# Patient Record
Sex: FEMALE | Race: WHITE | Employment: OTHER | URBAN - METROPOLITAN AREA
[De-identification: names, ages, dates, MRNs, and addresses within clinical notes are randomized per-mention and may not be internally consistent; named-entity substitution may affect disease eponyms.]

---

## 2017-07-20 PROBLEM — E03.9 HYPOTHYROIDISM, ADULT: Status: ACTIVE | Noted: 2017-07-20

## 2017-07-20 PROBLEM — E78.2 HYPERLIPIDEMIA, MIXED: Status: ACTIVE | Noted: 2017-07-20

## 2017-07-20 PROBLEM — I48.20 CHRONIC ATRIAL FIBRILLATION (HCC): Status: ACTIVE | Noted: 2017-07-20

## 2017-07-20 PROBLEM — R73.01 IFG (IMPAIRED FASTING GLUCOSE): Status: ACTIVE | Noted: 2017-07-20

## 2017-07-20 PROBLEM — H40.9 GLAUCOMA: Status: ACTIVE | Noted: 2017-07-20

## 2017-07-20 PROBLEM — Z86.73 HISTORY OF CVA (CEREBROVASCULAR ACCIDENT): Status: ACTIVE | Noted: 2017-07-20

## 2017-07-20 PROBLEM — I10 HTN (HYPERTENSION), BENIGN: Status: ACTIVE | Noted: 2017-07-20

## 2017-07-20 PROBLEM — K21.00 GASTROESOPHAGEAL REFLUX DISEASE WITH ESOPHAGITIS: Status: ACTIVE | Noted: 2017-07-20

## 2017-07-20 PROBLEM — M51.36 DDD (DEGENERATIVE DISC DISEASE), LUMBAR: Status: ACTIVE | Noted: 2017-07-20

## 2017-07-20 PROBLEM — M81.0 AGE RELATED OSTEOPOROSIS: Status: ACTIVE | Noted: 2017-07-20

## 2017-07-21 PROBLEM — M15.9 PRIMARY OSTEOARTHRITIS INVOLVING MULTIPLE JOINTS: Status: ACTIVE | Noted: 2017-07-21

## 2017-08-10 PROBLEM — K40.90 RIGHT INGUINAL HERNIA: Status: ACTIVE | Noted: 2017-08-10

## 2018-06-18 ENCOUNTER — HOSPITAL ENCOUNTER (OUTPATIENT)
Dept: LAB | Age: 83
Discharge: HOME | End: 2018-06-18
Attending: INTERNAL MEDICINE
Payer: MEDICARE

## 2018-06-18 DIAGNOSIS — R53.83 FATIGUE, UNSPECIFIED TYPE: ICD-10-CM

## 2018-06-18 LAB
T4 FREE SERPL-MCNC: 1.5 NG/DL (ref 0.78–1.46)
TSH SERPL DL<=0.005 MIU/L-ACNC: 0.9 UIU/ML (ref 0.36–3.74)

## 2018-06-18 PROCEDURE — 84443 ASSAY THYROID STIM HORMONE: CPT | Performed by: INTERNAL MEDICINE

## 2018-06-18 PROCEDURE — 84439 ASSAY OF FREE THYROXINE: CPT | Performed by: INTERNAL MEDICINE

## 2018-10-10 RX ORDER — SODIUM CHLORIDE 0.9 % (FLUSH) 0.9 %
5-10 SYRINGE (ML) INJECTION AS NEEDED
Status: CANCELLED | OUTPATIENT
Start: 2018-10-10

## 2018-10-10 RX ORDER — SODIUM CHLORIDE 0.9 % (FLUSH) 0.9 %
5-10 SYRINGE (ML) INJECTION EVERY 8 HOURS
Status: CANCELLED | OUTPATIENT
Start: 2018-10-10

## 2018-10-11 ENCOUNTER — ANESTHESIA EVENT (OUTPATIENT)
Dept: SURGERY | Age: 83
End: 2018-10-11
Payer: MEDICARE

## 2018-10-11 RX ORDER — SODIUM CHLORIDE 0.9 % (FLUSH) 0.9 %
5-10 SYRINGE (ML) INJECTION EVERY 8 HOURS
Status: CANCELLED | OUTPATIENT
Start: 2018-10-11

## 2018-10-11 RX ORDER — SODIUM CHLORIDE 0.9 % (FLUSH) 0.9 %
5-10 SYRINGE (ML) INJECTION AS NEEDED
Status: CANCELLED | OUTPATIENT
Start: 2018-10-11

## 2018-10-11 NOTE — ANESTHESIA PREPROCEDURE EVALUATION
Anesthetic History Review of Systems / Medical History Patient summary reviewed, nursing notes reviewed and pertinent labs reviewed Pulmonary Neuro/Psych CVA Cardiovascular Hypertension Dysrhythmias : atrial fibrillation Hyperlipidemia Exercise tolerance: >4 METS Comments: TTE Sept 2017:  LVEF 50-55%. Aortic valve sclerosis w mild AR. Mild-moderate MR. Moderate TR.  
GI/Hepatic/Renal 
  
GERD: well controlled PUD (remote hx) Endo/Other Hypothyroidism: well controlled Arthritis Other Findings Physical Exam 
 
Airway Mallampati: I 
TM Distance: 4 - 6 cm Neck ROM: normal range of motion Mouth opening: Normal 
 
 Cardiovascular Rhythm: irregular Rate: normal 
 
 
 
 Dental 
No notable dental hx Pulmonary Breath sounds clear to auscultation Abdominal 
GI exam deferred Other Findings Anesthetic Plan ASA: 3 Anesthesia type: spinal 
Pt and daughter requesting avoidance of general anesthesia if possible Anesthetic plan and risks discussed with: Patient and Son / Daughter

## 2018-10-12 ENCOUNTER — HOSPITAL ENCOUNTER (OUTPATIENT)
Age: 83
Setting detail: OUTPATIENT SURGERY
Discharge: HOME | End: 2018-10-12
Attending: SURGERY | Admitting: SURGERY
Payer: MEDICARE

## 2018-10-12 ENCOUNTER — ANESTHESIA (OUTPATIENT)
Dept: SURGERY | Age: 83
End: 2018-10-12
Payer: MEDICARE

## 2018-10-12 VITALS
DIASTOLIC BLOOD PRESSURE: 75 MMHG | BODY MASS INDEX: 25.61 KG/M2 | SYSTOLIC BLOOD PRESSURE: 167 MMHG | RESPIRATION RATE: 15 BRPM | OXYGEN SATURATION: 100 % | HEART RATE: 67 BPM | TEMPERATURE: 98.1 F | WEIGHT: 126.8 LBS

## 2018-10-12 DIAGNOSIS — K40.90 RIGHT INGUINAL HERNIA: Primary | ICD-10-CM

## 2018-10-12 LAB — HGB BLD-MCNC: 13.8 G/DL (ref 11.7–15.4)

## 2018-10-12 PROCEDURE — 77030007880 HC KT SPN EPDRL BBMI -B: Performed by: ANESTHESIOLOGY

## 2018-10-12 PROCEDURE — 76060000033 HC ANESTHESIA 1 TO 1.5 HR: Performed by: SURGERY

## 2018-10-12 PROCEDURE — 77030018836 HC SOL IRR NACL ICUM -A: Performed by: SURGERY

## 2018-10-12 PROCEDURE — 74011000250 HC RX REV CODE- 250

## 2018-10-12 PROCEDURE — 77030002933 HC SUT MCRYL J&J -A: Performed by: SURGERY

## 2018-10-12 PROCEDURE — 77030032490 HC SLV COMPR SCD KNE COVD -B: Performed by: SURGERY

## 2018-10-12 PROCEDURE — 76210000020 HC REC RM PH II FIRST 0.5 HR: Performed by: SURGERY

## 2018-10-12 PROCEDURE — 74011250637 HC RX REV CODE- 250/637: Performed by: ANESTHESIOLOGY

## 2018-10-12 PROCEDURE — 77030002996 HC SUT SLK J&J -A: Performed by: SURGERY

## 2018-10-12 PROCEDURE — 77030003665 HC NDL SPN BBMI -A: Performed by: ANESTHESIOLOGY

## 2018-10-12 PROCEDURE — 74011250636 HC RX REV CODE- 250/636

## 2018-10-12 PROCEDURE — 74011250637 HC RX REV CODE- 250/637

## 2018-10-12 PROCEDURE — 85018 HEMOGLOBIN: CPT

## 2018-10-12 PROCEDURE — 77030031139 HC SUT VCRL2 J&J -A: Performed by: SURGERY

## 2018-10-12 PROCEDURE — 77030039266 HC ADH SKN EXOFIN S2SG -A: Performed by: SURGERY

## 2018-10-12 PROCEDURE — C1781 MESH (IMPLANTABLE): HCPCS | Performed by: SURGERY

## 2018-10-12 PROCEDURE — 77030020782 HC GWN BAIR PAWS FLX 3M -B: Performed by: ANESTHESIOLOGY

## 2018-10-12 PROCEDURE — 74011250636 HC RX REV CODE- 250/636: Performed by: SURGERY

## 2018-10-12 PROCEDURE — 77030002986 HC SUT PROL J&J -A: Performed by: SURGERY

## 2018-10-12 PROCEDURE — 77030020143 HC AIRWY LARYN INTUB CGAS -A: Performed by: ANESTHESIOLOGY

## 2018-10-12 PROCEDURE — 74011250636 HC RX REV CODE- 250/636: Performed by: ANESTHESIOLOGY

## 2018-10-12 PROCEDURE — 76010000149 HC OR TIME 1 TO 1.5 HR: Performed by: SURGERY

## 2018-10-12 PROCEDURE — 76210000006 HC OR PH I REC 0.5 TO 1 HR: Performed by: SURGERY

## 2018-10-12 DEVICE — MESH HERN W1.8XL4IN INGUINAL POLYPR PRESHAPED SPERMATIC CRD: Type: IMPLANTABLE DEVICE | Site: GROIN | Status: FUNCTIONAL

## 2018-10-12 RX ORDER — CEFAZOLIN SODIUM/WATER 2 G/20 ML
2 SYRINGE (ML) INTRAVENOUS ONCE
Status: COMPLETED | OUTPATIENT
Start: 2018-10-12 | End: 2018-10-12

## 2018-10-12 RX ORDER — GUAIFENESIN 100 MG/5ML
81 LIQUID (ML) ORAL ONCE
Status: DISCONTINUED | OUTPATIENT
Start: 2018-10-12 | End: 2018-10-12 | Stop reason: HOSPADM

## 2018-10-12 RX ORDER — GUAIFENESIN 100 MG/5ML
81 LIQUID (ML) ORAL DAILY
Status: DISCONTINUED | OUTPATIENT
Start: 2018-10-12 | End: 2018-10-12 | Stop reason: CLARIF

## 2018-10-12 RX ORDER — SODIUM CHLORIDE, SODIUM LACTATE, POTASSIUM CHLORIDE, CALCIUM CHLORIDE 600; 310; 30; 20 MG/100ML; MG/100ML; MG/100ML; MG/100ML
100 INJECTION, SOLUTION INTRAVENOUS CONTINUOUS
Status: DISCONTINUED | OUTPATIENT
Start: 2018-10-12 | End: 2018-10-12 | Stop reason: HOSPADM

## 2018-10-12 RX ORDER — GUAIFENESIN 100 MG/5ML
LIQUID (ML) ORAL
Status: COMPLETED
Start: 2018-10-12 | End: 2018-10-12

## 2018-10-12 RX ORDER — BUPIVACAINE HYDROCHLORIDE 7.5 MG/ML
INJECTION, SOLUTION EPIDURAL; RETROBULBAR AS NEEDED
Status: DISCONTINUED | OUTPATIENT
Start: 2018-10-12 | End: 2018-10-12 | Stop reason: HOSPADM

## 2018-10-12 RX ORDER — PROPOFOL 10 MG/ML
INJECTION, EMULSION INTRAVENOUS
Status: DISCONTINUED | OUTPATIENT
Start: 2018-10-12 | End: 2018-10-12 | Stop reason: HOSPADM

## 2018-10-12 RX ORDER — OXYCODONE AND ACETAMINOPHEN 5; 325 MG/1; MG/1
TABLET ORAL
Qty: 24 TAB | Refills: 0 | Status: SHIPPED | OUTPATIENT
Start: 2018-10-12 | End: 2019-11-11

## 2018-10-12 RX ORDER — ONDANSETRON 2 MG/ML
INJECTION INTRAMUSCULAR; INTRAVENOUS AS NEEDED
Status: DISCONTINUED | OUTPATIENT
Start: 2018-10-12 | End: 2018-10-12 | Stop reason: HOSPADM

## 2018-10-12 RX ORDER — NALOXONE HYDROCHLORIDE 0.4 MG/ML
0.1 INJECTION, SOLUTION INTRAMUSCULAR; INTRAVENOUS; SUBCUTANEOUS AS NEEDED
Status: DISCONTINUED | OUTPATIENT
Start: 2018-10-12 | End: 2018-10-12 | Stop reason: HOSPADM

## 2018-10-12 RX ORDER — HYDROMORPHONE HYDROCHLORIDE 2 MG/ML
0.5 INJECTION, SOLUTION INTRAMUSCULAR; INTRAVENOUS; SUBCUTANEOUS
Status: DISCONTINUED | OUTPATIENT
Start: 2018-10-12 | End: 2018-10-12 | Stop reason: HOSPADM

## 2018-10-12 RX ORDER — SODIUM CHLORIDE 0.9 % (FLUSH) 0.9 %
5-10 SYRINGE (ML) INJECTION AS NEEDED
Status: DISCONTINUED | OUTPATIENT
Start: 2018-10-12 | End: 2018-10-12 | Stop reason: HOSPADM

## 2018-10-12 RX ORDER — LIDOCAINE HYDROCHLORIDE 10 MG/ML
0.1 INJECTION INFILTRATION; PERINEURAL AS NEEDED
Status: DISCONTINUED | OUTPATIENT
Start: 2018-10-12 | End: 2018-10-12 | Stop reason: HOSPADM

## 2018-10-12 RX ORDER — PROPOFOL 10 MG/ML
INJECTION, EMULSION INTRAVENOUS AS NEEDED
Status: DISCONTINUED | OUTPATIENT
Start: 2018-10-12 | End: 2018-10-12 | Stop reason: HOSPADM

## 2018-10-12 RX ORDER — OXYCODONE HYDROCHLORIDE 5 MG/1
5 TABLET ORAL
Status: COMPLETED | OUTPATIENT
Start: 2018-10-12 | End: 2018-10-12

## 2018-10-12 RX ORDER — FENTANYL CITRATE 50 UG/ML
INJECTION, SOLUTION INTRAMUSCULAR; INTRAVENOUS AS NEEDED
Status: DISCONTINUED | OUTPATIENT
Start: 2018-10-12 | End: 2018-10-12 | Stop reason: HOSPADM

## 2018-10-12 RX ADMIN — FENTANYL CITRATE 25 MCG: 50 INJECTION, SOLUTION INTRAMUSCULAR; INTRAVENOUS at 07:43

## 2018-10-12 RX ADMIN — PROPOFOL 50 MCG/KG/MIN: 10 INJECTION, EMULSION INTRAVENOUS at 07:30

## 2018-10-12 RX ADMIN — PROPOFOL 120 MG: 10 INJECTION, EMULSION INTRAVENOUS at 07:50

## 2018-10-12 RX ADMIN — ASPIRIN 81 MG 81 MG: 81 TABLET ORAL at 07:12

## 2018-10-12 RX ADMIN — Medication 2 G: at 07:30

## 2018-10-12 RX ADMIN — PROPOFOL 20 MG: 10 INJECTION, EMULSION INTRAVENOUS at 07:30

## 2018-10-12 RX ADMIN — SODIUM CHLORIDE, SODIUM LACTATE, POTASSIUM CHLORIDE, AND CALCIUM CHLORIDE 100 ML/HR: 600; 310; 30; 20 INJECTION, SOLUTION INTRAVENOUS at 06:18

## 2018-10-12 RX ADMIN — BUPIVACAINE HYDROCHLORIDE 1.8 ML: 7.5 INJECTION, SOLUTION EPIDURAL; RETROBULBAR at 07:22

## 2018-10-12 RX ADMIN — OXYCODONE HYDROCHLORIDE 2.5 MG: 5 TABLET ORAL at 08:57

## 2018-10-12 RX ADMIN — ONDANSETRON 4 MG: 2 INJECTION INTRAMUSCULAR; INTRAVENOUS at 07:57

## 2018-10-12 NOTE — ANESTHESIA PROCEDURE NOTES
Spinal Block Start time: 10/12/2018 7:20 AM 
End time: 10/12/2018 7:22 AM 
Performed by: Rocio Rose Authorized by: Rocio Rose  
 
Pre-procedure: Indications: primary anesthetic  Preanesthetic Checklist: patient identified, risks and benefits discussed, anesthesia consent, site marked, patient being monitored and timeout performed Timeout Time: 07:20 Spinal Block:  
Patient Position:  Seated Prep Region:  Lumbar Prep: chlorhexidine and patient draped Location:  L3-4 Technique:  Single shot Local:  Lidocaine 1% Needle:  
Needle Type:  Quincke Needle Gauge:  22 G Attempts:  1 Events: CSF confirmed and no blood with aspiration Assessment: 
Insertion:  Uncomplicated Patient tolerance:  Patient tolerated the procedure well with no immediate complications

## 2018-10-12 NOTE — IP AVS SNAPSHOT
[address] [facility] 
 
 
 [address] [facility] [address] 
[phone] Patient: Carmen Wetzel MRN: [id number] NGD:[id number] About your hospitalization You were admitted on:  October 12, 2018 You last received care in the:  [facility] You were discharged on:  October 12, 2018 Why you were hospitalized Your primary diagnosis was:  Not on File Follow-up Information Follow up With Details Comments Contact Info Alistair Rincon Brothers, DO   [address] [facility] [place] [address] 
[phone] Candace Arredondo MD Follow up on 10/25/2018 10:15 am [address] 
[address] [address] 
[phone] Your Scheduled Appointments Thursday October 25, 2018 10:15 AM EDT Global Post Op with HELENE Gallo  
[facility] ([facility] MAIN) Jazmin [address] [facility]  
[phone] Discharge Orders None A check renay indicates which time of day the medication should be taken. My Medications START taking these medications Instructions Each Dose to Equal  
 Morning Noon Evening Bedtime  
 oxyCODONE-acetaminophen 5-325 mg per tablet Commonly known as:  PERCOCET Your last dose was: Your next dose is:    
   
   
 1 tab by mouth every four hours prn pain CONTINUE taking these medications Instructions Each Dose to Equal  
 Morning Noon Evening Bedtime  
 amLODIPine 5 mg tablet Commonly known as:  Francisco Peterson Your last dose was: Your next dose is: Take 1 Tab by mouth daily. 5 mg  
    
   
   
   
  
 benazepril 40 mg tablet Commonly known as:  LOTENSIN Your last dose was: Your next dose is: Take 1 Tab by mouth daily. 40 mg CALCIUM 600 + D 600-125 mg-unit Tab Generic drug:  calcium-cholecalciferol (d3) Your last dose was: Your next dose is: Take  by mouth. dorzolamide-timolol 22.3-6.8 mg/mL ophthalmic solution Commonly known as:  COSOPT Your last dose was: Your next dose is:    
   
   
 INT ONE GTT INTO OU BID  
     
   
   
   
  
 levothyroxine 75 mcg tablet Commonly known as:  SYNTHROID Your last dose was: Your next dose is: Take 1 Tab by mouth Daily (before breakfast). Indications: hypothyroidism 75 mcg LUMIGAN 0.01 % ophthalmic drops Generic drug:  bimatoprost  
   
Your last dose was: Your next dose is: INT 1 GTT IN OU HS  
     
   
   
   
  
 metoprolol succinate 100 mg tablet Commonly known as:  TOPROL XL Your last dose was: Your next dose is: Take 1 Tab by mouth daily. 100 mg  
    
   
   
   
  
 omeprazole 20 mg capsule Commonly known as:  PRILOSEC Your last dose was: Your next dose is: Take 1 Cap by mouth daily. 20 mg  
    
   
   
   
  
 rivaroxaban 20 mg Tab tablet Commonly known as:  Ruchi Chelsea Your last dose was: Your next dose is: Take 1 Tab by mouth daily (with dinner). 20 mg Where to Get Your Medications Information on where to get these meds will be given to you by the nurse or doctor. ! Ask your nurse or doctor about these medications  
  oxyCODONE-acetaminophen 5-325 mg per tablet Opioid Education Prescription Opioids: What You Need to Know: 
 
 
ACTIVITY · As tolerated and as directed by your doctor. · You may shower starting tomorrow but do not take a bath until released by your doctor. · Avoid lifting more than 5 pounds (pulling and straining). Avoid excessive use of stairs. · Take deep breathes and support incision with pillow when you cough. DIET · Clear liquids until no nausea or vomiting; then light diet for the first day. · Advance to regular diet on second day, unless directed by your doctor. · If nausea or vomiting continues, call your doctor. You may notice that your bowel movements are not regular right after your surgery. This is common. Try to avoid constipation and straining with bowel movements. You may want to take a fiber supplement every day (Miralax). If you have not had a bowel movement after a couple of days, ask your doctor about taking a mild laxative. CALL YOUR DOCTOR IF  
· Excessive bleeding that does not stop after holding pressure over the area. · Temperature of 101 degrees F or above. · Redness, excessive swelling or bruising, and/ or green or yellow, smelly discharge from incision. AFTER ANESTHESIA · For the first 24 hours: DO NOT drive, drink alcoholic beverages, or make important decisions. · Be aware of dizziness following anesthesia and while taking pain medication. · Limit your activities · Do not  operate hazardous machinery · If you have not urinated within 8 hours after discharge, please contact your surgeon on call. *  Please give a list of your current medications to your Primary Care Provider. *  Please update this list whenever your medications are discontinued, doses are 
    changed, or new medications (including over-the-counter products) are added. *  Please carry medication information at all times in case of emergency situations. No smoking/ No tobacco products/ Avoid exposure to second hand smoke Surgeon General's Warning:  Quitting smoking now greatly reduces serious risk to your health. Obesity, smoking, and sedentary lifestyle greatly increases your risk for illness A healthy diet, regular physical exercise & weight monitoring are important for maintaining a healthy lifestyle Recognize signs and symptoms of STROKE: 
F-face looks uneven A-arms unable to move or move unevenly S-speech slurred or non-existent T-time-call 911 as soon as signs and symptoms begin-DO NOT go Back to bed or wait to see if you get better-TIME IS BRAIN. ACO Transitions of Care Introducing [employer] [address] Kylie Martinez offers a voluntary care coordination program to provide high quality service and care to [facility] fee-for-service beneficiaries. Stephanie Rodney was designed to help you enhance your health and well-being through the following services: ? Transitions of Care  support for individuals who are transitioning from one care setting to another (example: Hospital to home). ? Chronic and Complex Care Coordination  support for individuals and caregivers of those with serious or chronic illnesses or with more than one chronic (ongoing) condition and those who take a number of different medications. If you meet specific medical criteria, a [facility] may call you directly to coordinate your care with your primary care physician and your other care providers. For questions about the [facility] programs, please, contact your physicians office. For general questions or additional information about Accountable Care Organizations: 
Please visit www.medicare.gov/acos. html or call 1-800-MEDICARE (8-148.834.6618) TTY users should call 4-912.874.2841. Introducing [facility]! Lilia Holcomb introduces [employer] patient portal. Now you can access parts of your medical record, email your doctor's office, and request medication refills online.    
 
1. In your internet browser, go to [email or web address] 2. Click on the First Time User? Click Here link in the Sign In box. You will see the New Member Sign Up page. 3. Enter your [employer] Access Code exactly as it appears below. You will not need to use this code after youve completed the sign-up process. If you do not sign up before the expiration date, you must request a new code. · [employer] Access Code: [email or web address] Expires: 12/19/2018  3:24 PM 
 
4. Enter the last four digits of your Social Security Number (xxxx) and Date of Birth (mm/dd/yyyy) as indicated and click Submit. You will be taken to the next sign-up page. 5. Create a [employer] ID. This will be your [employer] login ID and cannot be changed, so think of one that is secure and easy to remember. 6. Create a [employer] password. You can change your password at any time. 7. Enter your Password Reset Question and Answer. This can be used at a later time if you forget your password. 8. Enter your e-mail address. You will receive e-mail notification when new information is available in [address] 9. Click Sign Up. You can now view and download portions of your medical record. 10. Click the Download Summary menu link to download a portable copy of your medical information. If you have questions, please visit the Frequently Asked Questions section of the [employer] website. Remember, [employer] is NOT to be used for urgent needs. For medical emergencies, dial 911. Now available from your iPhone and Android! Introducing Mo Noyola As a [facility] patient, I wanted to make you aware of our electronic visit tool called Mo Noyola. [facility] 24/7 allows you to connect within minutes with a medical provider 24 hours a day, seven days a week via a mobile device or tablet or logging into a secure website from your computer. You can access Mo Noyola from anywhere in the [place]. A virtual visit might be right for you when you have a simple condition and feel like you just dont want to get out of bed, or cant get away from work for an appointment, when your regular [employer] provider is not available (evenings, weekends or holidays), or when youre out of town and need minor care. Electronic visits cost only $49 and if the [employer] 24/7 provider determines a prescription is needed to treat your condition, one can be electronically transmitted to a nearby pharmacy*. Please take a moment to enroll today if you have not already done so. The enrollment process is free and takes just a few minutes. To enroll, please download the [employer] 24/7 judit to your tablet or phone, or visit [email or web address] to enroll on your computer. And, as an [address] [place] [address] patient with a [employer] account, the results of your visits will be scanned into your electronic medical record and your primary care provider will be able to view the scanned results. We urge you to continue to see your regular [employer] provider for your ongoing medical care. And while your primary care provider may not be the one available when you seek a [employer] virtual visit, the peace of mind you get from getting a real diagnosis real time can be priceless. For more information on [employer], view our Frequently Asked Questions (FAQs) at [email or web address]. Sincerely, 
 
Willma Hodgkins, MD 
Chief Medical Officer Uzma Martinez *:  certain medications cannot be prescribed via [employer] Providers Seen During Your Hospitalization Provider Specialty Primary office phone Dagmar Lorenz MD General Surgery [phone] Your Primary Care Physician (PCP) Primary Care Physician Office Phone Office Fax Patricia 86, [address] [facility] [phone] You are allergic to the following Allergen Reactions Actonel (Risedronate) Other (comments) GI Problems Atorvastatin Myalgia Fosamax (Alendronate) Unknown (comments) Ketoprofen Other (comments) Peptic ulcer disease Moexipril Cough Recent Documentation Weight BMI OB Status Smoking Status 57.5 kg 25.61 kg/m2 Menopause Never Smoker Emergency Contacts Name Discharge Info Relation Home Work Mobile Nathalie Haynes  Daughter [21] [phone] Colten Winston  Other Relative [6] [phone] Patient Belongings The following personal items are in your possession at time of discharge: 
  Dental Appliances: None         Home Medications: None   Jewelry: None  Clothing: Footwear, Pants, Shirt, Undergarments, With patient    Other Valuables: None  Personal Items Sent to Safe: none Please provide this summary of care documentation to your next provider. Signatures-by signing, you are acknowledging that this After Visit Summary has been reviewed with you and you have received a copy. Patient Signature:  ____________________________________________________________ Date:  ____________________________________________________________  
  
Celester Rota Provider Signature:  ____________________________________________________________ Date:  ____________________________________________________________

## 2018-10-12 NOTE — ANESTHESIA POSTPROCEDURE EVALUATION
Post-Anesthesia Evaluation and Assessment Patient: Carmen Wetzel MRN: [id number]  SSN: [id number] YOB: 1931  Age: 80 y.o. Sex: female Cardiovascular Function/Vital Signs Visit Vitals  /70  Pulse 65  Temp 36.6 °C (97.9 °F)  Resp 16  Wt 57.5 kg (126 lb 12.8 oz)  SpO2 97%  BMI 25.61 kg/m2 Patient is status post spinal, general anesthesia for Procedure(s): OPEN RIGHT HERNIA INGUINAL REPAIR. Nausea/Vomiting: None Postoperative hydration reviewed and adequate. Pain: 
Pain Scale 1: Visual (10/12/18 2518) Pain Intensity 1: 0 (10/12/18 5038) Managed Neurological Status:  
Neuro (WDL): Exceptions to [facility] (10/12/18 8040) Neuro Neurologic State: Sleeping (10/12/18 3376) At baseline Mental Status and Level of Consciousness: Arousable Pulmonary Status:  
O2 Device: Room air (10/12/18 9464) Adequate oxygenation and airway patent Complications related to anesthesia: None Post-anesthesia assessment completed. No concerns Signed By: Emily Rucker MD   
 October 12, 2018

## 2018-10-13 NOTE — OP NOTES
[facility] REPORT    Cara Whittington  MR#: [id number]  : 10/23/1931  ACCOUNT #: [de-identified]   DATE OF SERVICE: 10/12/2018    PREOPERATIVE DIAGNOSIS:  Right inguinal hernia. POSTOPERATIVE DIAGNOSIS:  Right inguinal hernia. PROCEDURE PERFORMED:  Open right inguinal hernia repair with mesh. SURGEON:  Claudeen Siren, MD    ASSISTANT:  None. ANESTHESIA:  General anesthetic. ESTIMATED BLOOD LOSS:  10 mL. COMPLICATIONS:  None. IMPLANT:  Bard polypropylene mesh. SPECIMENS REMOVED:  none    CONDITION:  Patient was stable. INDICATION:  The patient is an 66-year-old female who presented with a palpable hernia in the right groin. After a period of conservative management, the patient began experiencing more pain and difficulty with standing and walking. She wished to have the hernia repaired. The risks, benefits and alternatives to surgical repair of the hernia were discussed in detail with the patient prior to surgery. Appropriate consent was given. PROCEDURE IN DETAIL:  The patient was taken to the operating room. She underwent a spinal anesthetic, which was then converted to a general anesthetic. The area was prepped and draped in sterile fashion. IV antibiotics were given upon induction of anesthetic. Timeout was performed identifying the patient, procedure and laterality. Curved incision was made above the inguinal crease with a 15 blade and careful dissection through the subcutaneous layers of tissue were performed with electrocautery. The external oblique fascia was identified. A 15 blade was used to open the external oblique fascia. This was extended with Metzenbaum scissors to the pubic tubercle. Fascial flaps were raised superiorly and inferiorly. A direct hernia sac was identified and was carefully dissected out from the surrounding tissues.   The hernia sac was opened at its apex and assurance of no incarceration or strangulation of bowel was performed. The bowel was reduced into the abdominal cavity with no difficulty. The hernia sac was then amputated at the level of the internal ring by suture ligation with 2-0 Vicryl suture. The distal sac was amputated with electrocautery. Mesh repair was then performed. The polypropylene pre-shaped mesh was secured at the pubic tubercle with 0 Prolene suture and then in a running fashion along the shelving edge of the inguinal ligament. Superiorly, the mesh was secured to the transversalis fascia in an interrupted fashion. The external oblique fascia was closed over the mesh with a running 3-0 Vicryl suture. Subcuticular tissue was closed with interrupted 3-0 Vicryl. 30 mL of Marcaine was injected for anesthetic purposes and the skin layer was closed with a subcuticular 4-0 Monocryl. Steri-Strips and gauze dressings were applied. The patient tolerated the procedure well with no complication, was awakened, extubated, and taken to recovery in stable condition.       MD Jaime Barrera  D: 10/12/2018 14:46     T: 10/13/2018 00:09  JOB #: [id number]

## 2018-10-25 PROBLEM — Z87.19 S/P RIGHT INGUINAL HERNIA REPAIR: Status: ACTIVE | Noted: 2018-10-25

## 2018-10-25 PROBLEM — Z98.890 S/P RIGHT INGUINAL HERNIA REPAIR: Status: ACTIVE | Noted: 2018-10-25

## 2018-11-01 ENCOUNTER — APPOINTMENT (OUTPATIENT)
Dept: CT IMAGING | Age: 83
End: 2018-11-01
Attending: EMERGENCY MEDICINE
Payer: MEDICARE

## 2018-11-01 ENCOUNTER — HOSPITAL ENCOUNTER (EMERGENCY)
Age: 83
Discharge: HOME | End: 2018-11-01
Attending: EMERGENCY MEDICINE
Payer: MEDICARE

## 2018-11-01 VITALS
RESPIRATION RATE: 18 BRPM | WEIGHT: 125 LBS | TEMPERATURE: 98.3 F | OXYGEN SATURATION: 98 % | HEART RATE: 75 BPM | SYSTOLIC BLOOD PRESSURE: 175 MMHG | DIASTOLIC BLOOD PRESSURE: 80 MMHG | BODY MASS INDEX: 25.2 KG/M2 | HEIGHT: 59 IN

## 2018-11-01 DIAGNOSIS — S30.1XXA ABDOMINAL WALL SEROMA, INITIAL ENCOUNTER: ICD-10-CM

## 2018-11-01 DIAGNOSIS — K62.5 RECTAL BLEEDING: ICD-10-CM

## 2018-11-01 DIAGNOSIS — R10.9 NONSPECIFIC ABDOMINAL PAIN: Primary | ICD-10-CM

## 2018-11-01 LAB
ABO + RH BLD: NORMAL
ANION GAP SERPL CALC-SCNC: 6 MMOL/L (ref 7–16)
BASOPHILS # BLD: 0.1 K/UL (ref 0–0.2)
BASOPHILS NFR BLD: 1 % (ref 0–2)
BLOOD GROUP ANTIBODIES SERPL: NORMAL
BUN SERPL-MCNC: 15 MG/DL (ref 8–23)
CALCIUM SERPL-MCNC: 8.2 MG/DL (ref 8.3–10.4)
CHLORIDE SERPL-SCNC: 108 MMOL/L (ref 98–107)
CO2 SERPL-SCNC: 29 MMOL/L (ref 21–32)
CREAT SERPL-MCNC: 0.76 MG/DL (ref 0.6–1)
DIFFERENTIAL METHOD BLD: NORMAL
EOSINOPHIL # BLD: 0.1 K/UL (ref 0–0.8)
EOSINOPHIL NFR BLD: 2 % (ref 0.5–7.8)
ERYTHROCYTE [DISTWIDTH] IN BLOOD BY AUTOMATED COUNT: 14.1 %
GLUCOSE SERPL-MCNC: 90 MG/DL (ref 65–100)
HCT VFR BLD AUTO: 40.8 % (ref 35.8–46.3)
HGB BLD-MCNC: 13.4 G/DL (ref 11.7–15.4)
IMM GRANULOCYTES # BLD: 0 K/UL (ref 0–0.5)
IMM GRANULOCYTES NFR BLD AUTO: 1 % (ref 0–5)
LYMPHOCYTES # BLD: 1.4 K/UL (ref 0.5–4.6)
LYMPHOCYTES NFR BLD: 24 % (ref 13–44)
MCH RBC QN AUTO: 29.4 PG (ref 26.1–32.9)
MCHC RBC AUTO-ENTMCNC: 32.8 G/DL (ref 31.4–35)
MCV RBC AUTO: 89.5 FL (ref 79.6–97.8)
MONOCYTES # BLD: 0.4 K/UL (ref 0.1–1.3)
MONOCYTES NFR BLD: 8 % (ref 4–12)
NEUTS SEG # BLD: 3.8 K/UL (ref 1.7–8.2)
NEUTS SEG NFR BLD: 65 % (ref 43–78)
NRBC # BLD: 0 K/UL (ref 0–0.2)
PLATELET # BLD AUTO: 243 K/UL (ref 150–450)
PMV BLD AUTO: 9.8 FL (ref 9.4–12.3)
POTASSIUM SERPL-SCNC: 3.4 MMOL/L (ref 3.5–5.1)
RBC # BLD AUTO: 4.56 M/UL (ref 4.05–5.2)
SODIUM SERPL-SCNC: 143 MMOL/L (ref 136–145)
SPECIMEN EXP DATE BLD: NORMAL
WBC # BLD AUTO: 5.8 K/UL (ref 4.3–11.1)

## 2018-11-01 PROCEDURE — 99284 EMERGENCY DEPT VISIT MOD MDM: CPT | Performed by: EMERGENCY MEDICINE

## 2018-11-01 PROCEDURE — 86901 BLOOD TYPING SEROLOGIC RH(D): CPT

## 2018-11-01 PROCEDURE — 74011250636 HC RX REV CODE- 250/636: Performed by: EMERGENCY MEDICINE

## 2018-11-01 PROCEDURE — 74177 CT ABD & PELVIS W/CONTRAST: CPT

## 2018-11-01 PROCEDURE — 80048 BASIC METABOLIC PNL TOTAL CA: CPT

## 2018-11-01 PROCEDURE — 74011636320 HC RX REV CODE- 636/320: Performed by: EMERGENCY MEDICINE

## 2018-11-01 PROCEDURE — 74011000258 HC RX REV CODE- 258: Performed by: EMERGENCY MEDICINE

## 2018-11-01 PROCEDURE — 96361 HYDRATE IV INFUSION ADD-ON: CPT | Performed by: EMERGENCY MEDICINE

## 2018-11-01 PROCEDURE — 85025 COMPLETE CBC W/AUTO DIFF WBC: CPT

## 2018-11-01 PROCEDURE — 96375 TX/PRO/DX INJ NEW DRUG ADDON: CPT | Performed by: EMERGENCY MEDICINE

## 2018-11-01 PROCEDURE — 96374 THER/PROPH/DIAG INJ IV PUSH: CPT | Performed by: EMERGENCY MEDICINE

## 2018-11-01 RX ORDER — SODIUM CHLORIDE 9 MG/ML
150 INJECTION, SOLUTION INTRAVENOUS CONTINUOUS
Status: DISCONTINUED | OUTPATIENT
Start: 2018-11-01 | End: 2018-11-01 | Stop reason: HOSPADM

## 2018-11-01 RX ORDER — ONDANSETRON 2 MG/ML
4 INJECTION INTRAMUSCULAR; INTRAVENOUS
Status: COMPLETED | OUTPATIENT
Start: 2018-11-01 | End: 2018-11-01

## 2018-11-01 RX ORDER — SODIUM CHLORIDE 0.9 % (FLUSH) 0.9 %
10 SYRINGE (ML) INJECTION
Status: COMPLETED | OUTPATIENT
Start: 2018-11-01 | End: 2018-11-01

## 2018-11-01 RX ORDER — MORPHINE SULFATE 2 MG/ML
2 INJECTION, SOLUTION INTRAMUSCULAR; INTRAVENOUS ONCE
Status: COMPLETED | OUTPATIENT
Start: 2018-11-01 | End: 2018-11-01

## 2018-11-01 RX ADMIN — SODIUM CHLORIDE 150 ML/HR: 900 INJECTION, SOLUTION INTRAVENOUS at 11:44

## 2018-11-01 RX ADMIN — Medication 10 ML: at 13:00

## 2018-11-01 RX ADMIN — IOPAMIDOL 100 ML: 755 INJECTION, SOLUTION INTRAVENOUS at 13:00

## 2018-11-01 RX ADMIN — SODIUM CHLORIDE 100 ML: 900 INJECTION, SOLUTION INTRAVENOUS at 13:00

## 2018-11-01 RX ADMIN — ONDANSETRON 4 MG: 2 INJECTION INTRAMUSCULAR; INTRAVENOUS at 11:45

## 2018-11-01 RX ADMIN — MORPHINE SULFATE 2 MG: 2 INJECTION, SOLUTION INTRAMUSCULAR; INTRAVENOUS at 11:45

## 2018-11-01 NOTE — ED PROVIDER NOTES
Patient presents with complaints of rectal bleeding. She reports 3 episodes so far; onset yesterday. She also reports left-sided abdominal pain radiating 8/10 in intensity. No reported history of diverticulosis or diverticulitis. No prior GI bleeding. Patient currently takes anticoagulation for atrial fibrillation. Also had recent right inguinal hernia surgery. The history is provided by the patient. Rectal Bleeding This is a new problem. The current episode started yesterday. The stool is described as bloody coated. Associated symptoms include abdominal pain. Pertinent negatives include no flatus, no dysuria, no abdominal distention, no chills, no fever, no nausea, no back pain, no vomiting, no diarrhea and no constipation. Risk factors: anticoagulation. She has tried nothing for the symptoms. The treatment provided no relief. Past Medical History:  
Diagnosis Date  Age related osteoporosis 2017  Chronic atrial fibrillation ([address] 2017  Chronic pain   
 shoulder  DDD (degenerative disc disease), lumbar 2017  Gastroesophageal reflux disease with esophagitis 2017  
 controlled with medication  Glaucoma 2017  History of CVA (cerebrovascular accident) 2017  
 HTN (hypertension), benign 2017  Hyperlipidemia, mixed 2017  Hypertension  Hypothyroidism, adult 2017  IFG (impaired fasting glucose) 2017  Primary osteoarthritis involving multiple joints 2017  PUD (peptic ulcer disease) Past Surgical History:  
Procedure Laterality Date  HX  SECTION    
 x2  
 HX COLONOSCOPY    
 HX URIEL AND BSO    
 hysterectomy Family History:  
Problem Relation Age of Onset  No Known Problems Mother  Cancer Father   
     stomach  No Known Problems Sister  No Known Problems Brother  No Known Problems Sister  No Known Problems Sister  No Known Problems Sister  No Known Problems Brother  No Known Problems Brother  No Known Problems Brother  No Known Problems Brother  Coronary Artery Disease Neg Hx Social History Socioeconomic History  Marital status:  Spouse name: Not on file  Number of children: Not on file  Years of education: Not on file  Highest education level: Not on file Social Needs  Financial resource strain: Not on file  Food insecurity - worry: Not on file  Food insecurity - inability: Not on file  Transportation needs - medical: Not on file  Transportation needs - non-medical: Not on file Occupational History  Not on file Tobacco Use  Smoking status: Never Smoker  Smokeless tobacco: Never Used Substance and Sexual Activity  Alcohol use: No  
 Drug use: Not on file  Sexual activity: Not on file Other Topics Concern  Not on file Social History Narrative  Not on file ALLERGIES: Actonel [risedronate]; Atorvastatin; Fosamax [alendronate]; Ketoprofen; and Moexipril Review of Systems Constitutional: Negative for chills and fever. Gastrointestinal: Positive for abdominal pain and anal bleeding. Negative for abdominal distention, constipation, diarrhea, flatus, nausea and vomiting. Genitourinary: Negative for dysuria. Musculoskeletal: Negative for back pain. All other systems reviewed and are negative. Vitals:  
 11/01/18 1048 BP: 145/76 Pulse: 84 Resp: 18 Temp: 98.3 °F (36.8 °C) SpO2: 97% Weight: 56.7 kg (125 lb) Height: 4' 11\" (1.499 m) Physical Exam  
Constitutional: She is oriented to person, place, and time. She appears well-developed and well-nourished. HENT:  
Head: Normocephalic and atraumatic. Eyes: Conjunctivae and EOM are normal. Pupils are equal, round, and reactive to light. Neck: Normal range of motion. Neck supple. Cardiovascular: Normal rate and regular rhythm. Pulmonary/Chest: Effort normal and breath sounds normal.  
Abdominal: Soft. Bowel sounds are normal. She exhibits no distension and no mass. There is tenderness. There is no rebound and no guarding. No hernia. Tenderness across the upper abdomen and in the left side of the abdomen is well Genitourinary: Rectal exam shows guaiac negative stool. Genitourinary Comments: Rectal exam was performed there are no masses or hemorrhoids noted there is no blood noted on the glove and Hemoccult was negative. Examination of the surgical incision site demonstrates no evidence of infection however there does to be or a possible seroma or hematomain the subcutaneous tissues. Musculoskeletal: Normal range of motion. Neurological: She is alert and oriented to person, place, and time. Skin: Skin is warm and dry. Capillary refill takes less than 2 seconds. Psychiatric: She has a normal mood and affect. Her behavior is normal.  
Nursing note and vitals reviewed. MDM Number of Diagnoses or Management Options Amount and/or Complexity of Data Reviewed Clinical lab tests: ordered and reviewed Tests in the radiology section of CPT®: ordered and reviewed Independent visualization of images, tracings, or specimens: yes Risk of Complications, Morbidity, and/or Mortality Presenting problems: moderate Diagnostic procedures: moderate Management options: moderate Patient Progress Patient progress: stable Procedures

## 2018-11-01 NOTE — ED NOTES
Laboratory data andCT results were reviewed and discussed with the patient and family members. There does not appear to be any evidence of diverticulitis CT shows what is felt to be a seroma or hematoma in the incisional area from her recent inguinal hernia repair and there is no evidence of active bleeding noted based on rectal exam or stable hemoglobin. Patient will be discharged to follow up with primary care as needed return if worse

## 2018-11-01 NOTE — DISCHARGE INSTRUCTIONS
Abdominal Pain: Care Instructions  Your Care Instructions    Abdominal pain has many possible causes. Some aren't serious and get better on their own in a few days. Others need more testing and treatment. If your pain continues or gets worse, you need to be rechecked and may need more tests to find out what is wrong. You may need surgery to correct the problem. Don't ignore new symptoms, such as fever, nausea and vomiting, urination problems, pain that gets worse, and dizziness. These may be signs of a more serious problem. Your doctor may have recommended a follow-up visit in the next 8 to 12 hours. If you are not getting better, you may need more tests or treatment. The doctor has checked you carefully, but problems can develop later. If you notice any problems or new symptoms, get medical treatment right away. Follow-up care is a key part of your treatment and safety. Be sure to make and go to all appointments, and call your doctor if you are having problems. It's also a good idea to know your test results and keep a list of the medicines you take. How can you care for yourself at home? · Rest until you feel better. · To prevent dehydration, drink plenty of fluids, enough so that your urine is light yellow or clear like water. Choose water and other caffeine-free clear liquids until you feel better. If you have kidney, heart, or liver disease and have to limit fluids, talk with your doctor before you increase the amount of fluids you drink. · If your stomach is upset, eat mild foods, such as rice, dry toast or crackers, bananas, and applesauce. Try eating several small meals instead of two or three large ones. · Wait until 48 hours after all symptoms have gone away before you have spicy foods, alcohol, and drinks that contain caffeine. · Do not eat foods that are high in fat. · Avoid anti-inflammatory medicines such as aspirin, ibuprofen (Advil, Motrin), and naproxen (Aleve).  These can cause stomach upset. Talk to your doctor if you take daily aspirin for another health problem. When should you call for help? Call 911 anytime you think you may need emergency care. For example, call if:    · You passed out (lost consciousness).     · You pass maroon or very bloody stools.     · You vomit blood or what looks like coffee grounds.     · You have new, severe belly pain.    Call your doctor now or seek immediate medical care if:    · Your pain gets worse, especially if it becomes focused in one area of your belly.     · You have a new or higher fever.     · Your stools are black and look like tar, or they have streaks of blood.     · You have unexpected vaginal bleeding.     · You have symptoms of a urinary tract infection. These may include:  ? Pain when you urinate. ? Urinating more often than usual.  ? Blood in your urine.     · You are dizzy or lightheaded, or you feel like you may faint.    Watch closely for changes in your health, and be sure to contact your doctor if:    · You are not getting better after 1 day (24 hours). Where can you learn more? Go to http://juan-shimon.info/. Enter E802 in the search box to learn more about \"Abdominal Pain: Care Instructions. \"  Current as of: November 20, 2017  Content Version: 11.8  © 5053-7305 [employer]. Care instructions adapted under license by [employer] (which disclaims liability or warranty for this information). If you have questions about a medical condition or this instruction, always ask your healthcare professional. [place] [address] any warranty or liability for your use of this information. Rectal Bleeding: Care Instructions  Your Care Instructions    Rectal bleeding in small amounts is common. You may see red spotting on toilet paper or drops of blood in the toilet.  Rectal bleeding has many possible causes, from something as minor as hemorrhoids to something as serious as colon cancer. You may need more tests to find the cause of your bleeding. Follow-up care is a key part of your treatment and safety. Be sure to make and go to all appointments, and call your doctor if you are having problems. It's also a good idea to know your test results and keep a list of the medicines you take. How can you care for yourself at home? · Avoid aspirin and other nonsteroidal anti-inflammatory drugs (NSAIDs), such as ibuprofen (Advil, Motrin) and naproxen (Aleve). They can cause you to bleed more. Ask your doctor if you can take acetaminophen (Tylenol). Read and follow all instructions on the label. · Use a stool softener that contains bran or psyllium. You can save money by buying bran or psyllium (available in bulk at most health food stores) and sprinkling it on foods or stirring it into fruit juice. You can also use a product such as Metamucil or Citrucel. · Take your medicines exactly as directed. Call your doctor if you think you are having a problem with your medicine. When should you call for help? Call 911 anytime you think you may need emergency care. For example, call if:    · You passed out (lost consciousness).    Call your doctor now or seek immediate medical care if:    · You have new or worse pain.     · You have new or worse bleeding from the rectum.     · You are dizzy or light-headed, or you feel like you may faint.    Watch closely for changes in your health, and be sure to contact your doctor if:    · You cannot pass stools or gas.     · You do not get better as expected. Where can you learn more? Go to http://juan-shimon.info/. Enter D608 in the search box to learn more about \"Rectal Bleeding: Care Instructions. \"  Current as of: March 28, 2018  Content Version: 11.8  © 0205-9982 [employer], [employer] /l?k â€¢ [employer]ik/. Care instructions adapted under license by [employer] (which disclaims liability or warranty for this information).  If you have questions about a medical condition or this instruction, always ask your healthcare professional. [place] [address] any warranty or liability for your use of this information.

## 2018-11-01 NOTE — ED NOTES
I have reviewed discharge instructions with the patient and caregiver. The patient and caregiver verbalized understanding. Patient left ED via Discharge Method: wheelchair to Home with daughter. Opportunity for questions and clarification provided. Patient given 0 scripts. To continue your aftercare when you leave the hospital, you may receive an automated call from our care team to check in on how you are doing. This is a free service and part of our promise to provide the best care and service to meet your aftercare needs.  If you have questions, or wish to unsubscribe from this service please call [phone]. Thank you for Choosing our [facility] [place] Emergency Department.

## 2018-11-01 NOTE — ED NOTES
Called lab to verify blood has been received since is not in process. Ngozi Leach states it was sent with temp labels and they do not run until we call when there are temp labels.

## 2018-11-01 NOTE — ED TRIAGE NOTES
Pt started having rectal bleeding yesterday morning. Had a hernia operation here 2 weeks ago. Pt is on xarelto. Taking pain pills and stool softener to help her sleep. Bright red blood. No known hemorrhoids.

## 2019-11-11 ENCOUNTER — HOSPITAL ENCOUNTER (OUTPATIENT)
Dept: GENERAL RADIOLOGY | Age: 84
Discharge: HOME | End: 2019-11-11

## 2019-11-11 DIAGNOSIS — M25.512 ACUTE PAIN OF LEFT SHOULDER: ICD-10-CM

## 2019-11-14 ENCOUNTER — HOSPITAL ENCOUNTER (OUTPATIENT)
Dept: MRI IMAGING | Age: 84
Discharge: HOME | End: 2019-11-14
Attending: FAMILY MEDICINE
Payer: MEDICARE

## 2019-11-14 DIAGNOSIS — R42 DIZZINESS: ICD-10-CM

## 2019-11-14 DIAGNOSIS — S06.0X1S: ICD-10-CM

## 2019-11-14 PROCEDURE — 70551 MRI BRAIN STEM W/O DYE: CPT

## 2020-06-17 ENCOUNTER — HOSPITAL ENCOUNTER (OUTPATIENT)
Dept: CT IMAGING | Age: 85
Discharge: HOME | End: 2020-06-17
Attending: PHYSICIAN ASSISTANT
Payer: MEDICARE

## 2020-06-17 DIAGNOSIS — M54.50 ACUTE BILATERAL LOW BACK PAIN WITHOUT SCIATICA: ICD-10-CM

## 2020-06-17 DIAGNOSIS — R31.0 GROSS HEMATURIA: ICD-10-CM

## 2020-06-17 PROCEDURE — 74176 CT ABD & PELVIS W/O CONTRAST: CPT

## 2020-06-17 NOTE — PROGRESS NOTES
CT urogram did not show kidney stone. I would like to wait until culture comes back to discuss further as far as potential for Xarelto to cause this bleeding. I would like to go ahead and treat with Macrobid, antibiotic, at this time while we await the culture result. We will be in touch with culture result and discuss further at that time. Tanvi Sexton was sent to [facility].

## 2020-07-24 ENCOUNTER — HOSPITAL ENCOUNTER (OUTPATIENT)
Dept: GENERAL RADIOLOGY | Age: 85
Discharge: HOME | End: 2020-07-24
Payer: MEDICARE

## 2020-07-24 DIAGNOSIS — M25.551 RIGHT HIP PAIN: ICD-10-CM

## 2020-07-24 DIAGNOSIS — M54.50 ACUTE RIGHT-SIDED LOW BACK PAIN WITHOUT SCIATICA: ICD-10-CM

## 2020-07-24 DIAGNOSIS — M25.562 ACUTE PAIN OF LEFT KNEE: ICD-10-CM

## 2020-07-24 PROCEDURE — 73562 X-RAY EXAM OF KNEE 3: CPT

## 2020-07-24 PROCEDURE — 72100 X-RAY EXAM L-S SPINE 2/3 VWS: CPT

## 2020-07-24 PROCEDURE — 73502 X-RAY EXAM HIP UNI 2-3 VIEWS: CPT

## 2020-12-17 ENCOUNTER — HOSPITAL ENCOUNTER (OUTPATIENT)
Dept: GENERAL RADIOLOGY | Age: 85
Discharge: HOME | End: 2020-12-17
Payer: MEDICARE

## 2020-12-17 DIAGNOSIS — G89.29 CHRONIC LEFT SHOULDER PAIN: ICD-10-CM

## 2020-12-17 DIAGNOSIS — M25.512 CHRONIC LEFT SHOULDER PAIN: ICD-10-CM

## 2020-12-17 PROCEDURE — 73030 X-RAY EXAM OF SHOULDER: CPT

## 2020-12-21 NOTE — PROGRESS NOTES
Called pt, left message for pt daughter to return call. Need to advise of results and recommendations.

## 2020-12-23 ENCOUNTER — HOSPITAL ENCOUNTER (OUTPATIENT)
Dept: CT IMAGING | Age: 85
Discharge: HOME | End: 2020-12-23
Attending: FAMILY MEDICINE
Payer: MEDICARE

## 2020-12-23 DIAGNOSIS — R10.32 LLQ PAIN: ICD-10-CM

## 2020-12-23 LAB — CREAT BLD-MCNC: 0.6 MG/DL (ref 0.8–1.5)

## 2020-12-23 PROCEDURE — 74011000636 HC RX REV CODE- 636: Performed by: FAMILY MEDICINE

## 2020-12-23 PROCEDURE — 82565 ASSAY OF CREATININE: CPT

## 2020-12-23 PROCEDURE — 74011000258 HC RX REV CODE- 258: Performed by: FAMILY MEDICINE

## 2020-12-23 PROCEDURE — 74177 CT ABD & PELVIS W/CONTRAST: CPT

## 2020-12-23 RX ORDER — SODIUM CHLORIDE 0.9 % (FLUSH) 0.9 %
10 SYRINGE (ML) INJECTION
Status: COMPLETED | OUTPATIENT
Start: 2020-12-23 | End: 2020-12-23

## 2020-12-23 RX ADMIN — IOPAMIDOL 100 ML: 755 INJECTION, SOLUTION INTRAVENOUS at 13:04

## 2020-12-23 RX ADMIN — SODIUM CHLORIDE 100 ML: 900 INJECTION, SOLUTION INTRAVENOUS at 13:04

## 2020-12-23 RX ADMIN — Medication 10 ML: at 13:04

## 2020-12-23 RX ADMIN — DIATRIZOATE MEGLUMINE AND DIATRIZOATE SODIUM 15 ML: 660; 100 LIQUID ORAL; RECTAL at 13:04

## 2020-12-24 ENCOUNTER — APPOINTMENT (OUTPATIENT)
Dept: GENERAL RADIOLOGY | Age: 85
End: 2020-12-24
Attending: EMERGENCY MEDICINE
Payer: MEDICARE

## 2020-12-24 ENCOUNTER — HOSPITAL ENCOUNTER (EMERGENCY)
Age: 85
Discharge: HOME | End: 2020-12-24
Attending: EMERGENCY MEDICINE | Admitting: EMERGENCY MEDICINE
Payer: MEDICARE

## 2020-12-24 VITALS
TEMPERATURE: 98.2 F | OXYGEN SATURATION: 99 % | RESPIRATION RATE: 20 BRPM | DIASTOLIC BLOOD PRESSURE: 99 MMHG | SYSTOLIC BLOOD PRESSURE: 185 MMHG | HEART RATE: 107 BPM

## 2020-12-24 DIAGNOSIS — S43.004A DISLOCATION OF RIGHT SHOULDER JOINT, INITIAL ENCOUNTER: Primary | ICD-10-CM

## 2020-12-24 LAB
ALBUMIN SERPL-MCNC: 3.8 G/DL (ref 3.2–4.6)
ALBUMIN/GLOB SERPL: 1 {RATIO} (ref 1.2–3.5)
ALP SERPL-CCNC: 131 U/L (ref 50–136)
ALT SERPL-CCNC: 26 U/L (ref 12–65)
ANION GAP SERPL CALC-SCNC: 8 MMOL/L (ref 7–16)
AST SERPL-CCNC: 29 U/L (ref 15–37)
BASOPHILS # BLD: 0.1 K/UL (ref 0–0.2)
BASOPHILS NFR BLD: 1 % (ref 0–2)
BILIRUB SERPL-MCNC: 0.9 MG/DL (ref 0.2–1.1)
BUN SERPL-MCNC: 17 MG/DL (ref 8–23)
CALCIUM SERPL-MCNC: 8.6 MG/DL (ref 8.3–10.4)
CHLORIDE SERPL-SCNC: 104 MMOL/L (ref 98–107)
CO2 SERPL-SCNC: 25 MMOL/L (ref 21–32)
CREAT SERPL-MCNC: 0.65 MG/DL (ref 0.6–1)
DIFFERENTIAL METHOD BLD: ABNORMAL
EOSINOPHIL # BLD: 0 K/UL (ref 0–0.8)
EOSINOPHIL NFR BLD: 0 % (ref 0.5–7.8)
ERYTHROCYTE [DISTWIDTH] IN BLOOD BY AUTOMATED COUNT: 13.4 % (ref 11.9–14.6)
GLOBULIN SER CALC-MCNC: 3.9 G/DL (ref 2.3–3.5)
GLUCOSE SERPL-MCNC: 177 MG/DL (ref 65–100)
HCT VFR BLD AUTO: 40.3 % (ref 35.8–46.3)
HGB BLD-MCNC: 13.3 G/DL (ref 11.7–15.4)
IMM GRANULOCYTES # BLD AUTO: 0.1 K/UL (ref 0–0.5)
IMM GRANULOCYTES NFR BLD AUTO: 1 % (ref 0–5)
LYMPHOCYTES # BLD: 1 K/UL (ref 0.5–4.6)
LYMPHOCYTES NFR BLD: 13 % (ref 13–44)
MCH RBC QN AUTO: 29.4 PG (ref 26.1–32.9)
MCHC RBC AUTO-ENTMCNC: 33 G/DL (ref 31.4–35)
MCV RBC AUTO: 89 FL (ref 79.6–97.8)
MONOCYTES # BLD: 0.4 K/UL (ref 0.1–1.3)
MONOCYTES NFR BLD: 5 % (ref 4–12)
NEUTS SEG # BLD: 6.7 K/UL (ref 1.7–8.2)
NEUTS SEG NFR BLD: 81 % (ref 43–78)
NRBC # BLD: 0 K/UL (ref 0–0.2)
PLATELET # BLD AUTO: 196 K/UL (ref 150–450)
PMV BLD AUTO: 10.8 FL (ref 9.4–12.3)
POTASSIUM SERPL-SCNC: 3.1 MMOL/L (ref 3.5–5.1)
PROT SERPL-MCNC: 7.7 G/DL (ref 6.3–8.2)
RBC # BLD AUTO: 4.53 M/UL (ref 4.05–5.2)
SODIUM SERPL-SCNC: 137 MMOL/L (ref 136–145)
WBC # BLD AUTO: 8.3 K/UL (ref 4.3–11.1)

## 2020-12-24 PROCEDURE — 99283 EMERGENCY DEPT VISIT LOW MDM: CPT

## 2020-12-24 PROCEDURE — 75810000301 HC ER LEVEL 1 CLOSED TREATMNT FRACTURE/DISLOCATION

## 2020-12-24 PROCEDURE — 85025 COMPLETE CBC W/AUTO DIFF WBC: CPT

## 2020-12-24 PROCEDURE — 80053 COMPREHEN METABOLIC PANEL: CPT

## 2020-12-24 PROCEDURE — 74011250636 HC RX REV CODE- 250/636: Performed by: EMERGENCY MEDICINE

## 2020-12-24 PROCEDURE — 73020 X-RAY EXAM OF SHOULDER: CPT

## 2020-12-24 PROCEDURE — 96375 TX/PRO/DX INJ NEW DRUG ADDON: CPT

## 2020-12-24 PROCEDURE — 73030 X-RAY EXAM OF SHOULDER: CPT

## 2020-12-24 PROCEDURE — 96374 THER/PROPH/DIAG INJ IV PUSH: CPT

## 2020-12-24 RX ORDER — ONDANSETRON 2 MG/ML
4 INJECTION INTRAMUSCULAR; INTRAVENOUS ONCE
Status: COMPLETED | OUTPATIENT
Start: 2020-12-24 | End: 2020-12-24

## 2020-12-24 RX ORDER — HYDROCODONE BITARTRATE AND ACETAMINOPHEN 5; 325 MG/1; MG/1
1 TABLET ORAL
Qty: 10 TAB | Refills: 0 | Status: SHIPPED | OUTPATIENT
Start: 2020-12-24 | End: 2020-12-27

## 2020-12-24 RX ORDER — FENTANYL CITRATE 50 UG/ML
100 INJECTION, SOLUTION INTRAMUSCULAR; INTRAVENOUS
Status: COMPLETED | OUTPATIENT
Start: 2020-12-24 | End: 2020-12-24

## 2020-12-24 RX ORDER — HYDROMORPHONE HYDROCHLORIDE 1 MG/ML
0.5 INJECTION, SOLUTION INTRAMUSCULAR; INTRAVENOUS; SUBCUTANEOUS ONCE
Status: COMPLETED | OUTPATIENT
Start: 2020-12-24 | End: 2020-12-24

## 2020-12-24 RX ORDER — FENTANYL CITRATE 50 UG/ML
100 INJECTION, SOLUTION INTRAMUSCULAR; INTRAVENOUS
Status: DISCONTINUED | OUTPATIENT
Start: 2020-12-24 | End: 2020-12-25 | Stop reason: HOSPADM

## 2020-12-24 RX ADMIN — ONDANSETRON 4 MG: 2 INJECTION INTRAMUSCULAR; INTRAVENOUS at 20:31

## 2020-12-24 RX ADMIN — HYDROMORPHONE HYDROCHLORIDE 0.5 MG: 1 INJECTION, SOLUTION INTRAMUSCULAR; INTRAVENOUS; SUBCUTANEOUS at 20:31

## 2020-12-24 RX ADMIN — FENTANYL CITRATE 50 MCG: 50 INJECTION, SOLUTION INTRAMUSCULAR; INTRAVENOUS at 21:28

## 2020-12-25 NOTE — DISCHARGE INSTRUCTIONS
Keep your right arm in the sling for comfort. Remove it from the sling 5 times per day for passive range of motion exercises. Arm above your shoulder. Do not lift more than 3 pounds with the arm. Follow-up with orthopedics for reevaluation.

## 2020-12-25 NOTE — ED NOTES
I have reviewed discharge instructions with the daughter. The daughter verbalized understanding. Patient left ED via Discharge Method: wheelchair to Home with daughter. Opportunity for questions and clarification provided. Patient given 1 scripts. To continue your aftercare when you leave the hospital, you may receive an automated call from our care team to check in on how you are doing. This is a free service and part of our promise to provide the best care and service to meet your aftercare needs.  If you have questions, or wish to unsubscribe from this service please call [phone]. Thank you for Choosing our [facility] Emergency Department.

## 2020-12-25 NOTE — ED PROVIDER NOTES
The history is provided by the patient and a relative. Fall  The accident occurred less than 1 hour ago. The fall occurred while walking. She fell from a height of ground level. She landed on hard floor. There was no blood loss. The point of impact was the right shoulder. The pain is present in the right shoulder. The pain is at a severity of 10/10. The pain is severe. She was ambulatory at the scene. There was no entrapment after the fall. There was no drug use involved in the accident. There was no alcohol use involved in the accident. Pertinent negatives include no visual change, no fever, no numbness, no abdominal pain, no bowel incontinence, no nausea, no vomiting, no hematuria, no headaches, no extremity weakness, no hearing loss, no loss of consciousness, no tingling and no laceration. The risk factors include being elderly. The symptoms are aggravated by rotation and use of injured limb. She has tried immobilization for the symptoms. The treatment provided mild relief. The patient's last tetanus shot was less than 5 years ago.        Past Medical History:   Diagnosis Date    Age related osteoporosis 2017    Chronic atrial fibrillation ([address] 2017    Chronic pain     shoulder    DDD (degenerative disc disease), lumbar 2017    Gastroesophageal reflux disease with esophagitis 2017    controlled with medication    Glaucoma 2017    History of CVA (cerebrovascular accident) 2017    HTN (hypertension), benign 2017    Hyperlipidemia, mixed 2017    Hypertension     Hypothyroidism, adult 2017    IFG (impaired fasting glucose) 2017    Primary osteoarthritis involving multiple joints 2017    PUD (peptic ulcer disease)        Past Surgical History:   Procedure Laterality Date    HX  SECTION      x2    HX COLONOSCOPY      HX URIEL AND BSO      hysterectomy         Family History:   Problem Relation Age of Onset    No Known Problems Mother     Cancer Father         stomach    No Known Problems Sister     No Known Problems Brother     No Known Problems Sister     No Known Problems Sister     No Known Problems Sister     No Known Problems Brother     No Known Problems Brother     No Known Problems Brother     No Known Problems Brother     Coronary Artery Disease Neg Hx        Social History     Socioeconomic History    Marital status:      Spouse name: Not on file    Number of children: Not on file    Years of education: Not on file    Highest education level: Not on file   Occupational History    Not on file   Social Needs    Financial resource strain: Not on file    Food insecurity     Worry: Not on file     Inability: Not on file   [employer] needs     Medical: Not on file     Non-medical: Not on file   Tobacco Use    Smoking status: Never Smoker    Smokeless tobacco: Never Used   Substance and Sexual Activity    Alcohol use: No    Drug use: Not on file    Sexual activity: Not on file   Lifestyle    Physical activity     Days per week: Not on file     Minutes per session: Not on file    Stress: Not on file   Relationships    Social connections     Talks on phone: Not on file     Gets together: Not on file     Attends Rastafarian service: Not on file     Active member of club or organization: Not on file     Attends meetings of clubs or organizations: Not on file     Relationship status: Not on file    Intimate partner violence     Fear of current or ex partner: Not on file     Emotionally abused: Not on file     Physically abused: Not on file     Forced sexual activity: Not on file   Other Topics Concern    Not on file   Social History Narrative    Not on file         ALLERGIES: Actonel [risedronate], Atorvastatin, Fosamax [alendronate], Ketoprofen, and Moexipril    Review of Systems   Constitutional: Negative for fever.    Gastrointestinal: Negative for abdominal pain, bowel incontinence, nausea and vomiting. Genitourinary: Negative for hematuria. Musculoskeletal: Positive for arthralgias. Negative for extremity weakness. Neurological: Negative for tingling, loss of consciousness, numbness and headaches. All other systems reviewed and are negative. Vitals:    12/24/20 2005 12/24/20 2101   BP: (!) 169/118 (!) 192/99   Pulse: (!) 120    Resp: 20    Temp: 98.2 °F (36.8 °C)    SpO2: 98% 94%            Physical Exam  Vitals signs and nursing note reviewed. Constitutional:       Appearance: She is well-developed. She is ill-appearing. HENT:      Head: Normocephalic and atraumatic. Eyes:      Conjunctiva/sclera: Conjunctivae normal.      Pupils: Pupils are equal, round, and reactive to light. Neck:      Musculoskeletal: Neck supple. Cardiovascular:      Rate and Rhythm: Normal rate and regular rhythm. Pulmonary:      Effort: Pulmonary effort is normal.      Breath sounds: Normal breath sounds. Abdominal:      General: Bowel sounds are normal.      Palpations: Abdomen is soft. Musculoskeletal: Normal range of motion. General: Swelling, tenderness, deformity and signs of injury present. Comments: Obvious deformity of the right shoulder, no tenderness over elbow or wrist.   is full, pulses and cap refill are less than 3 seconds ll   Skin:     General: Skin is warm and dry. Findings: No laceration. Neurological:      Mental Status: She is alert and oriented to person, place, and time. MDM  Number of Diagnoses or Management Options  Dislocation of right shoulder joint, initial encounter  Diagnosis management comments: 51-year-old female presenting for right shoulder injury. Concern for fracture versus dislocation.        Amount and/or Complexity of Data Reviewed  Clinical lab tests: ordered and reviewed (Results for orders placed or performed during the hospital encounter of 12/24/20  -CBC WITH AUTOMATED DIFF       Result                      Value Ref Range           WBC                         8.3               4.3 - 11.1 K*       RBC                         4.53              4.05 - 5.2 M*       HGB                         13.3              11.7 - 15.4 *       HCT                         40.3              35.8 - 46.3 %       MCV                         89.0              79.6 - 97.8 *       MCH                         29.4              26.1 - 32.9 *       MCHC                        33.0              31.4 - 35.0 *       RDW                         13.4              11.9 - 14.6 %       PLATELET                    196               150 - 450 K/*       MPV                         10.8              9.4 - 12.3 FL       ABSOLUTE NRBC               0.00              0.0 - 0.2 K/*       DF                          AUTOMATED                             NEUTROPHILS                 81 (H)            43 - 78 %           LYMPHOCYTES                 13                13 - 44 %           MONOCYTES                   5                 4.0 - 12.0 %        EOSINOPHILS                 0 (L)             0.5 - 7.8 %         BASOPHILS                   1                 0.0 - 2.0 %         IMMATURE GRANULOCYTES       1                 0.0 - 5.0 %         ABS. NEUTROPHILS            6.7               1.7 - 8.2 K/*       ABS. LYMPHOCYTES            1.0               0.5 - 4.6 K/*       ABS. MONOCYTES              0.4               0.1 - 1.3 K/*       ABS. EOSINOPHILS            0.0               0.0 - 0.8 K/*       ABS. BASOPHILS              0.1               0.0 - 0.2 K/*       ABS. IMM.  GRANS.            0.1               0.0 - 0.5 K/*  -METABOLIC PANEL, COMPREHENSIVE       Result                      Value             Ref Range           Sodium                      137               136 - 145 mm*       Potassium                   3.1 (L)           3.5 - 5.1 mm*       Chloride                    104               98 - 107 mmo*       CO2                         25                21 - 32 mmol*       Anion gap                   8                 7 - 16 mmol/L       Glucose                     177 (H)           65 - 100 mg/*       BUN                         17                8 - 23 MG/DL        Creatinine                  0.65              0.6 - 1.0 MG*       GFR est AA                  >60               >60 ml/min/1*       GFR est non-AA              >60               >60 ml/min/1*       Calcium                     8.6               8.3 - 10.4 M*       Bilirubin, total            0.9               0.2 - 1.1 MG*       ALT (SGPT)                  26                12 - 65 U/L         AST (SGOT)                  29                15 - 37 U/L         Alk. phosphatase            131               50 - 136 U/L        Protein, total              7.7               6.3 - 8.2 g/*       Albumin                     3.8               3.2 - 4.6 g/*       Globulin                    3.9 (H)           2.3 - 3.5 g/*       A-G Ratio                   1.0 (L)           1.2 - 3.5      )  Tests in the radiology section of CPT®: ordered and reviewed (Xr Shoulder Rt 1v    Result Date: 12/24/2020  X-ray right shoulder. HISTORY: Dislocation. COMPARISON: 12/24/2020. FINDINGS: Anatomic closed reduction of glenohumeral dislocation. No fractures are identified. Is evidence of a Hill-Sachs deformity. IMPRESSION: Anatomic closed reduction. Xr Shoulder Lt Ap/lat Min 2 V    Result Date: 12/17/2020  LEFT SHOULDER, 3 views. HISTORY: Chronic left shoulder pain. COMPARISON:  November 2019. TECHNIQUE: AP, Grashey and transcapular-Y views. FINDINGS: No acute fracture, subluxation or dislocation. Severe osteoarthritis with joint space narrowing and osteophytes at the glenohumeral joint. Also osteophytes at the acromioclavicular joint. Included portion of the ribs are unremarkable. IMPRESSION: Severe osteoarthritis without significant change. Osteopenia.      Xr Shoulder Rt Ap/lat Min 2 V    Result Date: 12/24/2020  X-ray right shoulder 3 views. HISTORY: Pain. COMPARISON: None. FINDINGS: Anterior inferior glenohumeral dislocation with evidence of a Hill-Sachs deformity. No other abnormalities are identified. IMPRESSION: Glenohumeral dislocation with a Hill-Sachs deformity. Ct Abd Pelv W Cont    Result Date: 12/23/2020  CT OF THE ABDOMEN AND PELVIS INDICATION: Increasing left lower quadrant pain for several months Multiple axial images were obtained through the abdomen and pelvis. Oral contrast was used for bowel opacification. 100mL of Isovue 370 intravenous contrast was used for better evaluation of solid organs and vascular structures. Radiation dose reduction techniques were used for this study. All CT scans performed at this facility use one or all of the following: Automated exposure control, adjustment of the mA and/or kVp according to patient's size, iterative reconstruction. COMPARISON: 06/17/2020 FINDINGS: -LUNG BASES: No infiltrates or masses. -LIVER: Normal in size and appearance. -GALLBLADDER/BILE DUCTS: No gallstones or bile duct dilatation. -PANCREAS: Normal. -SPLEEN: Normal. -ADRENALS: Normal. -KIDNEYS/URETERS: No hydronephrosis or significant mass. -BLADDER: Distended -REPRODUCTIVE ORGANS: Post hysterectomy. -BOWEL: Normal caliber. No inflammatory changes. -LYMPH NODES: No significant retroperitoneal, mesenteric, or pelvic adenopathy. -BONES: Lumbar scoliosis and degenerative change. Slight increased compression of L1 compared to the prior exam.  This is not appear acute. -VASCULATURE: Moderate atherosclerosis. -OTHER: No ascites. IMPRESSION: 1. Urinary bladder distention. 2.  Moderate vascular disease. 3.  L1 compression fracture which is not acute, but is new compared with 6/20.  If there are any questions about this report, I can be reached on [employer] or at [phone]     )  Tests in the medicine section of CPT®: ordered and reviewed  Independent visualization of images, tracings, or specimens: yes    Risk of Complications, Morbidity, and/or Mortality  Presenting problems: high  Diagnostic procedures: high  Management options: moderate  General comments: I personally reviewed the patient's vital signs, laboratory tests, and/or radiological findings. I discussed these findings with the patient and their significance. I answered all questions and gave the patient clear return precautions. The patient was discharged from the emergency department in stable condition        Patient Progress  Patient progress: improved    ED Course as of Dec 24 2116   Thu Dec 24, 2020   2108 Reviewed the patient's x-ray and she appears to have a right anterior shoulder dislocation. [JS]      ED Course User Index  [JS] Lilian Harden MD       Reduction of Joint    Date/Time: 12/24/2020 9:35 PM  Performed by: Lilian Harden MD  Authorized by: Lilian Harden MD     Consent:     Consent obtained:  Verbal    Consent given by:  Patient    Risks discussed:  Nerve damage    Alternatives discussed:  No treatment  Injury:     Injury location:  Shoulder    Shoulder injury location:  R shoulder    Hill-Sachs deformity: yes    Pre-procedure assessment:     Neurological function: normal      Distal perfusion: normal      Range of motion: reduced    Sedation:     Sedation type: Anxiolysis  Anesthesia (see MAR for exact dosages): Anesthesia method:  None  Procedure details:     Manipulation performed: yes      Reduction successful: yes      X-ray confirmed reduction: yes      Immobilization:  Sling  Post-procedure assessment:     Neurological function: normal      Distal perfusion: normal      Range of motion: normal      Patient tolerance of procedure:   Tolerated well, no immediate complications

## 2020-12-25 NOTE — ED TRIAGE NOTES
Patient arrives via [facility] from home with mask in place. Patient with ground level fall tonight onto carpet. Right sided shoulder and bicep pain. Patient denies LOC, does take xarelto. ; bp 168/90; 100 RA.

## 2020-12-27 NOTE — PROGRESS NOTES
Please let daughter know looks ok as far as colon concerned. She does have a new L1 compression fracture since last June. This could be causing some of her pain. Please make sure she is taking Ca++ 1000mg/Vit D 5000IU daily.

## 2020-12-31 ENCOUNTER — APPOINTMENT (OUTPATIENT)
Dept: MRI IMAGING | Age: 85
DRG: 064 | End: 2020-12-31
Attending: INTERNAL MEDICINE
Payer: MEDICARE

## 2020-12-31 ENCOUNTER — HOSPITAL ENCOUNTER (INPATIENT)
Age: 85
LOS: 7 days | Discharge: NURSING, REHAB OR LONG-TERM CARE | DRG: 064 | End: 2021-01-07
Attending: EMERGENCY MEDICINE | Admitting: INTERNAL MEDICINE
Payer: MEDICARE

## 2020-12-31 ENCOUNTER — APPOINTMENT (OUTPATIENT)
Dept: CT IMAGING | Age: 85
DRG: 064 | End: 2020-12-31
Attending: EMERGENCY MEDICINE
Payer: MEDICARE

## 2020-12-31 DIAGNOSIS — E78.2 HYPERLIPIDEMIA, MIXED: Chronic | ICD-10-CM

## 2020-12-31 DIAGNOSIS — I48.91 ATRIAL FIBRILLATION WITH RVR (HCC): ICD-10-CM

## 2020-12-31 DIAGNOSIS — I48.20 CHRONIC ATRIAL FIBRILLATION (HCC): Chronic | ICD-10-CM

## 2020-12-31 DIAGNOSIS — I63.9 ACUTE CVA (CEREBROVASCULAR ACCIDENT) (HCC): Primary | ICD-10-CM

## 2020-12-31 PROBLEM — E03.9 HYPOTHYROIDISM, ADULT: Chronic | Status: ACTIVE | Noted: 2017-07-20

## 2020-12-31 PROBLEM — R77.8 ELEVATED TROPONIN: Status: ACTIVE | Noted: 2020-12-31

## 2020-12-31 PROBLEM — M81.0 AGE RELATED OSTEOPOROSIS: Chronic | Status: ACTIVE | Noted: 2017-07-20

## 2020-12-31 PROBLEM — I10 HTN (HYPERTENSION), BENIGN: Chronic | Status: ACTIVE | Noted: 2017-07-20

## 2020-12-31 PROBLEM — I21.4 NSTEMI (NON-ST ELEVATED MYOCARDIAL INFARCTION) (HCC): Status: ACTIVE | Noted: 2020-12-31

## 2020-12-31 PROBLEM — Z86.73 HISTORY OF CVA (CEREBROVASCULAR ACCIDENT): Chronic | Status: ACTIVE | Noted: 2017-07-20

## 2020-12-31 PROBLEM — K21.00 GASTROESOPHAGEAL REFLUX DISEASE WITH ESOPHAGITIS: Chronic | Status: ACTIVE | Noted: 2017-07-20

## 2020-12-31 PROBLEM — M15.9 PRIMARY OSTEOARTHRITIS INVOLVING MULTIPLE JOINTS: Chronic | Status: ACTIVE | Noted: 2017-07-21

## 2020-12-31 LAB
ALBUMIN SERPL-MCNC: 3.4 G/DL (ref 3.2–4.6)
ALBUMIN/GLOB SERPL: 1.1 {RATIO} (ref 1.2–3.5)
ALP SERPL-CCNC: 107 U/L (ref 50–136)
ALT SERPL-CCNC: 37 U/L (ref 12–65)
ANION GAP SERPL CALC-SCNC: 5 MMOL/L (ref 7–16)
APTT PPP: 32.1 SEC (ref 24.1–35.1)
AST SERPL-CCNC: 136 U/L (ref 15–37)
ATRIAL RATE: 214 BPM
BASOPHILS # BLD: 0 K/UL (ref 0–0.2)
BASOPHILS NFR BLD: 1 % (ref 0–2)
BILIRUB DIRECT SERPL-MCNC: 0.3 MG/DL
BILIRUB SERPL-MCNC: 1.5 MG/DL (ref 0.2–1.1)
BUN SERPL-MCNC: 17 MG/DL (ref 8–23)
CALCIUM SERPL-MCNC: 8.8 MG/DL (ref 8.3–10.4)
CALCULATED P AXIS, ECG09: 0 DEGREES
CALCULATED R AXIS, ECG10: 92 DEGREES
CALCULATED T AXIS, ECG11: -74 DEGREES
CHLORIDE SERPL-SCNC: 106 MMOL/L (ref 98–107)
CO2 SERPL-SCNC: 27 MMOL/L (ref 21–32)
CREAT SERPL-MCNC: 0.81 MG/DL (ref 0.6–1)
DIAGNOSIS, 93000: NORMAL
DIFFERENTIAL METHOD BLD: ABNORMAL
EOSINOPHIL # BLD: 0 K/UL (ref 0–0.8)
EOSINOPHIL NFR BLD: 0 % (ref 0.5–7.8)
ERYTHROCYTE [DISTWIDTH] IN BLOOD BY AUTOMATED COUNT: 13.6 % (ref 11.9–14.6)
GLOBULIN SER CALC-MCNC: 3 G/DL (ref 2.3–3.5)
GLUCOSE BLD STRIP.AUTO-MCNC: 135 MG/DL (ref 65–100)
GLUCOSE SERPL-MCNC: 125 MG/DL (ref 65–100)
HCT VFR BLD AUTO: 38 % (ref 35.8–46.3)
HGB BLD-MCNC: 12.7 G/DL (ref 11.7–15.4)
IMM GRANULOCYTES # BLD AUTO: 0 K/UL (ref 0–0.5)
IMM GRANULOCYTES NFR BLD AUTO: 0 % (ref 0–5)
INR BLD: 1.4 (ref 0.9–1.2)
INR PPP: 1.2
LYMPHOCYTES # BLD: 0.9 K/UL (ref 0.5–4.6)
LYMPHOCYTES NFR BLD: 11 % (ref 13–44)
MCH RBC QN AUTO: 29.7 PG (ref 26.1–32.9)
MCHC RBC AUTO-ENTMCNC: 33.4 G/DL (ref 31.4–35)
MCV RBC AUTO: 88.8 FL (ref 79.6–97.8)
MONOCYTES # BLD: 0.7 K/UL (ref 0.1–1.3)
MONOCYTES NFR BLD: 9 % (ref 4–12)
NEUTS SEG # BLD: 6.3 K/UL (ref 1.7–8.2)
NEUTS SEG NFR BLD: 79 % (ref 43–78)
NRBC # BLD: 0 K/UL (ref 0–0.2)
PLATELET # BLD AUTO: 165 K/UL (ref 150–450)
PMV BLD AUTO: 11.7 FL (ref 9.4–12.3)
POTASSIUM SERPL-SCNC: 2.9 MMOL/L (ref 3.5–5.1)
PROT SERPL-MCNC: 6.4 G/DL (ref 6.3–8.2)
PROTHROMBIN TIME: 15 SEC (ref 12.5–14.7)
PT BLD: 16.8 SECS (ref 9.6–11.6)
Q-T INTERVAL, ECG07: 376 MS
QRS DURATION, ECG06: 128 MS
QTC CALCULATION (BEZET), ECG08: 513 MS
RBC # BLD AUTO: 4.28 M/UL (ref 4.05–5.2)
SODIUM SERPL-SCNC: 138 MMOL/L (ref 136–145)
TROPONIN-HIGH SENSITIVITY: ABNORMAL PG/ML (ref 0–14)
UFH PPP CHRO-ACNC: 0.19 IU/ML (ref 0.3–0.7)
VENTRICULAR RATE, ECG03: 112 BPM
WBC # BLD AUTO: 8 K/UL (ref 4.3–11.1)

## 2020-12-31 PROCEDURE — 74011250637 HC RX REV CODE- 250/637: Performed by: INTERNAL MEDICINE

## 2020-12-31 PROCEDURE — 85610 PROTHROMBIN TIME: CPT

## 2020-12-31 PROCEDURE — 96374 THER/PROPH/DIAG INJ IV PUSH: CPT

## 2020-12-31 PROCEDURE — 70496 CT ANGIOGRAPHY HEAD: CPT

## 2020-12-31 PROCEDURE — 4A03X5D MEASUREMENT OF ARTERIAL FLOW, INTRACRANIAL, EXTERNAL APPROACH: ICD-10-PCS | Performed by: RADIOLOGY

## 2020-12-31 PROCEDURE — 86580 TB INTRADERMAL TEST: CPT | Performed by: INTERNAL MEDICINE

## 2020-12-31 PROCEDURE — 74011000250 HC RX REV CODE- 250: Performed by: INTERNAL MEDICINE

## 2020-12-31 PROCEDURE — 36415 COLL VENOUS BLD VENIPUNCTURE: CPT

## 2020-12-31 PROCEDURE — 74011250636 HC RX REV CODE- 250/636: Performed by: EMERGENCY MEDICINE

## 2020-12-31 PROCEDURE — 0042T CT PERF W CBF: CPT

## 2020-12-31 PROCEDURE — 82962 GLUCOSE BLOOD TEST: CPT

## 2020-12-31 PROCEDURE — 65660000000 HC RM CCU STEPDOWN

## 2020-12-31 PROCEDURE — 74011250636 HC RX REV CODE- 250/636: Performed by: INTERNAL MEDICINE

## 2020-12-31 PROCEDURE — 93005 ELECTROCARDIOGRAM TRACING: CPT | Performed by: EMERGENCY MEDICINE

## 2020-12-31 PROCEDURE — 74011000258 HC RX REV CODE- 258: Performed by: INTERNAL MEDICINE

## 2020-12-31 PROCEDURE — 99223 1ST HOSP IP/OBS HIGH 75: CPT | Performed by: INTERNAL MEDICINE

## 2020-12-31 PROCEDURE — 80076 HEPATIC FUNCTION PANEL: CPT

## 2020-12-31 PROCEDURE — 96375 TX/PRO/DX INJ NEW DRUG ADDON: CPT

## 2020-12-31 PROCEDURE — 74011000302 HC RX REV CODE- 302: Performed by: INTERNAL MEDICINE

## 2020-12-31 PROCEDURE — 85025 COMPLETE CBC W/AUTO DIFF WBC: CPT

## 2020-12-31 PROCEDURE — 85730 THROMBOPLASTIN TIME PARTIAL: CPT

## 2020-12-31 PROCEDURE — 70450 CT HEAD/BRAIN W/O DYE: CPT

## 2020-12-31 PROCEDURE — 74011000258 HC RX REV CODE- 258: Performed by: EMERGENCY MEDICINE

## 2020-12-31 PROCEDURE — 99284 EMERGENCY DEPT VISIT MOD MDM: CPT

## 2020-12-31 PROCEDURE — 2709999900 HC NON-CHARGEABLE SUPPLY

## 2020-12-31 PROCEDURE — C8929 TTE W OR WO FOL WCON,DOPPLER: HCPCS

## 2020-12-31 PROCEDURE — 85520 HEPARIN ASSAY: CPT

## 2020-12-31 PROCEDURE — 87635 SARS-COV-2 COVID-19 AMP PRB: CPT

## 2020-12-31 PROCEDURE — 74011000636 HC RX REV CODE- 636: Performed by: EMERGENCY MEDICINE

## 2020-12-31 PROCEDURE — 84484 ASSAY OF TROPONIN QUANT: CPT

## 2020-12-31 PROCEDURE — 80048 BASIC METABOLIC PNL TOTAL CA: CPT

## 2020-12-31 RX ORDER — ENOXAPARIN SODIUM 100 MG/ML
40 INJECTION SUBCUTANEOUS EVERY 24 HOURS
Status: CANCELLED | OUTPATIENT
Start: 2020-12-31

## 2020-12-31 RX ORDER — PANTOPRAZOLE SODIUM 40 MG/1
40 TABLET, DELAYED RELEASE ORAL
Status: DISCONTINUED | OUTPATIENT
Start: 2021-01-01 | End: 2021-01-07 | Stop reason: HOSPADM

## 2020-12-31 RX ORDER — ASPIRIN 81 MG/1
81 TABLET ORAL DAILY
Status: DISCONTINUED | OUTPATIENT
Start: 2021-01-01 | End: 2020-12-31

## 2020-12-31 RX ORDER — LABETALOL HYDROCHLORIDE 5 MG/ML
5 INJECTION, SOLUTION INTRAVENOUS
Status: DISCONTINUED | OUTPATIENT
Start: 2020-12-31 | End: 2021-01-07 | Stop reason: HOSPADM

## 2020-12-31 RX ORDER — METOPROLOL SUCCINATE 100 MG/1
100 TABLET, EXTENDED RELEASE ORAL DAILY
Status: DISCONTINUED | OUTPATIENT
Start: 2021-01-01 | End: 2021-01-03

## 2020-12-31 RX ORDER — AMLODIPINE BESYLATE 5 MG/1
2.5 TABLET ORAL DAILY
Status: DISCONTINUED | OUTPATIENT
Start: 2021-01-01 | End: 2021-01-04

## 2020-12-31 RX ORDER — SODIUM CHLORIDE 0.9 % (FLUSH) 0.9 %
5-40 SYRINGE (ML) INJECTION EVERY 8 HOURS
Status: DISCONTINUED | OUTPATIENT
Start: 2020-12-31 | End: 2021-01-07 | Stop reason: HOSPADM

## 2020-12-31 RX ORDER — LISINOPRIL 20 MG/1
40 TABLET ORAL DAILY
Status: DISCONTINUED | OUTPATIENT
Start: 2021-01-01 | End: 2021-01-03

## 2020-12-31 RX ORDER — LORAZEPAM 2 MG/ML
1 INJECTION INTRAMUSCULAR
Status: DISCONTINUED | OUTPATIENT
Start: 2020-12-31 | End: 2021-01-01

## 2020-12-31 RX ORDER — HEPARIN SODIUM 5000 [USP'U]/ML
20 INJECTION, SOLUTION INTRAVENOUS; SUBCUTANEOUS ONCE
Status: COMPLETED | OUTPATIENT
Start: 2020-12-31 | End: 2020-12-31

## 2020-12-31 RX ORDER — FERROUS SULFATE, DRIED 160(50) MG
1 TABLET, EXTENDED RELEASE ORAL
Status: DISCONTINUED | OUTPATIENT
Start: 2021-01-01 | End: 2021-01-07 | Stop reason: HOSPADM

## 2020-12-31 RX ORDER — POTASSIUM CHLORIDE 14.9 MG/ML
20 INJECTION INTRAVENOUS
Status: DISPENSED | OUTPATIENT
Start: 2020-12-31 | End: 2020-12-31

## 2020-12-31 RX ORDER — DORZOLAMIDE HYDROCHLORIDE AND TIMOLOL MALEATE 20; 5 MG/ML; MG/ML
1 SOLUTION/ DROPS OPHTHALMIC EVERY 12 HOURS
Status: DISCONTINUED | OUTPATIENT
Start: 2020-12-31 | End: 2021-01-07 | Stop reason: HOSPADM

## 2020-12-31 RX ORDER — ASPIRIN 300 MG/1
300 SUPPOSITORY RECTAL DAILY
Status: DISCONTINUED | OUTPATIENT
Start: 2020-12-31 | End: 2021-01-03

## 2020-12-31 RX ORDER — ACETAMINOPHEN 325 MG/1
650 TABLET ORAL
Status: DISCONTINUED | OUTPATIENT
Start: 2020-12-31 | End: 2021-01-07 | Stop reason: HOSPADM

## 2020-12-31 RX ORDER — HEPARIN SODIUM 5000 [USP'U]/100ML
12-25 INJECTION, SOLUTION INTRAVENOUS
Status: DISCONTINUED | OUTPATIENT
Start: 2020-12-31 | End: 2021-01-03

## 2020-12-31 RX ORDER — METOPROLOL TARTRATE 5 MG/5ML
2.5 INJECTION INTRAVENOUS EVERY 6 HOURS
Status: DISCONTINUED | OUTPATIENT
Start: 2020-12-31 | End: 2020-12-31

## 2020-12-31 RX ORDER — SODIUM CHLORIDE 0.9 % (FLUSH) 0.9 %
10 SYRINGE (ML) INJECTION
Status: COMPLETED | OUTPATIENT
Start: 2020-12-31 | End: 2020-12-31

## 2020-12-31 RX ORDER — LEVOTHYROXINE SODIUM 75 UG/1
75 TABLET ORAL
Status: DISCONTINUED | OUTPATIENT
Start: 2021-01-01 | End: 2021-01-07 | Stop reason: HOSPADM

## 2020-12-31 RX ORDER — HEPARIN SODIUM 5000 [USP'U]/ML
60 INJECTION, SOLUTION INTRAVENOUS; SUBCUTANEOUS ONCE
Status: COMPLETED | OUTPATIENT
Start: 2020-12-31 | End: 2020-12-31

## 2020-12-31 RX ORDER — ONDANSETRON 2 MG/ML
4 INJECTION INTRAMUSCULAR; INTRAVENOUS
Status: DISCONTINUED | OUTPATIENT
Start: 2020-12-31 | End: 2021-01-07 | Stop reason: HOSPADM

## 2020-12-31 RX ORDER — POLYETHYLENE GLYCOL 3350 17 G/17G
17 POWDER, FOR SOLUTION ORAL
Status: DISCONTINUED | OUTPATIENT
Start: 2020-12-31 | End: 2021-01-07 | Stop reason: HOSPADM

## 2020-12-31 RX ORDER — DILTIAZEM HYDROCHLORIDE 5 MG/ML
5 INJECTION INTRAVENOUS
Status: DISCONTINUED | OUTPATIENT
Start: 2020-12-31 | End: 2020-12-31

## 2020-12-31 RX ORDER — SODIUM CHLORIDE 0.9 % (FLUSH) 0.9 %
5-40 SYRINGE (ML) INJECTION AS NEEDED
Status: DISCONTINUED | OUTPATIENT
Start: 2020-12-31 | End: 2021-01-07 | Stop reason: HOSPADM

## 2020-12-31 RX ADMIN — Medication 10 ML: at 22:00

## 2020-12-31 RX ADMIN — TUBERCULIN PURIFIED PROTEIN DERIVATIVE 5 UNITS: 5 INJECTION, SOLUTION INTRADERMAL at 17:47

## 2020-12-31 RX ADMIN — POTASSIUM CHLORIDE 20 MEQ: 14.9 INJECTION, SOLUTION INTRAVENOUS at 17:24

## 2020-12-31 RX ADMIN — Medication 10 ML: at 20:40

## 2020-12-31 RX ADMIN — METOPROLOL TARTRATE 2.5 MG: 1 INJECTION, SOLUTION INTRAVENOUS at 14:43

## 2020-12-31 RX ADMIN — LORAZEPAM 1 MG: 2 INJECTION INTRAMUSCULAR; INTRAVENOUS at 17:41

## 2020-12-31 RX ADMIN — Medication 10 ML: at 10:42

## 2020-12-31 RX ADMIN — ASPIRIN 300 MG: 300 SUPPOSITORY RECTAL at 20:24

## 2020-12-31 RX ADMIN — Medication 10 ML: at 17:36

## 2020-12-31 RX ADMIN — SODIUM CHLORIDE 2.5 MG/HR: 900 INJECTION, SOLUTION INTRAVENOUS at 17:05

## 2020-12-31 RX ADMIN — DILTIAZEM HYDROCHLORIDE 5 MG: 5 INJECTION INTRAVENOUS at 15:31

## 2020-12-31 RX ADMIN — SODIUM CHLORIDE 100 ML: 900 INJECTION, SOLUTION INTRAVENOUS at 10:42

## 2020-12-31 RX ADMIN — HEPARIN SODIUM 3100 UNITS: 5000 INJECTION INTRAVENOUS; SUBCUTANEOUS at 13:34

## 2020-12-31 RX ADMIN — DORZOLAMIDE HYDROCHLORIDE AND TIMOLOL MALEATE 1 DROP: 20; 5 SOLUTION/ DROPS OPHTHALMIC at 20:24

## 2020-12-31 RX ADMIN — LORAZEPAM 1 MG: 2 INJECTION INTRAMUSCULAR; INTRAVENOUS at 20:40

## 2020-12-31 RX ADMIN — HEPARIN SODIUM 1050 UNITS: 5000 INJECTION INTRAVENOUS; SUBCUTANEOUS at 22:01

## 2020-12-31 RX ADMIN — SODIUM CHLORIDE 1000 MG: 900 INJECTION, SOLUTION INTRAVENOUS at 22:09

## 2020-12-31 RX ADMIN — HEPARIN SODIUM 12 UNITS/KG/HR: 5000 INJECTION, SOLUTION INTRAVENOUS at 13:36

## 2020-12-31 RX ADMIN — IOPAMIDOL 100 ML: 755 INJECTION, SOLUTION INTRAVENOUS at 10:42

## 2020-12-31 RX ADMIN — PERFLUTREN 1 ML: 6.52 INJECTION, SUSPENSION INTRAVENOUS at 13:00

## 2020-12-31 RX ADMIN — SODIUM CHLORIDE 1000 MG: 900 INJECTION, SOLUTION INTRAVENOUS at 11:43

## 2020-12-31 NOTE — ED PROVIDER NOTES
51-year-old female presenting for sudden onset right-sided hemiparesis  Patient's daughter reported to EMS that she was last seen normal about 7:30 in the morning. She was talking to her about breakfast exited the room and then came back in and found the patient looking to the left not moving her right side. Patient had a fall about a week ago resulted in a right shoulder dislocation but otherwise no injuries. The patient herself is not answering questions but she does recognize her name and looks around.            Past Medical History:   Diagnosis Date    Age related osteoporosis 2017    Chronic atrial fibrillation ([address] 2017    Chronic pain     shoulder    DDD (degenerative disc disease), lumbar 2017    Gastroesophageal reflux disease with esophagitis 2017    controlled with medication    Glaucoma 2017    History of CVA (cerebrovascular accident) 2017    HTN (hypertension), benign 2017    Hyperlipidemia, mixed 2017    Hypertension     Hypothyroidism, adult 2017    IFG (impaired fasting glucose) 2017    Primary osteoarthritis involving multiple joints 2017    PUD (peptic ulcer disease)        Past Surgical History:   Procedure Laterality Date    HX  SECTION      x2    HX COLONOSCOPY      HX URIEL AND BSO      hysterectomy         Family History:   Problem Relation Age of Onset    No Known Problems Mother     Cancer Father         stomach    No Known Problems Sister     No Known Problems Brother     No Known Problems Sister     No Known Problems Sister     No Known Problems Sister     No Known Problems Brother     No Known Problems Brother     No Known Problems Brother     No Known Problems Brother     Coronary Artery Disease Neg Hx        Social History     Socioeconomic History    Marital status:      Spouse name: Not on file    Number of children: Not on file    Years of education: Not on file   [facility] Highest education level: Not on file   Occupational History    Not on file   Social Needs    Financial resource strain: Not on file    Food insecurity     Worry: Not on file     Inability: Not on file    Transportation needs     Medical: Not on file     Non-medical: Not on file   Tobacco Use    Smoking status: Never Smoker    Smokeless tobacco: Never Used   Substance and Sexual Activity    Alcohol use: No    Drug use: Not on file    Sexual activity: Not on file   Lifestyle    Physical activity     Days per week: Not on file     Minutes per session: Not on file    Stress: Not on file   Relationships    Social connections     Talks on phone: Not on file     Gets together: Not on file     Attends Denominational service: Not on file     Active member of club or organization: Not on file     Attends meetings of clubs or organizations: Not on file     Relationship status: Not on file    Intimate partner violence     Fear of current or ex partner: Not on file     Emotionally abused: Not on file     Physically abused: Not on file     Forced sexual activity: Not on file   Other Topics Concern    Not on file   Social History Narrative    Not on file         ALLERGIES: Actonel [risedronate], Atorvastatin, Fosamax [alendronate], Ketoprofen, and Moexipril    Review of Systems   Neurological: Positive for facial asymmetry, speech difficulty and weakness. All other systems reviewed and are negative. There were no vitals filed for this visit. Physical Exam  Vitals signs and nursing note reviewed. Constitutional:       Appearance: She is well-developed. HENT:      Head: Normocephalic and atraumatic. Eyes:      Conjunctiva/sclera: Conjunctivae normal.      Pupils: Pupils are equal, round, and reactive to light. Neck:      Musculoskeletal: Neck supple. Cardiovascular:      Rate and Rhythm: Normal rate and regular rhythm.    Pulmonary:      Effort: Pulmonary effort is normal.      Breath sounds: Normal breath sounds. Abdominal:      General: Bowel sounds are normal.      Palpations: Abdomen is soft. Musculoskeletal: Normal range of motion. Skin:     General: Skin is warm and dry. Neurological:      Mental Status: She is alert. Cranial Nerves: Cranial nerve deficit present. Sensory: Sensory deficit present. Motor: Weakness present.       Comments: Right-sided hemineglect, left lateral gaze deviation, right-sided hemiparesis          MDM  Number of Diagnoses or Management Options  Acute CVA (cerebrovascular accident) [facility])  Diagnosis management comments: Code S was activated upon arrival.  Patient with NIH score of 29 Race score of 9  Last known well was 730 which was 2 hours ago patient is on Xarelto       Amount and/or Complexity of Data Reviewed  Clinical lab tests: ordered and reviewed (Results for orders placed or performed during the hospital encounter of 12/31/20  -CBC WITH AUTOMATED DIFF       Result                      Value             Ref Range           WBC                         8.0               4.3 - 11.1 K*       RBC                         4.28              4.05 - 5.2 M*       HGB                         12.7              11.7 - 15.4 *       HCT                         38.0              35.8 - 46.3 %       MCV                         88.8              79.6 - 97.8 *       MCH                         29.7              26.1 - 32.9 *       MCHC                        33.4              31.4 - 35.0 *       RDW                         13.6              11.9 - 14.6 %       PLATELET                    165               150 - 450 K/*       MPV                         11.7              9.4 - 12.3 FL       ABSOLUTE NRBC               0.00              0.0 - 0.2 K/*       DF                          AUTOMATED                             NEUTROPHILS                 79 (H)            43 - 78 %           LYMPHOCYTES                 11 (L)            13 - 44 %           MONOCYTES 9                 4.0 - 12.0 %        EOSINOPHILS                 0 (L)             0.5 - 7.8 %         BASOPHILS                   1                 0.0 - 2.0 %         IMMATURE GRANULOCYTES       0                 0.0 - 5.0 %         ABS. NEUTROPHILS            6.3               1.7 - 8.2 K/*       ABS. LYMPHOCYTES            0.9               0.5 - 4.6 K/*       ABS. MONOCYTES              0.7               0.1 - 1.3 K/*       ABS. EOSINOPHILS            0.0               0.0 - 0.8 K/*       ABS. BASOPHILS              0.0               0.0 - 0.2 K/*       ABS. IMM.  GRANS.            0.0               0.0 - 0.5 K/*  -METABOLIC PANEL, BASIC       Result                      Value             Ref Range           Sodium                      138               136 - 145 mm*       Potassium                   2.9 (L)           3.5 - 5.1 mm*       Chloride                    106               98 - 107 mmo*       CO2                         27                21 - 32 mmol*       Anion gap                   5 (L)             7 - 16 mmol/L       Glucose                     125 (H)           65 - 100 mg/*       BUN                         17                8 - 23 MG/DL        Creatinine                  0.81              0.6 - 1.0 MG*       GFR est AA                  >60               >60 ml/min/1*       GFR est non-AA              >60               >60 ml/min/1*       Calcium                     8.8               8.3 - 10.4 M*  -PROTHROMBIN TIME + INR       Result                      Value             Ref Range           Prothrombin time            15.0 (H)          12.5 - 14.7 *       INR                         1.2                              -POC PT/INR       Result                      Value             Ref Range           Prothrombin time (POC)      16.8 (H)          9.6 - 11.6 S*       INR (POC)                   1.4 (H)           0.9 - 1.2      -GLUCOSE, POC       Result                      Value Ref Range           Glucose (POC)               135 (H)           65 - 100 mg/*  )  Tests in the radiology section of CPT®: ordered and reviewed (Xr Shoulder Rt 1v    Result Date: 12/24/2020  X-ray right shoulder. HISTORY: Dislocation. COMPARISON: 12/24/2020. FINDINGS: Anatomic closed reduction of glenohumeral dislocation. No fractures are identified. Is evidence of a Hill-Sachs deformity. IMPRESSION: Anatomic closed reduction. Xr Shoulder Lt Ap/lat Min 2 V    Result Date: 12/17/2020  LEFT SHOULDER, 3 views. HISTORY: Chronic left shoulder pain. COMPARISON:  November 2019. TECHNIQUE: AP, Grashey and transcapular-Y views. FINDINGS: No acute fracture, subluxation or dislocation. Severe osteoarthritis with joint space narrowing and osteophytes at the glenohumeral joint. Also osteophytes at the acromioclavicular joint. Included portion of the ribs are unremarkable. IMPRESSION: Severe osteoarthritis without significant change. Osteopenia. Xr Shoulder Rt Ap/lat Min 2 V    Result Date: 12/24/2020  X-ray right shoulder 3 views. HISTORY: Pain. COMPARISON: None. FINDINGS: Anterior inferior glenohumeral dislocation with evidence of a Hill-Sachs deformity. No other abnormalities are identified. IMPRESSION: Glenohumeral dislocation with a Hill-Sachs deformity. Ct Abd Pelv W Cont    Result Date: 12/23/2020  CT OF THE ABDOMEN AND PELVIS INDICATION: Increasing left lower quadrant pain for several months Multiple axial images were obtained through the abdomen and pelvis. Oral contrast was used for bowel opacification. 100mL of Isovue 370 intravenous contrast was used for better evaluation of solid organs and vascular structures. Radiation dose reduction techniques were used for this study. All CT scans performed at this facility use one or all of the following: Automated exposure control, adjustment of the mA and/or kVp according to patient's size, iterative reconstruction.  COMPARISON: 06/17/2020 FINDINGS: -LUNG BASES: No infiltrates or masses. -LIVER: Normal in size and appearance. -GALLBLADDER/BILE DUCTS: No gallstones or bile duct dilatation. -PANCREAS: Normal. -SPLEEN: Normal. -ADRENALS: Normal. -KIDNEYS/URETERS: No hydronephrosis or significant mass. -BLADDER: Distended -REPRODUCTIVE ORGANS: Post hysterectomy. -BOWEL: Normal caliber. No inflammatory changes. -LYMPH NODES: No significant retroperitoneal, mesenteric, or pelvic adenopathy. -BONES: Lumbar scoliosis and degenerative change. Slight increased compression of L1 compared to the prior exam.  This is not appear acute. -VASCULATURE: Moderate atherosclerosis. -OTHER: No ascites. IMPRESSION: 1. Urinary bladder distention. 2.  Moderate vascular disease. 3.  L1 compression fracture which is not acute, but is new compared with 6/20. If there are any questions about this report, I can be reached on [employer] or at [phone]     Ct Code Neuro Head Wo Contrast    Result Date: 12/31/2020  History: acute neuro changes. Right-sided deficits. Exam: CT head without contrast Technique: Thin section axial CT images were obtained from the skullbase through the vertex. Radiation dose reduction techniques were used for this study. Our CT scanners use one or all of the following: Automated exposure control, adjustment of the mA and/or kV according to patient size, use of iterative reconstruction. Findings: The ventricles are normal in size, shape, and position. Chronic appearing white matter change noted in the corona radiata and centrum semiovale. No extra-axial fluid collection is present. There is no mass or mass-effect. The basilar cisterns are patent. The paranasal sinuses and mastoid air cells are clear.      Impression: Chronic appearing white matter change noted without acute intracranial abnormality.     )  Tests in the medicine section of CPT®: ordered and reviewed  Discuss the patient with other providers: yes (Consultation with neurology    Neurosurgery    Hospitalist service)  Independent visualization of images, tracings, or specimens: yes    Risk of Complications, Morbidity, and/or Mortality  Presenting problems: high  Diagnostic procedures: high  Management options: high  General comments: I personally reviewed the patient's vital signs, laboratory tests, and/or radiological findings. I discussed these findings with the patient and their significance. I answered all questions and explained that given these findings there is significant concern for increased morbidity and/or mortality without immediate intervention. As a result, I recommended admission to the hospital, consulted the appropriate service, and transitioned care to that service in stable condition      Patient Progress  Patient progress: stable    ED Course as of Dec 31 1107   Thu Dec 31, 2020   1047 Patient appears to have a large MCA sign consistent with patient's stroke symptoms. She is on Xarelto so I have not preemptively ordered TPA. [JS]   7103 Patient was evaluated by surgery and they felt like maybe the patient's symptoms actually began overnight and this is a completed stroke. Imaging been reviewed by neuro interventional at this time.     [JS]   2679 EKG was performed shows A. fib with a rate of 112 right bundle branch block and LVH    [JS]   1059 Dr. Nelly Valle reviewed imaging says she has an M3 occlusion and thinks that with her NIH this high there may be some seizure activity so going order a gram of Keppra    [JS]      ED Course User Index  [JS] Jessica Bill MD       Critical Care  Performed by: Jessica Bill MD  Authorized by: Jessica Bill MD     Critical care provider statement:     Critical care time (minutes):  36    Critical care was necessary to treat or prevent imminent or life-threatening deterioration of the following conditions:  CNS failure or compromise    Critical care was time spent personally by me on the following activities:  Blood draw for specimens, ordering and performing treatments and interventions, development of treatment plan with patient or surrogate, ordering and review of laboratory studies, discussions with consultants, ordering and review of radiographic studies, discussions with primary provider, pulse oximetry, evaluation of patient's response to treatment, re-evaluation of patient's condition, examination of patient, review of old charts and obtaining history from patient or surrogate    I assumed direction of critical care for this patient from another provider in my specialty: no    Comments:      Acute CVA, stroke alert, rapid imagery interpretation, multiple service consultation

## 2020-12-31 NOTE — PROGRESS NOTES
Pt. 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 Given distal location and changes already noted, the patient is not an interventional candidate.

## 2020-12-31 NOTE — CONSULTS
am  12/31/2020 1:51 PM    Admit Date: 12/31/2020    Admit Diagnosis: Acute ischemic stroke [facility]) [I63.9]      Subjective:    Patient History of permanent atrial fibrillation (prior cardiologist in [facility]). History of hypertension, hyperlipidemia. Prior hx of CVA with no sig residual deficits. Echo with preserved EF and severe biatrial enlargement [9/17]. Aileen Alaniz is a 80 y.o. female who is being seen for code S. . The patient was last known normal at 07 30.      The patient presented to [facility] ED. A code S was called at 10:28 AM. Neurology arrived to the bedside at 10:30 AM initial NIHSS was18. A CT of the head was obtained and showed a left hemispheric area of encephalomalacia in an MCA distribution in addition to deep white matter ischemic change     The patient had experienced at 730 this morning the acute onset of a right hemiplegia and inability to to communicate.     Of significance is the fact that the patient had a fall 6 days ago and at that time had been seen in the emergency room with a shoulder separation.     This was mechanically reduced. The patient has chronic atrial fibrillation and is on Xarelto.   That medication was refilled on review of the chart and patient's relatives indicate that she is compliant with her medication and there was no history which suggested that Xarelto had been discontinued in the light of the shoulder problem    We are asked to see for troponin elevation    Objective:      Visit Vitals  BP (!) 164/96   Pulse (!) 129   Temp 98.7 °F (37.1 °C)   Resp 19   Ht 4' 11\" (1.499 m)   Wt 114 lb (51.7 kg)   SpO2 99%   BMI 23.03 kg/m²       ROS: not obtainable  Physical Exam:    Physical Examination:   Chest/CV - clear to auscultation, no wheezes, rales or rhonchi, symmetric air entry  Heart - irregularly irregular rhythm  Abdomen/GI - soft, nontender, nondistended, no masses or organomegaly  Musculoskeletal - no joint tenderness, deformity or swelling  Extremities - peripheral pulses normal, no pedal edema, no clubbing or cyanosis  Skin - normal coloration and turgor, no rashes, no suspicious skin lesions noted    Current Facility-Administered Medications   Medication Dose Route Frequency    levETIRAcetam (KEPPRA) 1,000 mg in 0.9% sodium chloride 100 mL IVPB  1,000 mg IntraVENous Q12H    potassium bicarb-citric acid (EFFER-K) tablet 40 mEq  40 mEq Oral BID    heparin 25,000 units in dextrose 500 mL infusion  12-25 Units/kg/hr IntraVENous TITRATE     Current Outpatient Medications   Medication Sig    Xarelto 20 mg tab tablet TAKE 1 TABLET BY MOUTH ONCE DAILY WITH SUPPER    metoprolol succinate (TOPROL-XL) 100 mg tablet Take 1 tablet by mouth once daily    benazepriL (LOTENSIN) 40 mg tablet Take 1 tablet by mouth once daily    levothyroxine (SYNTHROID) 75 mcg tablet TAKE 1 TABLET BY MOUTH ONCE DAILY BEFORE BREAKFAST    amLODIPine (NORVASC) 2.5 mg tablet Take 1 tablet by mouth once daily    omeprazole (PRILOSEC) 40 mg capsule Take 1 Cap by mouth daily.  Lumigan 0.01 % ophthalmic drops INSTILL 1 DROP IN BOTH EYES AT BEDTIME    cholecalciferol, vitamin D3, (VITAMIN D3) 2,000 unit tab Take  by mouth.  dorzolamide-timolol (COSOPT) 22.3-6.8 mg/mL ophthalmic solution PUT 1 DROP INTO BOTH EYES TWICE A DAY    calcium-cholecalciferol, d3, (CALCIUM 600 + D) 600-125 mg-unit tab Take  by mouth.        Data Review:   @LABRCNT(Na,K,BUN,CREA,WBC,HGB,HCT,PLT,INR,TRP,TCHOL*,Triglyceride*,LDL*,LDLCPOC HDL*,HDL])@    TELEMETRY: AF nstt    Assessment/Plan:     Principal Problem:    Acute ischemic stroke ([facility] (12/31/2020)    Active Problems:    Chronic atrial fibrillation ([facility] (7/20/2017)      HTN (hypertension), benign (7/20/2017)      Hyperlipidemia, mixed (7/20/2017)      Gastroesophageal reflux disease with esophagitis (7/20/2017)      Hypothyroidism, adult (7/20/2017)      History of CVA (cerebrovascular accident) (7/20/2017)      Overview: 2014      Age related osteoporosis (7/20/2017)      Primary osteoarthritis involving multiple joints (7/21/2017)      Elevated troponin (12/31/2020)    trop may well be sequele of CVA. She has no known cad but does have AF. She has some ST depression and subendocardial injury also a possibility. She currently is not a candidate for any cardiac workup or invasive treatment.  Would consider slowing AF with IV lopressor    Ok with heparin gtt          Elba Whitman MD

## 2020-12-31 NOTE — ED NOTES
Pt presents asleep at this time with even and unlabored respiraitons, daughter at bedside.  Bittrick at bedside

## 2020-12-31 NOTE — H&P
Hospitalist Note     Admit Date:  2020 10:23 AM   Name:  Alma Jansen   Age:  80 y.o.  :  10/23/1931   MRN:  [id number]   PCP:  Wilton Bentley DO  Treatment Team: Attending Provider: Iona Lopez MD; Primary Nurse: Pavel Dominick    HPI/Subjective:   Pt is poorly responsive so history is per daughter at bedside. Pt is an 81 y/o F with HTN, HLD intolerant to statins, hx CVA, afib on xarelto, who presented to ER with R sided weakness and unresponsiveness. Daughter said pt was in usual state of health this morning until around 730am.  At that point she noticed pt had stopped responding, was having trouble moving her R side. EMS was called. She had code S and CTA showed M3 occlusion but too distal to intervene mechanically. She is compliant with meds including xarelto so no TPA. Troponin came back >23k. Daughter did report pt having CP radiating up her neck often, off and on the past few days, significantly bothersome enough for her to complain multiple times a day. Daughter does not think she has had any other symptoms.     Unable to obtain ROS due to condition  Past Medical History:   Diagnosis Date    Age related osteoporosis 2017    Chronic atrial fibrillation ([address] 2017    Chronic pain     shoulder    DDD (degenerative disc disease), lumbar 2017    Gastroesophageal reflux disease with esophagitis 2017    controlled with medication    Glaucoma 2017    History of CVA (cerebrovascular accident) 2017    HTN (hypertension), benign 2017    Hyperlipidemia, mixed 2017    Hypertension     Hypothyroidism, adult 2017    IFG (impaired fasting glucose) 2017    Primary osteoarthritis involving multiple joints 2017    PUD (peptic ulcer disease)       Past Surgical History:   Procedure Laterality Date    HX  SECTION      x2    HX COLONOSCOPY      HX URIEL AND BSO      hysterectomy      Allergies Allergen Reactions    Actonel [Risedronate] Other (comments)     GI Problems    Atorvastatin Myalgia    Fosamax [Alendronate] Unknown (comments)    Ketoprofen Other (comments)     Peptic ulcer disease    Moexipril Cough      Social History     Tobacco Use    Smoking status: Never Smoker    Smokeless tobacco: Never Used   Substance Use Topics    Alcohol use: No      Family History   Problem Relation Age of Onset    No Known Problems Mother     Cancer Father         stomach    No Known Problems Sister     No Known Problems Brother     No Known Problems Sister     No Known Problems Sister     No Known Problems Sister     No Known Problems Brother     No Known Problems Brother     No Known Problems Brother     No Known Problems Brother     Coronary Artery Disease Neg Hx       Family history reviewed and noncontributory.   Immunization History   Administered Date(s) Administered    Tdap 10/13/2019     PTA Medications:  Current Outpatient Medications   Medication Instructions    amLODIPine (NORVASC) 2.5 mg tablet Take 1 tablet by mouth once daily    benazepriL (LOTENSIN) 40 mg tablet Take 1 tablet by mouth once daily    calcium-cholecalciferol, d3, (CALCIUM 600 + D) 600-125 mg-unit tab Oral    cholecalciferol, vitamin D3, (VITAMIN D3) 2,000 unit tab Oral    dorzolamide-timolol (COSOPT) 22.3-6.8 mg/mL ophthalmic solution PUT 1 DROP INTO BOTH EYES TWICE A DAY    levothyroxine (SYNTHROID) 75 mcg tablet TAKE 1 TABLET BY MOUTH ONCE DAILY BEFORE BREAKFAST    Lumigan 0.01 % ophthalmic drops INSTILL 1 DROP IN BOTH EYES AT BEDTIME    metoprolol succinate (TOPROL-XL) 100 mg tablet Take 1 tablet by mouth once daily    omeprazole (PRILOSEC) 40 mg, Oral, DAILY    Xarelto 20 mg tab tablet TAKE 1 TABLET BY MOUTH ONCE DAILY WITH SUPPER       Objective:     Patient Vitals for the past 24 hrs:   Temp Pulse Resp BP SpO2   12/31/20 1339 -- (!) 129 19 (!) 164/96 --   12/31/20 1320 -- (!) 133 18 (!) 153/91 -- 12/31/20 1300 -- (!) 137 26 (!) 174/90 --   12/31/20 1240 -- -- -- (!) 158/92 --   12/31/20 1220 -- (!) 121 21 (!) 167/81 --   12/31/20 1200 -- (!) 118 22 (!) 144/85 99 %   12/31/20 1146 -- (!) 110 17 (!) 162/87 96 %   12/31/20 1102 98.7 °F (37.1 °C) (!) 125 18 (!) 172/79 97 %     Oxygen Therapy  O2 Sat (%): 99 % (12/31/20 1200)  Pulse via Oximetry: 115 beats per minute (12/31/20 1200)  O2 Device: Room air (12/31/20 1102)    Estimated body mass index is 23.03 kg/m² as calculated from the following:    Height as of this encounter: 4' 11\" (1.499 m). Weight as of this encounter: 51.7 kg (114 lb). Intake/Output Summary (Last 24 hours) at 12/31/2020 1421  Last data filed at 12/31/2020 1158  Gross per 24 hour   Intake 100 ml   Output --   Net 100 ml       Physical Exam:  General:    Well nourished. Appears uncomfortable, constantly moving/writhing on stretcher. Eyes:   ?icteric sclerae. Appears to have leftward gaze  HENT:  Normocephalic, atraumatic. Moist mucous membranes  CV:   Very Tachy, reg. No m/r/g. No edema  Lungs:  CTAB anterior. No wheezing, rhonchi, or rales. Unlabored  Abdomen: Soft, nondistended. Extremities: Warm and dry. No cyanosis or clubbing  Neurologic: does not follow commands. Lethargic. Did see pt move RUE but appeared to be decerebrate rigidity. No significant RLE movement. Spontaneously moves other extremities. Skin:     No rashes. Slight yellowish discoloration, mild. Hard to say if jaundice    I reviewed the labs, imaging, EKGs, telemetry, and other studies done this admission.   Data Review:   Recent Results (from the past 24 hour(s))   BMP(8)/MAG    Collection Time: 12/30/20  2:29 PM   Result Value Ref Range    Glucose 134 (H) 65 - 99 mg/dL    BUN 10 8 - 27 mg/dL    Creatinine 0.69 0.57 - 1.00 mg/dL    GFR est non-AA 77 >59 mL/min/1.73    GFR est AA 89 >59 mL/min/1.73    BUN/Creatinine ratio 14 12 - 28    Sodium 140 134 - 144 mmol/L    Potassium 3.3 (L) 3.5 - 5.2 mmol/L Chloride 100 96 - 106 mmol/L    CO2 26 20 - 29 mmol/L    Calcium 9.1 8.7 - 10.3 mg/dL    Magnesium 1.9 1.6 - 2.3 mg/dL   GLUCOSE, POC    Collection Time: 12/31/20 10:29 AM   Result Value Ref Range    Glucose (POC) 135 (H) 65 - 100 mg/dL   CBC WITH AUTOMATED DIFF    Collection Time: 12/31/20 10:30 AM   Result Value Ref Range    WBC 8.0 4.3 - 11.1 K/uL    RBC 4.28 4.05 - 5.2 M/uL    HGB 12.7 11.7 - 15.4 g/dL    HCT 38.0 35.8 - 46.3 %    MCV 88.8 79.6 - 97.8 FL    MCH 29.7 26.1 - 32.9 PG    MCHC 33.4 31.4 - 35.0 g/dL    RDW 13.6 11.9 - 14.6 %    PLATELET 474 139 - 191 K/uL    MPV 11.7 9.4 - 12.3 FL    ABSOLUTE NRBC 0.00 0.0 - 0.2 K/uL    DF AUTOMATED      NEUTROPHILS 79 (H) 43 - 78 %    LYMPHOCYTES 11 (L) 13 - 44 %    MONOCYTES 9 4.0 - 12.0 %    EOSINOPHILS 0 (L) 0.5 - 7.8 %    BASOPHILS 1 0.0 - 2.0 %    IMMATURE GRANULOCYTES 0 0.0 - 5.0 %    ABS. NEUTROPHILS 6.3 1.7 - 8.2 K/UL    ABS. LYMPHOCYTES 0.9 0.5 - 4.6 K/UL    ABS. MONOCYTES 0.7 0.1 - 1.3 K/UL    ABS. EOSINOPHILS 0.0 0.0 - 0.8 K/UL    ABS. BASOPHILS 0.0 0.0 - 0.2 K/UL    ABS. IMM.  GRANS. 0.0 0.0 - 0.5 K/UL   METABOLIC PANEL, BASIC    Collection Time: 12/31/20 10:30 AM   Result Value Ref Range    Sodium 138 136 - 145 mmol/L    Potassium 2.9 (L) 3.5 - 5.1 mmol/L    Chloride 106 98 - 107 mmol/L    CO2 27 21 - 32 mmol/L    Anion gap 5 (L) 7 - 16 mmol/L    Glucose 125 (H) 65 - 100 mg/dL    BUN 17 8 - 23 MG/DL    Creatinine 0.81 0.6 - 1.0 MG/DL    GFR est AA >60 >60 ml/min/1.73m2    GFR est non-AA >60 >60 ml/min/1.73m2    Calcium 8.8 8.3 - 10.4 MG/DL   PROTHROMBIN TIME + INR    Collection Time: 12/31/20 10:30 AM   Result Value Ref Range    Prothrombin time 15.0 (H) 12.5 - 14.7 sec    INR 1.2     TROPONIN-HIGH SENSITIVITY    Collection Time: 12/31/20 10:30 AM   Result Value Ref Range    Troponin-High Sensitivity 23,154.9 (HH) 0 - 14 pg/mL   PTT    Collection Time: 12/31/20 10:30 AM   Result Value Ref Range    aPTT 32.1 24.1 - 35.1 SEC   POC PT/INR    Collection Time: 12/31/20 10:31 AM   Result Value Ref Range    Prothrombin time (POC) 16.8 (H) 9.6 - 11.6 SECS    INR (POC) 1.4 (H) 0.9 - 1.2         All Micro Results     None          Medications Administered     heparin (porcine) injection 3,100 Units     Admin Date  12/31/2020 Action  Given Dose  3,100 Units Route  IntraVENous Administered By  Omega Montalvo          iopamidoL (ISOVUE-370) 76 % injection 100 mL     Admin Date  12/31/2020 Action  Given Dose  100 mL Route  IntraVENous Administered By  Montell Jeans          levETIRAcetam (KEPPRA) 1,000 mg in 0.9% sodium chloride 100 mL IVPB     Admin Date  12/31/2020 Action  New Bag Dose  1,000 mg Route  IntraVENous Administered By  Omega Montalvo M          saline peripheral flush soln 10 mL     Admin Date  12/31/2020 Action  Given Dose  10 mL Route  InterCATHeter Administered By  Montell Jeans          sodium chloride 0.9 % bolus infusion 100 mL     Admin Date  12/31/2020 Action  New Bag Dose  100 mL Route  IntraVENous Administered By  Montell Jeans                Other Studies:  Ct Perf W Cbf    Result Date: 12/31/2020  EXAMINATION: CT PERFUSION DATE: 12/31/2020 10:49 AM ACCESSION NUMBER:  [id number] INDICATION: : acute neuro changes, possible LVAO COMPARISON: Same-day head CT and CTA head and neck TECHNIQUE: CT perfusion of the brain was obtained after the administration of intravenous contrast. Perfusion maps and perfusion analysis output were generated using the VIZ perfusion processing software algorithm. Radiation dose reduction techniques were used for this study:  Our CT scanners use one or all of the following: Automated exposure control, adjustment of the mA and/or kVp according to patient's size, iterative reconstruction. FINDINGS: There is a focus of Tmax greater than 6 seconds in the left parietal lobe measuring 59 cc.  There is a focus of cerebral blood flow less than 30 percent in the left parietal lobe in a similar distribution measuring 29 cc. There is a mismatch volume of 30 cc with a mismatch ratio of 2. Widespread areas of Tmax greater than 4 are likely artifactual or due to chronic oligemia. IMPRESSION: 1. There is an acute infarction in the left parietal lobe in the posterior left MCA distribution. Core infarction volume measures 29 cc. Mismatch volume measures 30 cc. 2. This infarction corresponds to to the left M2 occlusion and the sylvian fissure on axial image 419 of the concurrent CT angiogram. 3. Neuro interventional evaluation is recommended. Discussed with Dr. Soledad Flor by Dr. Marielena Mosqueda at 11:35 AM 12/31/2020. VOICE DICTATED BY: Dr. Rosendo Mccullough Date: 12/31/2020    Addendum: Addendum: There is occlusion of an M2 branch on the left in the sylvian fissure. This was reported to the bedside team by Dr. Marielena Mosqueda. Result Date: 12/31/2020  History: Right-sided weakness and left-sided gaze acute onset code stroke. FINDINGS: CT angiography was performed of the neck and head with contrast and three-dimensional CT angiography reconstruction and reformat was performed. NASCET criteria as needed. CT dose reduction was achieved through use of a standardized protocol tailored for this examination and automatic exposure control for dose modulation. Study analyzed by the [employer] software package per the hospital protocol. The results of the analysis are neither reviewed nor provided in this exam interpretation and report. Atherosclerosis of the aortic arch. The left subclavian artery is atherosclerotic but patent. The left vertebral artery is patent. The innominate artery is patent. The right subclavian artery is patent. There is a stenosis at the origin of the right vertebral artery. Atherosclerosis in the intracranial segment of the right vertebral artery. The basilar artery is patent.  The left posterior communicating artery provides the dominant flow to the left posterior cerebral artery with a hypoplastic P1 segment on the left. The right posterior communicating artery provides the dominant flow to the right posterior cerebral artery which is patent. Hypoplastic P1 segment on the right. The right common carotid artery has concentric atherosclerosis at the proximal segment without hemodynamically significant stenosis. The left internal carotid artery is patent with no significant stenosis. The right common carotid artery is patent. The right internal carotid artery is patent. The anterior cerebral arteries are patent bilaterally. The right middle cerebral artery is patent. The left middle cerebral artery is patent. Dural venous sinuses are patent. IMPRESSION: No evidence of acute arterial occlusive disease. No hemodynamically significant carotid artery stenosis. Ct Code Neuro Head Wo Contrast    Result Date: 12/31/2020  History: acute neuro changes. Right-sided deficits. Exam: CT head without contrast Technique: Thin section axial CT images were obtained from the skullbase through the vertex. Radiation dose reduction techniques were used for this study. Our CT scanners use one or all of the following: Automated exposure control, adjustment of the mA and/or kV according to patient size, use of iterative reconstruction. Findings: The ventricles are normal in size, shape, and position. Chronic appearing white matter change noted in the corona radiata and centrum semiovale. No extra-axial fluid collection is present. There is no mass or mass-effect. The basilar cisterns are patent. The paranasal sinuses and mastoid air cells are clear. Impression: Chronic appearing white matter change noted without acute intracranial abnormality.          Assessment and Plan:     Hospital Problems as of 12/31/2020 Date Reviewed: 12/30/2020          Codes Class Noted - Resolved POA    * (Principal) Acute ischemic stroke [facility]) ICD-10-CM: I63.9  ICD-9-CM: 434.91  12/31/2020 - Present Yes        NSTEMI (non-ST elevated myocardial infarction) ([facility] [address] ICD-10-CM: I21.4  ICD-9-CM: 410.70  12/31/2020 - Present Yes        Primary osteoarthritis involving multiple joints (Chronic) ICD-10-CM: M89.49  ICD-9-CM: 715.98  7/21/2017 - Present Yes        Chronic atrial fibrillation (HCC) (Chronic) ICD-10-CM: I48.20  ICD-9-CM: 427.31  7/20/2017 - Present Yes        HTN (hypertension), benign (Chronic) ICD-10-CM: I10  ICD-9-CM: 401.1  7/20/2017 - Present Yes        Hyperlipidemia, mixed (Chronic) ICD-10-CM: E78.2  ICD-9-CM: 272.2  7/20/2017 - Present Yes        Gastroesophageal reflux disease with esophagitis (Chronic) ICD-10-CM: K21.00  ICD-9-CM: 530.11  7/20/2017 - Present Yes        Hypothyroidism, adult (Chronic) ICD-10-CM: E03.9  ICD-9-CM: 244.9  7/20/2017 - Present Yes        History of CVA (cerebrovascular accident) (Chronic) ICD-10-CM: Z86.73  ICD-9-CM: V12.54  7/20/2017 - Present Yes    Overview Signed 7/20/2017 11:58 AM by Megan De La Torre DO     2014             Age related osteoporosis (Chronic) ICD-10-CM: M81.0  ICD-9-CM: 733.01  7/20/2017 - Present Yes              Plan:  CVA M3 occlusion  -Admit to inpatient  -Check MRI brain, 2d echo + bubble study, lipids, a1c  -I assume neurology ordered the keppra; I did not. Will defer to them  -Neuro checks q4h  -Antiplatelet ordered. Cannot tolerate statin per allergies  -cont AC; do heparin gtt due to NPO status  -PT/OT/SLP evals   -Cardiac telemetry  -Permissive HTN for 24 hours after symptoms first noted; no BP meds unless SBP >220 or DBP >120, unless bleeding or evidence of organ damage from HTN. Labetalol IV PRN or other PRN, or cardene gtt if really necessary  -Code status:  Full code, verified with daughter POA. She may change her mind. Elevated troponin  -quite high 23k. Some CP off and on per daughter last few days. I think there is some V5-V6 depression  -Heparin gtt  -notified Dr Hillary Mills who came to eval immediately.   Appreciate input/help    Afib  -heparin gtt  -metoprolol IV per cardiology   -cardizem gtt   -if this ineffective, may need cardizem gtt. Am told beds available for this are limited    Skin discoloration, ?icterus  -add on LFTs    FEN  -replace K IV    Other listed chronic conditions stable, cont home meds    DVT ppx:  heparin  Discharge planning:  CM consulted.   PPD in case of placement  Estimated LOS:  Greater than 2 midnights  Critical care time 45 minutes    Signed:  Miguel Angel Matta MD

## 2020-12-31 NOTE — PROGRESS NOTES
Sent the following message to Dr. Soren Hood via perfect serve \"This patient just came up from ER. Could I get a soft restraints order for her left leg and left arm. She's tugging at everything and flopping her leg off the bed. \"    He stated he is okay with the order.

## 2020-12-31 NOTE — CONSULTS
Consult    Patient: Mavis Palomares MRN: [id number]     YOB: 1931  Age: 80 y.o. Sex: female      Subjective:      Mavis Palomares is a 80 y.o. female who is being seen for code S. . The patient was last known normal at 07 30. The patient presented to [facility] ED. A code S was called at 10:28 AM. Neurology arrived to the bedside at 10:30 AM initial NIHSS was18. A CT of the head was obtained and showed a left hemispheric area of encephalomalacia in an MCA distribution in addition to deep white matter ischemic change    The patient had experienced at 730 this morning the acute onset of a right hemiplegia and inability to to communicate. Of significance is the fact that the patient had a fall 6 days ago and at that time had been seen in the emergency room with a shoulder separation. This was mechanically reduced. The patient has chronic atrial fibrillation and is on Xarelto.   That medication was refilled on review of the chart and patient's relatives indicate that she is compliant with her medication and there was no history which suggested that Xarelto had been discontinued in the light of the shoulder problem    Past Medical History:   Diagnosis Date    Age related osteoporosis 2017    Chronic atrial fibrillation ([address] 2017    Chronic pain     shoulder    DDD (degenerative disc disease), lumbar 2017    Gastroesophageal reflux disease with esophagitis 2017    controlled with medication    Glaucoma 2017    History of CVA (cerebrovascular accident) 2017    HTN (hypertension), benign 2017    Hyperlipidemia, mixed 2017    Hypertension     Hypothyroidism, adult 2017    IFG (impaired fasting glucose) 2017    Primary osteoarthritis involving multiple joints 2017    PUD (peptic ulcer disease)      Past Surgical History:   Procedure Laterality Date    HX  SECTION      x2    HX COLONOSCOPY      HX URIEL AND BSO hysterectomy      Family History   Problem Relation Age of Onset    No Known Problems Mother     Cancer Father         stomach    No Known Problems Sister     No Known Problems Brother     No Known Problems Sister     No Known Problems Sister     No Known Problems Sister     No Known Problems Brother     No Known Problems Brother     No Known Problems Brother     No Known Problems Brother     Coronary Artery Disease Neg Hx      Social History     Tobacco Use    Smoking status: Never Smoker    Smokeless tobacco: Never Used   Substance Use Topics    Alcohol use: No      Current Facility-Administered Medications   Medication Dose Route Frequency Provider Last Rate Last Admin    levETIRAcetam (KEPPRA) 1,000 mg in 0.9% sodium chloride 100 mL IVPB  1,000 mg IntraVENous Q12H Chris Tucker MD   1,000 mg at 12/31/20 1143    potassium bicarb-citric acid (EFFER-K) tablet 40 mEq  40 mEq Oral BID Areli Roca MD         Current Outpatient Medications   Medication Sig Dispense Refill    Xarelto 20 mg tab tablet TAKE 1 TABLET BY MOUTH ONCE DAILY WITH SUPPER 30 Tab 5    metoprolol succinate (TOPROL-XL) 100 mg tablet Take 1 tablet by mouth once daily 90 Tab 1    benazepriL (LOTENSIN) 40 mg tablet Take 1 tablet by mouth once daily 90 Tab 1    levothyroxine (SYNTHROID) 75 mcg tablet TAKE 1 TABLET BY MOUTH ONCE DAILY BEFORE BREAKFAST 90 Tab 1    amLODIPine (NORVASC) 2.5 mg tablet Take 1 tablet by mouth once daily 90 Tab 1    omeprazole (PRILOSEC) 40 mg capsule Take 1 Cap by mouth daily. 30 Cap 5    Lumigan 0.01 % ophthalmic drops INSTILL 1 DROP IN BOTH EYES AT BEDTIME 2.5 mL 5    cholecalciferol, vitamin D3, (VITAMIN D3) 2,000 unit tab Take  by mouth.  dorzolamide-timolol (COSOPT) 22.3-6.8 mg/mL ophthalmic solution PUT 1 DROP INTO BOTH EYES TWICE A DAY 10 mL 11    calcium-cholecalciferol, d3, (CALCIUM 600 + D) 600-125 mg-unit tab Take  by mouth.           Allergies   Allergen Reactions    Actonel [Risedronate] Other (comments)     GI Problems    Atorvastatin Myalgia    Fosamax [Alendronate] Unknown (comments)    Ketoprofen Other (comments)     Peptic ulcer disease    Moexipril Cough       Review of Systems:  Not obtained due to emergent situation         Objective:     Vitals:    12/31/20 1102   BP: (!) 172/79   Pulse: (!) 125   Resp: 18   Temp: 98.7 °F (37.1 °C)   SpO2: 97%   Weight: 114 lb (51.7 kg)   Height: 4' 11\" (1.499 m)        Physical Exam:  General - Well developed, well nourished, in no apparent distress. Pleasant and conversent. HEENT - Normocephalic, atraumatic. Conjunctiva, tympanic membranes, and oropharynx are clear. Neck - Supple without masses, no bruits   Cardiovascular - Regular rate and rhythm. Normal S1, S2 without murmurs, rubs, or gallops. Lungs - Clear to auscultation. Abdomen - Soft, nontender with normal bowel sounds. Extremities - Peripheral pulses intact. No edema and no rashes. Neurological examination -the patient is awake and alert but is globally aphasic. There is a left gaze paralysis. The patient is right hemiplegic. There is a right facial droop. Visual fields difficult to assess    Lab Results   Component Value Date/Time    Cholesterol, total 159 06/08/2020 12:25 PM    HDL Cholesterol 53 06/08/2020 12:25 PM    LDL, calculated 87 06/08/2020 12:25 PM    VLDL, calculated 19 06/08/2020 12:25 PM    Triglyceride 96 06/08/2020 12:25 PM        Lab Results   Component Value Date/Time    Hemoglobin A1c 5.5 05/28/2019 11:10 AM        Images personally reviewed by me area of encephalomalacia on the uninfused imaging in the left middle cerebral artery distribution  CT Results (most recent):  Results from Hospital Encounter encounter on 12/31/20   CTA CODE NEURO HEAD AND NECK W CONT    Addendum Addendum: Addendum: There is occlusion of an M2 branch on the left in the sylvian fissure. This was reported to the bedside team by Dr. Boston Person.       Yoel Robins MD [facility] 12/31/2020 11:46 AM          Narrative History: Right-sided weakness and left-sided gaze acute onset code stroke. FINDINGS:    CT angiography was performed of the neck and head with contrast and  three-dimensional CT angiography reconstruction and reformat was performed. NASCET criteria as needed. CT dose reduction was achieved through use of a  standardized protocol tailored for this examination and automatic exposure  control for dose modulation. Study analyzed by the [employer] software package per the hospital protocol. The  results of the analysis are neither reviewed nor provided in this exam  interpretation and report. Atherosclerosis of the aortic arch. The left subclavian artery is  atherosclerotic but patent. The left vertebral artery is patent. The innominate  artery is patent. The right subclavian artery is patent. There is a stenosis at  the origin of the right vertebral artery. Atherosclerosis in the intracranial  segment of the right vertebral artery. The basilar artery is patent. The left  posterior communicating artery provides the dominant flow to the left posterior  cerebral artery with a hypoplastic P1 segment on the left. The right posterior  communicating artery provides the dominant flow to the right posterior cerebral  artery which is patent. Hypoplastic P1 segment on the right. The right common  carotid artery has concentric atherosclerosis at the proximal segment without  hemodynamically significant stenosis. The left internal carotid artery is patent  with no significant stenosis. The right common carotid artery is patent. The  right internal carotid artery is patent. The anterior cerebral arteries are  patent bilaterally. The right middle cerebral artery is patent. The left middle  cerebral artery is patent. Dural venous sinuses are patent. Impression IMPRESSION:    No evidence of acute arterial occlusive disease.  No hemodynamically significant  carotid artery stenosis. Results for orders placed or performed in visit on 05/31/18   AMB POC EKG ROUTINE W/ 12 LEADS, INTER & REP    Impression    Atrial fibrillation with controlled ventricular response. Heart rate 74        MRI Results (most recent):   Results from Hospital Encounter encounter on 11/14/19   MRI BRAIN WO CONT    Narrative MRI of the brain     INDICATION: Recent fall with right-sided head injury, ataxia    Standard MRI sequences were obtained through the brain in multiple planes  without IV contrast    FINDINGS:  There is moderate chronic change in the white matter. There is a small focus of  old hemorrhage in the right thalamus. There is no evidence of acute hemorrhage  or infarction. The ventricles are normal in size. There are no extra-axial  fluid collections. There are no intracranial masses. There are no bony  lesions. The sinuses are clear. Impression IMPRESSION: Moderate chronic change in the white matter suggesting small vessel  disease. No evidence of acute intracranial abnormality          Most recent CTA:  Results from Hospital Encounter encounter on 12/31/20   CTA CODE NEURO HEAD AND NECK W CONT    Addendum Addendum: Addendum: There is occlusion of an M2 branch on the left in the sylvian fissure. This was reported to the bedside team by Dr. Mahendra Mares. Xin Ahmadi MD 12/31/2020 11:46 AM          Narrative History: Right-sided weakness and left-sided gaze acute onset code stroke. FINDINGS:    CT angiography was performed of the neck and head with contrast and  three-dimensional CT angiography reconstruction and reformat was performed. NASCET criteria as needed. CT dose reduction was achieved through use of a  standardized protocol tailored for this examination and automatic exposure  control for dose modulation. Study analyzed by the [employer] software package per the hospital protocol.   The  results of the analysis are neither reviewed nor provided in this exam  interpretation and report. Atherosclerosis of the aortic arch. The left subclavian artery is  atherosclerotic but patent. The left vertebral artery is patent. The innominate  artery is patent. The right subclavian artery is patent. There is a stenosis at  the origin of the right vertebral artery. Atherosclerosis in the intracranial  segment of the right vertebral artery. The basilar artery is patent. The left  posterior communicating artery provides the dominant flow to the left posterior  cerebral artery with a hypoplastic P1 segment on the left. The right posterior  communicating artery provides the dominant flow to the right posterior cerebral  artery which is patent. Hypoplastic P1 segment on the right. The right common  carotid artery has concentric atherosclerosis at the proximal segment without  hemodynamically significant stenosis. The left internal carotid artery is patent  with no significant stenosis. The right common carotid artery is patent. The  right internal carotid artery is patent. The anterior cerebral arteries are  patent bilaterally. The right middle cerebral artery is patent. The left middle  cerebral artery is patent. Dural venous sinuses are patent. Impression IMPRESSION:    No evidence of acute arterial occlusive disease. No hemodynamically significant  carotid artery stenosis. Most recent MRA:  Results from [place] encounter on 11/14/19   MRI BRAIN WO CONT    Narrative MRI of the brain     INDICATION: Recent fall with right-sided head injury, ataxia    Standard MRI sequences were obtained through the brain in multiple planes  without IV contrast    FINDINGS:  There is moderate chronic change in the white matter. There is a small focus of  old hemorrhage in the right thalamus. There is no evidence of acute hemorrhage  or infarction. The ventricles are normal in size. There are no extra-axial  fluid collections. There are no intracranial masses.   There are no bony  lesions. The sinuses are clear. Impression IMPRESSION: Moderate chronic change in the white matter suggesting small vessel  disease. No evidence of acute intracranial abnormality         Most recent Echo:  No results found for this visit on 12/31/20. Assessment:     seen as a code S with signs as above  Initial NIHSS was 18. CT of the head was obtained and . CTA of head neck {WAS obtained. Left M2 occlusion the patient was deemed not to be a candidate for alteplase secondary to the fact that she was on Xarelto. The patient was not a candidate for mechanical thrombectomy, due to age and area of encephalomalacia    Plan:         Signed By: Silvano Varma MD     December 31, 2020      I spent 50minutes with the patient. Over 50% of that time was spent face-to-face with the patient and family.   This represented critical care time with the patient who was extremely unstable

## 2020-12-31 NOTE — ED TRIAGE NOTES
Pt arrives from home with ems after daughter noticing changes with speech and \"sliding out of chair\" this am at 0730. Pt presents with a hx of afib and on xarleto per daughter. Pt unable to speak on arrival with right sided weakness and left sided gaze. Pt respirations even and unlabored.

## 2020-12-31 NOTE — ED NOTES
TRANSFER - OUT REPORT:    Verbal report given to clara Gamez on Paulette Moran  being transferred to [phone] for routine progression of care       Report consisted of patients Situation, Background, Assessment and   Recommendations(SBAR). Information from the following report(s) SBAR was reviewed with the receiving nurse. Lines:   Peripheral IV 12/31/20 Right Antecubital (Active)        Opportunity for questions and clarification was provided.       Patient transported with:   Monitor  Registered Nurse

## 2020-12-31 NOTE — Clinical Note
Status[de-identified] INPATIENT [101]   Type of Bed: Remote Telemetry [29]   Inpatient Hospitalization Certified Necessary for the Following Reasons: 3.  Patient receiving treatment that can only be provided in an inpatient setting (further clarification in H&P documentation)   Admitting Diagnosis: Acute ischemic stroke [facility]) [[id number]]   Admitting Physician: Leander Deleon   Attending Physician: Octavio Booker [[id number]]   Estimated Length of Stay: 2 Midnights   Discharge Plan[de-identified] Other (Specify) Comment: [facility]  Pager: [phone]. If no response or past 5 pm call [phone].     Please call ID service for questions over weekend at [phone]. Agree with assessment and plan.   No urologic intervention indicated

## 2021-01-01 LAB
ANION GAP SERPL CALC-SCNC: 9 MMOL/L (ref 7–16)
BASOPHILS # BLD: 0.1 K/UL (ref 0–0.2)
BASOPHILS NFR BLD: 1 % (ref 0–2)
BUN SERPL-MCNC: 14 MG/DL (ref 8–23)
CALCIUM SERPL-MCNC: 8.9 MG/DL (ref 8.3–10.4)
CHLORIDE SERPL-SCNC: 106 MMOL/L (ref 98–107)
CHOLEST SERPL-MCNC: 179 MG/DL
CO2 SERPL-SCNC: 26 MMOL/L (ref 21–32)
CREAT SERPL-MCNC: 0.55 MG/DL (ref 0.6–1)
DIFFERENTIAL METHOD BLD: ABNORMAL
EOSINOPHIL # BLD: 0 K/UL (ref 0–0.8)
EOSINOPHIL NFR BLD: 0 % (ref 0.5–7.8)
ERYTHROCYTE [DISTWIDTH] IN BLOOD BY AUTOMATED COUNT: 13.4 % (ref 11.9–14.6)
EST. AVERAGE GLUCOSE BLD GHB EST-MCNC: 120 MG/DL
GLUCOSE SERPL-MCNC: 105 MG/DL (ref 65–100)
HBA1C MFR BLD: 5.8 % (ref 4.8–6)
HCT VFR BLD AUTO: 39.2 % (ref 35.8–46.3)
HDLC SERPL-MCNC: 61 MG/DL (ref 40–60)
HDLC SERPL: 2.9 {RATIO}
HGB BLD-MCNC: 12.9 G/DL (ref 11.7–15.4)
IMM GRANULOCYTES # BLD AUTO: 0 K/UL (ref 0–0.5)
IMM GRANULOCYTES NFR BLD AUTO: 0 % (ref 0–5)
LDLC SERPL CALC-MCNC: 104.8 MG/DL
LIPID PROFILE,FLP: ABNORMAL
LYMPHOCYTES # BLD: 1.1 K/UL (ref 0.5–4.6)
LYMPHOCYTES NFR BLD: 16 % (ref 13–44)
MCH RBC QN AUTO: 28.9 PG (ref 26.1–32.9)
MCHC RBC AUTO-ENTMCNC: 32.9 G/DL (ref 31.4–35)
MCV RBC AUTO: 87.9 FL (ref 79.6–97.8)
MM INDURATION POC: 0 MM (ref 0–5)
MONOCYTES # BLD: 0.6 K/UL (ref 0.1–1.3)
MONOCYTES NFR BLD: 9 % (ref 4–12)
NEUTS SEG # BLD: 5.1 K/UL (ref 1.7–8.2)
NEUTS SEG NFR BLD: 74 % (ref 43–78)
NRBC # BLD: 0 K/UL (ref 0–0.2)
PLATELET # BLD AUTO: 168 K/UL (ref 150–450)
PMV BLD AUTO: 11.7 FL (ref 9.4–12.3)
POTASSIUM SERPL-SCNC: 2.5 MMOL/L (ref 3.5–5.1)
PPD POC: NEGATIVE NEGATIVE
RBC # BLD AUTO: 4.46 M/UL (ref 4.05–5.2)
SODIUM SERPL-SCNC: 141 MMOL/L (ref 136–145)
TRIGL SERPL-MCNC: 66 MG/DL (ref 35–150)
TROPONIN-HIGH SENSITIVITY: ABNORMAL PG/ML (ref 0–14)
UFH PPP CHRO-ACNC: 0.3 IU/ML (ref 0.3–0.7)
UFH PPP CHRO-ACNC: 0.32 IU/ML (ref 0.3–0.7)
VLDLC SERPL CALC-MCNC: 13.2 MG/DL (ref 6–23)
WBC # BLD AUTO: 6.9 K/UL (ref 4.3–11.1)

## 2021-01-01 PROCEDURE — 83036 HEMOGLOBIN GLYCOSYLATED A1C: CPT

## 2021-01-01 PROCEDURE — 80061 LIPID PANEL: CPT

## 2021-01-01 PROCEDURE — 99233 SBSQ HOSP IP/OBS HIGH 50: CPT | Performed by: PSYCHIATRY & NEUROLOGY

## 2021-01-01 PROCEDURE — 74011250636 HC RX REV CODE- 250/636: Performed by: INTERNAL MEDICINE

## 2021-01-01 PROCEDURE — 84484 ASSAY OF TROPONIN QUANT: CPT

## 2021-01-01 PROCEDURE — 77030040393 HC DRSG OPTIFOAM GENT MDII -B

## 2021-01-01 PROCEDURE — 2709999900 HC NON-CHARGEABLE SUPPLY

## 2021-01-01 PROCEDURE — 65660000000 HC RM CCU STEPDOWN

## 2021-01-01 PROCEDURE — 85520 HEPARIN ASSAY: CPT

## 2021-01-01 PROCEDURE — 85025 COMPLETE CBC W/AUTO DIFF WBC: CPT

## 2021-01-01 PROCEDURE — 80048 BASIC METABOLIC PNL TOTAL CA: CPT

## 2021-01-01 PROCEDURE — 36415 COLL VENOUS BLD VENIPUNCTURE: CPT

## 2021-01-01 PROCEDURE — 74011250637 HC RX REV CODE- 250/637: Performed by: INTERNAL MEDICINE

## 2021-01-01 PROCEDURE — 99221 1ST HOSP IP/OBS SF/LOW 40: CPT | Performed by: PHYSICAL MEDICINE & REHABILITATION

## 2021-01-01 PROCEDURE — 74011000258 HC RX REV CODE- 258: Performed by: INTERNAL MEDICINE

## 2021-01-01 RX ORDER — SODIUM CHLORIDE, SODIUM LACTATE, POTASSIUM CHLORIDE, CALCIUM CHLORIDE 600; 310; 30; 20 MG/100ML; MG/100ML; MG/100ML; MG/100ML
25 INJECTION, SOLUTION INTRAVENOUS CONTINUOUS
Status: DISCONTINUED | OUTPATIENT
Start: 2021-01-01 | End: 2021-01-03

## 2021-01-01 RX ORDER — ENALAPRILAT 1.25 MG/ML
1.25 INJECTION INTRAVENOUS
Status: DISCONTINUED | OUTPATIENT
Start: 2021-01-01 | End: 2021-01-01

## 2021-01-01 RX ORDER — LABETALOL HYDROCHLORIDE 5 MG/ML
20 INJECTION, SOLUTION INTRAVENOUS
Status: DISCONTINUED | OUTPATIENT
Start: 2021-01-01 | End: 2021-01-07 | Stop reason: HOSPADM

## 2021-01-01 RX ORDER — POTASSIUM CHLORIDE 14.9 MG/ML
20 INJECTION INTRAVENOUS
Status: COMPLETED | OUTPATIENT
Start: 2021-01-01 | End: 2021-01-01

## 2021-01-01 RX ORDER — LORAZEPAM 2 MG/ML
0.5 INJECTION INTRAMUSCULAR
Status: DISCONTINUED | OUTPATIENT
Start: 2021-01-01 | End: 2021-01-07 | Stop reason: HOSPADM

## 2021-01-01 RX ORDER — HYDRALAZINE HYDROCHLORIDE 20 MG/ML
20 INJECTION INTRAMUSCULAR; INTRAVENOUS
Status: DISCONTINUED | OUTPATIENT
Start: 2021-01-01 | End: 2021-01-03

## 2021-01-01 RX ORDER — HYDRALAZINE HYDROCHLORIDE 20 MG/ML
10 INJECTION INTRAMUSCULAR; INTRAVENOUS
Status: DISCONTINUED | OUTPATIENT
Start: 2021-01-01 | End: 2021-01-03

## 2021-01-01 RX ORDER — POTASSIUM CHLORIDE 20 MEQ/1
40 TABLET, EXTENDED RELEASE ORAL 2 TIMES DAILY
Status: DISCONTINUED | OUTPATIENT
Start: 2021-01-01 | End: 2021-01-01

## 2021-01-01 RX ADMIN — POTASSIUM CHLORIDE 20 MEQ: 14.9 INJECTION, SOLUTION INTRAVENOUS at 08:46

## 2021-01-01 RX ADMIN — SODIUM CHLORIDE 10 MG/HR: 900 INJECTION, SOLUTION INTRAVENOUS at 20:21

## 2021-01-01 RX ADMIN — DORZOLAMIDE HYDROCHLORIDE AND TIMOLOL MALEATE 1 DROP: 20; 5 SOLUTION/ DROPS OPHTHALMIC at 20:24

## 2021-01-01 RX ADMIN — Medication 10 ML: at 20:24

## 2021-01-01 RX ADMIN — Medication 10 ML: at 05:26

## 2021-01-01 RX ADMIN — ASPIRIN 300 MG: 300 SUPPOSITORY RECTAL at 08:31

## 2021-01-01 RX ADMIN — DORZOLAMIDE HYDROCHLORIDE AND TIMOLOL MALEATE 1 DROP: 20; 5 SOLUTION/ DROPS OPHTHALMIC at 10:25

## 2021-01-01 RX ADMIN — HYDRALAZINE HYDROCHLORIDE 10 MG: 20 INJECTION, SOLUTION INTRAMUSCULAR; INTRAVENOUS at 17:04

## 2021-01-01 RX ADMIN — SODIUM CHLORIDE 12.5 MG/HR: 900 INJECTION, SOLUTION INTRAVENOUS at 00:47

## 2021-01-01 RX ADMIN — POTASSIUM CHLORIDE 20 MEQ: 14.9 INJECTION, SOLUTION INTRAVENOUS at 12:30

## 2021-01-01 RX ADMIN — HYDRALAZINE HYDROCHLORIDE 20 MG: 20 INJECTION, SOLUTION INTRAMUSCULAR; INTRAVENOUS at 22:20

## 2021-01-01 RX ADMIN — LORAZEPAM 1 MG: 2 INJECTION INTRAMUSCULAR; INTRAVENOUS at 06:37

## 2021-01-01 RX ADMIN — SODIUM CHLORIDE, SODIUM LACTATE, POTASSIUM CHLORIDE, AND CALCIUM CHLORIDE 50 ML/HR: 600; 310; 30; 20 INJECTION, SOLUTION INTRAVENOUS at 08:40

## 2021-01-01 RX ADMIN — POTASSIUM CHLORIDE 20 MEQ: 14.9 INJECTION, SOLUTION INTRAVENOUS at 14:35

## 2021-01-01 RX ADMIN — LORAZEPAM 0.5 MG: 2 INJECTION INTRAMUSCULAR; INTRAVENOUS at 18:01

## 2021-01-01 RX ADMIN — POTASSIUM CHLORIDE 20 MEQ: 14.9 INJECTION, SOLUTION INTRAVENOUS at 10:24

## 2021-01-01 RX ADMIN — SODIUM CHLORIDE 10 MG/HR: 900 INJECTION, SOLUTION INTRAVENOUS at 10:23

## 2021-01-01 NOTE — PROGRESS NOTES
01/01/21 0738   NIH Stroke Scale   Interval Other (comment)  (change primary Rn)   LOC 3   LOC Questions 2   LOC Commands 2   Best Gaze 1   Visual 2  (unable to assess)   Facial Palsy 0   Motor Right Arm 3   Motor Left Arm 3   Motor Right Leg 3   Motor Left Leg 3   Limb Ataxia 2   Sensory 1   Best Language 3   Dysarthria UN  (unable to assess)   Extinction and Inattention 2   Total 30

## 2021-01-01 NOTE — PROGRESS NOTES
Patient was supposed to go for a MRI, MRI called to see if pt can com off telemetry monitor , but she is on Cardizem drip and on Telemetry monitor. MD Cyn Sams notified he order for the MRI to be cancelled.

## 2021-01-01 NOTE — PROGRESS NOTES
12/31/20 1925   NIH Stroke Scale   Interval Other (comment)  (shift change)   LOC 3   LOC Questions 2   LOC Commands 2   Best Gaze 1   Visual 2  (unable to assess)   Facial Palsy 0   Motor Right Arm 3   Motor Left Arm 2   Motor Right Leg 3   Motor Left Leg 2   Limb Ataxia 2  (unable to assess)   Sensory 1   Best Language 2   Dysarthria 0   Extinction and Inattention 2  (unable to verbalize)   Total 27

## 2021-01-01 NOTE — PROGRESS NOTES
Neurology Daily Progress Note     Assessment:     Acute LMCA stroke secondary to A.fib despite being on Xarelto. A CT of the head was obtained and showed a left hemispheric area of encephalomalacia in an MCA distribution in addition to deep white matter ischemic change. CTA of head/neck left M2 occlusion with 30cc of core infarct that correlates with changes noted on the CT head. She was evaluated by [facility], not candidate for ALIX. Continues to have Right hemiplegia, left gaze deviation, and aphasic. She was agitated, received IV ativan prior to examination. Left wrist and LLE restriant in place. Daughter updated on plan of care at bedside. She was started on heparin gtt for elevated troponin and abnormal EKG, this will likely will increase for hemorrhagic conversion. If no MI is suspected, recommend discontinuing heparin drip and continue with ASA therapy. Will make recommendation on resuming [address] [place] [address] Road pending MRI. Plan:   · Continue ASA  300 mg suppository daily  · Initiate high intensity statin prior to d/c  · A.fib rate controlled with diltiazem. · Neurochecks Q4H  · MRI of brain pending. · Bedside swallow test   · Labs: A1c, FLP, TSH, Cardiac enzymes, BMP, CBC  · Telemetry and echocardiogram with bubble study  · PT/OT/ST  · DVT prophylaxis   · BP management - allow permissive HTN to 220/120 x24 hours, treat with labetalol 10 mg IV or nicardipine gtt  · Smoking cessation if applicable   · Diabetes education if applicable   · Depression Screening prior to discharge      Subjective: Interval history:    Continues to have Right hemiplegia, left gaze deviation, and aphasic. She was agitated, received IV ativan prior to examination. Left wrist and LLE restriant in place. Daughter updated on plan of care at bedside. She was started on heparin gtt for elevated troponin and abnormal EKG, this will likely will increase for hemorrhagic conversion.  If no MI is suspected, recommend discontinuing heparin drip and continue with ASA therapy. Will make recommendation on resuming [address] pending MRI. History:    Alonza Simmonds is a 80 y.o. female who is being seen for LMCA stroke    Review of systems negative with exception of pertinent positives and negatives noted above.        Objective:     Vitals:    01/01/21 0346 01/01/21 0446 01/01/21 0546 01/01/21 0705   BP: (!) 184/84 (!) 176/81 (!) 189/79 (!) 185/79   Pulse: 84 79 82 86   Resp:    16   Temp:    97.4 °F (36.3 °C)   SpO2:    96%   Weight:       Height:              Current Facility-Administered Medications:     potassium chloride 20 mEq in 100 ml IVPB, 20 mEq, IntraVENous, Q2H, Tamra Moraes MD, Last Rate: 50 mL/hr at 01/01/21 0846, 20 mEq at 01/01/21 0846    labetaloL (NORMODYNE;TRANDATE) injection 20 mg, 20 mg, IntraVENous, Q4H PRN, Tamra Moraes MD    hydrALAZINE (APRESOLINE) 20 mg/mL injection 20 mg, 20 mg, IntraVENous, Q4H PRN, Tamra Moraes MD    hydrALAZINE (APRESOLINE) 20 mg/mL injection 10 mg, 10 mg, IntraVENous, Q4H PRN, Tamra Mroaes MD    lactated Ringers infusion, 50 mL/hr, IntraVENous, CONTINUOUS, Tamra Moraes MD, Last Rate: 50 mL/hr at 01/01/21 0840, 50 mL/hr at 01/01/21 0840    LORazepam (ATIVAN) injection 0.5 mg, 0.5 mg, IntraVENous, Q2H PRN, Tamra Moraes MD    [Held by provider] levETIRAcetam (KEPPRA) 1,000 mg in 0.9% sodium chloride 100 mL IVPB, 1,000 mg, IntraVENous, Q12H, Satya Morales MD, Last Rate: 0 mL/hr at 12/31/20 1158, 1,000 mg at 12/31/20 2209    [Held by provider] amLODIPine (NORVASC) tablet 2.5 mg, 2.5 mg, Oral, DAILY, Tamra Moraes MD    [Held by provider] lisinopriL (PRINIVIL, ZESTRIL) tablet 40 mg, 40 mg, Oral, DAILY, Tamra Moraes MD    [Held by provider] calcium-vitamin D (OS-GIANA +D3) 500 mg-200 unit per tablet 1 Tab, 1 Tab, Oral, DAILY WITH BREAKFAST, Tamra Moraes MD    dorzolamide-timoloL (COSOPT) 22.3-6.8 mg/mL ophthalmic solution 1 Drop, 1 Drop, Both Eyes, Q12H, Carter Chambers MD, 1 Drop at 12/31/20 2024    [Held by provider] levothyroxine (SYNTHROID) tablet 75 mcg, 75 mcg, Oral, ACB, Carter Chambers MD    [Held by provider] metoprolol succinate (TOPROL-XL) tablet 100 mg, 100 mg, Oral, DAILY, Carter Chambers MD    [Held by provider] pantoprazole (PROTONIX) tablet 40 mg, 40 mg, Oral, ACB, Carter Chambers MD    [Held by provider] rivaroxaban [facility]) tablet 15 mg, 15 mg, Oral, DAILY, Carter Chambers MD    sodium chloride (NS) flush 5-40 mL, 5-40 mL, IntraVENous, Q8H, Carter Chambers MD, 10 mL at 01/01/21 0526    sodium chloride (NS) flush 5-40 mL, 5-40 mL, IntraVENous, PRN, Carter Chambers MD, 10 mL at 12/31/20 2040    ondansetron [facility]) injection 4 mg, 4 mg, IntraVENous, Q4H PRN, Carter Chambers MD    acetaminophen (TYLENOL) tablet 650 mg, 650 mg, Oral, Q4H PRN, Carter Chambers MD    labetaloL (NORMODYNE;TRANDATE) injection 5 mg, 5 mg, IntraVENous, Q10MIN PRN, Carter Chambers MD    polyethylene glycol [facility]) packet 17 g, 17 g, Oral, DAILY PRN, Carter Chambers MD    tuberculin injection 5 Units, 5 Units, IntraDERMal, ONCE, Carter Chambers MD, 5 Units at 12/31/20 1747    heparin 25,000 units in dextrose 500 mL infusion, 12-25 Units/kg/hr, IntraVENous, TITRATE, Carter Chambers MD, Last Rate: 14.5 mL/hr at 01/01/21 0735, 14 Units/kg/hr at 01/01/21 0735    dilTIAZem (CARDIZEM) 100 mg in 0.9% sodium chloride (MBP/ADV) 100 mL infusion, 0-15 mg/hr, IntraVENous, TITRATE, Carter Chambers MD, Last Rate: 10 mL/hr at 01/01/21 0130, 10 mg/hr at 01/01/21 0130    aspirin (ASA) suppository 300 mg, 300 mg, Rectal, DAILY, Carter Chambers MD, 300 mg at 01/01/21 0831    Recent Results (from the past 12 hour(s))   LIPID PANEL    Collection Time: 01/01/21  3:59 AM   Result Value Ref Range    LIPID PROFILE          Cholesterol, total 179 <200 MG/DL Triglyceride 66 35 - 150 MG/DL    HDL Cholesterol 61 (H) 40 - 60 MG/DL    LDL, calculated 104.8 (H) <100 MG/DL    VLDL, calculated 13.2 6.0 - 23.0 MG/DL    CHOL/HDL Ratio 2.9     HEMOGLOBIN A1C WITH EAG    Collection Time: 01/01/21  3:59 AM   Result Value Ref Range    Hemoglobin A1c 5.8 4.8 - 6.0 %    Est. average glucose 460 mg/dL   METABOLIC PANEL, BASIC    Collection Time: 01/01/21  3:59 AM   Result Value Ref Range    Sodium 141 136 - 145 mmol/L    Potassium 2.5 (L) 3.5 - 5.1 mmol/L    Chloride 106 98 - 107 mmol/L    CO2 26 21 - 32 mmol/L    Anion gap 9 7 - 16 mmol/L    Glucose 105 (H) 65 - 100 mg/dL    BUN 14 8 - 23 MG/DL    Creatinine 0.55 (L) 0.6 - 1.0 MG/DL    GFR est AA >60 >60 ml/min/1.73m2    GFR est non-AA >60 >60 ml/min/1.73m2    Calcium 8.9 8.3 - 10.4 MG/DL   TROPONIN-HIGH SENSITIVITY    Collection Time: 01/01/21  3:59 AM   Result Value Ref Range    Troponin-High Sensitivity 13,649.5 (HH) 0 - 14 pg/mL   HEPARIN XA UFH    Collection Time: 01/01/21  3:59 AM   Result Value Ref Range    Heparin Xa UFH 0.32 0.3 - 0.7 IU/mL   CBC WITH AUTOMATED DIFF    Collection Time: 01/01/21  4:00 AM   Result Value Ref Range    WBC 6.9 4.3 - 11.1 K/uL    RBC 4.46 4.05 - 5.2 M/uL    HGB 12.9 11.7 - 15.4 g/dL    HCT 39.2 35.8 - 46.3 %    MCV 87.9 79.6 - 97.8 FL    MCH 28.9 26.1 - 32.9 PG    MCHC 32.9 31.4 - 35.0 g/dL    RDW 13.4 11.9 - 14.6 %    PLATELET 460 157 - 090 K/uL    MPV 11.7 9.4 - 12.3 FL    ABSOLUTE NRBC 0.00 0.0 - 0.2 K/uL    DF AUTOMATED      NEUTROPHILS 74 43 - 78 %    LYMPHOCYTES 16 13 - 44 %    MONOCYTES 9 4.0 - 12.0 %    EOSINOPHILS 0 (L) 0.5 - 7.8 %    BASOPHILS 1 0.0 - 2.0 %    IMMATURE GRANULOCYTES 0 0.0 - 5.0 %    ABS. NEUTROPHILS 5.1 1.7 - 8.2 K/UL    ABS. LYMPHOCYTES 1.1 0.5 - 4.6 K/UL    ABS. MONOCYTES 0.6 0.1 - 1.3 K/UL    ABS. EOSINOPHILS 0.0 0.0 - 0.8 K/UL    ABS. BASOPHILS 0.1 0.0 - 0.2 K/UL    ABS. IMM.  GRANS. 0.0 0.0 - 0.5 K/UL     CT Results (most recent):  Results from [facility] [place] on 12/31/20   CTA CODE NEURO HEAD AND NECK W CONT    Addendum Addendum: Addendum: There is occlusion of an M2 branch on the left in the sylvian fissure. This was reported to the bedside team by Dr. Sam Hastings. Percy Siddiqi MD 12/31/2020 11:46 AM          Narrative History: Right-sided weakness and left-sided gaze acute onset code stroke. FINDINGS:    CT angiography was performed of the neck and head with contrast and  three-dimensional CT angiography reconstruction and reformat was performed. NASCET criteria as needed. CT dose reduction was achieved through use of a  standardized protocol tailored for this examination and automatic exposure  control for dose modulation. Study analyzed by the clinovo software package per the hospital protocol. The  results of the analysis are neither reviewed nor provided in this exam  interpretation and report. Atherosclerosis of the aortic arch. The left subclavian artery is  atherosclerotic but patent. The left vertebral artery is patent. The innominate  artery is patent. The right subclavian artery is patent. There is a stenosis at  the origin of the right vertebral artery. Atherosclerosis in the intracranial  segment of the right vertebral artery. The basilar artery is patent. The left  posterior communicating artery provides the dominant flow to the left posterior  cerebral artery with a hypoplastic P1 segment on the left. The right posterior  communicating artery provides the dominant flow to the right posterior cerebral  artery which is patent. Hypoplastic P1 segment on the right. The right common  carotid artery has concentric atherosclerosis at the proximal segment without  hemodynamically significant stenosis. The left internal carotid artery is patent  with no significant stenosis. The right common carotid artery is patent. The  right internal carotid artery is patent. The anterior cerebral arteries are  patent bilaterally.  The right middle cerebral artery is patent. The left middle  cerebral artery is patent. Dural venous sinuses are patent. Impression IMPRESSION:    No evidence of acute arterial occlusive disease. No hemodynamically significant  carotid artery stenosis. Physical Exam:  General - Well developed, well nourished, in no apparent distress. Pleasant and conversent. HEENT - Normocephalic, atraumatic. Conjunctiva are clear. Neck - Supple without masses  Cardiovascular - Regular rate and rhythm. Normal S1, S2 without murmurs, rubs, or gallops. Lungs - Clear to auscultation. Abdomen - Soft, nontender with normal bowel sounds. Extremities - Peripheral pulses intact. No edema and no rashes. Neurological examination - Somnolent. Comprehension, attention, memory and reasoning are impaired. Language and speech are expressive/receptive aphasia. On cranial nerve examination, (II, III, IV, VI) pupils are equal, round, and reactive to light. Visual acuity unable to assess. Visual fields are full to finger confrontation. Left gaze deviation. (V, VII) left facial droop  (VIII) Hearing is intact. (IX, X) unable to assess (XI) unable to assess (XII)unable to assess. Motor examination - There is normal muscle tone and bulk. Moves LUE/LE spontaneously, flaccid in RUE/LE. Muscle stretch reflexes are normoactive and there are no pathological reflexes present. Plantar response is flexor. Unable to assess sensation,  Cerebellar examination, or Gait due to AMS. Signed By: Demi Macedo NP     January 1, 2021    Neuro attending    Patient seen and examined    Extensive period of education of daughter at bedside with reference to management of atrial fibrillation and anticoagulation usage.   Issues raised with reference to potential risk of hemorrhagic conversion are clearly with the size stroke quite substantial.  Have additionally attempted to educate her with reference to issues of arousal level and her aphasia and the fact that the aphasia may well be a long-term difficulty. Discussed also the fact that she will undoubtedly need a skilled amount of rehabilitation.     Daughter had multiple questions as indeed is appropriate given the patient's relatively high level of functionality prior to this event and they are clearly aware that she will require substantially more in the way of supervision in the face of this illness, for which additional morbidity and mortality incidence is going to be quite substantial despite optimal medical management

## 2021-01-01 NOTE — ROUTINE PROCESS
TRANSFER - IN REPORT:    Verbal report received from [place], RN(name) on Carly Chambers  being received from ED(unit) for routine progression of care      Report consisted of patients Situation, Background, Assessment and   Recommendations(SBAR). Information from the following report(s) SBAR, Kardex, ED Summary, Intake/Output, MAR, Recent Results, Med Rec Status and Cardiac Rhythm atrial fibrillation was reviewed with the receiving nurse. Opportunity for questions and clarification was provided. Assessment completed upon patients arrival to unit and care assumed. Dual skin assessment completed with RN. All skin inspected, no breakdown noted. Heels intact.

## 2021-01-01 NOTE — PROGRESS NOTES
PT Note:  Evaluation attempted this AM but RN requested hold for pt agitation. Will re-attempt pending schedule and pt status.   Thanks,  Marciano Blood, PT, DPT

## 2021-01-01 NOTE — PROGRESS NOTES
SPEECH PATHOLOGY NOTE    Consult appreciated and chart reviewed. Swallow Evaluation attempted, however, RN reports pt is not appropriate for po trials at this time secondary to unresponsiveness. SLP will follow-up tomorrow for evaluation as indicated.     Thank you for this referral,  Janie Tse, CCC-SLP

## 2021-01-01 NOTE — PROGRESS NOTES
Bedside and Verbal shift change report given to self (oncoming nurse) by Guille Staley (offgoing nurse). Report included the following information SBAR and Kardex.

## 2021-01-01 NOTE — PROGRESS NOTES
Notified night shift RN that I did not  Administer aspirin suppository due to patient's HR of 150s-170s. Once this becomes more stable, she will administer it.

## 2021-01-01 NOTE — PROGRESS NOTES
Hospitalist Note     Admit Date:  2020 10:23 AM   Name:  Maggi Patterson   Age:  80 y.o.  :  10/23/1931   MRN:  [id number]   PCP:  Fransisco Hahn DO  Treatment Team: Attending Provider: Jose De Jesus Rolon MD; Occupational Therapist: Clarisa Joy, OT; Physical Therapist: Abdirahman Actis, PT, DPT    HPI/Subjective:   Pt is poorly responsive so history is per daughter at bedside. Pt is an 81 y/o F with HTN, HLD intolerant to statins, hx CVA, afib on xarelto, who presented to ER with R sided weakness and unresponsiveness. Daughter said pt was in usual state of health this morning until around 730am.  At that point she noticed pt had stopped responding, was having trouble moving her R side. EMS was called. She had code S and CTA showed M3 occlusion but too distal to intervene mechanically. She is compliant with meds including xarelto so no TPA. Troponin came back >23k. Daughter did report pt having CP radiating up her neck often, off and on the past few days, significantly bothersome enough for her to complain multiple times a day. Daughter does not think she has had any other symptoms.  - pt still unresponsive. Same as yesterday. Discussed prognosis and code status with daughter.   Will give her a few days to improve and if no improvement by Monday, will probably do hospice    Unable to obtain ROS due to condition    Objective:     Patient Vitals for the past 24 hrs:   Temp Pulse Resp BP SpO2   21 0705 97.4 °F (36.3 °C) 86 16 (!) 185/79 96 %   21 0546 -- 82 -- (!) 189/79 --   21 0446 -- 79 -- (!) 176/81 --   21 0346 -- 84 -- (!) 184/84 --   21 0332 98.6 °F (37 °C) 75 16 (!) 180/78 97 %   21 0246 -- 84 -- (!) 166/74 --   21 0146 -- 75 -- (!) 158/70 --   21 0046 -- 75 -- (!) 163/68 --   20 2319 98.2 °F (36.8 °C) 80 22 (!) 164/78 94 %   20 -- 92 -- (!) 189/84 --   20 99.6 °F (37.6 °C) 98 14 (!) 190/84 98 % 12/31/20 1845 -- (!) 123 -- (!) 200/116 --   12/31/20 1759 -- (!) 172 -- -- 97 %   12/31/20 1755 -- (!) 167 -- -- --   12/31/20 1751 -- (!) 123 -- (!) 191/87 --   12/31/20 1556 98.5 °F (36.9 °C) (!) 131 16 (!) 169/104 96 %   12/31/20 1531 -- (!) 144 -- (!) 167/107 --   12/31/20 1500 -- (!) 128 18 (!) 178/96 --   12/31/20 1443 -- (!) 133 -- (!) 172/96 --   12/31/20 1439 -- (!) 127 23 (!) 172/96 --   12/31/20 1339 -- (!) 129 19 (!) 164/96 --   12/31/20 1320 -- (!) 133 18 (!) 153/91 --   12/31/20 1300 -- (!) 137 26 (!) 174/90 --   12/31/20 1240 -- -- -- (!) 158/92 --   12/31/20 1220 -- (!) 121 21 (!) 167/81 --   12/31/20 1200 -- (!) 118 22 (!) 144/85 99 %   12/31/20 1146 -- (!) 110 17 (!) 162/87 96 %   12/31/20 1102 98.7 °F (37.1 °C) (!) 125 18 (!) 172/79 97 %     Oxygen Therapy  O2 Sat (%): 96 % (01/01/21 0705)  Pulse via Oximetry: 85 beats per minute (12/31/20 2319)  O2 Device: Room air (01/01/21 0705)    Estimated body mass index is 23.03 kg/m² as calculated from the following:    Height as of this encounter: 4' 11\" (1.499 m). Weight as of this encounter: 51.7 kg (114 lb). Intake/Output Summary (Last 24 hours) at 1/1/2021 0949  Last data filed at 12/31/2020 1556  Gross per 24 hour   Intake 100 ml   Output --   Net 100 ml       Physical Exam:  General:    no overt distress. unresponsive  Eyes:   leftward gaze  CV:   Irregular  No m/r/g. No edema  Lungs:  CTAB anterior. No wheezing, rhonchi, or rales. Unlabored  Abdomen: Soft, nondistended. Extremities: Warm and dry. No cyanosis or clubbing  Neurologic: does not follow commands. Lethargic. Did see pt move RUE but appeared to be decerebrate rigidity. No significant RLE movement. Spontaneously moves other extremities. Skin:     No rashes. Slight yellowish discoloration, mild. Hard to say if jaundice    I reviewed the labs, imaging, EKGs, telemetry, and other studies done this admission.   Data Review:   Recent Results (from the past 24 hour(s)) GLUCOSE, POC    Collection Time: 12/31/20 10:29 AM   Result Value Ref Range    Glucose (POC) 135 (H) 65 - 100 mg/dL   CBC WITH AUTOMATED DIFF    Collection Time: 12/31/20 10:30 AM   Result Value Ref Range    WBC 8.0 4.3 - 11.1 K/uL    RBC 4.28 4.05 - 5.2 M/uL    HGB 12.7 11.7 - 15.4 g/dL    HCT 38.0 35.8 - 46.3 %    MCV 88.8 79.6 - 97.8 FL    MCH 29.7 26.1 - 32.9 PG    MCHC 33.4 31.4 - 35.0 g/dL    RDW 13.6 11.9 - 14.6 %    PLATELET 402 310 - 229 K/uL    MPV 11.7 9.4 - 12.3 FL    ABSOLUTE NRBC 0.00 0.0 - 0.2 K/uL    DF AUTOMATED      NEUTROPHILS 79 (H) 43 - 78 %    LYMPHOCYTES 11 (L) 13 - 44 %    MONOCYTES 9 4.0 - 12.0 %    EOSINOPHILS 0 (L) 0.5 - 7.8 %    BASOPHILS 1 0.0 - 2.0 %    IMMATURE GRANULOCYTES 0 0.0 - 5.0 %    ABS. NEUTROPHILS 6.3 1.7 - 8.2 K/UL    ABS. LYMPHOCYTES 0.9 0.5 - 4.6 K/UL    ABS. MONOCYTES 0.7 0.1 - 1.3 K/UL    ABS. EOSINOPHILS 0.0 0.0 - 0.8 K/UL    ABS. BASOPHILS 0.0 0.0 - 0.2 K/UL    ABS. IMM.  GRANS. 0.0 0.0 - 0.5 K/UL   METABOLIC PANEL, BASIC    Collection Time: 12/31/20 10:30 AM   Result Value Ref Range    Sodium 138 136 - 145 mmol/L    Potassium 2.9 (L) 3.5 - 5.1 mmol/L    Chloride 106 98 - 107 mmol/L    CO2 27 21 - 32 mmol/L    Anion gap 5 (L) 7 - 16 mmol/L    Glucose 125 (H) 65 - 100 mg/dL    BUN 17 8 - 23 MG/DL    Creatinine 0.81 0.6 - 1.0 MG/DL    GFR est AA >60 >60 ml/min/1.73m2    GFR est non-AA >60 >60 ml/min/1.73m2    Calcium 8.8 8.3 - 10.4 MG/DL   PROTHROMBIN TIME + INR    Collection Time: 12/31/20 10:30 AM   Result Value Ref Range    Prothrombin time 15.0 (H) 12.5 - 14.7 sec    INR 1.2     TROPONIN-HIGH SENSITIVITY    Collection Time: 12/31/20 10:30 AM   Result Value Ref Range    Troponin-High Sensitivity 23,154.9 (HH) 0 - 14 pg/mL   PTT    Collection Time: 12/31/20 10:30 AM   Result Value Ref Range    aPTT 32.1 24.1 - 35.1 SEC   HEPATIC FUNCTION PANEL    Collection Time: 12/31/20 10:30 AM   Result Value Ref Range    Protein, total 6.4 6.3 - 8.2 g/dL    Albumin 3.4 3.2 - 4.6 g/dL    Globulin 3.0 2.3 - 3.5 g/dL    A-G Ratio 1.1 (L) 1.2 - 3.5      Bilirubin, total 1.5 (H) 0.2 - 1.1 MG/DL    Bilirubin, direct 0.3 <0.4 MG/DL    Alk. phosphatase 107 50 - 136 U/L    AST (SGOT) 136 (H) 15 - 37 U/L    ALT (SGPT) 37 12 - 65 U/L   POC PT/INR    Collection Time: 12/31/20 10:31 AM   Result Value Ref Range    Prothrombin time (POC) 16.8 (H) 9.6 - 11.6 SECS    INR (POC) 1.4 (H) 0.9 - 1.2     EKG, 12 LEAD, INITIAL    Collection Time: 12/31/20 10:55 AM   Result Value Ref Range    Ventricular Rate 112 BPM    Atrial Rate 214 BPM    QRS Duration 128 ms    Q-T Interval 376 ms    QTC Calculation (Bezet) 513 ms    Calculated P Axis 0 degrees    Calculated R Axis 92 degrees    Calculated T Axis -74 degrees    Diagnosis       !! AGE AND GENDER SPECIFIC ECG ANALYSIS !!   Atrial fibrillation  Rightward axis  Left ventricular hypertrophy with QRS widening and repolarization abnormality  Cannot rule out Anterior infarct , age undetermined  Abnormal ECG  No previous ECGs available  Confirmed by [facility]  MD (), CHICHI CARDENAS ([id number]) on 12/31/2020 5:14:33 PM     TROPONIN-HIGH SENSITIVITY    Collection Time: 12/31/20  2:48 PM   Result Value Ref Range    Troponin-High Sensitivity 21,112.5 (HH) 0 - 14 pg/mL   HEPARIN XA UFH    Collection Time: 12/31/20  8:41 PM   Result Value Ref Range    Heparin Xa UFH 0.19 (L) 0.3 - 0.7 IU/mL   TROPONIN-HIGH SENSITIVITY    Collection Time: 12/31/20  8:41 PM   Result Value Ref Range    Troponin-High Sensitivity 16,976.6 (HH) 0 - 14 pg/mL   LIPID PANEL    Collection Time: 01/01/21  3:59 AM   Result Value Ref Range    LIPID PROFILE          Cholesterol, total 179 <200 MG/DL    Triglyceride 66 35 - 150 MG/DL    HDL Cholesterol 61 (H) 40 - 60 MG/DL    LDL, calculated 104.8 (H) <100 MG/DL    VLDL, calculated 13.2 6.0 - 23.0 MG/DL    CHOL/HDL Ratio 2.9     HEMOGLOBIN A1C WITH EAG    Collection Time: 01/01/21  3:59 AM   Result Value Ref Range    Hemoglobin A1c 5.8 4.8 - 6.0 %    Est. average glucose 461 mg/dL   METABOLIC PANEL, BASIC    Collection Time: 01/01/21  3:59 AM   Result Value Ref Range    Sodium 141 136 - 145 mmol/L    Potassium 2.5 (L) 3.5 - 5.1 mmol/L    Chloride 106 98 - 107 mmol/L    CO2 26 21 - 32 mmol/L    Anion gap 9 7 - 16 mmol/L    Glucose 105 (H) 65 - 100 mg/dL    BUN 14 8 - 23 MG/DL    Creatinine 0.55 (L) 0.6 - 1.0 MG/DL    GFR est AA >60 >60 ml/min/1.73m2    GFR est non-AA >60 >60 ml/min/1.73m2    Calcium 8.9 8.3 - 10.4 MG/DL   TROPONIN-HIGH SENSITIVITY    Collection Time: 01/01/21  3:59 AM   Result Value Ref Range    Troponin-High Sensitivity 13,649.5 (HH) 0 - 14 pg/mL   HEPARIN XA UFH    Collection Time: 01/01/21  3:59 AM   Result Value Ref Range    Heparin Xa UFH 0.32 0.3 - 0.7 IU/mL   CBC WITH AUTOMATED DIFF    Collection Time: 01/01/21  4:00 AM   Result Value Ref Range    WBC 6.9 4.3 - 11.1 K/uL    RBC 4.46 4.05 - 5.2 M/uL    HGB 12.9 11.7 - 15.4 g/dL    HCT 39.2 35.8 - 46.3 %    MCV 87.9 79.6 - 97.8 FL    MCH 28.9 26.1 - 32.9 PG    MCHC 32.9 31.4 - 35.0 g/dL    RDW 13.4 11.9 - 14.6 %    PLATELET 434 435 - 033 K/uL    MPV 11.7 9.4 - 12.3 FL    ABSOLUTE NRBC 0.00 0.0 - 0.2 K/uL    DF AUTOMATED      NEUTROPHILS 74 43 - 78 %    LYMPHOCYTES 16 13 - 44 %    MONOCYTES 9 4.0 - 12.0 %    EOSINOPHILS 0 (L) 0.5 - 7.8 %    BASOPHILS 1 0.0 - 2.0 %    IMMATURE GRANULOCYTES 0 0.0 - 5.0 %    ABS. NEUTROPHILS 5.1 1.7 - 8.2 K/UL    ABS. LYMPHOCYTES 1.1 0.5 - 4.6 K/UL    ABS. MONOCYTES 0.6 0.1 - 1.3 K/UL    ABS. EOSINOPHILS 0.0 0.0 - 0.8 K/UL    ABS. BASOPHILS 0.1 0.0 - 0.2 K/UL    ABS. IMM.  GRANS. 0.0 0.0 - 0.5 K/UL       All Micro Results     None          Medications Administered     heparin (porcine) injection 3,100 Units     Admin Date  12/31/2020 Action  Given Dose  3,100 Units Route  IntraVENous Administered By  Helen Perkins          iopamidoL (ISOVUE-370) 76 % injection 100 mL     Admin Date  12/31/2020 Action  Given Dose  100 mL Route  IntraVENous Administered By  Nisreen Sutherland, Gisellelory Okeefeon          levETIRAcetam (KEPPRA) 1,000 mg in 0.9% sodium chloride 100 mL IVPB     Admin Date  12/31/2020 Action  New Bag Dose  1,000 mg Route  IntraVENous Administered By  Osmani Lot M          saline peripheral flush soln 10 mL     Admin Date  12/31/2020 Action  Given Dose  10 mL Route  InterCATHeter Administered By  Jessica Marshall          sodium chloride 0.9 % bolus infusion 100 mL     Admin Date  12/31/2020 Action  New Bag Dose  100 mL Route  IntraVENous Administered By  Jessica Rolando                Other Studies:  Ct Perf W Cbf    Result Date: 12/31/2020  EXAMINATION: CT PERFUSION DATE: 12/31/2020 10:49 AM ACCESSION NUMBER:  [id number] INDICATION: : acute neuro changes, possible LVAO COMPARISON: Same-day head CT and CTA head and neck TECHNIQUE: CT perfusion of the brain was obtained after the administration of intravenous contrast. Perfusion maps and perfusion analysis output were generated using the [employer] perfusion processing software algorithm. Radiation dose reduction techniques were used for this study:  Our CT scanners use one or all of the following: Automated exposure control, adjustment of the mA and/or kVp according to patient's size, iterative reconstruction. FINDINGS: There is a focus of Tmax greater than 6 seconds in the left parietal lobe measuring 59 cc. There is a focus of cerebral blood flow less than 30 percent in the left parietal lobe in a similar distribution measuring 29 cc. There is a mismatch volume of 30 cc with a mismatch ratio of 2. Widespread areas of Tmax greater than 4 are likely artifactual or due to chronic oligemia. IMPRESSION: 1. There is an acute infarction in the left parietal lobe in the posterior left MCA distribution. Core infarction volume measures 29 cc.  Mismatch volume measures 30 cc. 2. This infarction corresponds to to the left M2 occlusion and the sylvian fissure on axial image 419 of the concurrent CT angiogram. 3. Neuro interventional evaluation is recommended. Discussed with Dr. Everette Curry by Dr. Herman Lopez at 11:35 AM 12/31/2020. VOICE DICTATED BY: Dr. Sheldon Stockton Date: 12/31/2020    Addendum: Addendum: There is occlusion of an M2 branch on the left in the sylvian fissure. This was reported to the bedside team by Dr. Herman Lopez. Result Date: 12/31/2020  History: Right-sided weakness and left-sided gaze acute onset code stroke. FINDINGS: CT angiography was performed of the neck and head with contrast and three-dimensional CT angiography reconstruction and reformat was performed. NASCET criteria as needed. CT dose reduction was achieved through use of a standardized protocol tailored for this examination and automatic exposure control for dose modulation. Study analyzed by the [employer] software package per the hospital protocol. The results of the analysis are neither reviewed nor provided in this exam interpretation and report. Atherosclerosis of the aortic arch. The left subclavian artery is atherosclerotic but patent. The left vertebral artery is patent. The innominate artery is patent. The right subclavian artery is patent. There is a stenosis at the origin of the right vertebral artery. Atherosclerosis in the intracranial segment of the right vertebral artery. The basilar artery is patent. The left posterior communicating artery provides the dominant flow to the left posterior cerebral artery with a hypoplastic P1 segment on the left. The right posterior communicating artery provides the dominant flow to the right posterior cerebral artery which is patent. Hypoplastic P1 segment on the right. The right common carotid artery has concentric atherosclerosis at the proximal segment without hemodynamically significant stenosis. The left internal carotid artery is patent with no significant stenosis. The right common carotid artery is patent. The right internal carotid artery is patent.  The anterior cerebral arteries are patent bilaterally. The right middle cerebral artery is patent. The left middle cerebral artery is patent. Dural venous sinuses are patent. IMPRESSION: No evidence of acute arterial occlusive disease. No hemodynamically significant carotid artery stenosis. Ct Code Neuro Head Wo Contrast    Result Date: 12/31/2020  History: acute neuro changes. Right-sided deficits. Exam: CT head without contrast Technique: Thin section axial CT images were obtained from the skullbase through the vertex. Radiation dose reduction techniques were used for this study. Our CT scanners use one or all of the following: Automated exposure control, adjustment of the mA and/or kV according to patient size, use of iterative reconstruction. Findings: The ventricles are normal in size, shape, and position. Chronic appearing white matter change noted in the corona radiata and centrum semiovale. No extra-axial fluid collection is present. There is no mass or mass-effect. The basilar cisterns are patent. The paranasal sinuses and mastoid air cells are clear. Impression: Chronic appearing white matter change noted without acute intracranial abnormality.          Assessment and Plan:     Hospital Problems as of 1/1/2021 Date Reviewed: 12/30/2020          Codes Class Noted - Resolved POA    * (Principal) Acute ischemic stroke ([facility] ICD-10-CM: I63.9  ICD-9-CM: 434.91  12/31/2020 - Present Yes        NSTEMI (non-ST elevated myocardial infarction) ([facility] ICD-10-CM: I21.4  ICD-9-CM: 410.70  12/31/2020 - Present Yes        Atrial fibrillation with RVR (HCC) ICD-10-CM: I48.91  ICD-9-CM: 427.31  12/31/2020 - Present Yes        Primary osteoarthritis involving multiple joints (Chronic) ICD-10-CM: M89.49  ICD-9-CM: 715.98  7/21/2017 - Present Yes        Chronic atrial fibrillation (HCC) (Chronic) ICD-10-CM: I48.20  ICD-9-CM: 427.31  7/20/2017 - Present Yes        HTN (hypertension), benign (Chronic) ICD-10-CM: I10  ICD-9-CM: 401.1  7/20/2017 - Present Yes        Hyperlipidemia, mixed (Chronic) ICD-10-CM: E78.2  ICD-9-CM: 272.2  7/20/2017 - Present Yes        Gastroesophageal reflux disease with esophagitis (Chronic) ICD-10-CM: K21.00  ICD-9-CM: 530.11  7/20/2017 - Present Yes        Hypothyroidism, adult (Chronic) ICD-10-CM: E03.9  ICD-9-CM: 244.9  7/20/2017 - Present Yes        History of CVA (cerebrovascular accident) (Chronic) ICD-10-CM: Z86.73  ICD-9-CM: V12.54  7/20/2017 - Present Yes    Overview Signed 7/20/2017 11:58 AM by Solitario Ashley DO     2014             Age related osteoporosis (Chronic) ICD-10-CM: M81.0  ICD-9-CM: 733.01  7/20/2017 - Present Yes              Plan:  CVA M3 occlusion  -mentation not improved. Prognosis grim  -I am going to hold the keppra and try to limit other meds that can have sedating effects and see if she becomes any more responsive.  -cannot get MRI without coming off heparin and cardizem per nursing, which I would rather not do right now. It likely would only confirm what we already know anyway  -echo pending  -ASA suppository  -cannot take statin. NPO/intolerance  -Permissive HTN period ended. Will try to control today    Elevated troponin  -possibly she had NSTEMI or other event prior to admission. CP with neck radiation for a few days PTA. Appeared to have ST depression in lateral leads  -echo pending  -Heparin gtt  -ASA supp  -appreciate cardio input    Afib RVR  -heparin gtt. Is NPO, needs AC. On xarelto at home, was compliant  -cardizem gtt     Code status:    -Still full code. Discussed with daughter and she may be leaning towards DNR. Will need to follow up on this regularly.     Skin discoloration, ?icterus  -LFT only mildly abnormal.  Recheck tomorrow    FEN  -replace K IV  -LR IVF supplementation with gtts for hydration  -is NPO    High risk of decompensation    Signed:  Mae Bauer MD

## 2021-01-01 NOTE — PROGRESS NOTES
Occupational Therapy Note:    OT orders received, chart reviewed, and evaluation attempted. RN requesting to hold d/t agitation/restraints in place. Will check back later as schedule and timing permits.     Green [employer], OTR/L, CLT

## 2021-01-01 NOTE — CONSULTS
Carrie Rapp MD  Medical Director  [address] [place] [address], [address] [facility]  Tel: [phone]       Physical Medicine & Rehabilitation Consult    Subjective:     Date of Consultation:  January 1, 2021  Referring Physician: Dr San Hodgkins is a 80 y.o. female who is being evaluated at the request of the Hospitalist service for consideration for inpatient rehabilitation following a significant debilitating stroke     HPI: The patient is a right hand dominant 79yo female with a PMH of afib, on Xarelto, HTN, HLD and prior CVA without residual deficits, and severe biatrial enlargement (on ECHO) who presented to the ED 12/31 with right hemiplegia and aphasia of acute onset. Neurology arrived to the bedside at 10:30 AM initial NIHSS was18. A CT of the head was obtained and showed a left hemispheric area of encephalomalacia in an MCA distribution in addition to deep white matter ischemic change. Notation was made that she had been seen in the ED 6d prior with a shoulder separation which was mechanically reduced. She is compliant with her medication and had not missed dosing of Xarelto. Troponin came back >23k. Daughter did report pt having CP radiating up her neck often, off and on the past few days, significantly bothersome enough for her to complain multiple times a day. She has been hypertensive and tachycardic with afib. CTA showed that there is occlusion of an M2 branch on the left in the sylvian fissure  with 30cc of core infarct that correlates with changes noted on the CT head. She was evaluated by Endovascular NS, not candidate for ALIX. Continues to have Right hemiplegia, left gaze deviation, and aphasic. She was agitated, received IV ativan prior to examination. Left wrist and LLE restriant in place. She has been seen by Cardiology and is on a heparin gtt. She remains severely impaired from a stroke standpt. She is NPO.   MRI has been cancelled due to cardiac issues.     Principal Problem:    Acute ischemic stroke ([address] (2020)    Active Problems:    Chronic atrial fibrillation ([address] (2017)      HTN (hypertension), benign (2017)      Hyperlipidemia, mixed (2017)      Gastroesophageal reflux disease with esophagitis (2017)      Hypothyroidism, adult (2017)      History of CVA (cerebrovascular accident) (2017)      Overview:       Age related osteoporosis (2017)      Primary osteoarthritis involving multiple joints (2017)      NSTEMI (non-ST elevated myocardial infarction) ([address] (2020)      Atrial fibrillation with RVR ([address] (2020)      Past Medical History:   Diagnosis Date    Age related osteoporosis 2017    Chronic atrial fibrillation ([address] 2017    Chronic pain     shoulder    DDD (degenerative disc disease), lumbar 2017    Gastroesophageal reflux disease with esophagitis 2017    controlled with medication    Glaucoma 2017    History of CVA (cerebrovascular accident) 2017    HTN (hypertension), benign 2017    Hyperlipidemia, mixed 2017    Hypertension     Hypothyroidism, adult 2017    IFG (impaired fasting glucose) 2017    Primary osteoarthritis involving multiple joints 2017    PUD (peptic ulcer disease)       Past Surgical History:   Procedure Laterality Date    HX  SECTION      x2    HX COLONOSCOPY      HX URIEL AND BSO      hysterectomy      Family History   Problem Relation Age of Onset    No Known Problems Mother     Cancer Father         stomach    No Known Problems Sister     No Known Problems Brother     No Known Problems Sister     No Known Problems Sister     No Known Problems Sister     No Known Problems Brother     No Known Problems Brother     No Known Problems Brother     No Known Problems Brother     Coronary Artery Disease Neg Hx       Social History     Tobacco Use    Smoking status: Never Smoker    Smokeless tobacco: Never Used   Substance Use Topics    Alcohol use: No     Prior to Admission medications    Medication Sig Start Date End Date Taking? Authorizing Provider   Xarelto 20 mg tab tablet TAKE 1 TABLET BY MOUTH ONCE DAILY WITH SUPPER 20   Micheal Costadennys Trevizo PRESLEY, DO   metoprolol succinate (TOPROL-XL) 100 mg tablet Take 1 tablet by mouth once daily 20   [facility], DO   benazepriL (LOTENSIN) 40 mg tablet Take 1 tablet by mouth once daily 20   [facility], DO   levothyroxine (SYNTHROID) 75 mcg tablet TAKE 1 TABLET BY MOUTH ONCE DAILY BEFORE BREAKFAST 20   Igor Frank Trevizo PRESLEY, DO   amLODIPine (NORVASC) 2.5 mg tablet Take 1 tablet by mouth once daily 20   Igor Frank Trevizo PRESLEY, DO   omeprazole (PRILOSEC) 40 mg capsule Take 1 Cap by mouth daily. 9/15/20   Igor Frank SHERWOOD, DO   Lumigan 0.01 % ophthalmic drops INSTILL 1 DROP IN BOTH EYES AT BEDTIME 20   [facility], DO   cholecalciferol, vitamin D3, (VITAMIN D3) 2,000 unit tab Take  by mouth. Provider, Historical   dorzolamide-timolol (COSOPT) 22.3-6.8 mg/mL ophthalmic solution PUT 1 DROP INTO BOTH EYES TWICE A DAY 19   HELENE Baker   calcium-cholecalciferol, d3, (CALCIUM 600 + D) 600-125 mg-unit tab Take  by mouth. Provider, Historical     Allergies   Allergen Reactions    Actonel [Risedronate] Other (comments)     GI Problems    Atorvastatin Myalgia    Fosamax [Alendronate] Unknown (comments)    Ketoprofen Other (comments)     Peptic ulcer disease    Moexipril Cough        Review of Systems:  Review of systems not obtained due to patient factors. Objective:     Vitals:  Blood pressure (!) 161/77, pulse 75, temperature 97.1 °F (36.2 °C), resp. rate 19, height 4' 11\" (1.499 m), weight 114 lb (51.7 kg), SpO2 96 %.   Temp (24hrs), Av.2 °F (36.8 °C), Min:97.1 °F (36.2 °C), Max:99.6 °F (37.6 °C)      Intake and Output:  1901 -  07  In: 100 [I.V.:100]  Out: -     Physical Exam:  General:  Sleeping, lethargic, no response to voice. No spontaneous eye opening   Lungs:   Clear to auscultation bilaterally. Heart:  Regular rate and rhythm, S1, S2 stable, no murmur, click, rub or gallop. Abdomen:   Soft, non-tender. Bowel sounds present. No masses,  No organomegaly. Genitourinary: deferred   Neuro Muscular: Withdraws to pain LUE, LLE, RLE but not RUE  No purposeful movement, but left sided spontaneous movement noted  Left gaze preference, right facial weakness  Non verbal  Flaccid right hemibody  Bilaterally; plantar flexor response    Skin:  No rashes, lesions, or signs/symptoms or infection. No edema       Labs/Studies:  Recent Results (from the past 72 hour(s))   BMP(8)/MAG    Collection Time: 12/30/20  2:29 PM   Result Value Ref Range    Glucose 134 (H) 65 - 99 mg/dL    BUN 10 8 - 27 mg/dL    Creatinine 0.69 0.57 - 1.00 mg/dL    GFR est non-AA 77 >59 mL/min/1.73    GFR est AA 89 >59 mL/min/1.73    BUN/Creatinine ratio 14 12 - 28    Sodium 140 134 - 144 mmol/L    Potassium 3.3 (L) 3.5 - 5.2 mmol/L    Chloride 100 96 - 106 mmol/L    CO2 26 20 - 29 mmol/L    Calcium 9.1 8.7 - 10.3 mg/dL    Magnesium 1.9 1.6 - 2.3 mg/dL   GLUCOSE, POC    Collection Time: 12/31/20 10:29 AM   Result Value Ref Range    Glucose (POC) 135 (H) 65 - 100 mg/dL   CBC WITH AUTOMATED DIFF    Collection Time: 12/31/20 10:30 AM   Result Value Ref Range    WBC 8.0 4.3 - 11.1 K/uL    RBC 4.28 4.05 - 5.2 M/uL    HGB 12.7 11.7 - 15.4 g/dL    HCT 38.0 35.8 - 46.3 %    MCV 88.8 79.6 - 97.8 FL    MCH 29.7 26.1 - 32.9 PG    MCHC 33.4 31.4 - 35.0 g/dL    RDW 13.6 11.9 - 14.6 %    PLATELET 866 029 - 540 K/uL    MPV 11.7 9.4 - 12.3 FL    ABSOLUTE NRBC 0.00 0.0 - 0.2 K/uL    DF AUTOMATED      NEUTROPHILS 79 (H) 43 - 78 %    LYMPHOCYTES 11 (L) 13 - 44 %    MONOCYTES 9 4.0 - 12.0 %    EOSINOPHILS 0 (L) 0.5 - 7.8 %    BASOPHILS 1 0.0 - 2.0 %    IMMATURE GRANULOCYTES 0 0.0 - 5.0 %    ABS. NEUTROPHILS 6.3 1.7 - 8.2 K/UL    ABS. LYMPHOCYTES 0.9 0.5 - 4.6 K/UL    ABS. MONOCYTES 0.7 0.1 - 1.3 K/UL    ABS. EOSINOPHILS 0.0 0.0 - 0.8 K/UL    ABS. BASOPHILS 0.0 0.0 - 0.2 K/UL    ABS. IMM. GRANS. 0.0 0.0 - 0.5 K/UL   METABOLIC PANEL, BASIC    Collection Time: 12/31/20 10:30 AM   Result Value Ref Range    Sodium 138 136 - 145 mmol/L    Potassium 2.9 (L) 3.5 - 5.1 mmol/L    Chloride 106 98 - 107 mmol/L    CO2 27 21 - 32 mmol/L    Anion gap 5 (L) 7 - 16 mmol/L    Glucose 125 (H) 65 - 100 mg/dL    BUN 17 8 - 23 MG/DL    Creatinine 0.81 0.6 - 1.0 MG/DL    GFR est AA >60 >60 ml/min/1.73m2    GFR est non-AA >60 >60 ml/min/1.73m2    Calcium 8.8 8.3 - 10.4 MG/DL   PROTHROMBIN TIME + INR    Collection Time: 12/31/20 10:30 AM   Result Value Ref Range    Prothrombin time 15.0 (H) 12.5 - 14.7 sec    INR 1.2     TROPONIN-HIGH SENSITIVITY    Collection Time: 12/31/20 10:30 AM   Result Value Ref Range    Troponin-High Sensitivity 23,154.9 (HH) 0 - 14 pg/mL   PTT    Collection Time: 12/31/20 10:30 AM   Result Value Ref Range    aPTT 32.1 24.1 - 35.1 SEC   HEPATIC FUNCTION PANEL    Collection Time: 12/31/20 10:30 AM   Result Value Ref Range    Protein, total 6.4 6.3 - 8.2 g/dL    Albumin 3.4 3.2 - 4.6 g/dL    Globulin 3.0 2.3 - 3.5 g/dL    A-G Ratio 1.1 (L) 1.2 - 3.5      Bilirubin, total 1.5 (H) 0.2 - 1.1 MG/DL    Bilirubin, direct 0.3 <0.4 MG/DL    Alk.  phosphatase 107 50 - 136 U/L    AST (SGOT) 136 (H) 15 - 37 U/L    ALT (SGPT) 37 12 - 65 U/L   POC PT/INR    Collection Time: 12/31/20 10:31 AM   Result Value Ref Range    Prothrombin time (POC) 16.8 (H) 9.6 - 11.6 SECS    INR (POC) 1.4 (H) 0.9 - 1.2     EKG, 12 LEAD, INITIAL    Collection Time: 12/31/20 10:55 AM   Result Value Ref Range    Ventricular Rate 112 BPM    Atrial Rate 214 BPM    QRS Duration 128 ms    Q-T Interval 376 ms    QTC Calculation (Bezet) 513 ms    Calculated P Axis 0 degrees    Calculated R Axis 92 degrees    Calculated T Axis -74 degrees    Diagnosis !! AGE AND GENDER SPECIFIC ECG ANALYSIS !!   Atrial fibrillation  Rightward axis  Left ventricular hypertrophy with QRS widening and repolarization abnormality  Cannot rule out Anterior infarct , age undetermined  Abnormal ECG  No previous ECGs available  Confirmed by [facility]  MD (), Flora Singh ([id number]) on 12/31/2020 5:14:33 PM     TROPONIN-HIGH SENSITIVITY    Collection Time: 12/31/20  2:48 PM   Result Value Ref Range    Troponin-High Sensitivity 21,112.5 (HH) 0 - 14 pg/mL   HEPARIN XA UFH    Collection Time: 12/31/20  8:41 PM   Result Value Ref Range    Heparin Xa UFH 0.19 (L) 0.3 - 0.7 IU/mL   TROPONIN-HIGH SENSITIVITY    Collection Time: 12/31/20  8:41 PM   Result Value Ref Range    Troponin-High Sensitivity 16,976.6 (HH) 0 - 14 pg/mL   LIPID PANEL    Collection Time: 01/01/21  3:59 AM   Result Value Ref Range    LIPID PROFILE          Cholesterol, total 179 <200 MG/DL    Triglyceride 66 35 - 150 MG/DL    HDL Cholesterol 61 (H) 40 - 60 MG/DL    LDL, calculated 104.8 (H) <100 MG/DL    VLDL, calculated 13.2 6.0 - 23.0 MG/DL    CHOL/HDL Ratio 2.9     HEMOGLOBIN A1C WITH EAG    Collection Time: 01/01/21  3:59 AM   Result Value Ref Range    Hemoglobin A1c 5.8 4.8 - 6.0 %    Est. average glucose 219 mg/dL   METABOLIC PANEL, BASIC    Collection Time: 01/01/21  3:59 AM   Result Value Ref Range    Sodium 141 136 - 145 mmol/L    Potassium 2.5 (L) 3.5 - 5.1 mmol/L    Chloride 106 98 - 107 mmol/L    CO2 26 21 - 32 mmol/L    Anion gap 9 7 - 16 mmol/L    Glucose 105 (H) 65 - 100 mg/dL    BUN 14 8 - 23 MG/DL    Creatinine 0.55 (L) 0.6 - 1.0 MG/DL    GFR est AA >60 >60 ml/min/1.73m2    GFR est non-AA >60 >60 ml/min/1.73m2    Calcium 8.9 8.3 - 10.4 MG/DL   TROPONIN-HIGH SENSITIVITY    Collection Time: 01/01/21  3:59 AM   Result Value Ref Range    Troponin-High Sensitivity 13,649.5 (HH) 0 - 14 pg/mL   HEPARIN XA UFH    Collection Time: 01/01/21  3:59 AM   Result Value Ref Range    Heparin Xa UFH 0.32 0.3 - 0.7 IU/mL   CBC WITH AUTOMATED DIFF    Collection Time: 01/01/21  4:00 AM   Result Value Ref Range    WBC 6.9 4.3 - 11.1 K/uL    RBC 4.46 4.05 - 5.2 M/uL    HGB 12.9 11.7 - 15.4 g/dL    HCT 39.2 35.8 - 46.3 %    MCV 87.9 79.6 - 97.8 FL    MCH 28.9 26.1 - 32.9 PG    MCHC 32.9 31.4 - 35.0 g/dL    RDW 13.4 11.9 - 14.6 %    PLATELET 500 051 - 319 K/uL    MPV 11.7 9.4 - 12.3 FL    ABSOLUTE NRBC 0.00 0.0 - 0.2 K/uL    DF AUTOMATED      NEUTROPHILS 74 43 - 78 %    LYMPHOCYTES 16 13 - 44 %    MONOCYTES 9 4.0 - 12.0 %    EOSINOPHILS 0 (L) 0.5 - 7.8 %    BASOPHILS 1 0.0 - 2.0 %    IMMATURE GRANULOCYTES 0 0.0 - 5.0 %    ABS. NEUTROPHILS 5.1 1.7 - 8.2 K/UL    ABS. LYMPHOCYTES 1.1 0.5 - 4.6 K/UL    ABS. MONOCYTES 0.6 0.1 - 1.3 K/UL    ABS. EOSINOPHILS 0.0 0.0 - 0.8 K/UL    ABS. BASOPHILS 0.1 0.0 - 0.2 K/UL    ABS. IMM.  GRANS. 0.0 0.0 - 0.5 K/UL         Speech Assessment:  Dysphagia Screening  Vocal Quality/Secretions: (!) Abnormal  History of Dysphagia: (!) Yes                Ambulation:  Activity and Safety  Activity Level: Bed Rest  Activity: In bed, Family/Visitors present  Activity Assistance: Complete care  Weight Bearing Status: WBAT (Weight Bearing as Tolerated)  Patient Turned: Turns self  Head of Bed Elevated: HOB 30  Safety Measures: Bed/Chair alarm on, Bed/Chair-Wheels locked, Bed in low position, Call light within reach, Gripper socks, Restraints, Side rails X2     Impression / Assessment:     Principal Problem:    Acute ischemic stroke ([facility] (12/31/2020)    Active Problems:    Chronic atrial fibrillation (HCC) (7/20/2017)      HTN (hypertension), benign (7/20/2017)      Hyperlipidemia, mixed (7/20/2017)      Gastroesophageal reflux disease with esophagitis (7/20/2017)      Hypothyroidism, adult (7/20/2017)      History of CVA (cerebrovascular accident) (7/20/2017)      Overview: 2014      Age related osteoporosis (7/20/2017)      Primary osteoarthritis involving multiple joints (7/21/2017)      NSTEMI (non-ST elevated myocardial infarction) ([facility] (12/31/2020)      Atrial fibrillation with RVR ([facility] (12/31/2020)      L MCA occlusion with left hemispheric stroke with flaccid right hemiplegia, global aphasia, afib  Plan / Recommendations / Medical Decision Making:     Recommendations:   Continue Acute Rehab Program  Coordination of rehab / medical care  Counseling of PM&R care issues management  Monitoring and management of medical conditions per plan of care / orders  -overall poor prognosis. Not appropriate for rehab  -will sign off   -remains full Code. ? Palliative Care Consult. Hospice likely appropriate. Discussion with Staff  Reviewed Therapies / Georgi Marshall / Larissa Getting / Records  Thank you very much for this referral. We appreciate the opportunity to participate in this patient's care. We will continue to follow. Patricia Arrieta MD, Medical Director  [address] [facility]

## 2021-01-01 NOTE — PROGRESS NOTES
Verbal bedside report given to oncoming RN, Eulalio Arguello. Patient's situation, background, assessment and recommendations provided. Opportunity for questions provided. Oncoming RN assumed care of patient.

## 2021-01-02 ENCOUNTER — APPOINTMENT (OUTPATIENT)
Dept: CT IMAGING | Age: 86
DRG: 064 | End: 2021-01-02
Attending: PSYCHIATRY & NEUROLOGY
Payer: MEDICARE

## 2021-01-02 PROBLEM — I50.21 SYSTOLIC CHF, ACUTE (HCC): Status: ACTIVE | Noted: 2021-01-02

## 2021-01-02 LAB
ALBUMIN SERPL-MCNC: 3 G/DL (ref 3.2–4.6)
ALBUMIN/GLOB SERPL: 0.8 {RATIO} (ref 1.2–3.5)
ALP SERPL-CCNC: 93 U/L (ref 50–136)
ALT SERPL-CCNC: 26 U/L (ref 12–65)
ANION GAP SERPL CALC-SCNC: 8 MMOL/L (ref 7–16)
AST SERPL-CCNC: 48 U/L (ref 15–37)
BASOPHILS # BLD: 0 K/UL (ref 0–0.2)
BASOPHILS NFR BLD: 0 % (ref 0–2)
BILIRUB DIRECT SERPL-MCNC: 0.4 MG/DL
BILIRUB SERPL-MCNC: 1.3 MG/DL (ref 0.2–1.1)
BUN SERPL-MCNC: 19 MG/DL (ref 8–23)
CALCIUM SERPL-MCNC: 8.4 MG/DL (ref 8.3–10.4)
CHLORIDE SERPL-SCNC: 111 MMOL/L (ref 98–107)
CO2 SERPL-SCNC: 24 MMOL/L (ref 21–32)
CREAT SERPL-MCNC: 0.47 MG/DL (ref 0.6–1)
DIFFERENTIAL METHOD BLD: ABNORMAL
EOSINOPHIL # BLD: 0 K/UL (ref 0–0.8)
EOSINOPHIL NFR BLD: 0 % (ref 0.5–7.8)
ERYTHROCYTE [DISTWIDTH] IN BLOOD BY AUTOMATED COUNT: 13.5 % (ref 11.9–14.6)
GLOBULIN SER CALC-MCNC: 3.6 G/DL (ref 2.3–3.5)
GLUCOSE SERPL-MCNC: 103 MG/DL (ref 65–100)
HCT VFR BLD AUTO: 34.8 % (ref 35.8–46.3)
HGB BLD-MCNC: 11.8 G/DL (ref 11.7–15.4)
IMM GRANULOCYTES # BLD AUTO: 0 K/UL (ref 0–0.5)
IMM GRANULOCYTES NFR BLD AUTO: 0 % (ref 0–5)
LYMPHOCYTES # BLD: 0.8 K/UL (ref 0.5–4.6)
LYMPHOCYTES NFR BLD: 12 % (ref 13–44)
MCH RBC QN AUTO: 29.5 PG (ref 26.1–32.9)
MCHC RBC AUTO-ENTMCNC: 33.9 G/DL (ref 31.4–35)
MCV RBC AUTO: 87 FL (ref 79.6–97.8)
MM INDURATION POC: 0 MM (ref 0–5)
MONOCYTES # BLD: 0.5 K/UL (ref 0.1–1.3)
MONOCYTES NFR BLD: 8 % (ref 4–12)
NEUTS SEG # BLD: 5.4 K/UL (ref 1.7–8.2)
NEUTS SEG NFR BLD: 80 % (ref 43–78)
NRBC # BLD: 0 K/UL (ref 0–0.2)
PLATELET # BLD AUTO: 212 K/UL (ref 150–450)
PMV BLD AUTO: 9.6 FL (ref 9.4–12.3)
POTASSIUM SERPL-SCNC: 3.1 MMOL/L (ref 3.5–5.1)
PPD POC: NEGATIVE NEGATIVE
PROT SERPL-MCNC: 6.6 G/DL (ref 6.3–8.2)
RBC # BLD AUTO: 4 M/UL (ref 4.05–5.2)
SODIUM SERPL-SCNC: 143 MMOL/L (ref 136–145)
UFH PPP CHRO-ACNC: 0.38 IU/ML (ref 0.3–0.7)
WBC # BLD AUTO: 6.8 K/UL (ref 4.3–11.1)

## 2021-01-02 PROCEDURE — 92610 EVALUATE SWALLOWING FUNCTION: CPT

## 2021-01-02 PROCEDURE — 97530 THERAPEUTIC ACTIVITIES: CPT

## 2021-01-02 PROCEDURE — 51798 US URINE CAPACITY MEASURE: CPT

## 2021-01-02 PROCEDURE — 80048 BASIC METABOLIC PNL TOTAL CA: CPT

## 2021-01-02 PROCEDURE — 36415 COLL VENOUS BLD VENIPUNCTURE: CPT

## 2021-01-02 PROCEDURE — 85025 COMPLETE CBC W/AUTO DIFF WBC: CPT

## 2021-01-02 PROCEDURE — 97165 OT EVAL LOW COMPLEX 30 MIN: CPT

## 2021-01-02 PROCEDURE — 65660000000 HC RM CCU STEPDOWN

## 2021-01-02 PROCEDURE — 2709999900 HC NON-CHARGEABLE SUPPLY

## 2021-01-02 PROCEDURE — 77030019905 HC CATH URETH INTMIT MDII -A

## 2021-01-02 PROCEDURE — 70450 CT HEAD/BRAIN W/O DYE: CPT

## 2021-01-02 PROCEDURE — 74011000250 HC RX REV CODE- 250: Performed by: FAMILY MEDICINE

## 2021-01-02 PROCEDURE — 74011250637 HC RX REV CODE- 250/637: Performed by: INTERNAL MEDICINE

## 2021-01-02 PROCEDURE — 80076 HEPATIC FUNCTION PANEL: CPT

## 2021-01-02 PROCEDURE — 74011250636 HC RX REV CODE- 250/636: Performed by: INTERNAL MEDICINE

## 2021-01-02 PROCEDURE — 74011000258 HC RX REV CODE- 258: Performed by: INTERNAL MEDICINE

## 2021-01-02 PROCEDURE — 77030040830 HC CATH URETH FOL MDII -A

## 2021-01-02 PROCEDURE — 97535 SELF CARE MNGMENT TRAINING: CPT

## 2021-01-02 PROCEDURE — 85520 HEPARIN ASSAY: CPT

## 2021-01-02 PROCEDURE — 97161 PT EVAL LOW COMPLEX 20 MIN: CPT

## 2021-01-02 RX ORDER — POTASSIUM CHLORIDE 14.9 MG/ML
20 INJECTION INTRAVENOUS ONCE
Status: COMPLETED | OUTPATIENT
Start: 2021-01-03 | End: 2021-01-03

## 2021-01-02 RX ORDER — LATANOPROST 50 UG/ML
1 SOLUTION/ DROPS OPHTHALMIC EVERY EVENING
Status: DISCONTINUED | OUTPATIENT
Start: 2021-01-02 | End: 2021-01-07 | Stop reason: HOSPADM

## 2021-01-02 RX ADMIN — HYDRALAZINE HYDROCHLORIDE 20 MG: 20 INJECTION, SOLUTION INTRAMUSCULAR; INTRAVENOUS at 03:50

## 2021-01-02 RX ADMIN — Medication 10 ML: at 06:37

## 2021-01-02 RX ADMIN — DORZOLAMIDE HYDROCHLORIDE AND TIMOLOL MALEATE 1 DROP: 20; 5 SOLUTION/ DROPS OPHTHALMIC at 10:11

## 2021-01-02 RX ADMIN — LATANOPROST 1 DROP: 50 SOLUTION OPHTHALMIC at 23:37

## 2021-01-02 RX ADMIN — Medication 10 ML: at 15:56

## 2021-01-02 RX ADMIN — SODIUM CHLORIDE 10 MG/HR: 900 INJECTION, SOLUTION INTRAVENOUS at 13:00

## 2021-01-02 RX ADMIN — DORZOLAMIDE HYDROCHLORIDE AND TIMOLOL MALEATE 1 DROP: 20; 5 SOLUTION/ DROPS OPHTHALMIC at 21:20

## 2021-01-02 RX ADMIN — HEPARIN SODIUM 14 UNITS/KG/HR: 5000 INJECTION, SOLUTION INTRAVENOUS at 01:47

## 2021-01-02 RX ADMIN — SODIUM CHLORIDE 10 MG/HR: 900 INJECTION, SOLUTION INTRAVENOUS at 06:36

## 2021-01-02 RX ADMIN — ASPIRIN 300 MG: 300 SUPPOSITORY RECTAL at 09:38

## 2021-01-02 RX ADMIN — SODIUM CHLORIDE 10 MG/HR: 900 INJECTION, SOLUTION INTRAVENOUS at 17:26

## 2021-01-02 RX ADMIN — Medication 10 ML: at 21:20

## 2021-01-02 RX ADMIN — SODIUM CHLORIDE, SODIUM LACTATE, POTASSIUM CHLORIDE, AND CALCIUM CHLORIDE 50 ML/HR: 600; 310; 30; 20 INJECTION, SOLUTION INTRAVENOUS at 03:36

## 2021-01-02 NOTE — PROGRESS NOTES
Progress Note    Patient: Darian Luis MRN: [id number]  SSN: [id number]    YOB: 1931  Age: 80 y.o. Sex: female      Admit Date: 12/31/2020    LOS: 2 days     Subjective:     Patient is aphasic and no additional information has made very little in the way of headway and appears if anything less responsive this morning did have some improvement yesterday    Objective:     Vitals:    01/02/21 0345 01/02/21 0446 01/02/21 0711 01/02/21 0846   BP: (!) 163/74 (!) 186/70 136/69 133/64   Pulse: 91 100 96 91   Resp:   16    Temp:   97.3 °F (36.3 °C)    SpO2:   96%    Weight:       Height:            Intake and Output:  Current Shift: No intake/output data recorded. Last three shifts: 12/31 1901 - 01/02 0700  In: -   Out: 600 [Urine:600]    Physical Exam:   The patient is arousable, moaning and globally aphasic  Patient appears chronically ill  Cranial nerve examination gaze deviation to the left  Face .  Right facial droop  Motor dense right hemiplegia remains  Fine motor coordination is untestable and for cooperation today    Peripheral sensation untestable in the light of the significant aphasia  Extensor Babinski's right greater than left  Cardiovascular irregular no murmur  Lab/Data Review:  Recent Results (from the past 24 hour(s))   HEPARIN XA UFH    Collection Time: 01/01/21 12:37 PM   Result Value Ref Range    Heparin Xa UFH 0.30 0.3 - 0.7 IU/mL   PLEASE READ & DOCUMENT PPD TEST IN 24 HRS    Collection Time: 01/01/21  5:52 PM   Result Value Ref Range    PPD Negative Negative    mm Induration 0 0 - 5 mm   CBC WITH AUTOMATED DIFF    Collection Time: 01/02/21  3:56 AM   Result Value Ref Range    WBC 6.8 4.3 - 11.1 K/uL    RBC 4.00 (L) 4.05 - 5.2 M/uL    HGB 11.8 11.7 - 15.4 g/dL    HCT 34.8 (L) 35.8 - 46.3 %    MCV 87.0 79.6 - 97.8 FL    MCH 29.5 26.1 - 32.9 PG    MCHC 33.9 31.4 - 35.0 g/dL    RDW 13.5 11.9 - 14.6 %    PLATELET 746 866 - 517 K/uL    MPV 9.6 9.4 - 12.3 FL    ABSOLUTE NRBC 0.00 0.0 - 0.2 K/uL    DF AUTOMATED      NEUTROPHILS 80 (H) 43 - 78 %    LYMPHOCYTES 12 (L) 13 - 44 %    MONOCYTES 8 4.0 - 12.0 %    EOSINOPHILS 0 (L) 0.5 - 7.8 %    BASOPHILS 0 0.0 - 2.0 %    IMMATURE GRANULOCYTES 0 0.0 - 5.0 %    ABS. NEUTROPHILS 5.4 1.7 - 8.2 K/UL    ABS. LYMPHOCYTES 0.8 0.5 - 4.6 K/UL    ABS. MONOCYTES 0.5 0.1 - 1.3 K/UL    ABS. EOSINOPHILS 0.0 0.0 - 0.8 K/UL    ABS. BASOPHILS 0.0 0.0 - 0.2 K/UL    ABS. IMM. GRANS. 0.0 0.0 - 0.5 K/UL   HEPATIC FUNCTION PANEL    Collection Time: 01/02/21  3:56 AM   Result Value Ref Range    Protein, total 6.6 6.3 - 8.2 g/dL    Albumin 3.0 (L) 3.2 - 4.6 g/dL    Globulin 3.6 (H) 2.3 - 3.5 g/dL    A-G Ratio 0.8 (L) 1.2 - 3.5      Bilirubin, total 1.3 (H) 0.2 - 1.1 MG/DL    Bilirubin, direct 0.4 (H) <0.4 MG/DL    Alk.  phosphatase 93 50 - 136 U/L    AST (SGOT) 48 (H) 15 - 37 U/L    ALT (SGPT) 26 12 - 65 U/L   HEPARIN XA UFH    Collection Time: 01/02/21  3:56 AM   Result Value Ref Range    Heparin Xa UFH 0.38 0.3 - 0.7 IU/mL          Assessment:     Principal Problem:    Acute ischemic stroke (HCC) (12/31/2020)    Active Problems:    Chronic atrial fibrillation (HCC) (7/20/2017)      HTN (hypertension), benign (7/20/2017)      Hyperlipidemia, mixed (7/20/2017)      Gastroesophageal reflux disease with esophagitis (7/20/2017)      Hypothyroidism, adult (7/20/2017)      History of CVA (cerebrovascular accident) (7/20/2017)      Overview: 2014      Age related osteoporosis (7/20/2017)      Primary osteoarthritis involving multiple joints (7/21/2017)      NSTEMI (non-ST elevated myocardial infarction) ([facility] (12/31/2020)      Atrial fibrillation with RVR ([address] (12/31/2020)        Plan:   Patient continues to have evidence of profound left brain dysfunction there is obviously significant concern with reference to hemorrhagic conversion in this situation and repeat CT scan has been requested  In the event that there is hemorrhagic conversion a discussion with family members with regards to desirability of perhaps switching to a palliative care approach would be desirable      Signed By: Sparkle Lovell MD     January 2, 2021

## 2021-01-02 NOTE — PROGRESS NOTES
Verbal bedside report given to oncoming RN, Joy Sanchez. Patient's situation, background, assessment and recommendations provided. Opportunity for questions provided. Oncoming RN assumed care of patient.

## 2021-01-02 NOTE — PROGRESS NOTES
ACUTE OT GOALS:  (Developed with and agreed upon by patient and/or caregiver.)  1. Patient will follow 50% of one step verbal or visual commands to increase participation during ADL performance. 2. Patient will tolerate 15 minutes static sitting balance unsupported with CGA while completing functional activity. 3. Patient will tolerate 25  minutes of OT treatment with 2-3 rest breaks to increase activity tolerance for ADLs. 4. Patient will complete functional transfers with modA and adaptive equipment as needed. 5. Patient will complete upper body bathing and dressing with mod A and adaptive equipment as needed. 6. Patient will complete self-grooming with mod A and adaptive equipment as needed. 7. Patient will attend to R side of environment with min verbal cues from therapist to increase safety awareness during ADL performance. Timeframe: 7 visits       OCCUPATIONAL THERAPY ASSESSMENT: Initial Assessment and Daily Note OT Treatment Day # 1    Maggi Patterson is a 80 y.o. female   PRIMARY DIAGNOSIS: Acute ischemic stroke ([facility] [address]  Acute ischemic stroke ([facility] [address] [I63.9]       Reason for Referral:    ICD-10: Treatment Diagnosis: Generalized Muscle Weakness (M62.81)  [place]PAT[place] / [place] PART [place] B / Product Type: Medicare /   ASSESSMENT:     REHAB RECOMMENDATIONS:   Recommendation to date pending progress:  Setting:   Short-term Rehab  Equipment:    To Be Determined     PRIOR LEVEL OF FUNCTION:  (Prior to Hospitalization)  INITIAL/CURRENT LEVEL OF FUNCTION:  (Most Recently Demonstrated)   Bathing:   Unknown  Dressing:   Unknown  Feeding/Grooming:   Unknown  Toileting:   Unknown  Functional Mobility:   Unknown Bathing:   Maximal Assistance  Dressing:   Maximal Assistance  Feeding/Grooming:   Maximal Assistance  Toileting:   Total Assistance  Functional Mobility:   Total Assistance     ASSESSMENT:  Ms. Ani Paris presents for CVA.  Today, noted deficits in overall strength, activity tolerance, ADL performance and functional mobility. R hemiplegia with subsequent neglect. Max A x 2 for transfers. Min A for washing  face with L hand. At this time, Carter Bruce is functioning below baseline for ADLs and functional mobility. Pt would benefit from skilled OT services to address OT goals and and plan of care.         SUBJECTIVE:   Ms. Mayra Peterson  - did not verbalize    SOCIAL HISTORY/LIVING ENVIRONMENT: unknown       OBJECTIVE:     PAIN: VITAL SIGNS: LINES/DRAINS:   Pre Treatment: Pain Screen  Pain Scale 1: Numeric (0 - 10)  Pain Intensity 1: 0  Post Treatment: 0/10   IV  O2 Device: Room air     GROSS EVALUATION:  RUEs Within Functional Limits Abnormal/ Functional Abnormal/ Non-Functional (see comments) Not Tested Comments:   AROM [] [x] [] [] RUE   PROM [] [x] [] [] RUE   Strength [] [x] [] [] RUE   Balance [] [x] [] []    Posture [] [x] [] []    Sensation [] [x] [] []    Coordination [] [x] [] []    Tone [x] [] [] []    Edema [x] [] [] []    Activity Tolerance [] [x] [] []     [] [] [] []      COGNITION/  PERCEPTION: Intact Impaired   (see comments) Comments:   Orientation [] [x]    Vision [] [x]    Hearing [] [x]    Judgment/ Insight [] [x]    Attention [] [x]    Memory [] [x]    Command Following [] [x]    Emotional Regulation [] [x]     [] []      ACTIVITIES OF DAILY LIVING: I Mod I S SBA CGA Min Mod Max Total NT Comments   BASIC ADLs:              Bathing/ Showering [] [] [] [] [] [] [] [] [x] []    Toileting [] [] [] [] [] [] [] [] [x] []    Dressing [] [] [] [] [] [] [] [] [x] []    Feeding [] [] [] [] [] [] [] [x] [] []    Grooming [] [] [] [] [] [] [] [x] [] []    Personal Device Care [] [] [] [] [] [] [] [] [x] []    Functional Mobility [] [] [] [] [] [] [] [] [x] []    INSTRUMENTAL ADLs:              Care of Others [] [] [] [] [] [] [] [] [] [x]    Community Mobility [] [] [] [] [] [] [] [] [] [x]    Financial Management [] [] [] [] [] [] [] [] [] [x]    Home Management [] [] [] [] [] [] [] [] [] [x]    Meal Preparation [] [] [] [] [] [] [] [] [] [x]    Health Management [] [] [] [] [] [] [] [] [] [x]    Work/Leisure [] [] [] [] [] [] [] [] [] [x]    I=Independent, Mod I=Modified Independent, S=Supervision, SBA=Standby Assistance, CGA=Contact Guard Assistance,   Min=Minimal Assistance, Mod=Moderate Assistance, Max=Maximal Assistance, Total=Total Assistance, NT=Not Tested    MOBILITY: I Mod I S SBA CGA Min Mod Max Total  NT x2 Comments:   Supine to sit [] [] [] [] [] [] [] [x] [] [] [x]    Sit to supine [] [] [] [] [] [] [] [x] [] [] [x]    Sit to stand [] [] [] [] [] [] [] [] [] [x] []    Bed to chair [] [] [] [] [] [] [] [] [] [x] []    I=Independent, Mod I=Modified Independent, S=Supervision, SBA=Standby Assistance, CGA=Contact Guard Assistance,   Min=Minimal Assistance, Mod=Moderate Assistance, Max=Maximal Assistance, Total=Total Assistance, NT=[facility] 6 Clicks   Daily Activity Inpatient Short Form        How much help from another person does the patient currently need. .. Total A Lot A Little None   1. Putting on and taking off regular lower body clothing? [x] 1   [] 2   [] 3   [] 4   2. Bathing (including washing, rinsing, drying)? [x] 1   [] 2   [] 3   [] 4   3. Toileting, which includes using toilet, bedpan or urinal?   [x] 1   [] 2   [] 3   [] 4   4. Putting on and taking off regular upper body clothing? [] 1   [x] 2   [] 3   [] 4   5. Taking care of personal grooming such as brushing teeth? [] 1   [x] 2   [] 3   [] 4   6. Eating meals? [] 1   [x] 2   [] 3   [] 4   © 2007, Trustees of [facility], under license to [facility]. All rights reserved     Score:  Initial: 9 Most Recent: X (Date: -- )   Interpretation of Tool:  Represents activities that are increasingly more difficult (i.e. Bed mobility, Transfers, Gait).     PLAN:   FREQUENCY/DURATION: OT Plan of Care: 3 times/week for duration of hospital stay or until stated goals are met, whichever comes first.    PROBLEM LIST:   (Skilled intervention is medically necessary to address:)  1. Decreased ADL/Functional Activities  2. Decreased Activity Tolerance  3. Decreased AROM/PROM  4. Decreased Balance  5. Decreased Coordination  6. Decreased Gait Ability  7. Decreased Strength  8. Decreased Transfer Abilities   INTERVENTIONS PLANNED:   (Benefits and precautions of occupational therapy have been discussed with the patient.)  1. Self Care Training  2. Therapeutic Activity  3. Therapeutic Exercise/HEP  4. Neuromuscular Re-education  5. Education     TREATMENT:     EVALUATION: Low Complexity : (Untimed Charge)    TREATMENT:   ($$ Self Care/Home Management: 8-22 mins    )  Self Care (8 Minutes): Self care including Grooming to decrease level of assistance required.     AFTER TREATMENT POSITION/PRECAUTIONS:  Alarm Activated, Bed, Needs within reach and Restraints     INTERDISCIPLINARY COLLABORATION:  RN/PCT, PT/PTA and OT/GUPTA    TOTAL TREATMENT DURATION:  OT Patient Time In/Time Out  Time In: 1018  Time Out: [address], OT

## 2021-01-02 NOTE — PROGRESS NOTES
Patient has not voided this shift, bladder scanned performed 359 mls of urine in bladder. MD Tan Allan notified orders to place a [place] received.

## 2021-01-02 NOTE — PROGRESS NOTES
01/02/21 0748   NIH Stroke Scale   Interval Other (comment)  (change in primary nurse)   LOC 3   LOC Questions 2   LOC Commands 1   Best Gaze 1   Visual 2  (unable to assess)   Facial Palsy 0   Motor Right Arm 3   Motor Left Arm 0   Motor Right Leg 2   Motor Left Leg 0   Limb Ataxia 1   Sensory 1   Best Language 3   Dysarthria 2   Extinction and Inattention 1   Total 22

## 2021-01-02 NOTE — PROGRESS NOTES
Problem: Dysphagia (Adult)  Goal: *Acute Goals and Plan of Care (Insert Text)  Description: STG: Pt will tolerate po trials of various consistencies with no overt s/sx of airway compromise with 100% accuracy. STG: Pt will participate in MBS, if/when deemed appropriate, with 100% compliance. STG: Pt will participate in speech/language/cognition evaluation with 100% compliance. LTG: Pt will tolerate safest/least restrictive diet with no s/sx of airway compromise. LTG: Pt will communicate effectively/efficiently within direct environment with family/caregivers. Outcome: Progressing Towards Goal   SPEECH LANGUAGE PATHOLOGY: DYSPHAGIA- Initial Assessment    NAME/AGE/GENDER: Yaneth Koch is a 80 y.o. female  DATE: 1/2/2021  PRIMARY DIAGNOSIS: Acute ischemic stroke ([facility] [address] [I63.9]       ICD-10: Treatment Diagnosis: R13.12 Dysphagia, Oropharyngeal Phase    RECOMMENDATIONS   DIET:   NPO  Po trials with SLP team only at this time, may have sips of water by spoon from RN after oral care    MEDICATIONS: Non-oral     ASPIRATION PRECAUTIONS  Slow rate of intake  Small bites/sips  Upright at 90 degrees during meal     COMPENSATORY STRATEGIES/MODIFICATIONS  Small sips and bites     RECOMMENDATIONS for CONTINUED SPEECH THERAPY:   YES: Anticipate need for ongoing speech therapy during this hospitalization and at next level of care. ASSESSMENT   Patient presents with poor oral control of bolus resulting in anterior spillage of majority of po trial, however, despite this, pt's swallow function seems intact with no overt sign of airway compromise as evidenced by timely swallows observed and no cough response elicited. Pt actively accepting po trials of thin liquids and pureed solids from SLP. Given severity of deficits, pt requires po trials be provided by SLP team only at this time. Recommend continue NPO with non-oral meds.  SLP team will follow for ongoing swallow assessment and diet initiation when deemed appropriate/safe. Thank you for this referral.      CONTINUATION OF SKILLED SERVICES/MEDICAL NECESSITY:  Patient continues to require skilled intervention due to dysphagia.   EDUCATION:  Recommendations discussed with Patient, Family, and RN  REHABILITATION POTENTIAL FOR STATED GOALS: Good    PLAN    FREQUENCY/DURATION: Continue to follow patient 3 times a week for duration of hospital stay to address above goals. Speech therapy to follow up for assessment of cognitive-linguistic function.     - Recommendations for next treatment session: Next treatment will address po trials.    SUBJECTIVE   Pt sleeping and woke to verbal stim. Daughter present and leaving to get food from cafeteria as SLP provided oral care. Dr. Du in to assess pt before daughter left. Pt only attending to left side of bed.    Oxygen Device: nasal canula  Pain: Pain Scale 1: Adult Nonverbal Pain Scale  Pain Intensity 1: 0    History of Present Injury/Illness: Ms. Joseph  has a past medical history of Age related osteoporosis (2017), Chronic atrial fibrillation (HCC) (2017), Chronic pain, DDD (degenerative disc disease), lumbar (2017), Gastroesophageal reflux disease with esophagitis (2017), Glaucoma (2017), History of CVA (cerebrovascular accident) (2017), HTN (hypertension), benign (2017), Hyperlipidemia, mixed (2017), Hypertension, Hypothyroidism, adult (2017), IFG (impaired fasting glucose) (2017), Primary osteoarthritis involving multiple joints (2017), and PUD (peptic ulcer disease).. She also  has a past surgical history that includes hx colonoscopy; hx hermes and bso; and hx  section. PRECAUTIONS/ALLERGIES: Actonel [risedronate], Atorvastatin, Fosamax [alendronate], Ketoprofen, and Moexipril     Problem List:  (Impairments causing functional limitations):  Oropharyngeal dysphagia  Aphasia  dysarthria    Previous Dysphagia: NONE REPORTED  Diet Prior to Evaluation:  NPO    Orientation:  Unable to assess, pt intermittently answering yes/no questions, unsure of reliability    Cognitive-Linguistic Screening:  Speech Production:   impaired  Expressive Language:  impaired  Receptive Language:  impaired  Cognition:   impaired  Prior Level of Function: assisted by family  Recommendations: Given results of screening, further evaluation is indicated to assess speech/language/cognition. OBJECTIVE   Oral Motor:   Labial: Decreased seal, Impaired coordination, and Right droop  Dentition: Intact, Natural, and some missing teeth  Oral Hygiene:  oral care provided by SLP  Lingual: Decreased rate, Decreased strength, and Impaired coordination    Swallow evaluation:   Patient consumed trials of thin liquids by spoon and pureed solids. Limited trials provided secondary to severity of deficits. Pt having difficulty coordinating breath + swallow resulting in mild anterior loss of liquid bolus, as well as, mild-moderate oral residue of pureed bolus. Oral care provided to remove residue. No overt signs/symptoms of airway compromise with half teaspoon trials of thin liquids x4/4, as well as, half teaspoon trials of pureed solids x3/3. No further po trials provided given pt's increasing lethargy and inability to clear/control previous po trials completely. Attempted providing education to daughter, however, she had yet to return to room after ~15 minutes after SLP assessment. Education provided to [address] [place] [address].     Tool Used: Dysphagia Outcome and Severity Scale (DINH)    Score Comments   Normal Diet  [] 7 With no strategies or extra time needed   Functional Swallow  [] 6 May have mild oral or pharyngeal delay   Mild Dysphagia  [] 5 Which may require one diet consistency restricted    Mild-Moderate Dysphagia  [] 4 With 1-2 diet consistencies restricted   Moderate Dysphagia  [] 3 With 2 or more diet consistencies restricted   Moderate-Severe Dysphagia  [x] 2 With partial PO strategies (trials with ST only) Severe Dysphagia  [] 1 With inability to tolerate any PO safely      Score:  Initial: 2 Most Recent: x (Date 01/02/21 )   Interpretation of Tool: The Dysphagia Outcome and Severity Scale (DINH) is a simple, easy-to-use, 7-point scale developed to systematically rate the functional severity of dysphagia based on objective assessment and make recommendations for diet level, independence level, and type of nutrition. Current Medications:   No current facility-administered medications on file prior to encounter. Current Outpatient Medications on File Prior to Encounter   Medication Sig Dispense Refill    Xarelto 20 mg tab tablet TAKE 1 TABLET BY MOUTH ONCE DAILY WITH SUPPER 30 Tab 5    metoprolol succinate (TOPROL-XL) 100 mg tablet Take 1 tablet by mouth once daily 90 Tab 1    benazepriL (LOTENSIN) 40 mg tablet Take 1 tablet by mouth once daily 90 Tab 1    levothyroxine (SYNTHROID) 75 mcg tablet TAKE 1 TABLET BY MOUTH ONCE DAILY BEFORE BREAKFAST 90 Tab 1    amLODIPine (NORVASC) 2.5 mg tablet Take 1 tablet by mouth once daily 90 Tab 1    omeprazole (PRILOSEC) 40 mg capsule Take 1 Cap by mouth daily. 30 Cap 5    Lumigan 0.01 % ophthalmic drops INSTILL 1 DROP IN BOTH EYES AT BEDTIME 2.5 mL 5    cholecalciferol, vitamin D3, (VITAMIN D3) 2,000 unit tab Take  by mouth. dorzolamide-timolol (COSOPT) 22.3-6.8 mg/mL ophthalmic solution PUT 1 DROP INTO BOTH EYES TWICE A DAY 10 mL 11    calcium-cholecalciferol, d3, (CALCIUM 600 + D) 600-125 mg-unit tab Take  by mouth.           INTERDISCIPLINARY COLLABORATION: RN    After treatment position/precautions:  Upright in bed  RN notified    Total Treatment Duration:   Time In: 3483  Time Out: [address] [facility], 117 Counts include 234 beds at the [facility]

## 2021-01-02 NOTE — PROGRESS NOTES
Patient is to go to CT spoke with MD Cyn Sams about taking pt off Cardizem drip for CT and he verbalized  That it is okay to take her off drip for CT and restart once back to the floor

## 2021-01-02 NOTE — PROGRESS NOTES
01/01/21 1904   NIH Stroke Scale   Interval Other (comment)  (shift change)   LOC 3   LOC Questions 2   LOC Commands 2   Best Gaze 1   Visual 2  (unable to assess)   Facial Palsy 0   Motor Right Arm 3   Motor Left Arm 0   Motor Right Leg 2   Motor Left Leg 1   Limb Ataxia 2   Sensory 1   Best Language 3   Dysarthria 2   Extinction and Inattention 1   Total 25

## 2021-01-02 NOTE — PROGRESS NOTES
Problem: Patient Education: Go to Patient Education Activity  Goal: Patient/Family Education  Outcome: Progressing Towards Goal     Problem: TIA/CVA Stroke: Day 2 Until Discharge  Goal: Activity/Safety  Outcome: Progressing Towards Goal  Goal: Diagnostic Test/Procedures  Outcome: Progressing Towards Goal  Goal: Nutrition/Diet  Outcome: Not Met  Goal: Discharge Planning  Outcome: Progressing Towards Goal  Goal: Respiratory  Outcome: Progressing Towards Goal  Goal: Treatments/Interventions/Procedures  Outcome: Progressing Towards Goal  Goal: Psychosocial  Outcome: Progressing Towards Goal  Goal: *Verbalizes anxiety and depression are reduced or absent  Outcome: Not Met  Goal: *Absence of aspiration  Outcome: Progressing Towards Goal  Goal: *Absence of deep venous thrombosis signs and symptoms(Stroke Metric)  Outcome: Progressing Towards Goal  Goal: *Optimal pain control at patient's stated goal  Outcome: Progressing Towards Goal  Goal: *Tolerating diet  Outcome: Not Met  Goal: *Ability to perform ADLs and demonstrates progressive mobility and function  Outcome: Progressing Towards Goal  Goal: *Stroke education continued(Stroke Metric)  Outcome: Progressing Towards Goal

## 2021-01-02 NOTE — PROGRESS NOTES
Bedside and Verbal shift change report given to self (oncoming nurse) by Piedad Thomas (offgoing nurse). Report included the following information SBAR, Kardex and MAR.

## 2021-01-02 NOTE — PROGRESS NOTES
Care Management Interventions  PCP Verified by CM: Yes  Mode of Transport at Discharge: [address] (CM Consult): SNF  Discharge Durable Medical Equipment: No  Physical Therapy Consult: Yes  Occupational Therapy Consult: Yes  Confirm Follow Up Transport: Family  Discharge Location  Discharge Placement: Skilled nursing facility    CM consult for discharge planning. CM spoke with patient's daughter, Rodrigo Michel, [phone], by phone. Therapy evals have been completed and STR is recommended. Daughter is agreeable to STR placement. She is unsure where she would referrals sent. Daughter states she would like to review facility list; she plans on coming back to hospital tonight. CM instructed her that she would leave list of skilled facilities in patient's room for her to review and CM will f/u tomorrow for decisions; she verbalized understanding. PPD and COVID have been ordered. CM will continue to follow for discharge needs.

## 2021-01-02 NOTE — PROGRESS NOTES
Verbal bedside report given to oncoming RN, Soumya Avalos. Patient's situation, background, assessment and recommendations provided. Opportunity for questions provided. Oncoming RN assumed care of patient.

## 2021-01-02 NOTE — PROGRESS NOTES
ACUTE PHYSICAL THERAPY GOALS:  (Developed with and agreed upon by patient and/or caregiver. )  LTG:  (1.)Ms. Cristobal Graves will move from supine to sit and sit to supine , scoot up and down and roll side to side in bed with MINIMAL ASSISTANCE within 7 treatment day(s). (2.)Ms. Cristobal Graves will transfer from bed to chair and chair to bed with MODERATE ASSIST using the least restrictive device within 7 treatment day(s). (3.)Ms. Cristobal Graves will ambulate with MODERATE ASSIST for 25 feet with the least restrictive device within 7 treatment day(s). (4.)Ms. Cristobal Graves will maintain static/dynamic sitting x 15 minutes with STAND BY ASSIST for improved balance within 7 treatment days. (5.)Ms. Cristobal Graves will participate in therapeutic activity/exercises x 25 for increased strength within 7 treatment days.     ________________________________________________________________________________________________      PHYSICAL THERAPY ASSESSMENT: Initial Assessment and AM PT Treatment Day # 1      Cedric Spaulding is a 80 y.o. female   PRIMARY DIAGNOSIS: Acute ischemic stroke ([facility]  Acute ischemic stroke ([facility] [I63.9]       Reason for Referral:    ICD-10: Treatment Diagnosis: Generalized Muscle Weakness (M62.81)  Difficulty in walking, Not elsewhere classified (R26.2)  [place]PAT[place] / [place] / Product Type: Medicare /     ASSESSMENT:     REHAB RECOMMENDATIONS:   Recommendation to date pending progress:  Setting:   Short-term Rehab  Equipment:    To Be Determined     PRIOR LEVEL OF FUNCTION:  (Prior to Hospitalization) INITIAL/CURRENT LEVEL OF FUNCTION:  (Most Recently Demonstrated)   Bed Mobility:   Independent  Sit to Stand:   Independent  Transfers:   Independent  Gait/Mobility:   Independent Bed Mobility:   Total Assistance x 2  Sit to Stand:   Maximal Assistance x 2  Transfers:   Maximal Assistance x 2  Gait/Mobility:   Unable to perform     ASSESSMENT:   Ms. Cristobal Graves presents to PT with generally decreased AROM and grossly decreased strength in R LE. She was unable to follow any commands on R side today and demonstrated heavy R side neglect. Pt non-verbal throughout but moaned with mobility. She leaned to her right in sitting and demonstrated poor standing balance. OT present to assist with ADLs while PT addressed bed mobility/transfers. Ms. Meño Chavez could benefit from skilled PT as she is currently functioning below her baseline. SUBJECTIVE:   Ms. Meño Chavez states, . Nonverbal    SOCIAL HISTORY/LIVING ENVIRONMENT: RN reported independent PTA     OBJECTIVE:     PAIN: VITAL SIGNS: LINES/DRAINS:   Pre Treatment: Pain Screen  Pain Scale 1: Adult Nonverbal Pain Scale  Pain Intensity 1: 0  Post Treatment: 0   IV  O2 Device: Room air     GROSS EVALUATION:  B LEs Within Functional Limits Abnormal/ Functional Abnormal/ Non-Functional (see comments) Not Tested Comments:   AROM [] [x] [] []    PROM [] [] [] []    Strength [] [] [x] [] R LE spontaneous movement observed   Balance [] [] [x] [] FAIR-POOR sitting and POOR standing   Posture [] [] [] []    Sensation [] [] [] []    Coordination [] [] [x] []    Tone [] [] [] []    Edema [] [] [] []    Activity Tolerance [] [] [x] [] Unable to stand for long    [] [] [] []      COGNITION/  PERCEPTION: Intact Impaired   (see comments) Comments:   Orientation [] [x] Nonverbal    Vision [] []    Hearing [] []    Command Following [] [x] None on R side   Safety Awareness [] [x] Agitated    [] []      MOBILITY: I Mod I S SBA CGA Min Mod Max Total  NT x2 Comments:   Bed Mobility    Rolling [] [] [] [] [] [] [] [] [x] [] [x]    Supine to Sit [] [] [] [] [] [] [] [] [x] [] [x]    Scooting [] [] [] [] [] [] [] [] [x] [] [x]    Sit to Supine [] [] [] [] [] [] [] [] [x] [] [x]    Transfers    Sit to Stand [] [] [] [] [] [] [] [x] [] [] [x]    Bed to Chair [] [] [] [] [] [] [] [] [] [x] []    Stand to Sit [] [] [] [] [] [] [] [x] [] [] [x]    I=Independent, Mod I=Modified Independent, S=Supervision, SBA=Standby Assistance, CGA=Contact Guard Assistance,   Min=Minimal Assistance, Mod=Moderate Assistance, Max=Maximal Assistance, Total=Total Assistance, NT=Not Tested  GAIT: I Mod I S SBA CGA Min Mod Max Total  NT x2 Comments:   Level of Assistance [] [] [] [] [] [] [] [] [] [x] []    Distance NT    DME N/A    Gait Quality nT    I=Independent, Mod I=Modified Independent, S=Supervision, SBA=Standby Assistance, CGA=Contact Guard Assistance,   Min=Minimal Assistance, Mod=Moderate Assistance, Max=Maximal Assistance, Total=Total Assistance, NT=Not Tested    [facility] AM-PAC™ “6 Clicks”   Basic Mobility Inpatient Short Form       How much difficulty does the patient currently have... Unable A Lot A Little None   1.  Turning over in bed (including adjusting bedclothes, sheets and blankets)?   [x] 1   [] 2   [] 3   [] 4   2.  Sitting down on and standing up from a chair with arms ( e.g., wheelchair, bedside commode, etc.)   [x] 1   [] 2   [] 3   [] 4   3.  Moving from lying on back to sitting on the side of the bed?   [x] 1   [] 2   [] 3   [] 4   How much help from another person does the patient currently need... Total A Lot A Little None   4.  Moving to and from a bed to a chair (including a wheelchair)?   [x] 1   [] 2   [] 3   [] 4   5.  Need to walk in hospital room?   [x] 1   [] 2   [] 3   [] 4   6.  Climbing 3-5 steps with a railing?   [x] 1   [] 2   [] 3   [] 4   © 2007, Trustees of [facility], under license to [employer]. All rights reserved     Score:  Initial: 6 Most Recent: X (Date: -- )    Interpretation of Tool:  Represents activities that are increasingly more difficult (i.e. Bed mobility, Transfers, Gait).    PLAN:   FREQUENCY/DURATION: PT Plan of Care: 3 times/week for duration of hospital stay or until stated goals are met, whichever comes first.    PROBLEM LIST:   (Skilled intervention is medically necessary to address:)  1. Decreased ADL/Functional  Activities  2. Decreased Activity Tolerance  3. Decreased AROM/PROM  4. Decreased Balance  5. Decreased Cognition  6. Decreased Coordination  7. Decreased Gait Ability  8. Decreased Strength  9. Decreased Transfer Abilities   INTERVENTIONS PLANNED:   (Benefits and precautions of physical therapy have been discussed with the patient.)  1. Therapeutic Activity  2. Therapeutic Exercise/HEP  3. Neuromuscular Re-education  4. Gait Training  5. Manual Therapy  6. Education     TREATMENT:     EVALUATION: Low Complexity : (Untimed Charge)    TREATMENT:   ($$ Therapeutic Activity: 8-22 mins    )  Therapeutic Activity (10 Minutes): Therapeutic activity included Rolling, Supine to Sit, Sit to Supine, Scooting, Transfer Training, Sitting balance  and Standing balance to improve functional Mobility, Strength and Activity tolerance.   Co-Treatment PT/OT necessary due to patient's decreased overall endurance/tolerance levels, as well as need for high level skilled assistance to complete functional transfers/mobility and functional tasks    AFTER TREATMENT POSITION/PRECAUTIONS:  Bed, Restraints  and RN notified    INTERDISCIPLINARY COLLABORATION:  RN/PCT, PT/PTA and OT/GUPTA    TOTAL TREATMENT DURATION:  PT Patient Time In/Time Out  Time In: 1018  Time Out: Naun Taveras [address], PT, DPT

## 2021-01-02 NOTE — PROGRESS NOTES
Hospitalist Note     Admit Date:  2020 10:23 AM   Name:  Stella Junior   Age:  80 y.o.  :  10/23/1931   MRN:  [id number]   PCP:  Colton Trammell DO  Treatment Team: Attending Provider: Yvonne Cadena MD; Occupational Therapist: Stacy Robertson OT; Physical Therapist: Nisreen Gomez, PT, DPT    HPI/Subjective:   Pt is poorly responsive so history is per daughter at bedside. Pt is an 81 y/o F with HTN, HLD intolerant to statins, hx CVA, afib on xarelto, who presented to ER with R sided weakness and unresponsiveness. Daughter said pt was in usual state of health this morning until around 730am.  At that point she noticed pt had stopped responding, was having trouble moving her R side. EMS was called. She had code S and CTA showed M3 occlusion but too distal to intervene mechanically. She is compliant with meds including xarelto so no TPA. Troponin came back >23k. Daughter did report pt having CP radiating up her neck often, off and on the past few days, significantly bothersome enough for her to complain multiple times a day. Daughter does not think she has had any other symptoms. 1/2 - more awake today. Tries to speak but unable. Am told she stood with PT. Tries to follow commands.     Unable to obtain ROS due to condition    Objective:     Patient Vitals for the past 24 hrs:   Temp Pulse Resp BP SpO2   21 0846 -- 91 -- 133/64 --   21 0711 97.3 °F (36.3 °C) 96 16 136/69 96 %   21 0446 -- 100 -- (!) 186/70 --   21 0345 -- 91 -- (!) 163/74 --   21 0332 97 °F (36.1 °C) 83 18 (!) 153/71 95 %   21 0317 -- 84 -- (!) 153/71 --   21 0245 -- 96 -- (!) 141/69 --   21 0145 -- 91 -- 139/68 --   21 014 -- 98 -- 139/68 --   21 0045 -- 89 -- (!) 127/59 --   21 2345 -- 85 -- 127/61 --   21 2258 97.9 °F (36.6 °C) 94 18 130/60 96 %   21 2246 -- (!) 101 -- (!) 142/75 --   21 2145 -- 93 -- (!) 171/79 -- 01/01/21 2045 -- 86 -- (!) 160/71 --   01/01/21 1946 -- 80 -- (!) 149/73 --   01/01/21 1930 -- 86 -- (!) 161/76 --   01/01/21 1902 97.5 °F (36.4 °C) 87 18 (!) 161/76 99 %   01/01/21 1845 -- 84 -- (!) 160/70 --   01/01/21 1746 -- 82 -- (!) 153/70 --   01/01/21 1646 -- 80 -- (!) 153/66 --   01/01/21 1546 -- 86 -- (!) 160/76 --   01/01/21 1506 -- 75 -- (!) 166/98 --   01/01/21 1505 97.9 °F (36.6 °C) 76 18 (!) 166/98 95 %   01/01/21 1438 -- 78 -- (!) 169/79 --   01/01/21 1346 -- 89 -- (!) 160/71 --   01/01/21 1246 -- 82 -- (!) 160/71 --   01/01/21 1146 -- 84 -- (!) 160/78 --     Oxygen Therapy  O2 Sat (%): 96 % (01/02/21 0711)  Pulse via Oximetry: 85 beats per minute (12/31/20 2319)  O2 Device: Room air (01/02/21 0946)    Estimated body mass index is 23.03 kg/m² as calculated from the following:    Height as of this encounter: 4' 11\" (1.499 m). Weight as of this encounter: 51.7 kg (114 lb). Intake/Output Summary (Last 24 hours) at 1/2/2021 1142  Last data filed at 1/2/2021 0826  Gross per 24 hour   Intake 0 ml   Output 600 ml   Net -600 ml       Physical Exam:  General:    no overt distress. Ill appearing. awake  Eyes:   leftward gaze  CV:   Irregular  No m/r/g. No edema  Lungs:  CTAB anterior. No wheezing, rhonchi, or rales. Unlabored  Abdomen: Soft, nondistended. Extremities: Warm and dry. No cyanosis or clubbing  Neurologic: Asphasic. R hemiplegia. Skin:     No rashes. I reviewed the labs, imaging, EKGs, telemetry, and other studies done this admission.   Data Review:   Recent Results (from the past 24 hour(s))   HEPARIN XA UFH    Collection Time: 01/01/21 12:37 PM   Result Value Ref Range    Heparin Xa UFH 0.30 0.3 - 0.7 IU/mL   PLEASE READ & DOCUMENT PPD TEST IN 24 HRS    Collection Time: 01/01/21  5:52 PM   Result Value Ref Range    PPD Negative Negative    mm Induration 0 0 - 5 mm   CBC WITH AUTOMATED DIFF    Collection Time: 01/02/21  3:56 AM   Result Value Ref Range    WBC 6.8 4.3 - 11.1 K/uL    RBC 4.00 (L) 4.05 - 5.2 M/uL    HGB 11.8 11.7 - 15.4 g/dL    HCT 34.8 (L) 35.8 - 46.3 %    MCV 87.0 79.6 - 97.8 FL    MCH 29.5 26.1 - 32.9 PG    MCHC 33.9 31.4 - 35.0 g/dL    RDW 13.5 11.9 - 14.6 %    PLATELET 395 021 - 314 K/uL    MPV 9.6 9.4 - 12.3 FL    ABSOLUTE NRBC 0.00 0.0 - 0.2 K/uL    DF AUTOMATED      NEUTROPHILS 80 (H) 43 - 78 %    LYMPHOCYTES 12 (L) 13 - 44 %    MONOCYTES 8 4.0 - 12.0 %    EOSINOPHILS 0 (L) 0.5 - 7.8 %    BASOPHILS 0 0.0 - 2.0 %    IMMATURE GRANULOCYTES 0 0.0 - 5.0 %    ABS. NEUTROPHILS 5.4 1.7 - 8.2 K/UL    ABS. LYMPHOCYTES 0.8 0.5 - 4.6 K/UL    ABS. MONOCYTES 0.5 0.1 - 1.3 K/UL    ABS. EOSINOPHILS 0.0 0.0 - 0.8 K/UL    ABS. BASOPHILS 0.0 0.0 - 0.2 K/UL    ABS. IMM. GRANS. 0.0 0.0 - 0.5 K/UL   HEPATIC FUNCTION PANEL    Collection Time: 01/02/21  3:56 AM   Result Value Ref Range    Protein, total 6.6 6.3 - 8.2 g/dL    Albumin 3.0 (L) 3.2 - 4.6 g/dL    Globulin 3.6 (H) 2.3 - 3.5 g/dL    A-G Ratio 0.8 (L) 1.2 - 3.5      Bilirubin, total 1.3 (H) 0.2 - 1.1 MG/DL    Bilirubin, direct 0.4 (H) <0.4 MG/DL    Alk.  phosphatase 93 50 - 136 U/L    AST (SGOT) 48 (H) 15 - 37 U/L    ALT (SGPT) 26 12 - 65 U/L   HEPARIN XA UFH    Collection Time: 01/02/21  3:56 AM   Result Value Ref Range    Heparin Xa UFH 0.38 0.3 - 0.7 IU/mL       All Micro Results     None          Medications Administered     heparin (porcine) injection 3,100 Units     Admin Date  12/31/2020 Action  Given Dose  3,100 Units Route  IntraVENous Administered By  Jackson Barrera          iopamidoL (ISOVUE-370) 76 % injection 100 mL     Admin Date  12/31/2020 Action  Given Dose  100 mL Route  IntraVENous Administered By  Britany Caruso          levETIRAcetam (KEPPRA) 1,000 mg in 0.9% sodium chloride 100 mL IVPB     Admin Date  12/31/2020 Action  New Bag Dose  1,000 mg Route  IntraVENous Administered By  Jackson Barrera M          saline peripheral flush soln 10 mL     Admin Date  12/31/2020 Action  Given Dose  10 mL Route  InterCATHeter Administered By  Althea Welch          sodium chloride 0.9 % bolus infusion 100 mL     Admin Date  12/31/2020 Action  New Bag Dose  100 mL Route  IntraVENous Administered By  Althea Welch                Other Studies:  No results found. Assessment and Plan:     Hospital Problems as of 1/2/2021 Date Reviewed: 12/30/2020          Codes Class Noted - Resolved POA    Systolic CHF, acute ([facility] ICD-10-CM: I50.21  ICD-9-CM: 428.21, 428.0  1/2/2021 - Present Yes        * (Principal) Acute ischemic stroke ([facility] ICD-10-CM: I63.9  ICD-9-CM: 434.91  12/31/2020 - Present Yes        NSTEMI (non-ST elevated myocardial infarction) ([facility] ICD-10-CM: I21.4  ICD-9-CM: 410.70  12/31/2020 - Present Yes        Atrial fibrillation with RVR ([facility] ICD-10-CM: I48.91  ICD-9-CM: 427.31  12/31/2020 - Present Yes        Primary osteoarthritis involving multiple joints (Chronic) ICD-10-CM: M89.49  ICD-9-CM: 715.98  7/21/2017 - Present Yes        Chronic atrial fibrillation (HCC) (Chronic) ICD-10-CM: I48.20  ICD-9-CM: 427.31  7/20/2017 - Present Yes        HTN (hypertension), benign (Chronic) ICD-10-CM: I10  ICD-9-CM: 401.1  7/20/2017 - Present Yes        Hyperlipidemia, mixed (Chronic) ICD-10-CM: E78.2  ICD-9-CM: 272.2  7/20/2017 - Present Yes        Gastroesophageal reflux disease with esophagitis (Chronic) ICD-10-CM: K21.00  ICD-9-CM: 530.11  7/20/2017 - Present Yes        Hypothyroidism, adult (Chronic) ICD-10-CM: E03.9  ICD-9-CM: 244.9  7/20/2017 - Present Yes        History of CVA (cerebrovascular accident) (Chronic) ICD-10-CM: Z86.73  ICD-9-CM: V12.54  7/20/2017 - Present Yes    Overview Signed 7/20/2017 11:58 AM by Nena Webster DO     2014             Age related osteoporosis (Chronic) ICD-10-CM: M81.0  ICD-9-CM: 733.01  7/20/2017 - Present Yes              Plan:  CVA M3 occlusion  -repeat CT head today per neurology  -may need hospice consult Monday.   I doubt she will pass her SLP christen and I think PEG will be too risky in light of her recent events   -limit meds with sedating effects  -cannot get MRI without coming off heparin and cardizem per nursing, I do not think it is necessary; would confirm what we know already  -ASA supp  -cannot take statin. NPO/intolerance    NSTEMI, new sCHF  -probably had NSTEMI. CP with neck radiation for a few days PTA. ST depression in lateral leads on EKG in ER. Echo showing EF 40-45%, severe hypokinesis of the basal-mid anteroseptal, basal-mid inferoseptal, and mid inferior wall.  -heparin gtt  -ASA supp  -appreciate cardio input    Afib RVR  -heparin gtt. Is NPO, needs AC. On xarelto at home, was compliant  -cardizem gtt    Code status:    -full code. Daughter may be leaning towards DNR. Will need to follow up on this regularly.     FEN  -LR IVF supplementation with gtts for hydration  -is NPO    High risk of decompensation    Signed:  Miley Lu MD

## 2021-01-03 LAB
ANION GAP SERPL CALC-SCNC: 8 MMOL/L (ref 7–16)
BUN SERPL-MCNC: 17 MG/DL (ref 8–23)
CALCIUM SERPL-MCNC: 8.5 MG/DL (ref 8.3–10.4)
CHLORIDE SERPL-SCNC: 111 MMOL/L (ref 98–107)
CO2 SERPL-SCNC: 23 MMOL/L (ref 21–32)
CREAT SERPL-MCNC: 0.48 MG/DL (ref 0.6–1)
ERYTHROCYTE [DISTWIDTH] IN BLOOD BY AUTOMATED COUNT: 13.4 % (ref 11.9–14.6)
GLUCOSE SERPL-MCNC: 95 MG/DL (ref 65–100)
HCT VFR BLD AUTO: 34.7 % (ref 35.8–46.3)
HGB BLD-MCNC: 11.6 G/DL (ref 11.7–15.4)
MAGNESIUM SERPL-MCNC: 1.8 MG/DL (ref 1.8–2.4)
MCH RBC QN AUTO: 29.2 PG (ref 26.1–32.9)
MCHC RBC AUTO-ENTMCNC: 33.4 G/DL (ref 31.4–35)
MCV RBC AUTO: 87.4 FL (ref 79.6–97.8)
MM INDURATION POC: 0 MM (ref 0–5)
NRBC # BLD: 0 K/UL (ref 0–0.2)
PLATELET # BLD AUTO: 209 K/UL (ref 150–450)
PMV BLD AUTO: 9.9 FL (ref 9.4–12.3)
POTASSIUM SERPL-SCNC: 3.2 MMOL/L (ref 3.5–5.1)
PPD POC: NEGATIVE NEGATIVE
RBC # BLD AUTO: 3.97 M/UL (ref 4.05–5.2)
SARS COV-2, XPGCVT: NEGATIVE
SODIUM SERPL-SCNC: 142 MMOL/L (ref 136–145)
SOURCE, COVRS: NORMAL
UFH PPP CHRO-ACNC: 0.27 IU/ML (ref 0.3–0.7)
UFH PPP CHRO-ACNC: 0.51 IU/ML (ref 0.3–0.7)
WBC # BLD AUTO: 5.4 K/UL (ref 4.3–11.1)

## 2021-01-03 PROCEDURE — 77030040393 HC DRSG OPTIFOAM GENT MDII -B

## 2021-01-03 PROCEDURE — 83735 ASSAY OF MAGNESIUM: CPT

## 2021-01-03 PROCEDURE — 74011250636 HC RX REV CODE- 250/636: Performed by: INTERNAL MEDICINE

## 2021-01-03 PROCEDURE — 74011000258 HC RX REV CODE- 258: Performed by: INTERNAL MEDICINE

## 2021-01-03 PROCEDURE — 80048 BASIC METABOLIC PNL TOTAL CA: CPT

## 2021-01-03 PROCEDURE — 74011250636 HC RX REV CODE- 250/636: Performed by: FAMILY MEDICINE

## 2021-01-03 PROCEDURE — 85520 HEPARIN ASSAY: CPT

## 2021-01-03 PROCEDURE — 85027 COMPLETE CBC AUTOMATED: CPT

## 2021-01-03 PROCEDURE — 2709999900 HC NON-CHARGEABLE SUPPLY

## 2021-01-03 PROCEDURE — 74011250637 HC RX REV CODE- 250/637: Performed by: INTERNAL MEDICINE

## 2021-01-03 PROCEDURE — 36415 COLL VENOUS BLD VENIPUNCTURE: CPT

## 2021-01-03 PROCEDURE — 65660000000 HC RM CCU STEPDOWN

## 2021-01-03 PROCEDURE — 92526 ORAL FUNCTION THERAPY: CPT

## 2021-01-03 RX ORDER — HEPARIN SODIUM 5000 [USP'U]/ML
20 INJECTION, SOLUTION INTRAVENOUS; SUBCUTANEOUS ONCE
Status: COMPLETED | OUTPATIENT
Start: 2021-01-03 | End: 2021-01-03

## 2021-01-03 RX ORDER — SODIUM CHLORIDE, SODIUM LACTATE, POTASSIUM CHLORIDE, CALCIUM CHLORIDE 600; 310; 30; 20 MG/100ML; MG/100ML; MG/100ML; MG/100ML
25 INJECTION, SOLUTION INTRAVENOUS CONTINUOUS
Status: DISCONTINUED | OUTPATIENT
Start: 2021-01-03 | End: 2021-01-07 | Stop reason: HOSPADM

## 2021-01-03 RX ORDER — LOSARTAN POTASSIUM 25 MG/1
25 TABLET ORAL DAILY
Status: DISCONTINUED | OUTPATIENT
Start: 2021-01-04 | End: 2021-01-04

## 2021-01-03 RX ORDER — ATORVASTATIN CALCIUM 10 MG/1
20 TABLET, FILM COATED ORAL
Status: DISCONTINUED | OUTPATIENT
Start: 2021-01-03 | End: 2021-01-07 | Stop reason: HOSPADM

## 2021-01-03 RX ORDER — GUAIFENESIN 100 MG/5ML
81 LIQUID (ML) ORAL DAILY
Status: DISCONTINUED | OUTPATIENT
Start: 2021-01-04 | End: 2021-01-07 | Stop reason: HOSPADM

## 2021-01-03 RX ORDER — CARVEDILOL 12.5 MG/1
12.5 TABLET ORAL 2 TIMES DAILY WITH MEALS
Status: DISCONTINUED | OUTPATIENT
Start: 2021-01-03 | End: 2021-01-04

## 2021-01-03 RX ORDER — METOPROLOL SUCCINATE 100 MG/1
100 TABLET, EXTENDED RELEASE ORAL DAILY
Status: DISCONTINUED | OUTPATIENT
Start: 2021-01-03 | End: 2021-01-03

## 2021-01-03 RX ADMIN — RIVAROXABAN 15 MG: 15 TABLET, FILM COATED ORAL at 13:37

## 2021-01-03 RX ADMIN — ASPIRIN 300 MG: 300 SUPPOSITORY RECTAL at 09:11

## 2021-01-03 RX ADMIN — Medication 10 ML: at 22:17

## 2021-01-03 RX ADMIN — LATANOPROST 1 DROP: 50 SOLUTION OPHTHALMIC at 17:33

## 2021-01-03 RX ADMIN — POTASSIUM CHLORIDE 20 MEQ: 14.9 INJECTION, SOLUTION INTRAVENOUS at 00:37

## 2021-01-03 RX ADMIN — Medication 10 ML: at 04:49

## 2021-01-03 RX ADMIN — SODIUM CHLORIDE 5 MG/HR: 900 INJECTION, SOLUTION INTRAVENOUS at 19:28

## 2021-01-03 RX ADMIN — SODIUM CHLORIDE 10 MG/HR: 900 INJECTION, SOLUTION INTRAVENOUS at 12:13

## 2021-01-03 RX ADMIN — SODIUM CHLORIDE 10 MG/HR: 900 INJECTION, SOLUTION INTRAVENOUS at 03:41

## 2021-01-03 RX ADMIN — DORZOLAMIDE HYDROCHLORIDE AND TIMOLOL MALEATE 1 DROP: 20; 5 SOLUTION/ DROPS OPHTHALMIC at 10:06

## 2021-01-03 RX ADMIN — CARVEDILOL 12.5 MG: 12.5 TABLET, FILM COATED ORAL at 13:25

## 2021-01-03 RX ADMIN — DORZOLAMIDE HYDROCHLORIDE AND TIMOLOL MALEATE 1 DROP: 20; 5 SOLUTION/ DROPS OPHTHALMIC at 22:11

## 2021-01-03 RX ADMIN — Medication 10 ML: at 13:38

## 2021-01-03 RX ADMIN — HYDRALAZINE HYDROCHLORIDE 20 MG: 20 INJECTION, SOLUTION INTRAMUSCULAR; INTRAVENOUS at 11:34

## 2021-01-03 RX ADMIN — HEPARIN SODIUM 1050 UNITS: 5000 INJECTION INTRAVENOUS; SUBCUTANEOUS at 04:55

## 2021-01-03 RX ADMIN — ATORVASTATIN CALCIUM 20 MG: 10 TABLET, FILM COATED ORAL at 22:10

## 2021-01-03 RX ADMIN — HEPARIN SODIUM 16 UNITS/KG/HR: 5000 INJECTION, SOLUTION INTRAVENOUS at 11:29

## 2021-01-03 NOTE — PROGRESS NOTES
Bedside shift change report given to Vladimir oconnor RN (oncoming nurse) by myself (offgoing nurse). Report included the following information SBAR, Kardex, MAR, Recent Results and Cardiac Rhythm A.fib.

## 2021-01-03 NOTE — PROGRESS NOTES
Bedside and Verbal shift change report given to self (oncoming nurse) by German Stern (offgoing nurse). Report included the following information SBAR, Kardex and Intake/Output.

## 2021-01-03 NOTE — PROGRESS NOTES
01/03/21 0730   NIH Stroke Scale   Interval Other (comment)  (shift change)   LOC 0   LOC Questions 2   LOC Commands 0   Best Gaze 1   Visual 2  (unable to assess)   Facial Palsy 0   Motor Right Arm 3   Motor Left Arm 0   Motor Right Leg 2   Motor Left Leg 0   Limb Ataxia 1   Sensory 1   Best Language 3   Dysarthria 2   Extinction and Inattention 1   Total 18

## 2021-01-03 NOTE — PROGRESS NOTES
Bedside shift change report given to myself (oncoming nurse) by Lita Og RN (offgoing nurse). Report included the following information SBAR, Kardex, Intake/Output, MAR, Recent Results and Cardiac Rhythm A.fib. Heparin gtt @ 14 units/kg/hr and rate verified.

## 2021-01-03 NOTE — PROGRESS NOTES
[employer]ia [employer]:    \"Dr. Greenberg,    K: 3.1, previousy: 2.5. She is currently in a.fib, rate controlled. NPO due to her stroke. Would you like me to replace?\"    -Orders received to give Potassium 20meQ IV.

## 2021-01-03 NOTE — PROGRESS NOTES
Hospitalist Note     Admit Date:  2020 10:23 AM   Name:  Carly Chambers   Age:  80 y.o.  :  10/23/1931   MRN:  [id number]   PCP:  Margot Alva DO  Treatment Team: Attending Provider: Ja Roy MD; Care Manager: Erwin Call, ARTEMIO    HPI/Subjective:   Pt is poorly responsive so history is per daughter at bedside. Pt is an 81 y/o F with HTN, HLD intolerant to statins, hx CVA, afib on xarelto, who presented to ER with R sided weakness and unresponsiveness. Daughter said pt was in usual state of health this morning until around 730am.  At that point she noticed pt had stopped responding, was having trouble moving her R side. EMS was called. She had code S and CTA showed M3 occlusion but too distal to intervene mechanically. She is compliant with meds including xarelto so no TPA. Troponin came back >23k. Daughter did report pt having CP radiating up her neck often, off and on the past few days, significantly bothersome enough for her to complain multiple times a day. Daughter does not think she has had any other symptoms. 1/3 - taking pureed now per SLP which is a big improvement. MBS tomorrow. Attempts to respond but has severe aphasia, only garbled sound comes out.       Unable to obtain ROS due to condition    Objective:     Patient Vitals for the past 24 hrs:   Temp Pulse Resp BP SpO2   21 1325 -- (!) 119 -- (!) 156/67 --   21 1134 -- 99 -- (!) 166/90 --   21 1123 98.5 °F (36.9 °C) 98 16 (!) 168/80 98 %   21 0246 97.5 °F (36.4 °C) 93 16 (!) 160/74 96 %   21 2248 99.3 °F (37.4 °C) 91 15 (!) 159/74 94 %   21 1935 97.4 °F (36.3 °C) 93 14 (!) 159/73 93 %   21 1705 -- 81 -- (!) 115/59 --   21 1605 -- 79 -- 135/63 --   21 1555 98.9 °F (37.2 °C) 87 16 (!) 127/58 98 %     Oxygen Therapy  O2 Sat (%): 98 % (21 1123)  Pulse via Oximetry: 85 beats per minute (20 2319)  O2 Device: Room air (21 1130)    Estimated body mass index is 23.23 kg/m² as calculated from the following:    Height as of an earlier encounter on 12/31/20: 4' 11\" (1.499 m). Weight as of this encounter: 52.2 kg (115 lb). Intake/Output Summary (Last 24 hours) at 1/3/2021 1352  Last data filed at 1/3/2021 1243  Gross per 24 hour   Intake 0 ml   Output 950 ml   Net -950 ml       Physical Exam:  General:    no overt distress. Ill appearing. awake  Eyes:   leftward gaze  CV:   Irregular  No m/r/g. No edema  Lungs:  CTAB anterior. No wheezing, rhonchi, or rales. Unlabored  Abdomen: Soft, nondistended. Extremities: Warm and dry. No cyanosis or clubbing  Neurologic: Asphasic. R hemiplegia. Skin:     No rashes. I reviewed the labs, imaging, EKGs, telemetry, and other studies done this admission.   Data Review:   Recent Results (from the past 24 hour(s))   PLEASE READ & DOCUMENT PPD TEST IN 48 HRS    Collection Time: 01/02/21  5:11 PM   Result Value Ref Range    PPD Negative Negative    mm Induration 0 0 - 5 mm   METABOLIC PANEL, BASIC    Collection Time: 01/02/21  9:07 PM   Result Value Ref Range    Sodium 143 136 - 145 mmol/L    Potassium 3.1 (L) 3.5 - 5.1 mmol/L    Chloride 111 (H) 98 - 107 mmol/L    CO2 24 21 - 32 mmol/L    Anion gap 8 7 - 16 mmol/L    Glucose 103 (H) 65 - 100 mg/dL    BUN 19 8 - 23 MG/DL    Creatinine 0.47 (L) 0.6 - 1.0 MG/DL    GFR est AA >60 >60 ml/min/1.73m2    GFR est non-AA >60 >60 ml/min/1.73m2    Calcium 8.4 8.3 - 10.4 MG/DL   HEPARIN XA UFH    Collection Time: 01/03/21  3:44 AM   Result Value Ref Range    Heparin Xa UFH 0.27 (L) 0.3 - 0.7 IU/mL   METABOLIC PANEL, BASIC    Collection Time: 01/03/21  3:44 AM   Result Value Ref Range    Sodium 142 136 - 145 mmol/L    Potassium 3.2 (L) 3.5 - 5.1 mmol/L    Chloride 111 (H) 98 - 107 mmol/L    CO2 23 21 - 32 mmol/L    Anion gap 8 7 - 16 mmol/L    Glucose 95 65 - 100 mg/dL    BUN 17 8 - 23 MG/DL    Creatinine 0.48 (L) 0.6 - 1.0 MG/DL    GFR est AA >60 >60 ml/min/1.73m2 GFR est non-AA >60 >60 ml/min/1.73m2    Calcium 8.5 8.3 - 10.4 MG/DL   MAGNESIUM    Collection Time: 01/03/21  3:44 AM   Result Value Ref Range    Magnesium 1.8 1.8 - 2.4 mg/dL   CBC W/O DIFF    Collection Time: 01/03/21  3:44 AM   Result Value Ref Range    WBC 5.4 4.3 - 11.1 K/uL    RBC 3.97 (L) 4.05 - 5.2 M/uL    HGB 11.6 (L) 11.7 - 15.4 g/dL    HCT 34.7 (L) 35.8 - 46.3 %    MCV 87.4 79.6 - 97.8 FL    MCH 29.2 26.1 - 32.9 PG    MCHC 33.4 31.4 - 35.0 g/dL    RDW 13.4 11.9 - 14.6 %    PLATELET 468 088 - 682 K/uL    MPV 9.9 9.4 - 12.3 FL    ABSOLUTE NRBC 0.00 0.0 - 0.2 K/uL   HEPARIN XA UFH    Collection Time: 01/03/21 12:29 PM   Result Value Ref Range    Heparin Xa UFH 0.51 0.3 - 0.7 IU/mL       All Micro Results     None          Medications Administered     heparin (porcine) injection 3,100 Units     Admin Date  12/31/2020 Action  Given Dose  3,100 Units Route  IntraVENous Administered By  Chi Deng          iopamidoL (ISOVUE-370) 76 % injection 100 mL     Admin Date  12/31/2020 Action  Given Dose  100 mL Route  IntraVENous Administered By  [email or web address]          levETIRAcetam (KEPPRA) 1,000 mg in 0.9% sodium chloride 100 mL IVPB     Admin Date  12/31/2020 Action  New Bag Dose  1,000 mg Route  IntraVENous Administered By  Chi CARDENAS          saline peripheral flush soln 10 mL     Admin Date  12/31/2020 Action  Given Dose  10 mL Route  InterCATHeter Administered By  [employer]          sodium chloride 0.9 % bolus infusion 100 mL     Admin Date  12/31/2020 Action  New Bag Dose  100 mL Route  IntraVENous Administered By  [employer]                Other Studies:  No results found.       Assessment and Plan:     Hospital Problems as of 1/3/2021 Date Reviewed: 12/30/2020          Codes Class Noted - Resolved POA    Systolic CHF, acute ([facility] ICD-10-CM: I50.21  ICD-9-CM: 428.21, 428.0  1/2/2021 - Present Yes        * (Principal) Acute ischemic stroke ([facility] ICD-10-CM: I63.9  ICD-9-CM: 434.91  12/31/2020 - Present Yes        NSTEMI (non-ST elevated myocardial infarction) ([facility] ICD-10-CM: I21.4  ICD-9-CM: 410.70  12/31/2020 - Present Yes        Atrial fibrillation with RVR (HCC) ICD-10-CM: I48.91  ICD-9-CM: 427.31  12/31/2020 - Present Yes        Primary osteoarthritis involving multiple joints (Chronic) ICD-10-CM: M89.49  ICD-9-CM: 715.98  7/21/2017 - Present Yes        Chronic atrial fibrillation (HCC) (Chronic) ICD-10-CM: I48.20  ICD-9-CM: 427.31  7/20/2017 - Present Yes        HTN (hypertension), benign (Chronic) ICD-10-CM: I10  ICD-9-CM: 401.1  7/20/2017 - Present Yes        Hyperlipidemia, mixed (Chronic) ICD-10-CM: E78.2  ICD-9-CM: 272.2  7/20/2017 - Present Yes        Gastroesophageal reflux disease with esophagitis (Chronic) ICD-10-CM: K21.00  ICD-9-CM: 530.11  7/20/2017 - Present Yes        Hypothyroidism, adult (Chronic) ICD-10-CM: E03.9  ICD-9-CM: 244.9  7/20/2017 - Present Yes        History of CVA (cerebrovascular accident) (Chronic) ICD-10-CM: Z86.73  ICD-9-CM: V12.54  7/20/2017 - Present Yes    Overview Signed 7/20/2017 11:58 AM by Kristal Arnold DO     2014             Age related osteoporosis (Chronic) ICD-10-CM: M81.0  ICD-9-CM: 733.01  7/20/2017 - Present Yes              Plan:  CVA M3 occlusion  -repeat CT head no hemorrhagic conversion  -ASA  -try statin, intolerance in past but will do lower dose. -PT/OT  -xarelto for afib    NSTEMI, new sCHF  -probably had NSTEMI. CP with neck radiation for a few days PTA. ST depression in lateral leads on EKG in ER. trops >23k. Echo showing EF 40-45%, severe hypokinesis of the basal-mid anteroseptal, basal-mid inferoseptal, and mid inferior wall.   -xarelto  -ASA, statin  -coreg  -losartan    Afib RVR  -resume xarelto  -started coreg  -cardizem gtt; try to wean off    Code status:    -full code. Daughter may be leaning towards DNR. Will need to follow up on this regularly.     FEN  -LR IVF supplementation until PO up to speed    High risk of decompensation    Signed:  Colten Garcia MD

## 2021-01-03 NOTE — PROGRESS NOTES
CM received consult to assist with discharge planning. CM met with patient and patient's daughter Pete Corral [phone] at bedside, to discuss discharge planning. Daughter stated she is unsure if she would like the patient to proceed with STR, due to no visitor policy. [facility] , if patient/family are not agreeable to STR. However, daughter asked CM if insurance will cover 24/7 care. CM informed daughter, private caregivers/sitters are a private service, that is not covered by the insurance. Daughter was receptive to this information and would like to confirm discharge plan with family, before confirming STR placement as disposition. Please consult or notify CM if any needs shall arise. CM continues to monitor plan of care.

## 2021-01-03 NOTE — PROGRESS NOTES
01/02/21 1920   NIH Stroke Scale   Interval Other (comment)  (shift change)   LOC 0   LOC Questions 2   LOC Commands 0   Best Gaze 1   Visual 2  (unable to assess)   Facial Palsy 0   Motor Right Arm 3   Motor Left Arm 0   Motor Right Leg 2   Motor Left Leg 0   Limb Ataxia 1   Sensory 1   Best Language 3   Dysarthria 2   Extinction and Inattention 1   Total 18

## 2021-01-03 NOTE — PROGRESS NOTES
Problem: Dysphagia (Adult)  Goal: *Acute Goals and Plan of Care (Insert Text)  Description: STG: Pt will tolerate po trials of various consistencies with no overt s/sx of airway compromise with 100% accuracy. STG: Pt will participate in MBS, if/when deemed appropriate, with 100% compliance. STG: Pt will participate in speech/language/cognition evaluation with 100% compliance. LTG: Pt will tolerate safest/least restrictive diet with no s/sx of airway compromise. LTG: Pt will communicate effectively/efficiently within direct environment with family/caregivers. Outcome: Progressing Towards Goal   SPEECH LANGUAGE PATHOLOGY: DYSPHAGIA- Daily Note 1    NAME/AGE/GENDER: Nish Carrasco is a 80 y.o. female  DATE: 1/3/2021  PRIMARY DIAGNOSIS: Acute ischemic stroke ([facility] [I63.9]      ICD-10: Treatment Diagnosis: R13.12 Dysphagia, Oropharyngeal Phase    RECOMMENDATIONS   DIET:    PO:  Pureed   Liquids:  regular thin    MEDICATIONS: Crushed in puree     ASPIRATION PRECAUTIONS  · Slow rate of intake  · Small bites/sips  · Upright at 90 degrees during meal  · fullu awake and alert   · Alternate bites/drinks  · Check mouth for pocketing  · 1;1 assist with meals     COMPENSATORY STRATEGIES/MODIFICATIONS  · Small sips and bites  · No straws  · Pt present by cup when able       RECOMMENDATIONS for CONTINUED SPEECH THERAPY:   YES: Anticipate need for ongoing speech therapy during this hospitalization and at next level of care. ASSESSMENT   Patient seen for PO trials. Pt presents with right facial droop with noted spillage and pocketing, however, improved since yesterday. Anterior spillage of thin liquid via cup from right side. Pt with no overt sign of airway compromise as evidenced by timely swallows observed and no cough response elicited with presented trials. Pt actively accepting po trials of thin liquids and pureed solids from SLP. Recommend pureed textures with thin liquids. Medications crushed in pureed.  SLP team will follow for ongoing swallow assessment and diet tolerance. Recommend a Modified barium swallow study to rule out aspiration and fully assess pharyngeal swallow. CONTINUATION OF SKILLED SERVICES/MEDICAL NECESSITY:   Patient continues to require skilled intervention due to dysphagia. EDUCATION:  · Recommendations discussed with Patient, Family, MD and RN  REHABILITATION POTENTIAL FOR STATED GOALS: Fair    PLAN    FREQUENCY/DURATION: Continue to follow patient 3 times a week for duration of hospital stay to address above goals. Speech therapy to follow up for assessment of cognitive-linguistic function.     - Recommendations for next treatment session: Next treatment will address MBS    SUBJECTIVE   Pt sleeping and woke to verbal stim. Pt only attending to left side of bed. Oxygen Device: nasal canula  Pain: Pain Scale 1: Visual  Pain Intensity 1: 0    History of Present Injury/Illness: Ms. Ani Paris  has a past medical history of Age related osteoporosis (2017), Chronic atrial fibrillation ([facility] (2017), Chronic pain, DDD (degenerative disc disease), lumbar (2017), Gastroesophageal reflux disease with esophagitis (2017), Glaucoma (2017), History of CVA (cerebrovascular accident) (2017), HTN (hypertension), benign (2017), Hyperlipidemia, mixed (2017), Hypertension, Hypothyroidism, adult (2017), IFG (impaired fasting glucose) (2017), Primary osteoarthritis involving multiple joints (2017), and PUD (peptic ulcer disease). . She also  has a past surgical history that includes hx colonoscopy; hx hermes and bso; and hx  section. PRECAUTIONS/ALLERGIES: Actonel [risedronate], Atorvastatin, Fosamax [alendronate], Ketoprofen, and Moexipril     Problem List:  (Impairments causing functional limitations):  1. Oropharyngeal dysphagia  2.  Aphasia  3. dysarthria    Previous Dysphagia: NONE REPORTED  Diet Prior to Evaluation: NPO    Orientation:  Unable to assess, pt intermittently answering yes/no questions, unsure of reliability    Cognitive-Linguistic Screening:   Speech Production:   o impaired   Expressive Language:  o impaired   Receptive Language:  o impaired   Cognition:   o impaired  Prior Level of Function: assisted by family  Recommendations: Given results of screening, further evaluation is indicated to assess speech/language/cognition. OBJECTIVE   Oral Motor:   · Labial: Decreased seal, Impaired coordination, and Right droop  · Dentition: Intact, Natural, and some missing teeth  · Oral Hygiene: Adequate  · Lingual: Decreased rate, Decreased strength, and Impaired coordination    Swallow evaluation:   Patient consumed trials of thin liquids by spoon and cup with no overt signs/sx of aspiration. Pt unable to draw from straw. Anterior spillage from right side of mouth. Pt presented cup to self with guidances. Pt tolerated pureed with no overt signs/sx of aspiration. Pt with mild oral residue/right side pocketing. Cleared with liquid rinse. Chewable trials not assessed. Will follow for dysphagia management and receptive/expressive aphasia. Recommend pureed/thin at this time. To will need 1;1 assistance with all meals. Pt to be fully awake and upright with meals. Small bites/drinks  Alernate food and liquids. Check mouth at end of meal.    Recommend a Modified barium swallow study to rule out aspiration and fully assess pharyngeal swallow.     Tool Used: Dysphagia Outcome and Severity Scale (DINH)    Score Comments   Normal Diet  [] 7 With no strategies or extra time needed   Functional Swallow  [] 6 May have mild oral or pharyngeal delay   Mild Dysphagia  [] 5 Which may require one diet consistency restricted    Mild-Moderate Dysphagia  [] 4 With 1-2 diet consistencies restricted   Moderate Dysphagia  [] 3 With 2 or more diet consistencies restricted   Moderate-Severe Dysphagia  [x] 2 With partial PO strategies (trials with ST only)   Severe Dysphagia  [] 1 With inability to tolerate any PO safely      Score:  Initial: 2 Most Recent: 3 (Date 01/03/21 )   Interpretation of Tool: The Dysphagia Outcome and Severity Scale (DINH) is a simple, easy-to-use, 7-point scale developed to systematically rate the functional severity of dysphagia based on objective assessment and make recommendations for diet level, independence level, and type of nutrition. Current Medications:   No current facility-administered medications on file prior to encounter. Current Outpatient Medications on File Prior to Encounter   Medication Sig Dispense Refill    Xarelto 20 mg tab tablet TAKE 1 TABLET BY MOUTH ONCE DAILY WITH SUPPER 30 Tab 5    metoprolol succinate (TOPROL-XL) 100 mg tablet Take 1 tablet by mouth once daily 90 Tab 1    benazepriL (LOTENSIN) 40 mg tablet Take 1 tablet by mouth once daily 90 Tab 1    levothyroxine (SYNTHROID) 75 mcg tablet TAKE 1 TABLET BY MOUTH ONCE DAILY BEFORE BREAKFAST 90 Tab 1    amLODIPine (NORVASC) 2.5 mg tablet Take 1 tablet by mouth once daily 90 Tab 1    omeprazole (PRILOSEC) 40 mg capsule Take 1 Cap by mouth daily. 30 Cap 5    Lumigan 0.01 % ophthalmic drops INSTILL 1 DROP IN BOTH EYES AT BEDTIME 2.5 mL 5    cholecalciferol, vitamin D3, (VITAMIN D3) 2,000 unit tab Take  by mouth.  dorzolamide-timolol (COSOPT) 22.3-6.8 mg/mL ophthalmic solution PUT 1 DROP INTO BOTH EYES TWICE A DAY 10 mL 11    calcium-cholecalciferol, d3, (CALCIUM 600 + D) 600-125 mg-unit tab Take  by mouth.           INTERDISCIPLINARY COLLABORATION: RN    After treatment position/precautions:  · Upright in bed  · family at bedside  · RN and MD notified    Total Treatment Duration:   Time In: 1100  Time Out: 1650 [facility] MA/CCC/SLP

## 2021-01-03 NOTE — PROGRESS NOTES
Via Perfect serve:    \"Dr. Travis Savage,     Ms. Hieu Frank on CVSD in 2226 has Lumigan eye gtts that she takes at home. Her daughter is wondering if we can restart tonight? She has other eye gtts ordered for glaucoma. \"    -Orders received to restart eye drops. Per Rupert Arnel is substituted with Xalatan eye drops.

## 2021-01-04 ENCOUNTER — APPOINTMENT (OUTPATIENT)
Dept: GENERAL RADIOLOGY | Age: 86
DRG: 064 | End: 2021-01-04
Attending: INTERNAL MEDICINE
Payer: MEDICARE

## 2021-01-04 ENCOUNTER — APPOINTMENT (OUTPATIENT)
Dept: CT IMAGING | Age: 86
DRG: 064 | End: 2021-01-04
Attending: INTERNAL MEDICINE
Payer: MEDICARE

## 2021-01-04 LAB
ERYTHROCYTE [DISTWIDTH] IN BLOOD BY AUTOMATED COUNT: 13.4 % (ref 11.9–14.6)
HCT VFR BLD AUTO: 33.7 % (ref 35.8–46.3)
HGB BLD-MCNC: 11 G/DL (ref 11.7–15.4)
MCH RBC QN AUTO: 29 PG (ref 26.1–32.9)
MCHC RBC AUTO-ENTMCNC: 32.6 G/DL (ref 31.4–35)
MCV RBC AUTO: 88.9 FL (ref 79.6–97.8)
NRBC # BLD: 0 K/UL (ref 0–0.2)
PLATELET # BLD AUTO: 197 K/UL (ref 150–450)
PMV BLD AUTO: 9.7 FL (ref 9.4–12.3)
RBC # BLD AUTO: 3.79 M/UL (ref 4.05–5.2)
WBC # BLD AUTO: 6.4 K/UL (ref 4.3–11.1)

## 2021-01-04 PROCEDURE — 97110 THERAPEUTIC EXERCISES: CPT

## 2021-01-04 PROCEDURE — 74011250637 HC RX REV CODE- 250/637: Performed by: INTERNAL MEDICINE

## 2021-01-04 PROCEDURE — 74011000255 HC RX REV CODE- 255: Performed by: INTERNAL MEDICINE

## 2021-01-04 PROCEDURE — 92611 MOTION FLUOROSCOPY/SWALLOW: CPT

## 2021-01-04 PROCEDURE — 99232 SBSQ HOSP IP/OBS MODERATE 35: CPT | Performed by: NURSE PRACTITIONER

## 2021-01-04 PROCEDURE — 85027 COMPLETE CBC AUTOMATED: CPT

## 2021-01-04 PROCEDURE — 97112 NEUROMUSCULAR REEDUCATION: CPT

## 2021-01-04 PROCEDURE — 65660000000 HC RM CCU STEPDOWN

## 2021-01-04 PROCEDURE — 36415 COLL VENOUS BLD VENIPUNCTURE: CPT

## 2021-01-04 PROCEDURE — 74230 X-RAY XM SWLNG FUNCJ C+: CPT

## 2021-01-04 PROCEDURE — 2709999900 HC NON-CHARGEABLE SUPPLY

## 2021-01-04 PROCEDURE — 70450 CT HEAD/BRAIN W/O DYE: CPT

## 2021-01-04 PROCEDURE — 74011250636 HC RX REV CODE- 250/636: Performed by: INTERNAL MEDICINE

## 2021-01-04 RX ORDER — LOSARTAN POTASSIUM 50 MG/1
50 TABLET ORAL DAILY
Status: DISCONTINUED | OUTPATIENT
Start: 2021-01-05 | End: 2021-01-04

## 2021-01-04 RX ORDER — LOSARTAN POTASSIUM 25 MG/1
25 TABLET ORAL DAILY
Status: DISCONTINUED | OUTPATIENT
Start: 2021-01-05 | End: 2021-01-07 | Stop reason: HOSPADM

## 2021-01-04 RX ORDER — CARVEDILOL 25 MG/1
25 TABLET ORAL 2 TIMES DAILY WITH MEALS
Status: DISCONTINUED | OUTPATIENT
Start: 2021-01-04 | End: 2021-01-07 | Stop reason: HOSPADM

## 2021-01-04 RX ORDER — DILTIAZEM HYDROCHLORIDE 30 MG/1
30 TABLET, FILM COATED ORAL
Status: DISCONTINUED | OUTPATIENT
Start: 2021-01-04 | End: 2021-01-07 | Stop reason: HOSPADM

## 2021-01-04 RX ADMIN — LEVOTHYROXINE SODIUM 75 MCG: 0.07 TABLET ORAL at 08:02

## 2021-01-04 RX ADMIN — Medication 1 TABLET: at 10:54

## 2021-01-04 RX ADMIN — Medication 10 ML: at 14:06

## 2021-01-04 RX ADMIN — PANTOPRAZOLE SODIUM 40 MG: 40 TABLET, DELAYED RELEASE ORAL at 08:02

## 2021-01-04 RX ADMIN — BARIUM SULFATE 15 ML: 400 SUSPENSION ORAL at 09:45

## 2021-01-04 RX ADMIN — CARVEDILOL 25 MG: 25 TABLET, FILM COATED ORAL at 16:49

## 2021-01-04 RX ADMIN — LOSARTAN POTASSIUM 25 MG: 25 TABLET, FILM COATED ORAL at 08:02

## 2021-01-04 RX ADMIN — CARVEDILOL 12.5 MG: 12.5 TABLET, FILM COATED ORAL at 08:02

## 2021-01-04 RX ADMIN — DORZOLAMIDE HYDROCHLORIDE AND TIMOLOL MALEATE 1 DROP: 20; 5 SOLUTION/ DROPS OPHTHALMIC at 10:11

## 2021-01-04 RX ADMIN — ASPIRIN 81 MG: 81 TABLET, CHEWABLE ORAL at 08:03

## 2021-01-04 RX ADMIN — DORZOLAMIDE HYDROCHLORIDE AND TIMOLOL MALEATE 1 DROP: 20; 5 SOLUTION/ DROPS OPHTHALMIC at 21:08

## 2021-01-04 RX ADMIN — BARIUM SULFATE 15 ML: 400 SUSPENSION ORAL at 09:46

## 2021-01-04 RX ADMIN — LATANOPROST 1 DROP: 50 SOLUTION OPHTHALMIC at 17:58

## 2021-01-04 RX ADMIN — SODIUM CHLORIDE, SODIUM LACTATE, POTASSIUM CHLORIDE, AND CALCIUM CHLORIDE 50 ML/HR: 600; 310; 30; 20 INJECTION, SOLUTION INTRAVENOUS at 10:08

## 2021-01-04 RX ADMIN — BARIUM SULFATE 15 ML: 400 PASTE ORAL at 09:46

## 2021-01-04 RX ADMIN — BARIUM SULFATE 15 ML: 980 POWDER, FOR SUSPENSION ORAL at 09:45

## 2021-01-04 RX ADMIN — Medication 10 ML: at 06:02

## 2021-01-04 RX ADMIN — RIVAROXABAN 15 MG: 15 TABLET, FILM COATED ORAL at 08:02

## 2021-01-04 RX ADMIN — ATORVASTATIN CALCIUM 20 MG: 10 TABLET, FILM COATED ORAL at 21:09

## 2021-01-04 RX ADMIN — Medication 10 ML: at 21:09

## 2021-01-04 NOTE — PROGRESS NOTES
SPEECH PATHOLOGY NOTE:  Modified Barium Swallow study complete. Patient exhibited intermittent deep nonclearing laryngeal penetration of thin and nectar liquids. Marked oral dysphagia with all consistencies making spoon presentation of honey-thick liquids the easiest for the most oral containment. Trials of mixed and solid deferred due to oral dysphagia. Recommend pureed textures and liquids thickened to honey presented by spoon. Crush oral meds. Full report to follow.   Magdi Flood MA, CCC-SLP

## 2021-01-04 NOTE — ROUTINE PROCESS
Bedside shift report received from [place], [facility].

## 2021-01-04 NOTE — PROGRESS NOTES
01/03/21 1929   NIH Stroke Scale   Interval Other (comment)  (shift change)   LOC 0   LOC Questions 2   LOC Commands 0   Best Gaze 1   Visual 2   Facial Palsy 0   Motor Right Arm 3   Motor Left Arm 0   Motor Right Leg 2   Motor Left Leg 0   Limb Ataxia 1   Sensory 1   Best Language 3   Dysarthria 2   Extinction and Inattention 1   Total 18

## 2021-01-04 NOTE — PROGRESS NOTES
Verbal bedside report given to oncoming RN, Dragan Raya. Patient's situation, background, assessment and recommendations provided. Opportunity for questions provided. Oncoming RN assumed care of patient.

## 2021-01-04 NOTE — PROGRESS NOTES
This CM met with pt and daughter this day to discuss discharge planning. Pt and family have decided they would like to proceed with pt going to SNF for STR. Pt and daughter selected [facility] and [facility] to have referrals sent - encouraged them to pick 1-2 more facilities just in case first 2 do not have availability. Referrals made this day. COVID test complete, PPD placed and read. Will continue to follow and update as needed.

## 2021-01-04 NOTE — PROGRESS NOTES
ACUTE PHYSICAL THERAPY GOALS:  (Developed with and agreed upon by patient and/or caregiver. )  LTG:  (1.)Ms. Joan Wilson will move from supine to sit and sit to supine , scoot up and down and roll side to side in bed with MINIMAL ASSISTANCE within 7 treatment day(s). (2.)Ms. Joan Wilson will transfer from bed to chair and chair to bed with MODERATE ASSIST using the least restrictive device within 7 treatment day(s). (3.)Ms. Joan Wilson will ambulate with MODERATE ASSIST for 25 feet with the least restrictive device within 7 treatment day(s). (4.)Ms. Joan Wilson will maintain static/dynamic sitting x 15 minutes with STAND BY ASSIST for improved balance within 7 treatment days. (5.)Ms. Joan Wilson will participate in therapeutic activity/exercises x 25 for increased strength within 7 treatment days. ________________________________________________________________________________________________      PHYSICAL THERAPY ASSESSMENT: Daily Note and AM PT Treatment Day # 2      Yaneth Koch is a 80 y.o. female   PRIMARY DIAGNOSIS: Acute ischemic stroke ([facility] [address]  Acute ischemic stroke ([facility] [address] [I63.9]       Reason for Referral:    ICD-10: Treatment Diagnosis: Generalized Muscle Weakness (M62.81)  Difficulty in walking, Not elsewhere classified (R26.2)  [place] / Product Type: Medicare /     ASSESSMENT:     REHAB RECOMMENDATIONS:   Recommendation to date pending progress:  Setting:   Short-term Rehab  Equipment:    To Be Determined     PRIOR LEVEL OF FUNCTION:  (Prior to Hospitalization) INITIAL/CURRENT LEVEL OF FUNCTION:  (Most Recently Demonstrated)   Bed Mobility:   Independent  Sit to Stand:   Independent  Transfers:   Independent  Gait/Mobility:   Independent Bed Mobility:   Total Assistance x 2  Sit to Stand:   Maximal Assistance x 2  Transfers:   Maximal Assistance x 2  Gait/Mobility:   Unable to perform     ASSESSMENT:   Ms. Joan Wilson presents supine in bed.   Bed mobility is max assist.  Scooting to the edge of the bed with total assist.  Sitting balance fair after set up. Patient is found to be soiled and transferred to the chair, stood to be cleaned total assist.  Returned back to the bed with max assist.  Rolling to don brief in which the patient did assist.  Patient is left supine in bed with alarm intact. RN at bedside. She was unable to follow any commands on R side today and demonstrated heavy R side neglect. Pt non-verbal throughout but moaned with mobility. She leaned to her right in sitting and demonstrated poor standing balance. Ms. Melissa Lomeli could benefit from skilled PT as she is currently functioning below her baseline.   Rehab would be beneficial at d/c.         SUBJECTIVE:   Ms. Melissa Lomeli states, Nonverbal    SOCIAL HISTORY/LIVING ENVIRONMENT: RN reported independent PTA     OBJECTIVE:     PAIN: VITAL SIGNS: LINES/DRAINS:   Pre Treatment: Pain Screen  Pain Scale 1: Adult Nonverbal Pain Scale  Post Treatment: 0   IV  O2 Device: Room air     GROSS EVALUATION:  B LEs Within Functional Limits Abnormal/ Functional Abnormal/ Non-Functional (see comments) Not Tested Comments:   AROM [] [x] [] []    PROM [] [] [] []    Strength [] [] [x] [] R LE spontaneous movement observed   Balance [] [] [x] [] FAIR-POOR sitting and POOR standing   Posture [] [] [] []    Sensation [] [] [] []    Coordination [] [] [x] []    Tone [] [] [] []    Edema [] [] [] []    Activity Tolerance [] [] [x] [] Unable to stand for long    [] [] [] []      COGNITION/  PERCEPTION: Intact Impaired   (see comments) Comments:   Orientation [] [x] Nonverbal    Vision [] []    Hearing [] []    Command Following [] [x] None on R side   Safety Awareness [] [x] Agitated    [] []      MOBILITY: I Mod I S SBA CGA Min Mod Max Total  NT x2 Comments:   Bed Mobility    Rolling [] [] [] [] [] [] [] [x] [] [] []    Supine to Sit [] [] [] [] [] [] [] [x] [x] [] []    Scooting [] [] [] [] [] [] [] [x] [] [] []    Sit to Supine [] [] [] [] [] [] [] [x] [] [] []    Transfers    Sit to Stand [] [] [] [] [] [] [] [x] [] [] []    Bed to Chair [] [] [] [] [] [] [] [x] [] [] []    Stand to Sit [] [] [] [] [] [] [] [x] [] [] []    I=Independent, Mod I=Modified Independent, S=Supervision, SBA=Standby Assistance, CGA=Contact Guard Assistance,   Min=Minimal Assistance, Mod=Moderate Assistance, Max=Maximal Assistance, Total=Total Assistance, NT=Not Tested  GAIT: I Mod I S SBA CGA Min Mod Max Total  NT x2 Comments:   Level of Assistance [] [] [] [] [] [] [] [] [] [x] []    Distance NT    DME N/A    Gait Quality nT    I=Independent, Mod I=Modified Independent, S=Supervision, SBA=Standby Assistance, CGA=Contact Guard Assistance,   Min=Minimal Assistance, Mod=Moderate Assistance, Max=Maximal Assistance, Total=Total Assistance, NT=Not Tested    [facility] [employer] [facility] Form       How much difficulty does the patient currently have. .. Unable A Lot A Little None   1. Turning over in bed (including adjusting bedclothes, sheets and blankets)? [x] 1   [] 2   [] 3   [] 4   2. Sitting down on and standing up from a chair with arms ( e.g., wheelchair, bedside commode, etc.)   [x] 1   [] 2   [] 3   [] 4   3. Moving from lying on back to sitting on the side of the bed? [x] 1   [] 2   [] 3   [] 4   How much help from another person does the patient currently need. .. Total A Lot A Little None   4. Moving to and from a bed to a chair (including a wheelchair)? [x] 1   [] 2   [] 3   [] 4   5. Need to walk in hospital room? [x] 1   [] 2   [] 3   [] 4   6. Climbing 3-5 steps with a railing? [x] 1   [] 2   [] 3   [] 4   © 2007, Trustees of [facility] [employer] [employer], under license to [employer]. All rights reserved     Score:  Initial: 6 Most Recent: X (Date: -- )    Interpretation of Tool:  Represents activities that are increasingly more difficult (i.e. Bed mobility, Transfers, Gait).     PLAN: FREQUENCY/DURATION: PT Plan of Care: 3 times/week for duration of hospital stay or until stated goals are met, whichever comes first.    PROBLEM LIST:   (Skilled intervention is medically necessary to address:)  1. Decreased ADL/Functional Activities  2. Decreased Activity Tolerance  3. Decreased AROM/PROM  4. Decreased Balance  5. Decreased Cognition  6. Decreased Coordination  7. Decreased Gait Ability  8. Decreased Strength  9. Decreased Transfer Abilities   INTERVENTIONS PLANNED:   (Benefits and precautions of physical therapy have been discussed with the patient.)  1. Therapeutic Activity  2. Therapeutic Exercise/HEP  3. Neuromuscular Re-education  4. Gait Training  5. Manual Therapy  6. Education     TREATMENT:       TREATMENT:   ($$ Neuromuscular Re-education: 23-37 mins    )  Neuromuscular Re-education (24 Minutes): Neuromuscular Re-education included Balance Training, Coordination training, Postural training, Sitting balance training and Standing balance training to improve Balance, Coordination, Functional Mobility, Postural Control and Proprioception.     AFTER TREATMENT POSITION/PRECAUTIONS:  Alarm Activated, Bed and RN notified    INTERDISCIPLINARY COLLABORATION:  RN/PCT and PT/PTA    TOTAL TREATMENT DURATION:  PT Patient Time In/Time Out  Time In: 1101  Time Out: [address]    Veronica T [facility], PTA

## 2021-01-04 NOTE — PROGRESS NOTES
ACUTE OT GOALS:  (Developed with and agreed upon by patient and/or caregiver.)  1. Patient will follow 50% of one step verbal or visual commands to increase participation during ADL performance. 2. Patient will tolerate 15 minutes static sitting balance unsupported with CGA while completing functional activity. 3. Patient will tolerate 25  minutes of OT treatment with 2-3 rest breaks to increase activity tolerance for ADLs. 4. Patient will complete functional transfers with modA and adaptive equipment as needed. 5. Patient will complete upper body bathing and dressing with mod A and adaptive equipment as needed. 6. Patient will complete self-grooming with mod A and adaptive equipment as needed. 7. Patient will attend to R side of environment with min verbal cues from therapist to increase safety awareness during ADL performance.     Timeframe: 7 visits     OCCUPATIONAL THERAPY: Daily Note OT Treatment Day # 2    Yaneth Koch is a 80 y.o. female   PRIMARY DIAGNOSIS: Acute ischemic stroke ([facility] [address]  Acute ischemic stroke ([facility] [address] [I63.9]       [place] / Product Type: Medicare /   ASSESSMENT:     REHAB RECOMMENDATIONS: CURRENT LEVEL OF FUNCTION:  (Most Recently Demonstrated)   Recommendation to date pending progress:  Setting:   Short-term Rehab  Equipment:    To Be Determined Bathing:   Not tested  Dressing:   Not tested  Feeding/Grooming:   Not tested  Toileting:   Not tested  Functional Mobility:   Not tested     ASSESSMENT:  Ms. Joan iWlson tolerated PROM well today. Pt continue to be flaccid in RUE. Minimal progress made today due to patient being drowsy. Will continue to benefit from skilled OT during stay.      SUBJECTIVE:   Ms. Divina Solitario HISTORY/LIVING ENVIRONMENT:        OBJECTIVE:     PAIN: VITAL SIGNS: LINES/DRAINS:   Pre Treatment: Pain Screen  Pain Scale 1: Visual  Pain Intensity 1: 0  Post Treatment: 0   IV  O2 Device: Room air     ACTIVITIES OF DAILY LIVING: I Mod I S SBA CGA Min Mod Max Total NT Comments   BASIC ADLs:              Bathing/ Showering [] [] [] [] [] [] [] [] [] [x]    Toileting [] [] [] [] [] [] [] [] [] [x]    Dressing [] [] [] [] [] [] [] [] [] [x]    Feeding [] [] [] [] [] [] [] [] [] [x]    Grooming [] [] [] [] [] [] [] [] [] [x]    Personal Device Care [] [] [] [] [] [] [] [] [] [x]    Functional Mobility [] [] [] [] [] [] [] [] [] [x]    I=Independent, Mod I=Modified Independent, S=Supervision, SBA=Standby Assistance, CGA=Contact Guard Assistance,   Min=Minimal Assistance, Mod=Moderate Assistance, Max=Maximal Assistance, Total=Total Assistance, NT=Not Tested    MOBILITY: I Mod I S SBA CGA Min Mod Max Total  NT x2 Comments:   Supine to sit [] [] [] [] [] [] [] [] [] [x] []    Sit to supine [] [] [] [] [] [] [] [] [] [x] []    Sit to stand [] [] [] [] [] [] [] [] [] [x] []    Bed to chair [] [] [] [] [] [] [] [] [] [x] []    I=Independent, Mod I=Modified Independent, S=Supervision, SBA=Standby Assistance, CGA=Contact Guard Assistance,   Min=Minimal Assistance, Mod=Moderate Assistance, Max=Maximal Assistance, Total=Total Assistance, NT=Not Tested    PLAN:   FREQUENCY/DURATION: OT Plan of Care: 3 times/week for duration of hospital stay or until stated goals are met, whichever comes first.    TREATMENT:   TREATMENT:   ( $$ Therapeutic Exercises: 8-22 mins   )  Therapeutic Exercise (15 Minutes): Therapeutic exercises noted below to improve functional AROM, strength and mobility.      PROM Date:  1/4/21 Date:   Date:     Activity/Exercise Parameters Parameters Parameters   Shoulder Flexion 10 reps     Elbow Flexion 10 reps     Shoulder Abd/Adduction 10 reps     Wrist Flex/Extension 10 reps     Pro/Supination 10 reps     Digit Flexion/Extension 10 reps               AFTER TREATMENT POSITION/PRECAUTIONS:  Bed, Needs within reach, RN notified and Visitors at bedside    INTERDISCIPLINARY COLLABORATION:  RN/PCT and OT/GUPTA    TOTAL TREATMENT DURATION:  OT Patient Time In/Time Out  Time In: 1390  Time Out: [address] Marybel Koch

## 2021-01-04 NOTE — PROGRESS NOTES
01/04/21 0727   NIH Stroke Scale   Interval Other (comment)  (shift change)   LOC 0   LOC Questions 2   LOC Commands 0   Best Gaze 1   Visual 2   Facial Palsy 1   Motor Right Arm 3   Motor Left Arm 1   Motor Right Leg 0   Motor Left Leg 0   Limb Ataxia 1   Sensory 1   Best Language 2   Dysarthria 2   Extinction and Inattention 1   Total 17

## 2021-01-04 NOTE — PROGRESS NOTES
Problem: Dysphagia (Adult)  Goal: *Acute Goals and Plan of Care (Insert Text)  Description: STG: Pt will tolerate po trials of various consistencies with no overt s/sx of airway compromise with 100% accuracy. STG: Pt will participate in MBS, if/when deemed appropriate, with 100% compliance. Goal met 1/4/21  STG: Pt will participate in speech/language/cognition evaluation with 100% compliance. STG: Patient will consume pureed diet and honey-thick liquids by spoon with no overt signs or symptoms of airway compromise. Goal added 1/4/21  LTG: Pt will tolerate safest/least restrictive diet with no s/sx of airway compromise. LTG: Pt will communicate effectively/efficiently within direct environment with family/caregivers.   Outcome: Progressing Towards Goal     SPEECH LANGUAGE PATHOLOGY: MODIFIED BARIUM SWALLOW STUDY  Initial Assessment    NAME/AGE/GENDER: Michael Solomon is a 80 y.o. female  DATE: 1/4/2021  PRIMARY DIAGNOSIS: Acute ischemic stroke [facility] [I63.9]      ICD-10: Treatment Diagnosis: Oropharyngeal dysphagia (R13.12)  INTERDISCIPLINARY COLLABORATION: Radiologist, Dr. Simon Bang  PRECAUTIONS/ALLERGIES: Actonel [risedronate], Atorvastatin, Fosamax [alendronate], Ketoprofen, and Moexipril     RECOMMENDATIONS/PLAN   DIET:    PO diet texture:  Pureed   Liquids:  honey, by spoon only    MEDICATIONS: Crushed in puree     COMPENSATORY STRATEGIES/MODIFICATIONS INCLUDING:  · Fully awake/alert  · Upright for all PO  · 1:1 assistance with all PO  · Liquids by spoon  · Remain upright for 20-30 min after any PO  · Slow rate of PO intake  · Check mouth for pocketed PO  · Meticulous oral care  · Suction set-up     OTHER RECOMMENDATIONS (including follow up treatment recommendations):   · Treatment to improve/facilitate oral/pharyngeal skills   · Training in use of compensatory safe swallowing strategies/feeding guidelines  · Patient/family education  · Follow-up MBS as deemed necessary following therapy     RECOMMENDATIONS for CONTINUED SPEECH THERAPY:   YES: Anticipate need for ongoing speech therapy during this hospitalization and at next level of care. FREQUENCY/DURATION: Continue to follow patient 2 times a week for duration of hospital stay to address above goals. Recommendations for next treatment session: Next treatment will address diet tolerance, language assessment       ASSESSMENT   Ms. Maggi Gray presents with incoordinated swallow resulting in intermittent deep nonclearing laryngeal penetration of thin and nectar liquids. Cough response inconsistent. Marked oral dysphagia with all consistencies making spoon presentation of honey-thick liquids the easiest for the most oral containment. Trials of mixed and solid deferred due to oral dysphagia. Recommend pureed textures and liquids thickened to honey presented by spoon. Crush oral meds. Consider dietary consult, as patient likely will not meet nutritional needs by mouth even with modified diet texture due to severity of oral dysphagia. SUBJECTIVE   History of Present Injury/Illness: Ms. Maggi Gray  has a past medical history of Age related osteoporosis (2017), Chronic atrial fibrillation ([address] (2017), Chronic pain, DDD (degenerative disc disease), lumbar (2017), Gastroesophageal reflux disease with esophagitis (2017), Glaucoma (2017), History of CVA (cerebrovascular accident) (2017), HTN (hypertension), benign (2017), Hyperlipidemia, mixed (2017), Hypertension, Hypothyroidism, adult (2017), IFG (impaired fasting glucose) (2017), Primary osteoarthritis involving multiple joints (2017), and PUD (peptic ulcer disease). . She also  has a past surgical history that includes hx colonoscopy; hx hermes and bso; and hx  section.    Pain:  Pain Intensity 1: 0    Current dietary status prior to evaluation today:  Pureed diet and thin liquids initiated yesterday    Previous Modified Barium Swallow studies: none reported    Current Medications:   No current facility-administered medications on file prior to encounter. Current Outpatient Medications on File Prior to Encounter   Medication Sig Dispense Refill    Xarelto 20 mg tab tablet TAKE 1 TABLET BY MOUTH ONCE DAILY WITH SUPPER 30 Tab 5    metoprolol succinate (TOPROL-XL) 100 mg tablet Take 1 tablet by mouth once daily 90 Tab 1    benazepriL (LOTENSIN) 40 mg tablet Take 1 tablet by mouth once daily 90 Tab 1    levothyroxine (SYNTHROID) 75 mcg tablet TAKE 1 TABLET BY MOUTH ONCE DAILY BEFORE BREAKFAST 90 Tab 1    amLODIPine (NORVASC) 2.5 mg tablet Take 1 tablet by mouth once daily 90 Tab 1    omeprazole (PRILOSEC) 40 mg capsule Take 1 Cap by mouth daily. 30 Cap 5    Lumigan 0.01 % ophthalmic drops INSTILL 1 DROP IN BOTH EYES AT BEDTIME 2.5 mL 5    cholecalciferol, vitamin D3, (VITAMIN D3) 2,000 unit tab Take  by mouth.  dorzolamide-timolol (COSOPT) 22.3-6.8 mg/mL ophthalmic solution PUT 1 DROP INTO BOTH EYES TWICE A DAY 10 mL 11    calcium-cholecalciferol, d3, (CALCIUM 600 + D) 600-125 mg-unit tab Take  by mouth. OBJECTIVE   Orientation:    Patient essentially nonverbal and not consistently answering orientation questions. Nodded head yes to name. Oral Assessment:  Labial: Impaired coordination, Right droop and patient did not consistently follow commands for adequate oral assessment  Dentition: Natural and Poor  Oral Hygiene: Dry  Lingual: Impaired coordination and patient did not consistently follow commands for adequate oral assessment    Modified barium swallow study was performed in the radiology suite with Ms. Tan Mcguire in the lateral plane upright 90° in a stretcher and using C-arm.  To evaluate her swallow function, barium coated liquid and food was administered in the form of thin liquids (by spoon, cup sip and unable to draw liquid through straw), nectar-thick liquids (by spoon and cup sip), honey-thick liquids (by spoon and cup sip) and pudding. Oral phase of swallow:    Inadequate labial seal   inability to draw liquid from straw   anterior loss   slow oral transit   reduced posterior propulsion skills    Pharyngeal phase of swallow:    Swallows of thin liquids were incoordinated. There was intermittent and deep laryngeal penetration observed during the swallow from cup presentations that entered the laryngeal vestibule, reached the vocal folds and did not trigger a cough or throat clear response. No aspiration was observed. No pharyngeal residue after swallow.  Swallows of nectar-thick liquids were incoordinated. There was intermittent and deep laryngeal penetration observed during and after the swallow from spoon and cup presentations that entered the laryngeal vestibule, reached the vocal folds and did not trigger a cough or throat clear response. No aspiration was observed. No pharyngeal residue after swallow.  Swallows of honey-thick liquids were incoordinated. No laryngeal penetration or aspiration was observed. No pharyngeal residue after swallow.  Swallows of pudding were incoordinated. No laryngeal penetration or aspiration was observed. No pharyngeal residue after swallow. Pharyngeal characteristics:   adequate retraction of base of tongue   adequate hyolaryngeal elevation/excursion   incoordinated epiglottic inversion   adequate constriction of posterior pharyngeal wall   decreased laryngeal closure  Attempted strategies:    texture modification: honey thick liquids  Effective strategies:    texture modification: honey thick liquids  Aspiration/Penetration Scale: 5 (Deep penetration/visible residue. Contrast contacts the folds and is not ejected.)    Cervical esophageal phase of swallow:    adequate and timely clearance of all boluses through cervical esophagus  **Distal esophagus not assessed due to limitations of MBS study. Assessment/Reassessment only, no treatment provided today.       Tool Used: Dysphagia Outcome and Severity Scale (DINH)    Comments   Normal Diet With no strategies or extra time needed   Functional Swallow May have mild oral or pharyngeal delay   Mild Dysphagia Which may require one diet consistency restricted    Mild-Moderate Dysphagia With 1-2 diet consistencies restricted   Moderate Dysphagia With 2 or more diet consistencies restricted   Moderately Severe Dysphagia With partial PO strategies (trials with ST only)   Severe Dysphagia With inability to tolerate any PO safely      Score:  Initial: 2 Most Recent: 3 (Date:1/4/2021)   Interpretation of Tool: The Dysphagia Outcome and Severity Scale (DINH) is a simple, easy-to-use, 7-point scale developed to systematically rate the functional severity of dysphagia based on objective assessment and make recommendations for diet level, independence level, and type of nutrition. [place] / Product Type: Medicare /     Education:  · Recommendations discussed with patient and called to floor. Safety:   After treatment position/precautions:  · Patient waiting in radiology holding bay for transport back to room.     Total Treatment Duration:  Time In: 0910   Time Out: [place], [place], CCC-SLP

## 2021-01-04 NOTE — PROGRESS NOTES
Neurology Daily Progress Note     Assessment:     L:surinder acute LMCA stroke secondary to A.fib despite being on Xarelto. Left M2 occlusion on CTA. Head CT was repeated on 01/02 and was stable. She was unable to have MRI due to agitation, however this would likely not . She has been restarted on Xarelto and is currently on maximal medical therapy. Plan:   · Continue Xarelto  · Continue statin   · Neurochecks Q4H  · Telemetry  · PT/OT/ST  · DVT prophylaxis   · BP management - normotensive, with long-term goal <140/90  · Smoking cessation if applicable   · Diabetes education if applicable   · Depression Screening prior to discharge        Subjective: Interval history:    Patient awake. Expressive aphasia and right hemiparesis. Had barium swallow today and approved for modified diet. Planning to d/c to SNF when able. Daughter thinks she is making good progress. History:    Paulette Moran is a 80 y.o. female who is being seen for LMCA stroke    Unable to obtain ROS due to aphasia.         Objective:     Vitals:    01/03/21 2331 01/04/21 0300 01/04/21 0705 01/04/21 1128   BP: (!) 165/66 (!) 143/66 (!) 144/77 (!) 152/73   Pulse: 80 (!) 103 99 95   Resp: 18 18 20 20   Temp: 98.6 °F (37 °C) 98.2 °F (36.8 °C) 98.8 °F (37.1 °C) 99.1 °F (37.3 °C)   SpO2: 99% 97% 97% 96%   Weight:  116 lb 6.5 oz (52.8 kg)     Height:              Current Facility-Administered Medications:     [START ON 1/5/2021] losartan (COZAAR) tablet 25 mg, 25 mg, Oral, DAILY, Jud Fernando MD    carvediloL (COREG) tablet 25 mg, 25 mg, Oral, BID WITH MEALS, Jud Fernando MD    dilTIAZem IR (CARDIZEM) tablet 30 mg, 30 mg, Oral, QID PRN, Jud Fernando MD    aspirin chewable tablet 81 mg, 81 mg, Oral, DAILY, Jud Fernando MD, 81 mg at 01/04/21 7795    rivaroxaban (XARELTO) tablet 15 mg, 15 mg, Oral, DAILY, Jud Fernando MD, 15 mg at 01/04/21 0802    atorvastatin (LIPITOR) tablet 20 mg, 20 mg, Oral, QHS, Keyona Christy MD, 20 mg at 01/03/21 2210    lactated Ringers infusion, 25 mL/hr, IntraVENous, CONTINUOUS, Keyona Christy MD, Last Rate: 50 mL/hr at 01/04/21 1008, 50 mL/hr at 01/04/21 1008    latanoprost (XALATAN) 0.005 % ophthalmic solution 1 Drop, 1 Drop, Both Eyes, QPM, Malik Paris MD, 1 Drop at 01/03/21 1733    labetaloL (NORMODYNE;TRANDATE) injection 20 mg, 20 mg, IntraVENous, Q4H PRN, Keyona Christy MD    LORazepam (ATIVAN) injection 0.5 mg, 0.5 mg, IntraVENous, Q2H PRN, Keyona Christy MD, 0.5 mg at 01/01/21 1801    calcium-vitamin D (OS-GIANA +D3) 500 mg-200 unit per tablet 1 Tab, 1 Tab, Oral, DAILY WITH BREAKFAST, Keyona Christy MD, 1 Tab at 01/04/21 1054    dorzolamide-timoloL (COSOPT) 22.3-6.8 mg/mL ophthalmic solution 1 Drop, 1 Drop, Both Eyes, Q12H, Keyona Christy MD, 1 Drop at 01/04/21 1011    levothyroxine (SYNTHROID) tablet 75 mcg, 75 mcg, Oral, ACB, Keyona Christy MD, 75 mcg at 01/04/21 0802    pantoprazole (PROTONIX) tablet 40 mg, 40 mg, Oral, ACB, Keyona Christy MD, 40 mg at 01/04/21 0802    sodium chloride (NS) flush 5-40 mL, 5-40 mL, IntraVENous, Q8H, Keyona Christy MD, 10 mL at 01/04/21 0602    sodium chloride (NS) flush 5-40 mL, 5-40 mL, IntraVENous, PRN, Keyona Christy MD, 10 mL at 12/31/20 2040    ondansetron [facility]) injection 4 mg, 4 mg, IntraVENous, Q4H PRN, Keyona Christy MD    acetaminophen (TYLENOL) tablet 650 mg, 650 mg, Oral, Q4H PRN, Keyona Christy MD    labetaloL (NORMODYNE;TRANDATE) injection 5 mg, 5 mg, IntraVENous, Q10MIN PRN, Keyona Christy MD    polyethylene glycol [facility]) packet 17 g, 17 g, Oral, DAILY PRN, Keyona Christy MD    Recent Results (from the past 12 hour(s))   CBC W/O DIFF    Collection Time: 01/04/21  3:39 AM   Result Value Ref Range    WBC 6.4 4.3 - 11.1 K/uL    RBC 3.79 (L) 4.05 - 5.2 M/uL    HGB 11.0 (L) 11.7 - 15.4 g/dL HCT 33.7 (L) 35.8 - 46.3 %    MCV 88.9 79.6 - 97.8 FL    MCH 29.0 26.1 - 32.9 PG    MCHC 32.6 31.4 - 35.0 g/dL    RDW 13.4 11.9 - 14.6 %    PLATELET 150 957 - 521 K/uL    MPV 9.7 9.4 - 12.3 FL    ABSOLUTE NRBC 0.00 0.0 - 0.2 K/uL     CT Results (most recent):  Results from Hospital Encounter encounter on 12/31/20   CT HEAD WO CONT    Narrative HEAD CT WITHOUT CONTRAST  1/2/2021     HISTORY:   Stroke possible hemorrhagic transformation  stroke; patient was  responsive today    TECHNIQUE: Noncontrast axial images were obtained through the brain. All CT  scans at this facility used dose modulation, interactive reconstruction and/or  weight based dosing when appropriate to reduce radiation dose to as low as  reasonably achievable. COMPARISON: CT head December 31, 2020    FINDINGS: There is an evolving infarction in the left MCA territory with new  loss of the gray-white junction. There is no akira parenchymal hemorrhage or new  mass effect. There may be minimal petechial hemorrhage within this infarct. Mild to moderate diffuse cerebral volume loss is present and there are a few  areas of decreased attenuation in the supratentorial white matter suggestive of  chronic small vessel ischemic disease. There is no hydrocephalus or extra-axial hematoma. Impression IMPRESSION: Evolving left MCA infarction without akira hemorrhagic  transformation. Physical Exam:  General - Well developed, well nourished, in no apparent distress. Pleasant and conversent. HEENT - Normocephalic, atraumatic. Conjunctiva are clear. Neck - Supple without masses  Extremities - Peripheral pulses intact. No edema and no rashes. Neurological examination - Drowsy. Comprehension, attention, memory and reasoning are impaired. The patient has expressive aphasia and some mild receptive aphasia. Motor perseveration. On cranial nerve examination, (II, III, IV, VI) pupils are equal, round, and reactive to light.  Unable to assess visual acuity or visual fields Left gaze deviation. (V, VII) left facial droop  (VIII) Hearing is intact. (IX, X) unable to assess (XI) unable to assess (XII)unable to assess. Motor examination - There is normal muscle tone and bulk. Moves LUE/LE spontaneously and to command, flaccid in RUE/LE. Muscle stretch reflexes are normoactive and there are no pathological reflexes present. Plantar response is flexor. Unable to assess sensation,  Cerebellar examination, or Gait due to AMS.       Signed By: Sherrill Londono NP     January 4, 2021

## 2021-01-04 NOTE — PROGRESS NOTES
Hospitalist Note     Admit Date:  2020 10:23 AM   Name:  Alma Jansen   Age:  80 y.o.  :  10/23/1931   MRN:  [id number]   PCP:  Wilton Bentley DO  Treatment Team: Attending Provider: Iona Lopez MD; Care Manager: Deena Cleary, RN; Primary Nurse: Marylene Bandy, RN; Physical Therapy Assistant: Franko Espinal; Occupational Therapy Assistant: MAYURI Denton    HPI/Subjective:   Pt is poorly responsive so history is per daughter at bedside. Pt is an 81 y/o F with HTN, HLD intolerant to statins, hx CVA, afib on xarelto, who presented to ER with R sided weakness and unresponsiveness. Daughter said pt was in usual state of health this morning until around 730am.  At that point she noticed pt had stopped responding, was having trouble moving her R side. EMS was called. She had code S and CTA showed M3 occlusion but too distal to intervene mechanically. She is compliant with meds including xarelto so no TPA. Troponin came back >23k. Daughter did report pt having CP radiating up her neck often, off and on the past few days, significantly bothersome enough for her to complain multiple times a day. Daughter does not think she has had any other symptoms. 1/4 - pt responsive but aphasia prevents communication. More interactive. Attempts to follow commands. Modified diet.   Appears comfortable and in good spirits    Unable to obtain ROS due to condition    Objective:     Patient Vitals for the past 24 hrs:   Temp Pulse Resp BP SpO2   21 0705 98.8 °F (37.1 °C) 99 20 (!) 144/77 97 %   21 0300 98.2 °F (36.8 °C) (!) 103 18 (!) 143/66 97 %   21 2331 98.6 °F (37 °C) 80 18 (!) 165/66 99 %   21 1959 98.1 °F (36.7 °C) 83 18 (!) 149/65 98 %   21 1948 98.5 °F (36.9 °C) 77 18 (!) 100/51 95 %   21 1551 98.8 °F (37.1 °C) 85 -- (!) 110/59 96 %   21 1325 -- (!) 119 -- (!) 156/67 --     Oxygen Therapy  O2 Sat (%): 97 % (21 9859)  Pulse via Oximetry: 85 beats per minute (12/31/20 2319)  O2 Device: Room air (01/04/21 0810)    Estimated body mass index is 23.51 kg/m² as calculated from the following:    Height as of an earlier encounter on 12/31/20: 4' 11\" (1.499 m). Weight as of this encounter: 52.8 kg (116 lb 6.5 oz). Intake/Output Summary (Last 24 hours) at 1/4/2021 1217  Last data filed at 1/4/2021 1132  Gross per 24 hour   Intake 30 ml   Output 350 ml   Net -320 ml       Physical Exam:  General:    no overt distress. awake  Eyes:   leftward gaze  CV:   Irregular  No m/r/g. No edema  Lungs:  Even, Unlabored  Abdomen: nondistended. Extremities: Warm and dry. No cyanosis or clubbing  Neurologic: Asphasic. R hemiplegia. Skin:     No rashes. I reviewed the labs, imaging, EKGs, telemetry, and other studies done this admission.   Data Review:   Recent Results (from the past 24 hour(s))   HEPARIN XA UFH    Collection Time: 01/03/21 12:29 PM   Result Value Ref Range    Heparin Xa UFH 0.51 0.3 - 0.7 IU/mL   PLEASE READ & DOCUMENT PPD TEST IN 72 HRS    Collection Time: 01/03/21  5:58 PM   Result Value Ref Range    PPD Negative Negative    mm Induration 0 0 - 5 mm   CBC W/O DIFF    Collection Time: 01/04/21  3:39 AM   Result Value Ref Range    WBC 6.4 4.3 - 11.1 K/uL    RBC 3.79 (L) 4.05 - 5.2 M/uL    HGB 11.0 (L) 11.7 - 15.4 g/dL    HCT 33.7 (L) 35.8 - 46.3 %    MCV 88.9 79.6 - 97.8 FL    MCH 29.0 26.1 - 32.9 PG    MCHC 32.6 31.4 - 35.0 g/dL    RDW 13.4 11.9 - 14.6 %    PLATELET 988 931 - 530 K/uL    MPV 9.7 9.4 - 12.3 FL    ABSOLUTE NRBC 0.00 0.0 - 0.2 K/uL       All Micro Results     None          Medications Administered     heparin (porcine) injection 3,100 Units     Admin Date  12/31/2020 Action  Given Dose  3,100 Units Route  IntraVENous Administered By  Sindhu Qureshi          iopamidoL (ISOVUE-370) 76 % injection 100 mL     Admin Date  12/31/2020 Action  Given Dose  100 mL Route  IntraVENous Administered By  Britany Caruso          levETIRAcetam (KEPPRA) 1,000 mg in 0.9% sodium chloride 100 mL IVPB     Admin Date  12/31/2020 Action  New Bag Dose  1,000 mg Route  IntraVENous Administered By  Adrian jacobo peripheral flush soln 10 mL     Admin Date  12/31/2020 Action  Given Dose  10 mL Route  InterCATHeter Administered By  Britany Caruos          sodium chloride 0.9 % bolus infusion 100 mL     Admin Date  12/31/2020 Action  New Bag Dose  100 mL Route  IntraVENous Administered By  Britany Caruso                Other Studies:  Hortencia Lowe [facility] Video    Result Date: 1/4/2021  Modified barium swallow HISTORY: Recent CVA, dysphasia. The patient was seated in the lateral position and was administered various consistencies of barium. The study was viewed in conjunction with the speech pathologist under cine fluoroscopy. FINDINGS: There was poor control of the oral phase with spilling of liquid out of the mouth shortly after administering. There is also delay in the swallowing mechanism. There was laryngeal penetration with thin and nectar consistencies with possible eventual aspiration due to small residual amounts remaining within the oropharynx. No aspiration or penetration occurred when administered honey thick or pudding consistencies. The barium coated fruit cocktail and susie cracker was deferred. 1.55 minutes of fluoroscopy is used during this exam.     IMPRESSION: 1. Laryngeal penetration and possible aspiration with thin and nectar consistencies. 2. Abnormal oral phase as described.          Assessment and Plan:     Hospital Problems as of 1/4/2021 Date Reviewed: 12/30/2020          Codes Class Noted - Resolved POA    Systolic [facility], acute [facility] ICD-10-CM: I50.21  ICD-9-CM: 428.21, 428.0  1/2/2021 - Present Yes        * (Principal) Acute ischemic stroke [facility]) ICD-10-CM: I63.9  ICD-9-CM: 434.91  12/31/2020 - Present Yes        NSTEMI (non-ST elevated myocardial infarction) ([facility] ICD-10-CM: I21.4  ICD-9-CM: 410.70  12/31/2020 - Present Yes        Atrial fibrillation with RVR (HCC) ICD-10-CM: I48.91  ICD-9-CM: 427.31  12/31/2020 - Present Yes        Primary osteoarthritis involving multiple joints (Chronic) ICD-10-CM: M89.49  ICD-9-CM: 715.98  7/21/2017 - Present Yes        Chronic atrial fibrillation (HCC) (Chronic) ICD-10-CM: I48.20  ICD-9-CM: 427.31  7/20/2017 - Present Yes        HTN (hypertension), benign (Chronic) ICD-10-CM: I10  ICD-9-CM: 401.1  7/20/2017 - Present Yes        Hyperlipidemia, mixed (Chronic) ICD-10-CM: E78.2  ICD-9-CM: 272.2  7/20/2017 - Present Yes        Gastroesophageal reflux disease with esophagitis (Chronic) ICD-10-CM: K21.00  ICD-9-CM: 530.11  7/20/2017 - Present Yes        Hypothyroidism, adult (Chronic) ICD-10-CM: E03.9  ICD-9-CM: 244.9  7/20/2017 - Present Yes        History of CVA (cerebrovascular accident) (Chronic) ICD-10-CM: Z86.73  ICD-9-CM: V12.54  7/20/2017 - Present Yes    Overview Signed 7/20/2017 11:58 AM by Abby Jose DO     2014             Age related osteoporosis (Chronic) ICD-10-CM: M81.0  ICD-9-CM: 733.01  7/20/2017 - Present Yes              Plan:  CVA M3 occlusion  -repeat CT head no hemorrhagic conversion  -ASA  -on statin, intolerance in past but will doing lower dose, seems to be tolerating so far  -appreciate SLP recs. Modified diet. Aspiration precautions  -PT/OT  -xarelto for afib  -to SNF when placement found. Needs rehab as soon as possible    NSTEMI, new sCHF  -CP with neck radiation for a few days PTA. ST depression in lateral leads on EKG in ER. trops >23k. Echo showing EF 40-45%, severe hypokinesis of the basal-mid anteroseptal, basal-mid inferoseptal, and mid inferior wall.   -xarelto  -ASA, statin  -coreg  -losartan    Afib RVR  -xarelto  -increased coreg  -cardizem gtt; try to wean off today    Code status:    -full code. Daughter may be leaning towards DNR. Will need to follow up on this regularly.     FEN  -LR IVF supplementation 25/hr      Signed:  Vickie Estrada MD

## 2021-01-04 NOTE — PROGRESS NOTES
Verbal bedside report given to oncoming RNYakov. Patient's situation, background, assessment and recommendations provided. Opportunity for questions provided. Oncoming RN assumed care of patient.

## 2021-01-05 LAB
ANION GAP SERPL CALC-SCNC: 6 MMOL/L (ref 7–16)
BUN SERPL-MCNC: 16 MG/DL (ref 8–23)
CALCIUM SERPL-MCNC: 8.3 MG/DL (ref 8.3–10.4)
CHLORIDE SERPL-SCNC: 110 MMOL/L (ref 98–107)
CO2 SERPL-SCNC: 27 MMOL/L (ref 21–32)
CREAT SERPL-MCNC: 0.41 MG/DL (ref 0.6–1)
GLUCOSE SERPL-MCNC: 101 MG/DL (ref 65–100)
POTASSIUM SERPL-SCNC: 3 MMOL/L (ref 3.5–5.1)
SODIUM SERPL-SCNC: 143 MMOL/L (ref 136–145)

## 2021-01-05 PROCEDURE — 74011000250 HC RX REV CODE- 250: Performed by: INTERNAL MEDICINE

## 2021-01-05 PROCEDURE — 92526 ORAL FUNCTION THERAPY: CPT

## 2021-01-05 PROCEDURE — 97112 NEUROMUSCULAR REEDUCATION: CPT

## 2021-01-05 PROCEDURE — 74011250637 HC RX REV CODE- 250/637: Performed by: INTERNAL MEDICINE

## 2021-01-05 PROCEDURE — 74011250636 HC RX REV CODE- 250/636: Performed by: INTERNAL MEDICINE

## 2021-01-05 PROCEDURE — 36415 COLL VENOUS BLD VENIPUNCTURE: CPT

## 2021-01-05 PROCEDURE — 97110 THERAPEUTIC EXERCISES: CPT

## 2021-01-05 PROCEDURE — 74011000258 HC RX REV CODE- 258: Performed by: FAMILY MEDICINE

## 2021-01-05 PROCEDURE — 92523 SPEECH SOUND LANG COMPREHEN: CPT

## 2021-01-05 PROCEDURE — 2709999900 HC NON-CHARGEABLE SUPPLY

## 2021-01-05 PROCEDURE — 65660000000 HC RM CCU STEPDOWN

## 2021-01-05 PROCEDURE — 74011250636 HC RX REV CODE- 250/636: Performed by: FAMILY MEDICINE

## 2021-01-05 PROCEDURE — 80048 BASIC METABOLIC PNL TOTAL CA: CPT

## 2021-01-05 RX ORDER — POTASSIUM CHLORIDE 14.9 MG/ML
20 INJECTION INTRAVENOUS
Status: DISPENSED | OUTPATIENT
Start: 2021-01-05 | End: 2021-01-05

## 2021-01-05 RX ORDER — DILTIAZEM HYDROCHLORIDE 120 MG/1
120 CAPSULE, COATED, EXTENDED RELEASE ORAL DAILY
Status: DISCONTINUED | OUTPATIENT
Start: 2021-01-05 | End: 2021-01-07 | Stop reason: HOSPADM

## 2021-01-05 RX ADMIN — POTASSIUM CHLORIDE 20 MEQ: 14.9 INJECTION, SOLUTION INTRAVENOUS at 08:34

## 2021-01-05 RX ADMIN — DILTIAZEM HYDROCHLORIDE 120 MG: 120 CAPSULE, COATED, EXTENDED RELEASE ORAL at 08:14

## 2021-01-05 RX ADMIN — Medication 10 ML: at 04:35

## 2021-01-05 RX ADMIN — DORZOLAMIDE HYDROCHLORIDE AND TIMOLOL MALEATE 1 DROP: 20; 5 SOLUTION/ DROPS OPHTHALMIC at 21:52

## 2021-01-05 RX ADMIN — Medication 10 ML: at 21:52

## 2021-01-05 RX ADMIN — PANTOPRAZOLE SODIUM 40 MG: 40 TABLET, DELAYED RELEASE ORAL at 06:15

## 2021-01-05 RX ADMIN — POTASSIUM CHLORIDE 20 MEQ: 14.9 INJECTION, SOLUTION INTRAVENOUS at 14:46

## 2021-01-05 RX ADMIN — RIVAROXABAN 15 MG: 15 TABLET, FILM COATED ORAL at 08:14

## 2021-01-05 RX ADMIN — Medication 10 ML: at 14:46

## 2021-01-05 RX ADMIN — SODIUM CHLORIDE 10 MG/HR: 900 INJECTION, SOLUTION INTRAVENOUS at 03:10

## 2021-01-05 RX ADMIN — LABETALOL HYDROCHLORIDE 10 MG: 5 INJECTION INTRAVENOUS at 04:33

## 2021-01-05 RX ADMIN — SODIUM CHLORIDE 7.5 MG/HR: 900 INJECTION, SOLUTION INTRAVENOUS at 04:45

## 2021-01-05 RX ADMIN — SODIUM CHLORIDE 2.5 MG/HR: 900 INJECTION, SOLUTION INTRAVENOUS at 00:33

## 2021-01-05 RX ADMIN — LOSARTAN POTASSIUM 25 MG: 25 TABLET, FILM COATED ORAL at 08:14

## 2021-01-05 RX ADMIN — Medication 10 ML: at 06:16

## 2021-01-05 RX ADMIN — LATANOPROST 1 DROP: 50 SOLUTION OPHTHALMIC at 17:05

## 2021-01-05 RX ADMIN — CARVEDILOL 25 MG: 25 TABLET, FILM COATED ORAL at 08:14

## 2021-01-05 RX ADMIN — DORZOLAMIDE HYDROCHLORIDE AND TIMOLOL MALEATE 1 DROP: 20; 5 SOLUTION/ DROPS OPHTHALMIC at 08:39

## 2021-01-05 RX ADMIN — SODIUM CHLORIDE, SODIUM LACTATE, POTASSIUM CHLORIDE, AND CALCIUM CHLORIDE 25 ML/HR: 600; 310; 30; 20 INJECTION, SOLUTION INTRAVENOUS at 20:35

## 2021-01-05 RX ADMIN — POTASSIUM CHLORIDE 20 MEQ: 14.9 INJECTION, SOLUTION INTRAVENOUS at 17:03

## 2021-01-05 RX ADMIN — LEVOTHYROXINE SODIUM 75 MCG: 0.07 TABLET ORAL at 06:15

## 2021-01-05 RX ADMIN — ATORVASTATIN CALCIUM 20 MG: 10 TABLET, FILM COATED ORAL at 21:52

## 2021-01-05 RX ADMIN — Medication 1 TABLET: at 08:14

## 2021-01-05 RX ADMIN — CARVEDILOL 25 MG: 25 TABLET, FILM COATED ORAL at 17:07

## 2021-01-05 RX ADMIN — SODIUM CHLORIDE 7.5 MG/HR: 900 INJECTION, SOLUTION INTRAVENOUS at 02:02

## 2021-01-05 RX ADMIN — ASPIRIN 81 MG: 81 TABLET, CHEWABLE ORAL at 08:14

## 2021-01-05 NOTE — PROGRESS NOTES
Problem: Dysphagia (Adult)  Goal: *Acute Goals and Plan of Care (Insert Text)  Description:  STG: Pt will participate in speech/language/cognition evaluation with 100% compliance. MET 1/5/21  STG: Patient will consume pureed diet and honey-thick liquids by spoon with no overt signs or symptoms of airway compromise. Goal added 1/4/21  LTG: Pt will tolerate safest/least restrictive diet with no s/sx of airway compromise. LTG: Pt will communicate effectively/efficiently within direct environment with family/caregivers. STG: Patient will gesture to express basic wants and needs with 80% accuracy with moderate cues. ADDED 1/5/21  STG: Patient will vocalize on command on 80% of trials with moderate cues ADDED 1/5/21. STG: Patient will follow 1 step commands with 80% accuracy given visual model and moderate verbal cues ADDED 1/5/21  STG: Patient will participate in ongoing therapeutic assessment to assist with development of functional goals. ADDED 1/5/21  Outcome: Progressing Towards Goal- Goals updated 1/5/21. Completed goals removed.       SPEECH LANGUAGE PATHOLOGY: DYSPHAGIA AND SPEECH-LANGUAGE/COGNITION: Initial Assessment    NAME/AGE/GENDER: Micha Grayson is a 80 y.o. female  DATE: 1/5/2021  PRIMARY DIAGNOSIS: Acute ischemic stroke ([facility] [I63.9]      ICD-10: Treatment Diagnosis: R13.12 Dysphagia, Oropharyngeal Phase    RECOMMENDATIONS   DIET:    PO:  Pureed   Liquids:  honey   by tsp only    MEDICATIONS: Crushed in puree     ASPIRATION PRECAUTIONS  · Slow rate of intake  · Small bites/sips     COMPENSATORY STRATEGIES/MODIFICATIONS  · Alternate liquids/solids  · Tsp sips only     EDUCATION:  · Recommendations discussed with Nursing  · Family  · Patient     CONTINUATION OF SKILLED SERVICES/MEDICAL NECESSITY:   Patient is expected to demonstrate progress in  swallow strength, swallow timeliness, swallow function, diet tolerance and swallow safety in order to  improve swallow safety, work toward diet advancement and decrease aspiration risk.  Patient is expected to demonstrate progress in expressive communication and receptive ability to decrease assistance required communication, increase independence with activities of daily living and increase communication with family/caregivers.  Patient continues to require skilled intervention due to dysphagia and aphasia. RECOMMENDATIONS for CONTINUED SPEECH THERAPY: YES: Anticipate need for ongoing speech therapy during this hospitalization and at next level of care. ASSESSMENT   Patient presents with severe expressive aphasia and moderate-severe receptive aphasia. No verbalizations despite repeated attempts to initiate verbal communication. Relative strengths in receptive language abilities as she was able to respond to yes/no questions with fair accuracy (no response on 30% of trials) and ID objects in room with 100% accuracy. She was also observed to gesture to clinician to make basic wants known. ? Possible candidate for low tech communication board as her UE function improves. Patient is tolerating current diet of puree textures/honey thick liquids by tsp only without overt s/sx of airway compromise. One instance of ? Audible secretions and delayed throat clearing after honey thick liquids, but not additional cough or throat clear with remaining trials. She consumed approximately 50% of noontime meal before gestures that she did not want any more. Continued concern with her ability to meet nutritional needs orally given diet restrictions, especially honey thick liquids by tsp. Recommend continued speech therapy to address aphasia and dysphagia. Ongoing treatment is recommended at next level of care    REHABILITATION POTENTIAL FOR STATED GOALS: Good    PLAN    FREQUENCY/DURATION: Continue to follow patient 3 times a week for duration of hospital stay to address above goals.     - Recommendations for next treatment session: Next treatment will address dysphagia, aphasia    SUBJECTIVE   Upright in bed. Daughter assisting with noontime meal.     History of Present Injury/Illness: Ms. Joseph  has a past medical history of Age related osteoporosis (2017), Chronic atrial fibrillation (HCC) (2017), Chronic pain, DDD (degenerative disc disease), lumbar (2017), Gastroesophageal reflux disease with esophagitis (2017), Glaucoma (2017), History of CVA (cerebrovascular accident) (2017), HTN (hypertension), benign (2017), Hyperlipidemia, mixed (2017), Hypertension, Hypothyroidism, adult (2017), IFG (impaired fasting glucose) (2017), Primary osteoarthritis involving multiple joints (2017), and PUD (peptic ulcer disease).. She also  has a past surgical history that includes hx colonoscopy; hx hermes and bso; and hx  section.     Problem List:  (Impairments causing functional limitations):  1. Dysphagia  2. Aphasia    Orientation:   Unable to verbalize. No response to yes/no questiosn    Pain: Pain Scale 1: Adult Nonverbal Pain Scale  Pain Intensity 1: 0    OBJECTIVE   Swallow treatment:   Reviewed results and recommendations from modified barium swallow study on 21 (puree diet/honey thick liquids via tsp). Daughter feeding patient noontime meal upon clinician's arrival and implementing recommendations appropriately. Clinician began feeding patient during session with slow rate and small bites/sips. Oral delay observed and required occasional verbal cues to swallow puree bolus. Delayed also noted with swallow of honey thick liquids. After 4 consecutive tsp sips, patient did produce independent throat clear that did sound wet, but no additional s/sx of airway compromise throughout remainder of session. Patient independently gestures to decline additional trials after approximately 50% of meal was consumed.     Cognitive Linguistic Assessment :  Initial language evaluation completed with results as follows:  - no  command following for oral motor movements. Right facial droop noted. Unable to follow command to stick out tongue.   - Yes/ no questions: she responded to 70% of questions. Accurate responses to all questions. No response to remaining 30% of questions. - Body part ID- no response  - Object ID (F:2) 100%, objects in room: 100%. Difficulty pointing to specific items due to UE weakness, but she did point in correct vicinity of requested objects   - Phoneme repetition: no oral movements observed  - Automatic speech tasks: Counting: no oral movements; Singing: no oral movements. INTERDISCIPLINARY COLLABORATION: Registered Nurse  PRECAUTIONS/ALLERGIES: Actonel [risedronate], Atorvastatin, Fosamax [alendronate], Ketoprofen, and Moexipril     Tool Used: Dysphagia Outcome and Severity Scale (DINH)    Score Comments   Normal Diet  [] 7 With no strategies or extra time needed   Functional Swallow  [] 6 May have mild oral or pharyngeal delay   Mild Dysphagia  [] 5 Which may require one diet consistency restricted    Mild-Moderate Dysphagia  [] 4 With 1-2 diet consistencies restricted   Moderate Dysphagia  [] 3 With 2 or more diet consistencies restricted   Moderate-Severe Dysphagia  [] 2 With partial PO strategies (trials with ST only)   Severe Dysphagia  [] 1 With inability to tolerate any PO safely      Score:  Initial: 2 Most Recent: 3 (Date 01/05/21 )   Interpretation of Tool: The Dysphagia Outcome and Severity Scale (DINH) is a simple, easy-to-use, 7-point scale developed to systematically rate the functional severity of dysphagia based on objective assessment and make recommendations for diet level, independence level, and type of nutrition.      Tool Used: MODIFIED NAN SCALE (mRS)   Score   No Symptoms  [] 0   No significant disability despite symptoms; able to carry out all usual duties and activities  [] 1   Slight disability; unable to carry out all previous activities but able to look after own affairs without assistance. [] 2   Moderate disability; requiring some help but able to walk without assistance  [] 3   Moderately severe disability; unable to walk without assistance and unable to attend to own bodily needs without assistance  [] 4   Severe disability; bedridden, incontinent, and requiring constant nursing care and attention  [] 5      Score:  Initial: 4    Interpretation of Tool: The Modified [place] Scale is a 7-point scaled used to quantify level of disability as it relates to a patient's functional abilities. Current Medications:   No current facility-administered medications on file prior to encounter. Current Outpatient Medications on File Prior to Encounter   Medication Sig Dispense Refill    Xarelto 20 mg tab tablet TAKE 1 TABLET BY MOUTH ONCE DAILY WITH SUPPER 30 Tab 5    metoprolol succinate (TOPROL-XL) 100 mg tablet Take 1 tablet by mouth once daily 90 Tab 1    benazepriL (LOTENSIN) 40 mg tablet Take 1 tablet by mouth once daily 90 Tab 1    levothyroxine (SYNTHROID) 75 mcg tablet TAKE 1 TABLET BY MOUTH ONCE DAILY BEFORE BREAKFAST 90 Tab 1    amLODIPine (NORVASC) 2.5 mg tablet Take 1 tablet by mouth once daily 90 Tab 1    omeprazole (PRILOSEC) 40 mg capsule Take 1 Cap by mouth daily. 30 Cap 5    Lumigan 0.01 % ophthalmic drops INSTILL 1 DROP IN BOTH EYES AT BEDTIME 2.5 mL 5    cholecalciferol, vitamin D3, (VITAMIN D3) 2,000 unit tab Take  by mouth.  dorzolamide-timolol (COSOPT) 22.3-6.8 mg/mL ophthalmic solution PUT 1 DROP INTO BOTH EYES TWICE A DAY 10 mL 11    calcium-cholecalciferol, d3, (CALCIUM 600 + D) 600-125 mg-unit tab Take  by mouth.          SAFETY:  After treatment position/precautions:  · Upright in bed  · Daughters at bedside    Total Treatment Duration:   Time In: 4051  Time Out: Jah [address], [address] CCC-SLP

## 2021-01-05 NOTE — PROGRESS NOTES
ACUTE OT GOALS:  (Developed with and agreed upon by patient and/or caregiver.)  1. Patient will follow 50% of one step verbal or visual commands to increase participation during ADL performance. 2. Patient will tolerate 15 minutes static sitting balance unsupported with CGA while completing functional activity. 3. Patient will tolerate 25  minutes of OT treatment with 2-3 rest breaks to increase activity tolerance for ADLs. 4. Patient will complete functional transfers with modA and adaptive equipment as needed. 5. Patient will complete upper body bathing and dressing with mod A and adaptive equipment as needed. 6. Patient will complete self-grooming with mod A and adaptive equipment as needed. 7. Patient will attend to R side of environment with min verbal cues from therapist to increase safety awareness during ADL performance.     Timeframe: 7 visits     OCCUPATIONAL THERAPY: Daily Note OT Treatment Day # 3    Yue Levy is a 80 y.o. female   PRIMARY DIAGNOSIS: Acute ischemic stroke ([facility] [address]  Acute ischemic stroke ([facility] [address] [I63.9]       [place] / Product Type: Medicare /   ASSESSMENT:     REHAB RECOMMENDATIONS: CURRENT LEVEL OF FUNCTION:  (Most Recently Demonstrated)   Recommendation to date pending progress:  Setting:   Short-term Rehab  Equipment:    To Be Determined Bathing:   Not tested  Dressing:   Not tested  Feeding/Grooming:   Not tested  Toileting:   Not tested  Functional Mobility:   Not tested     ASSESSMENT:  Ms. Trisha Levy tolerated PROM well today. Pt continue to have weakness in RUE with some trace contraction of elbow extension. Some progress made this visit with strength. Will continue to benefit from skilled OT during stay.      SUBJECTIVE:   Ms. Jas Fuentes HISTORY/LIVING ENVIRONMENT:        OBJECTIVE:     PAIN: VITAL SIGNS: LINES/DRAINS:   Pre Treatment: Pain Screen  Pain Scale 1: Visual  Pain Intensity 1: 0  Post Treatment: 0 IV  O2 Device: Room air     ACTIVITIES OF DAILY LIVING: I Mod I S SBA CGA Min Mod Max Total NT Comments   BASIC ADLs:              Bathing/ Showering [] [] [] [] [] [] [] [] [] [x]    Toileting [] [] [] [] [] [] [] [] [] [x]    Dressing [] [] [] [] [] [] [] [] [] [x]    Feeding [] [] [] [] [] [] [] [] [] [x]    Grooming [] [] [] [] [] [] [] [] [] [x]    Personal Device Care [] [] [] [] [] [] [] [] [] [x]    Functional Mobility [] [] [] [] [] [] [] [] [] [x]    I=Independent, Mod I=Modified Independent, S=Supervision, SBA=Standby Assistance, CGA=Contact Guard Assistance,   Min=Minimal Assistance, Mod=Moderate Assistance, Max=Maximal Assistance, Total=Total Assistance, NT=Not Tested    MOBILITY: I Mod I S SBA CGA Min Mod Max Total  NT x2 Comments:   Supine to sit [] [] [] [] [] [] [] [] [] [x] []    Sit to supine [] [] [] [] [] [] [] [] [] [x] []    Sit to stand [] [] [] [] [] [] [] [] [] [x] []    Bed to chair [] [] [] [] [] [] [] [] [] [x] []    I=Independent, Mod I=Modified Independent, S=Supervision, SBA=Standby Assistance, CGA=Contact Guard Assistance,   Min=Minimal Assistance, Mod=Moderate Assistance, Max=Maximal Assistance, Total=Total Assistance, NT=Not Tested    PLAN:   FREQUENCY/DURATION: OT Plan of Care: 3 times/week for duration of hospital stay or until stated goals are met, whichever comes first.    TREATMENT:   TREATMENT:   ( $$ Therapeutic Exercises: 8-22 mins   )  Therapeutic Exercise (15 Minutes): Therapeutic exercises noted below to improve functional AROM, strength and mobility.      PROM Date:  1/4/21 Date:  1/5/21 Date:     Activity/Exercise Parameters Parameters Parameters   Shoulder Flexion 10 reps 15 reps    Elbow Flexion 10 reps 15 reps    Shoulder Abd/Adduction 10 reps 15 reps    Wrist Flex/Extension 10 reps 10 reps    Pro/Supination 10 reps 10 reps    Digit Flexion/Extension 10 reps 10 reps              AFTER TREATMENT POSITION/PRECAUTIONS:  Bed, Needs within reach, RN notified and Visitors at bedside    INTERDISCIPLINARY COLLABORATION:  RN/PCT and OT/GUPTA    TOTAL TREATMENT DURATION:  OT Patient Time In/Time Out  Time In: 1425  Time Out: 1440    MAYURI Conte

## 2021-01-05 NOTE — PROGRESS NOTES
ACUTE PHYSICAL THERAPY GOALS:  (Developed with and agreed upon by patient and/or caregiver. )  LTG:  (1.)Ms. Vitaliy Merida will move from supine to sit and sit to supine , scoot up and down and roll side to side in bed with MINIMAL ASSISTANCE within 7 treatment day(s). (2.)Ms. Vitaliy Merida will transfer from bed to chair and chair to bed with MODERATE ASSIST using the least restrictive device within 7 treatment day(s). (3.)Ms. Vitaliy Merida will ambulate with MODERATE ASSIST for 25 feet with the least restrictive device within 7 treatment day(s). (4.)Ms. Vitaliy Merida will maintain static/dynamic sitting x 15 minutes with STAND BY ASSIST for improved balance within 7 treatment days. (5.)Ms. Vitaliy Merida will participate in therapeutic activity/exercises x 25 for increased strength within 7 treatment days. ________________________________________________________________________________________________      PHYSICAL THERAPY ASSESSMENT: Daily Note and PM PT Treatment Day # 3      Yani Bolaños is a 80 y.o. female   PRIMARY DIAGNOSIS: Acute ischemic stroke ([facility] [address]  Acute ischemic stroke ([facility] [address] [I63.9]       Reason for Referral:    ICD-10: Treatment Diagnosis: Generalized Muscle Weakness (M62.81)  Difficulty in walking, Not elsewhere classified (R26.2)  [place] / Product Type: Medicare /     ASSESSMENT:     REHAB RECOMMENDATIONS:   Recommendation to date pending progress:  Setting:   Short-term Rehab  Equipment:    To Be Determined     PRIOR LEVEL OF FUNCTION:  (Prior to Hospitalization) INITIAL/CURRENT LEVEL OF FUNCTION:  (Most Recently Demonstrated)   Bed Mobility:   Independent  Sit to Stand:   Independent  Transfers:   Independent  Gait/Mobility:   Independent Bed Mobility:   Total Assistance x 2  Sit to Stand:   Maximal Assistance x 2  Transfers:   Maximal Assistance x 2  Gait/Mobility:   Unable to perform     ASSESSMENT:   Ms. Vitaliy Merida presents supine in bed. Daughter present. Bed mobility is max assist but the patient is trying to assist.  Scooting to the edge of the bed with total assist.  Sitting balance good after set up. Patient sat edge of bed with good balance. Somewhat antsy. Stood several time with improved posture each time. Patient has noted weight bearing through bilateral LE but of course better on the left. Some weight shifting with mod to max assist.  Patient is transferred to the [facility] stood to be cleaned and then transferred to the bed. Patient supine with assist x 2 however the patient did assist with rolling side to side to don brief. Following some commands. Pt non-verbal throughout but moaned with mobility and is trying to communicate. Left with OT at bedside. Bed alarm intact. Making progress. Ms. Winston Pisano could benefit from skilled PT as she is currently functioning below her baseline.   Rehab would be beneficial at d/c.         SUBJECTIVE:   Ms. Winston Pisano states, Nonverbal    SOCIAL HISTORY/LIVING ENVIRONMENT: RN reported independent PTA     OBJECTIVE:     PAIN: VITAL SIGNS: LINES/DRAINS:   Pre Treatment: Pain Screen  Pain Scale 1: Visual  Pain Intensity 1: 0  Post Treatment: 0   IV  O2 Device: Room air     GROSS EVALUATION:  B LEs Within Functional Limits Abnormal/ Functional Abnormal/ Non-Functional (see comments) Not Tested Comments:   AROM [] [x] [] []    PROM [] [] [] []    Strength [] [] [x] [] R LE spontaneous movement observed   Balance [] [] [x] [] FAIR-POOR sitting and POOR standing   Posture [] [] [] []    Sensation [] [] [] []    Coordination [] [] [x] []    Tone [] [] [] []    Edema [] [] [] []    Activity Tolerance [] [] [x] [] Unable to stand for long    [] [] [] []      COGNITION/  PERCEPTION: Intact Impaired   (see comments) Comments:   Orientation [] [x] Nonverbal    Vision [] []    Hearing [] []    Command Following [] [x] None on R side   Safety Awareness [] [x] Agitated    [] []      MOBILITY: I Mod I S SBA CGA Min Mod Max Total  NT x2 Comments:   Bed Mobility    Rolling [] [] [] [] [] [] [x] [x] [] [] []    Supine to Sit [] [] [] [] [] [] [x] [x] [x] [] []    Scooting [] [] [] [] [] [] [x] [x] [] [] []    Sit to Supine [] [] [] [] [] [] [x] [x] [] [] []    Transfers    Sit to Stand [] [] [] [] [] [] [x] [x] [] [] []    Bed to Chair [] [] [] [] [] [] [x] [x] [] [] []    Stand to Sit [] [] [] [] [] [] [x] [x] [] [] []    I=Independent, Mod I=Modified Independent, S=Supervision, SBA=Standby Assistance, CGA=Contact Guard Assistance,   Min=Minimal Assistance, Mod=Moderate Assistance, Max=Maximal Assistance, Total=Total Assistance, NT=Not Tested  GAIT: I Mod I S SBA CGA Min Mod Max Total  NT x2 Comments:   Level of Assistance [] [] [] [] [] [] [] [] [] [x] []    Distance NT    DME N/A    Gait Quality nT    I=Independent, Mod I=Modified Independent, S=Supervision, SBA=Standby Assistance, CGA=Contact Guard Assistance,   Min=Minimal Assistance, Mod=Moderate Assistance, Max=Maximal Assistance, Total=Total Assistance, NT=Not Tested    [facility]       How much difficulty does the patient currently have. .. Unable A Lot A Little None   1. Turning over in bed (including adjusting bedclothes, sheets and blankets)? [x] 1   [] 2   [] 3   [] 4   2. Sitting down on and standing up from a chair with arms ( e.g., wheelchair, bedside commode, etc.)   [x] 1   [] 2   [] 3   [] 4   3. Moving from lying on back to sitting on the side of the bed? [x] 1   [] 2   [] 3   [] 4   How much help from another person does the patient currently need. .. Total A Lot A Little None   4. Moving to and from a bed to a chair (including a wheelchair)? [x] 1   [] 2   [] 3   [] 4   5. Need to walk in hospital room? [x] 1   [] 2   [] 3   [] 4   6. Climbing 3-5 steps with a railing? [x] 1   [] 2   [] 3   [] 4   © 2007, Trustees of [facility] [employer], under license to [employer].  All rights reserved     Score: Initial: 6 Most Recent: X (Date: -- )    Interpretation of Tool:  Represents activities that are increasingly more difficult (i.e. Bed mobility, Transfers, Gait). PLAN:   FREQUENCY/DURATION: PT Plan of Care: 3 times/week for duration of hospital stay or until stated goals are met, whichever comes first.    PROBLEM LIST:   (Skilled intervention is medically necessary to address:)  1. Decreased ADL/Functional Activities  2. Decreased Activity Tolerance  3. Decreased AROM/PROM  4. Decreased Balance  5. Decreased Cognition  6. Decreased Coordination  7. Decreased Gait Ability  8. Decreased Strength  9. Decreased Transfer Abilities   INTERVENTIONS PLANNED:   (Benefits and precautions of physical therapy have been discussed with the patient.)  1. Therapeutic Activity  2. Therapeutic Exercise/HEP  3. Neuromuscular Re-education  4. Gait Training  5. Manual Therapy  6. Education     TREATMENT:       TREATMENT:   ($$ Neuromuscular Re-education: 23-37 mins    )  Neuromuscular Re-education (23 Minutes): Neuromuscular Re-education included Balance Training, Coordination training, Postural training, Sitting balance training and Standing balance training to improve Balance, Coordination, Functional Mobility, Postural Control and Proprioception. OT present for part of the treatment session.      AFTER TREATMENT POSITION/PRECAUTIONS:  Alarm Activated, Bed, RN notified and OT present    INTERDISCIPLINARY COLLABORATION:  RN/PCT, PT/PTA and OT/GUPTA    TOTAL TREATMENT DURATION:  PT Patient Time In/Time Out  Time In: 1403  Time Out: 1    Veronica Chavez PTA

## 2021-01-05 NOTE — PROGRESS NOTES
PROGRESS NOTE    I was paged due to the patient having a change in mental status over the last few hours. Apparently she ate lunch well and was laughing with her daughter this afternoon. Over the past few hours she has become more drowsy and she just pocketed dinner in her mouth. Pupils equal but sluggish. Moans but does not respond to vocal or tactile stimuli.     Plan:  Check STAT CT Head    [facility], DO

## 2021-01-05 NOTE — PROGRESS NOTES
RN and tech entered patient's room to find her pulling on her brunner catheter with her leg. Her urine is now bloody and her bed was wet. Linens changed and patient cleaned. Catheter was adjusted and the bag has been placed near the head of the bed so that the patient's leg cannot pull on the catheter. Will continue to monitor and remove and reinsert brunner if necessary.

## 2021-01-05 NOTE — PROGRESS NOTES
01/05/21 0700   NIH Stroke Scale   Interval Other (comment)  (shift assessment)   LOC 0   LOC Questions 2   LOC Commands 0   Best Gaze 1   Visual 2   Facial Palsy 1   Motor Right Arm 4   Motor Left Arm 0   Motor Right Leg 0   Motor Left Leg 0   Limb Ataxia 2   Sensory 1   Best Language 2   Dysarthria 2   Extinction and Inattention 1   Total 18

## 2021-01-05 NOTE — PROGRESS NOTES
Via Perfect serve @ 1953    Dr. Sadie Mallory,     Ms. Hieu Frank in 2226 on CV[facility] has had a change in neuro in the last 24 hours. Her right side has a facial droop and her eyes are sluggish, equal, and reactive. I know she was on a heparin gtt and switched to Xarelto. Last CT was two days ago. Can you call the floor when you have a moment? See flowsheet for neuro assessment.    -Orders received for a STAT CT of head.   -Notified charge nurse, Carter Barakat

## 2021-01-05 NOTE — PROGRESS NOTES
Shift change [facility]     01/04/21 1936   NIH Stroke Scale   Interval Other (comment)  (shift change)   LOC 0   LOC Questions 2   LOC Commands 0   Best Gaze 1   Visual 2   Facial Palsy 1   Motor Right Arm 3   Motor Left Arm 0   Motor Right Leg 0   Motor Left Leg 0   Limb Ataxia 2  (unable to follow instructions for shins)   Sensory 1   Best Language 2   Dysarthria 2   Extinction and Inattention 1   Total 17

## 2021-01-05 NOTE — PROGRESS NOTES
[facility]ia [facility] @6181:    Dr. Anthony Shell,    Ms. Carmencita Coreas on [facility] came in on 12/31 with a stroke. She was on a Cardizem gtt yesterday and it was stopped at 1400. Dr. Bhavya Cr stopped the gtt and she was changed to PO Coreg 25mg twice a day, receiving one dose. Her current HR: 110-130's, BP: 139/88. Patient is asymptomatic and resting quietly in bed. Would you like me to restart the Cardizem gtt?     -Orders received to restart Cardizem gtt. Will continue to monitor.

## 2021-01-05 NOTE — PROGRESS NOTES
01/05/21 0429   Vital Signs   BP (!) 181/89       Labetalol IV given, see MAR, SBP >160. BP cycling [id number]. Will continue to monitor.

## 2021-01-05 NOTE — PROGRESS NOTES
01/04/21 1940   NIH Stroke Scale   Interval Other (comment)  (shift assessment)   LOC 0   LOC Questions 2   LOC Commands 0   Best Gaze 1   Visual 2   Facial Palsy 1   Motor Right Arm 4   Motor Left Arm 0   Motor Right Leg 0   Motor Left Leg 0   Limb Ataxia 2   Sensory 1   Best Language 2   Dysarthria 2   Extinction and Inattention 1   Total 18       Attest from previous neuro assessment.

## 2021-01-05 NOTE — PROGRESS NOTES
Physician Progress Note      PATIENTZainab Pisanor  CSN #:                  [id number]  :                       10/23/1931  ADMIT DATE:       2020 10:23 AM  DISCH DATE:  RESPONDING  PROVIDER #:        Anel Jenkins MD          QUERY TEXT:    Pt admitted with L MCA Stroke. Pt noted to have HTN. If possible, please document in progress notes and discharge summary if you are evaluating and/or treating any of the following: The medical record reflects the following:  Risk Factors: Hx AF, HTN ; DX new onset sCHF, L MCA occlusion and stroke, AF  Clinical Indicators: on admit98.7 125 18 172/79 97% RA ; 162/87---144/85---200/116---190/84  180/78---184/84---176/81---189/79, new onset sCHF, non-Stemi,  Treatment: Monitoring, Tele, Cardiology consult: PO Norvasc;  PO Coreg, Cardizem IVP, gtt, PO: IV Vasotec, Hydralazine IV,PO; IV Normadyne , PO Cozaar, IV Lopressor, PO Toprol    Hypertensive Crisis, unspecified: at least 2 consecutive readings of SBP > 180 mmHg or DBP > 110 mmHg  - Hypertensive Urgency: Hypertensive crisis w/o associated organ dysfunction. S/s may or may not be present, but can include severe headache, SOB, epistaxis, severe anxiety. Tx: adjustment of oral antihypertensives; IV meds not usually required. - Hypertensive Emergency: Hypertensive crisis w/ associated organ damage (stroke, encephalopathy, BIN, MI, angina, acute or decompensated CHF, acute pulmonary edema, HELLP, etc.). Requires immediate treatment (usually IV meds) & possible ICU admission. Associated organ dysfunction needs documented.   [email or web address]    Thank you,  Georgina Patino RNC, BSN  Clinical   [email or web address]  Options provided:  -- Hypertensive Emergency  -- Hypertensive Urgency  -- Other - I will add my own diagnosis  -- Disagree - Not applicable / Not valid  -- Disagree - Clinically unable to determine / Unknown  -- Refer to Clinical Documentation Reviewer    PROVIDER RESPONSE TEXT:    This patient is in hypertensive emergency. Query created by:  Janine Rausch on 1/4/2021 9:29 PM      Electronically signed by:  Shania Palma MD 1/5/2021 7:29 AM

## 2021-01-05 NOTE — PROGRESS NOTES
UPDATE 3:30PM: Awaiting bed offer from  [facility]. Pt daughter would like referral made to [facility] as well. Referral made. Currently await bed placement. UPDATE 11:38am:  Pt daughter would now like referral made to  [facility] as well. Referral made. Will await response to see if facility able to offer STR bed. This CM spoke with pt and daughter this day to inform that [facility] are not able to offer STR bed but [facility] is able to offer STR bed. They are agreeable with [facility] in [facility]. Pt to transition to SNF when medically stable. No additional CM needs at this time. Will continue to monitor and update as needed.

## 2021-01-05 NOTE — PROGRESS NOTES
Hospitalist Note     Admit Date:  2020 10:23 AM   Name:  Nish Carrasco   Age:  80 y.o.  :  10/23/1931   MRN:  [id number]   PCP:  Rosetta Kim DO  Treatment Team: Attending Provider: Nirmala Blanco MD; Care Manager: Carin Lakhani, RN; Primary Nurse: Mary Villareal, RN; Primary Nurse: Guille Yoo RN; Speech Language Pathologist: Lillian Sanon, SLP; Physical Therapy Assistant: Santiago Lovell; Occupational Therapy Assistant: MAYURI Burgess    HPI/Subjective:   Pt is poorly responsive so history is per daughter at bedside. Pt is an 81 y/o F with HTN, HLD intolerant to statins, hx CVA, afib on xarelto, who presented to ER with R sided weakness and unresponsiveness. Daughter said pt was in usual state of health this morning until around 730am.  At that point she noticed pt had stopped responding, was having trouble moving her R side. EMS was called. She had code S and CTA showed M3 occlusion but too distal to intervene mechanically. She is compliant with meds including xarelto so no TPA. Troponin came back >23k. Daughter did report pt having CP radiating up her neck often, off and on the past few days, significantly bothersome enough for her to complain multiple times a day. Daughter does not think she has had any other symptoms. Admitted with CVA, afib RVR, and NSTEMI. Neuro and cardio consulted. 1/5 - still with aphasia. Is responsive. Events of last night noted but she is calm and attempting to smile/talk now. No distress.   Nursing is stopping the cardizem gtt as HR in 90s after adding cardizem PO    Unable to obtain ROS due to condition    Objective:     Patient Vitals for the past 24 hrs:   Temp Pulse Resp BP SpO2   21 0709 98 °F (36.7 °C) (!) 103 16 (!) 180/86 96 %   21 0547 -- 90 -- (!) 169/86 --   21 0447 -- 97 -- (!) 165/85 --   21 0429 -- (!) 104 -- (!) 181/89 --   21 0327 -- (!) 102 -- (!) 171/86 -- 01/05/21 0300 98.4 °F (36.9 °C) 100 16 (!) 159/88 --   01/05/21 0233 -- (!) 107 -- (!) 174/86 --   01/05/21 0127 -- (!) 105 -- (!) 160/77 --   01/05/21 0027 -- (!) 122 -- (!) 144/78 --   01/05/21 0025 -- (!) 121 -- -- --   01/05/21 0010 -- (!) 115 -- -- --   01/04/21 2349 -- 92 -- -- --   01/04/21 2308 98.5 °F (36.9 °C) 95 16 139/88 97 %   01/04/21 1940 98 °F (36.7 °C) 90 16 135/80 90 %   01/04/21 1547 99.1 °F (37.3 °C) 97 20 (!) 155/71 97 %   01/04/21 1128 99.1 °F (37.3 °C) 95 20 (!) 152/73 96 %     Oxygen Therapy  O2 Sat (%): 96 % (01/05/21 0709)  Pulse via Oximetry: 85 beats per minute (12/31/20 2319)  O2 Device: Room air (01/05/21 0709)    Estimated body mass index is 23.51 kg/m² as calculated from the following:    Height as of an earlier encounter on 12/31/20: 4' 11\" (1.499 m). Weight as of this encounter: 52.8 kg (116 lb 6.5 oz). Intake/Output Summary (Last 24 hours) at 1/5/2021 0832  Last data filed at 1/5/2021 8328  Gross per 24 hour   Intake 60 ml   Output 1550 ml   Net -1490 ml       Physical Exam:  General:    no overt distress. awake  Eyes:   leftward gaze  CV:   Irregular  No m/r/g. No edema  Lungs:  Even, Unlabored  Abdomen: nondistended. Extremities: Warm and dry. No cyanosis or clubbing  Neurologic: Asphasic. R hemiplegia. Skin:     No rashes. I reviewed the labs, imaging, EKGs, telemetry, and other studies done this admission.   Data Review:   Recent Results (from the past 24 hour(s))   METABOLIC PANEL, BASIC    Collection Time: 01/05/21  3:18 AM   Result Value Ref Range    Sodium 143 136 - 145 mmol/L    Potassium 3.0 (L) 3.5 - 5.1 mmol/L    Chloride 110 (H) 98 - 107 mmol/L    CO2 27 21 - 32 mmol/L    Anion gap 6 (L) 7 - 16 mmol/L    Glucose 101 (H) 65 - 100 mg/dL    BUN 16 8 - 23 MG/DL    Creatinine 0.41 (L) 0.6 - 1.0 MG/DL    GFR est AA >60 >60 ml/min/1.73m2    GFR est non-AA >60 >60 ml/min/1.73m2    Calcium 8.3 8.3 - 10.4 MG/DL       All Micro Results     None Medications Administered     heparin (porcine) injection 3,100 Units     Admin Date  12/31/2020 Action  Given Dose  3,100 Units Route  IntraVENous Administered By  Haylie Parents          iopamidoL (ISOVUE-370) 76 % injection 100 mL     Admin Date  12/31/2020 Action  Given Dose  100 mL Route  IntraVENous Administered By  [employer]          levETIRAcetam (KEPPRA) 1,000 mg in 0.9% sodium chloride 100 mL IVPB     Admin Date  12/31/2020 Action  New Bag Dose  1,000 mg Route  IntraVENous Administered By  Haylie Parents M          saline peripheral flush soln 10 mL     Admin Date  12/31/2020 Action  Given Dose  10 mL Route  InterCATHeter Administered By  [employer]          sodium chloride 0.9 % bolus infusion 100 mL     Admin Date  12/31/2020 Action  New Bag Dose  100 mL Route  IntraVENous Administered By  [employer]                Other Studies:  Danuta Lai [facility] Video    Result Date: 1/4/2021  Modified barium swallow HISTORY: Recent CVA, dysphasia. The patient was seated in the lateral position and was administered various consistencies of barium. The study was viewed in conjunction with the speech pathologist under cine fluoroscopy. FINDINGS: There was poor control of the oral phase with spilling of liquid out of the mouth shortly after administering. There is also delay in the swallowing mechanism. There was laryngeal penetration with thin and nectar consistencies with possible eventual aspiration due to small residual amounts remaining within the oropharynx. No aspiration or penetration occurred when administered honey thick or pudding consistencies. The barium coated fruit cocktail and susie cracker was deferred. 1.55 minutes of fluoroscopy is used during this exam.     IMPRESSION: 1. Laryngeal penetration and possible aspiration with thin and nectar consistencies. 2. Abnormal oral phase as described.      Ct Head Wo Cont    Result Date: 1/4/2021  History: Change in mental status with stroke, on Xarelto Exam: CT head without contrast Technique: Thin section axial CT images were obtained from the skullbase through the vertex. Radiation dose reduction techniques were used for this study. Our CT scanners use one or all of the following: Automated exposure control, adjustment of the mA and/or kV according to patient size, use of iterative reconstruction. COMPARISON: 1/2/2021 Findings: No change in the appearance of the large left MCA distribution infarction. No definite hemorrhagic conversion demonstrated. Confluent white matter hypodensity again noted in the corona radiata and centrum semiovale. No extra-axial fluid collection is present. There is no mass or mass-effect. The basilar cisterns are patent. The paranasal sinuses and mastoid air cells are clear. Impression: Stable appearance of large left MCA distribution infarct.          Assessment and Plan:     Hospital Problems as of 1/5/2021 Date Reviewed: 12/30/2020          Codes Class Noted - Resolved POA    Systolic CHF, acute ([facility] ICD-10-CM: I50.21  ICD-9-CM: 428.21, 428.0  1/2/2021 - Present Yes        * (Principal) Acute ischemic stroke ([facility] ICD-10-CM: I63.9  ICD-9-CM: 434.91  12/31/2020 - Present Yes        NSTEMI (non-ST elevated myocardial infarction) ([facility] ICD-10-CM: I21.4  ICD-9-CM: 410.70  12/31/2020 - Present Yes        Atrial fibrillation with RVR (HCC) ICD-10-CM: I48.91  ICD-9-CM: 427.31  12/31/2020 - Present Yes        Primary osteoarthritis involving multiple joints (Chronic) ICD-10-CM: M89.49  ICD-9-CM: 715.98  7/21/2017 - Present Yes        Chronic atrial fibrillation (HCC) (Chronic) ICD-10-CM: I48.20  ICD-9-CM: 427.31  7/20/2017 - Present Yes        HTN (hypertension), benign (Chronic) ICD-10-CM: I10  ICD-9-CM: 401.1  7/20/2017 - Present Yes        Hyperlipidemia, mixed (Chronic) ICD-10-CM: E78.2  ICD-9-CM: 272.2  7/20/2017 - Present Yes        Gastroesophageal reflux disease with esophagitis (Chronic) ICD-10-CM: K21.00  ICD-9-CM: 530.11  7/20/2017 - Present Yes        Hypothyroidism, adult (Chronic) ICD-10-CM: E03.9  ICD-9-CM: 244.9  7/20/2017 - Present Yes        History of CVA (cerebrovascular accident) (Chronic) ICD-10-CM: Z86.73  ICD-9-CM: V12.54  7/20/2017 - Present Yes    Overview Signed 7/20/2017 11:58 AM by Wilton Bentley,      2014             Age related osteoporosis (Chronic) ICD-10-CM: M81.0  ICD-9-CM: 733.01  7/20/2017 - Present Yes              Plan:  CVA M3 occlusion  -repeat CT head x2 no hemorrhagic conversion  -ASA  -on statin, intolerance in past but doing lower dose, seems to be tolerating so far  -appreciate SLP recs. Modified diet. Aspiration precautions  -PT/OT  -xarelto for afib    NSTEMI, new sCHF  -CP with neck radiation for a few days PTA. ST depression in lateral leads on EKG in ER. trops >23k. Echo showing EF 40-45%, severe hypokinesis of the basal-mid anteroseptal, basal-mid inferoseptal, and mid inferior wall.   -xarelto  -ASA, statin  -coreg  -losartan    Afib RVR  -xarelto  -maxed on coreg  -add cardizem PO  -cardizem gtt; weaning off today    HTN  -adding cardizem today. May need to increase losartan    FEN, dysphagia  -LR IVF supplementation 25/hr  -SLP following    Dispo:  -to SNF when vitals improved. Possibly tomorrow.   Needs rehab as soon as feasible for better functional outcome    Signed:  Beatriz Mott MD

## 2021-01-06 LAB
ANION GAP SERPL CALC-SCNC: 6 MMOL/L (ref 7–16)
ATRIAL RATE: 125 BPM
BASOPHILS # BLD: 0 K/UL (ref 0–0.2)
BASOPHILS NFR BLD: 1 % (ref 0–2)
BUN SERPL-MCNC: 18 MG/DL (ref 8–23)
CALCIUM SERPL-MCNC: 8.7 MG/DL (ref 8.3–10.4)
CALCULATED R AXIS, ECG10: 33 DEGREES
CALCULATED T AXIS, ECG11: -158 DEGREES
CHLORIDE SERPL-SCNC: 114 MMOL/L (ref 98–107)
CO2 SERPL-SCNC: 23 MMOL/L (ref 21–32)
CREAT SERPL-MCNC: 0.48 MG/DL (ref 0.6–1)
DIAGNOSIS, 93000: NORMAL
DIFFERENTIAL METHOD BLD: ABNORMAL
EOSINOPHIL # BLD: 0.1 K/UL (ref 0–0.8)
EOSINOPHIL NFR BLD: 2 % (ref 0.5–7.8)
ERYTHROCYTE [DISTWIDTH] IN BLOOD BY AUTOMATED COUNT: 13.4 % (ref 11.9–14.6)
GLUCOSE SERPL-MCNC: 103 MG/DL (ref 65–100)
HCT VFR BLD AUTO: 35.5 % (ref 35.8–46.3)
HGB BLD-MCNC: 11.7 G/DL (ref 11.7–15.4)
IMM GRANULOCYTES # BLD AUTO: 0 K/UL (ref 0–0.5)
IMM GRANULOCYTES NFR BLD AUTO: 0 % (ref 0–5)
LYMPHOCYTES # BLD: 1 K/UL (ref 0.5–4.6)
LYMPHOCYTES NFR BLD: 15 % (ref 13–44)
MAGNESIUM SERPL-MCNC: 1.8 MG/DL (ref 1.8–2.4)
MCH RBC QN AUTO: 29.5 PG (ref 26.1–32.9)
MCHC RBC AUTO-ENTMCNC: 33 G/DL (ref 31.4–35)
MCV RBC AUTO: 89.6 FL (ref 79.6–97.8)
MONOCYTES # BLD: 0.5 K/UL (ref 0.1–1.3)
MONOCYTES NFR BLD: 7 % (ref 4–12)
NEUTS SEG # BLD: 4.9 K/UL (ref 1.7–8.2)
NEUTS SEG NFR BLD: 76 % (ref 43–78)
NRBC # BLD: 0 K/UL (ref 0–0.2)
PLATELET # BLD AUTO: 197 K/UL (ref 150–450)
PMV BLD AUTO: 10.7 FL (ref 9.4–12.3)
POTASSIUM SERPL-SCNC: 3.9 MMOL/L (ref 3.5–5.1)
Q-T INTERVAL, ECG07: 322 MS
QRS DURATION, ECG06: 86 MS
QTC CALCULATION (BEZET), ECG08: 441 MS
RBC # BLD AUTO: 3.96 M/UL (ref 4.05–5.2)
SODIUM SERPL-SCNC: 143 MMOL/L (ref 136–145)
TSH SERPL DL<=0.005 MIU/L-ACNC: 3.66 UIU/ML (ref 0.36–3.74)
VENTRICULAR RATE, ECG03: 113 BPM
WBC # BLD AUTO: 6.5 K/UL (ref 4.3–11.1)

## 2021-01-06 PROCEDURE — 74011000250 HC RX REV CODE- 250: Performed by: INTERNAL MEDICINE

## 2021-01-06 PROCEDURE — 84443 ASSAY THYROID STIM HORMONE: CPT

## 2021-01-06 PROCEDURE — 93005 ELECTROCARDIOGRAM TRACING: CPT | Performed by: NURSE PRACTITIONER

## 2021-01-06 PROCEDURE — 65660000000 HC RM CCU STEPDOWN

## 2021-01-06 PROCEDURE — 97112 NEUROMUSCULAR REEDUCATION: CPT

## 2021-01-06 PROCEDURE — 92507 TX SP LANG VOICE COMM INDIV: CPT

## 2021-01-06 PROCEDURE — 2709999900 HC NON-CHARGEABLE SUPPLY

## 2021-01-06 PROCEDURE — 36415 COLL VENOUS BLD VENIPUNCTURE: CPT

## 2021-01-06 PROCEDURE — 85025 COMPLETE CBC W/AUTO DIFF WBC: CPT

## 2021-01-06 PROCEDURE — 74011250637 HC RX REV CODE- 250/637: Performed by: INTERNAL MEDICINE

## 2021-01-06 PROCEDURE — 80048 BASIC METABOLIC PNL TOTAL CA: CPT

## 2021-01-06 PROCEDURE — 83735 ASSAY OF MAGNESIUM: CPT

## 2021-01-06 RX ORDER — DILTIAZEM HYDROCHLORIDE 5 MG/ML
10 INJECTION INTRAVENOUS ONCE
Status: DISPENSED | OUTPATIENT
Start: 2021-01-06 | End: 2021-01-07

## 2021-01-06 RX ADMIN — LOSARTAN POTASSIUM 25 MG: 25 TABLET, FILM COATED ORAL at 08:04

## 2021-01-06 RX ADMIN — RIVAROXABAN 15 MG: 15 TABLET, FILM COATED ORAL at 08:04

## 2021-01-06 RX ADMIN — Medication 10 ML: at 15:02

## 2021-01-06 RX ADMIN — DILTIAZEM HYDROCHLORIDE 120 MG: 120 CAPSULE, COATED, EXTENDED RELEASE ORAL at 08:04

## 2021-01-06 RX ADMIN — LEVOTHYROXINE SODIUM 75 MCG: 0.07 TABLET ORAL at 08:04

## 2021-01-06 RX ADMIN — LATANOPROST 1 DROP: 50 SOLUTION OPHTHALMIC at 18:19

## 2021-01-06 RX ADMIN — Medication 1 TABLET: at 08:04

## 2021-01-06 RX ADMIN — CARVEDILOL 25 MG: 25 TABLET, FILM COATED ORAL at 08:04

## 2021-01-06 RX ADMIN — ATORVASTATIN CALCIUM 20 MG: 10 TABLET, FILM COATED ORAL at 23:18

## 2021-01-06 RX ADMIN — ASPIRIN 81 MG: 81 TABLET, CHEWABLE ORAL at 08:05

## 2021-01-06 RX ADMIN — DORZOLAMIDE HYDROCHLORIDE AND TIMOLOL MALEATE 1 DROP: 20; 5 SOLUTION/ DROPS OPHTHALMIC at 23:18

## 2021-01-06 RX ADMIN — PANTOPRAZOLE SODIUM 40 MG: 40 TABLET, DELAYED RELEASE ORAL at 08:04

## 2021-01-06 RX ADMIN — Medication 40 ML: at 05:42

## 2021-01-06 RX ADMIN — CARVEDILOL 25 MG: 25 TABLET, FILM COATED ORAL at 16:47

## 2021-01-06 RX ADMIN — LABETALOL HYDROCHLORIDE 20 MG: 5 INJECTION INTRAVENOUS at 09:42

## 2021-01-06 RX ADMIN — DORZOLAMIDE HYDROCHLORIDE AND TIMOLOL MALEATE 1 DROP: 20; 5 SOLUTION/ DROPS OPHTHALMIC at 08:05

## 2021-01-06 RX ADMIN — Medication 10 ML: at 23:18

## 2021-01-06 NOTE — PROGRESS NOTES
01/06/21 0100   NIH Stroke Scale   Interval Other (comment)  (shift change)   LOC 0   LOC Questions 2   LOC Commands 0   Best Gaze 1   Visual 2   Facial Palsy 1   Motor Right Arm 4   Motor Left Arm 0   Motor Right Leg 0   Motor Left Leg 0   Limb Ataxia 2   Sensory 1   Best Language 2   Dysarthria 2   Extinction and Inattention 1   Total 18

## 2021-01-06 NOTE — PROGRESS NOTES
Hospitalist Progress Note     Admit Date:  2020 10:23 AM   Name:  Maggi Patterson   Age:  80 y.o.  :  10/23/1931   MRN:  [id number]   PCP:  Fransisco Hahn DO  Treatment Team: Attending Provider: Dez Caruso MD; Physical Therapy Assistant: Franny Mcmillan; Occupational Therapy Assistant: MAYURI Donovan; Care Manager: Speedy Traore    Subjective:   HPI and or CC:  80year old HF PMH HTN, HLD intolerant to statins, hx CVA, afib on xarelto admitted  for CVA-large left MCA distribution  infarction. She had code S and CTA showed M3 occlusion but too distal to intervene mechanically. Echo showing EF 40-45%, severe hypokinesis of the basal-mid anteroseptal, basal-mid inferoseptal, and mid inferior wall. :  Patient alert, noted A Fib with periods of RVR. Given Cardizem IV x 1. Cardiology consulted. Afebrile, no leukocytosis. *To [facility] when stable, anticipate next 1-2 days. Objective:     Patient Vitals for the past 24 hrs:   Temp Pulse Resp BP SpO2   21 0942 -- (!) 112 -- (!) 173/84 --   21 0939 -- 99 -- -- --   21 0709 98.8 °F (37.1 °C) (!) 104 16 (!) 178/83 96 %   21 0557 -- (!) 111 -- (!) 160/82 --   21 0346 97.3 °F (36.3 °C) 83 16 (!) 160/78 97 %   21 2318 97 °F (36.1 °C) 87 16 (!) 150/72 96 %   21 2027 97.6 °F (36.4 °C) 90 18 (!) 140/93 96 %   21 1450 97.7 °F (36.5 °C) 90 16 (!) 140/72 96 %     Oxygen Therapy  O2 Sat (%): 96 % (21 0709)  Pulse via Oximetry: 70 beats per minute (218)  O2 Device: Room air (21 0834)    Intake/Output Summary (Last 24 hours) at 2021 1124  Last data filed at 2021 0540  Gross per 24 hour   Intake 105 ml   Output 1000 ml   Net -895 ml         REVIEW OF SYSTEMS: Comprehensive ROS performed and negative except as stated in HPI. Physical Examination:  General:          no overt distress.   awake  Eyes:               leftward gaze  CV: Irregular  No m/r/g. No edema  Lungs:             Even, Unlabored  Abdomen:        nondistended. Extremities:     Warm and dry. No cyanosis or clubbing  Neurologic:      Asphasic. R hemiplegia. Skin:                No rashes. Data Review:  I have reviewed all labs, meds, telemetry events, and studies from the last 24 hours. Recent Results (from the past 24 hour(s))   TSH 3RD GENERATION    Collection Time: 01/06/21  3:10 AM   Result Value Ref Range    TSH 3.660 0.358 - 3.740 uIU/mL   MAGNESIUM    Collection Time: 01/06/21  3:10 AM   Result Value Ref Range    Magnesium 1.8 1.8 - 2.4 mg/dL   METABOLIC PANEL, BASIC    Collection Time: 01/06/21  3:10 AM   Result Value Ref Range    Sodium 143 136 - 145 mmol/L    Potassium 3.9 3.5 - 5.1 mmol/L    Chloride 114 (H) 98 - 107 mmol/L    CO2 23 21 - 32 mmol/L    Anion gap 6 (L) 7 - 16 mmol/L    Glucose 103 (H) 65 - 100 mg/dL    BUN 18 8 - 23 MG/DL    Creatinine 0.48 (L) 0.6 - 1.0 MG/DL    GFR est AA >60 >60 ml/min/1.73m2    GFR est non-AA >60 >60 ml/min/1.73m2    Calcium 8.7 8.3 - 10.4 MG/DL   CBC WITH AUTOMATED DIFF    Collection Time: 01/06/21  3:10 AM   Result Value Ref Range    WBC 6.5 4.3 - 11.1 K/uL    RBC 3.96 (L) 4.05 - 5.2 M/uL    HGB 11.7 11.7 - 15.4 g/dL    HCT 35.5 (L) 35.8 - 46.3 %    MCV 89.6 79.6 - 97.8 FL    MCH 29.5 26.1 - 32.9 PG    MCHC 33.0 31.4 - 35.0 g/dL    RDW 13.4 11.9 - 14.6 %    PLATELET 050 215 - 579 K/uL    MPV 10.7 9.4 - 12.3 FL    ABSOLUTE NRBC 0.00 0.0 - 0.2 K/uL    DF AUTOMATED      NEUTROPHILS 76 43 - 78 %    LYMPHOCYTES 15 13 - 44 %    MONOCYTES 7 4.0 - 12.0 %    EOSINOPHILS 2 0.5 - 7.8 %    BASOPHILS 1 0.0 - 2.0 %    IMMATURE GRANULOCYTES 0 0.0 - 5.0 %    ABS. NEUTROPHILS 4.9 1.7 - 8.2 K/UL    ABS. LYMPHOCYTES 1.0 0.5 - 4.6 K/UL    ABS. MONOCYTES 0.5 0.1 - 1.3 K/UL    ABS. EOSINOPHILS 0.1 0.0 - 0.8 K/UL    ABS. BASOPHILS 0.0 0.0 - 0.2 K/UL    ABS. IMM.  GRANS. 0.0 0.0 - 0.5 K/UL   EKG, 12 LEAD, INITIAL    Collection Time: 01/06/21  9:03 AM   Result Value Ref Range    Ventricular Rate 113 BPM    Atrial Rate 125 BPM    QRS Duration 86 ms    Q-T Interval 322 ms    QTC Calculation (Bezet) 441 ms    Calculated R Axis 33 degrees    Calculated T Axis -158 degrees    Diagnosis       Atrial fibrillation with rapid ventricular response  Septal infarct (cited on or before 31-DEC-2020)  Abnormal ECG  When compared with ECG of 06-JAN-2021 09:02,  QRS axis Shifted left  Criteria for Inferior infarct are no longer Present  Questionable change in initial forces of Anterolateral leads  Nonspecific T wave abnormality has replaced inverted T waves in Lateral leads          All Micro Results     None          Current Meds:  Current Facility-Administered Medications   Medication Dose Route Frequency    dilTIAZem (CARDIZEM) 100 mg in 0.9% sodium chloride (MBP/ADV) 100 mL infusion  0-15 mg/hr IntraVENous TITRATE    dilTIAZem ER (CARDIZEM CD) capsule 120 mg  120 mg Oral DAILY    influenza vaccine 2020-21 (6 mos+)(PF) (FLUARIX/FLULAVAL/FLUZONE QUAD) injection 0.5 mL  0.5 mL IntraMUSCular PRIOR TO DISCHARGE    losartan (COZAAR) tablet 25 mg  25 mg Oral DAILY    carvediloL (COREG) tablet 25 mg  25 mg Oral BID WITH MEALS    dilTIAZem IR (CARDIZEM) tablet 30 mg  30 mg Oral QID PRN    aspirin chewable tablet 81 mg  81 mg Oral DAILY    rivaroxaban (XARELTO) tablet 15 mg  15 mg Oral DAILY    atorvastatin (LIPITOR) tablet 20 mg  20 mg Oral QHS    lactated Ringers infusion  25 mL/hr IntraVENous CONTINUOUS    latanoprost (XALATAN) 0.005 % ophthalmic solution 1 Drop  1 Drop Both Eyes QPM    labetaloL (NORMODYNE;TRANDATE) injection 20 mg  20 mg IntraVENous Q4H PRN    LORazepam (ATIVAN) injection 0.5 mg  0.5 mg IntraVENous Q2H PRN    calcium-vitamin D (OS-GIANA +D3) 500 mg-200 unit per tablet 1 Tab  1 Tab Oral DAILY WITH BREAKFAST    dorzolamide-timoloL (COSOPT) 22.3-6.8 mg/mL ophthalmic solution 1 Drop  1 Drop Both Eyes Q12H    levothyroxine (SYNTHROID) tablet 75 mcg  75 mcg Oral ACB    pantoprazole (PROTONIX) tablet 40 mg  40 mg Oral ACB    sodium chloride (NS) flush 5-40 mL  5-40 mL IntraVENous Q8H    sodium chloride (NS) flush 5-40 mL  5-40 mL IntraVENous PRN    ondansetron (ZOFRAN) injection 4 mg  4 mg IntraVENous Q4H PRN    acetaminophen (TYLENOL) tablet 650 mg  650 mg Oral Q4H PRN    labetaloL (NORMODYNE;TRANDATE) injection 5 mg  5 mg IntraVENous Q10MIN PRN    polyethylene glycol (MIRALAX) packet 17 g  17 g Oral DAILY PRN       Diet:  DIET PUREED    Other Studies (last 24 hours):  No results found.     Assessment and Plan:     Hospital Problems as of 1/6/2021 Date Reviewed: 12/30/2020          Codes Class Noted - Resolved POA    Systolic CHF, acute ([facility] ICD-10-CM: I50.21  ICD-9-CM: 428.21, 428.0  1/2/2021 - Present Yes        * (Principal) Acute ischemic stroke ([facility] ICD-10-CM: I63.9  ICD-9-CM: 434.91  12/31/2020 - Present Yes        NSTEMI (non-ST elevated myocardial infarction) ([facility] ICD-10-CM: I21.4  ICD-9-CM: 410.70  12/31/2020 - Present Yes        Atrial fibrillation with RVR (HCC) ICD-10-CM: I48.91  ICD-9-CM: 427.31  12/31/2020 - Present Yes        Primary osteoarthritis involving multiple joints (Chronic) ICD-10-CM: M89.49  ICD-9-CM: 715.98  7/21/2017 - Present Yes        Chronic atrial fibrillation (HCC) (Chronic) ICD-10-CM: I48.20  ICD-9-CM: 427.31  7/20/2017 - Present Yes        HTN (hypertension), benign (Chronic) ICD-10-CM: I10  ICD-9-CM: 401.1  7/20/2017 - Present Yes        Hyperlipidemia, mixed (Chronic) ICD-10-CM: E78.2  ICD-9-CM: 272.2  7/20/2017 - Present Yes        Gastroesophageal reflux disease with esophagitis (Chronic) ICD-10-CM: K21.00  ICD-9-CM: 530.11  7/20/2017 - Present Yes        Hypothyroidism, adult (Chronic) ICD-10-CM: E03.9  ICD-9-CM: 244.9  7/20/2017 - Present Yes        History of CVA (cerebrovascular accident) (Chronic) ICD-10-CM: U29.47  ICD-9-CM: V12.54  7/20/2017 - Present Yes    Overview Signed 7/20/2017 11:58 AM by Margot Alva DO     2014             Age related osteoporosis (Chronic) ICD-10-CM: M81.0  ICD-9-CM: 733.01  7/20/2017 - Present Yes              A/P:    1. CVA M3 occlusion  -repeat CT head x2 no hemorrhagic conversion  -ASA  -on statin, intolerance in past but doing lower dose, seems to be tolerating so far  -appreciate SLP recs. Modified diet. Aspiration precautions  -PT/OT  -xarelto for afib  -neurology following     2. NSTEMI, new sCHF  -CP with neck radiation for a few days PTA. ST depression in lateral leads on EKG in ER. trops >23k. Echo showing EF 40-45%, severe hypokinesis of the basal-mid anteroseptal, basal-mid inferoseptal, and mid inferior wall.   -xarelto  -ASA, statin  -coreg  -losartan  -Cardiology consult     3. Afib RVR  -xarelto  -maxed on coreg  -add cardizem PO  -cardizem gtt; weaning off today     4. HTN  - cardizem oral added 1/5.    -May need to increase losartan  -IV Cardizem today for periods of A Fib with RVR  -Cardiology consult placed, waiting on recommendations     5.  FEN, dysphagia  -LR IVF supplementation 25/hr  -SLP following          Signed:  Kaitlynn Washington NP

## 2021-01-06 NOTE — PROGRESS NOTES
Bedside and Verbal shift change report given to self (oncoming nurse) by Tri Coleman (offgoing nurse). Report included the following information SBAR, Kardex and MAR.

## 2021-01-06 NOTE — ROUTINE PROCESS
Bedside report given to Yadi Payne, [address]. Opportunity for questions, concerns. Patient involved.

## 2021-01-06 NOTE — ROUTINE PROCESS
Bedside report received from Beni Contreras [facility]. Opportunity for questions, concerns. Patient involved.

## 2021-01-06 NOTE — PROGRESS NOTES
01/05/21 1948   NIH Stroke Scale   Interval Other (comment)  (shift change)   LOC 0   LOC Questions 2   LOC Commands 0   Best Gaze 1   Visual 2   Facial Palsy 1   Motor Right Arm 4   Motor Left Arm 0   Motor Right Leg 1  (Patient not following command, lifting up and down)   Motor Left Leg 0   Limb Ataxia 2   Sensory 1   Best Language 2   Dysarthria 2   Extinction and Inattention 1   Total 19

## 2021-01-06 NOTE — PROGRESS NOTES
ACUTE PHYSICAL THERAPY GOALS:  (Developed with and agreed upon by patient and/or caregiver. )  LTG:  (1.)Ms. Alvaro Lama will move from supine to sit and sit to supine , scoot up and down and roll side to side in bed with MINIMAL ASSISTANCE within 7 treatment day(s). (2.)Ms. Alvaro Lama will transfer from bed to chair and chair to bed with MODERATE ASSIST using the least restrictive device within 7 treatment day(s). (3.)Ms. Alvaro Lama will ambulate with MODERATE ASSIST for 25 feet with the least restrictive device within 7 treatment day(s). (4.)Ms. Alvaro Lama will maintain static/dynamic sitting x 15 minutes with STAND BY ASSIST for improved balance within 7 treatment days. (5.)Ms. Alvaro Lama will participate in therapeutic activity/exercises x 25 for increased strength within 7 treatment days. ________________________________________________________________________________________________      PHYSICAL THERAPY ASSESSMENT: Daily Note and PM PT Treatment Day # 4      Deloris Engle is a 80 y.o. female   PRIMARY DIAGNOSIS: Acute ischemic stroke ([facility] [address]  Acute ischemic stroke ([facility] [address] [I63.9]       Reason for Referral:    ICD-10: Treatment Diagnosis: Generalized Muscle Weakness (M62.81)  Difficulty in walking, Not elsewhere classified (R26.2)  [place]PAT[place] / [place] / Product Type: Medicare /     ASSESSMENT:     REHAB RECOMMENDATIONS:   Recommendation to date pending progress:  Setting:   Short-term Rehab  Equipment:    To Be Determined     PRIOR LEVEL OF FUNCTION:  (Prior to Hospitalization) INITIAL/CURRENT LEVEL OF FUNCTION:  (Most Recently Demonstrated)   Bed Mobility:   Independent  Sit to Stand:   Independent  Transfers:   Independent  Gait/Mobility:   Independent Bed Mobility:   Total Assistance x 2  Sit to Stand:   Maximal Assistance x 2  Transfers:   Maximal Assistance x 2  Gait/Mobility:   Unable to perform     ASSESSMENT:   Ms. Alvaro Lama presents supine in bed.   Bed mobility is max assist but the patient is trying to assist.  Scooting to the edge of the bed with total assist.  Sitting balance good after set up. Patient sat edge of bed with good balance. Somewhat antsy. Stood several time with improved posture each time. Patient has noted weight bearing through bilateral LE but of course better on the left. Some weight shifting with mod to max assist.   Patient supine with assist x 2 however the patient did assist with positioning. Following some commands. Pt non-verbal throughout but moaned with mobility and is trying to communicate. Bed alarm intact. Making progress. Ms. Joan Wilson could benefit from skilled PT as she is currently functioning below her baseline.   Rehab would be beneficial at d/c.         SUBJECTIVE:   Ms. Joan Wilson states, Nonverbal    SOCIAL HISTORY/LIVING ENVIRONMENT: RN reported independent PTA     OBJECTIVE:     PAIN: VITAL SIGNS: LINES/DRAINS:   Pre Treatment: Pain Screen  Pain Scale 1: Adult Nonverbal Pain Scale  Post Treatment: 0   IV  O2 Device: Room air     GROSS EVALUATION:  B LEs Within Functional Limits Abnormal/ Functional Abnormal/ Non-Functional (see comments) Not Tested Comments:   AROM [] [x] [] []    PROM [] [] [] []    Strength [] [] [x] [] R LE spontaneous movement observed   Balance [] [] [x] [] FAIR-POOR sitting and POOR standing   Posture [] [] [] []    Sensation [] [] [] []    Coordination [] [] [x] []    Tone [] [] [] []    Edema [] [] [] []    Activity Tolerance [] [] [x] [] Unable to stand for long    [] [] [] []      COGNITION/  PERCEPTION: Intact Impaired   (see comments) Comments:   Orientation [] [x] Nonverbal    Vision [] []    Hearing [] []    Command Following [] [x] None on R side   Safety Awareness [] [x] Agitated    [] []      MOBILITY: I Mod I S SBA CGA Min Mod Max Total  NT x2 Comments:   Bed Mobility    Rolling [] [] [] [] [] [] [x] [x] [] [] []    Supine to Sit [] [] [] [] [] [] [x] [x] [x] [] []    Scooting [] [] [] [] [] [] [x] [x] [] [] []    Sit to Supine [] [] [] [] [] [] [x] [x] [] [] []    Transfers    Sit to Stand [] [] [] [] [] [] [x] [x] [] [] []    Bed to Chair [] [] [] [] [] [] [x] [x] [] [] []    Stand to Sit [] [] [] [] [] [] [x] [x] [] [] []    I=Independent, Mod I=Modified Independent, S=Supervision, SBA=Standby Assistance, CGA=Contact Guard Assistance,   Min=Minimal Assistance, Mod=Moderate Assistance, Max=Maximal Assistance, Total=Total Assistance, NT=Not Tested  GAIT: I Mod I S SBA CGA Min Mod Max Total  NT x2 Comments:   Level of Assistance [] [] [] [] [] [] [] [] [] [x] []    Distance NT    DME N/A    Gait Quality nT    I=Independent, Mod I=Modified Independent, S=Supervision, SBA=Standby Assistance, CGA=Contact Guard Assistance,   Min=Minimal Assistance, Mod=Moderate Assistance, Max=Maximal Assistance, Total=Total Assistance, NT=Not Tested    [address] [facility] [address] [address] [facility]       How much difficulty does the patient currently have. .. Unable A Lot A Little None   1. Turning over in bed (including adjusting bedclothes, sheets and blankets)? [x] 1   [] 2   [] 3   [] 4   2. Sitting down on and standing up from a chair with arms ( e.g., wheelchair, bedside commode, etc.)   [x] 1   [] 2   [] 3   [] 4   3. Moving from lying on back to sitting on the side of the bed? [x] 1   [] 2   [] 3   [] 4   How much help from another person does the patient currently need. .. Total A Lot A Little None   4. Moving to and from a bed to a chair (including a wheelchair)? [x] 1   [] 2   [] 3   [] 4   5. Need to walk in hospital room? [x] 1   [] 2   [] 3   [] 4   6. Climbing 3-5 steps with a railing? [x] 1   [] 2   [] 3   [] 4   © 2007, Trustees of [address] [place] [address] [address] [address], under license to [employer].  All rights reserved     Score:  Initial: 6 Most Recent: X (Date: -- )    Interpretation of Tool:  Represents activities that are increasingly more difficult (i.e. Bed mobility, Transfers, Gait).    PLAN:   FREQUENCY/DURATION: PT Plan of Care: 3 times/week for duration of hospital stay or until stated goals are met, whichever comes first.    PROBLEM LIST:   (Skilled intervention is medically necessary to address:)  1. Decreased ADL/Functional Activities  2. Decreased Activity Tolerance  3. Decreased AROM/PROM  4. Decreased Balance  5. Decreased Cognition  6. Decreased Coordination  7. Decreased Gait Ability  8. Decreased Strength  9. Decreased Transfer Abilities   INTERVENTIONS PLANNED:   (Benefits and precautions of physical therapy have been discussed with the patient.)  1. Therapeutic Activity  2. Therapeutic Exercise/HEP  3. Neuromuscular Re-education  4. Gait Training  5. Manual Therapy  6. Education     TREATMENT:       TREATMENT:   ($$ Neuromuscular Re-education: 23-37 mins   )  Neuromuscular Re-education (24 Minutes): Neuromuscular Re-education included Balance Training, Coordination training, Postural training, Sitting balance training and Standing balance training to improve Balance, Coordination, Functional Mobility, Postural Control and Proprioception. OT present for part of the treatment session.      AFTER TREATMENT POSITION/PRECAUTIONS:  Alarm Activated, Bed and RN notified    INTERDISCIPLINARY COLLABORATION:  RN/PCT and PT/PTA    TOTAL TREATMENT DURATION:  PT Patient Time In/Time Out  Time In: 1358  Time Out: 1559 [facility], PTA
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
## 2021-01-06 NOTE — PROGRESS NOTES
This CM spoke with pt daughter this morning. Informed her that unfortunately, [facility] is not able to offer STR bed at this time. Informed that [address] [facility] if interested. Daughter is agreeable to semi private room. [address] [facility] selected in [facility]. Per MD notes, pending pt progress, pt should be medically stable for discharge in the next 1-2 days tentatively. No additional CM needs at this time. Will continue to follow and update as needed.

## 2021-01-06 NOTE — PROGRESS NOTES
Problem: Dysphagia (Adult)  Goal: *Acute Goals and Plan of Care (Insert Text)  Description:  STG: Pt will participate in speech/language/cognition evaluation with 100% compliance. MET 1/5/21  STG: Patient will consume pureed diet and honey-thick liquids by spoon with no overt signs or symptoms of airway compromise. Goal added 1/4/21  LTG: Pt will tolerate safest/least restrictive diet with no s/sx of airway compromise. LTG: Pt will communicate effectively/efficiently within direct environment with family/caregivers. STG: Patient will gesture to express basic wants and needs with 80% accuracy with moderate cues. ADDED 1/5/21  STG: Patient will vocalize on command on 80% of trials with moderate cues ADDED 1/5/21. STG: Patient will follow 1 step commands with 80% accuracy given visual model and moderate verbal cues ADDED 1/5/21  STG: Patient will participate in ongoing therapeutic assessment to assist with development of functional goals. ADDED 1/5/21  Outcome: Progressing Towards Goal- Goals updated 1/5/21. Completed goals removed.       SPEECH LANGUAGE PATHOLOGY: DYSPHAGIA AND SPEECH-LANGUAGE/COGNITION: Daily Note 1    NAME/AGE/GENDER: Nish Carrasco is a 80 y.o. female  DATE: 1/6/2021  PRIMARY DIAGNOSIS: Acute ischemic stroke ([facility] [I63.9]      ICD-10: Treatment Diagnosis: R13.12 Dysphagia, Oropharyngeal Phase    RECOMMENDATIONS   DIET:    PO:  Pureed   Liquids:  honey   by tsp only    MEDICATIONS: Crushed in puree     ASPIRATION PRECAUTIONS  · Slow rate of intake  · Small bites/sips     COMPENSATORY STRATEGIES/MODIFICATIONS  · Alternate liquids/solids  · Tsp sips only     EDUCATION:Recommendations discussed with patient and patient's granddaughter    CONTINUATION OF SKILLED SERVICES/MEDICAL NECESSITY:   Patient is expected to demonstrate progress in  swallow strength, swallow timeliness, swallow function, diet tolerance and swallow safety in order to  improve swallow safety, work toward diet advancement and decrease aspiration risk.  Patient is expected to demonstrate progress in expressive communication and receptive ability to decrease assistance required communication, increase independence with activities of daily living and increase communication with family/caregivers.  Patient continues to require skilled intervention due to dysphagia and aphasia. RECOMMENDATIONS for CONTINUED SPEECH THERAPY: YES: Anticipate need for ongoing speech therapy during this hospitalization and at next level of care. ASSESSMENT   Language: Patient continues to demonstrate severe expressive/receptive aphasia. Expressive language remains profoundly impaired with only some vocalizing/groaning, but remains non verbal. Receptive deficits appeared more impaired this date as evidenced by essentially no command following and minimal benefit from model, no functional response to yes/no questions via head nod as patient only occasionally vocalizing but unable to differentiate yes versus no, and impaired accuracy in selecting item from visual field of 2. Per granddaughter, patient not accepting bfast or meds and \"heart rate has been up\". Single bite observed to be eaten off meal tray; same bite appeared to be what was removed from right buccal cavity with swab. Attempted dysphagia treatment/diettolerance, however patient only accepted single tsp of honey thick liquid with anterior loss of majority of bolus then refused other trials. Will follow up for ongoing tolerance. Patient may benefit from alternate means of nutrition as likely will be difficult to meet nutritional needs on current diet. Recommend continued speech therapy during this hospitalization and next level of care to address aphasia and dysphagia as patient is functioning significantly below baseline.      REHABILITATION POTENTIAL FOR STATED GOALS: Good    PLAN    FREQUENCY/DURATION: Continue to follow patient 3 times a week for duration of hospital stay to address above goals. - Recommendations for next treatment session: Next treatment will address dysphagia, aphasia    SUBJECTIVE   Upright in bed. Granddaughter at bedside reports patient spit out some meds this AM and refused breakfast. Single bite of egg eaten off tray, which also appeared to be the egg in patient's right cheek. History of Present Injury/Illness: Ms. Trisha Levy  has a past medical history of Age related osteoporosis (2017), Chronic atrial fibrillation ([address] (2017), Chronic pain, DDD (degenerative disc disease), lumbar (2017), Gastroesophageal reflux disease with esophagitis (2017), Glaucoma (2017), History of CVA (cerebrovascular accident) (2017), HTN (hypertension), benign (2017), Hyperlipidemia, mixed (2017), Hypertension, Hypothyroidism, adult (2017), IFG (impaired fasting glucose) (2017), Primary osteoarthritis involving multiple joints (2017), and PUD (peptic ulcer disease). . She also  has a past surgical history that includes hx colonoscopy; hx hermes and bso; and hx  section. Problem List:  (Impairments causing functional limitations):  1. Dysphagia  2. Aphasia    Orientation:   Unable to verbalize. No response to yes/no questiosn    Pain: Pain Scale 1: Visual  Pain Intensity 1: 0    OBJECTIVE   Swallow treatment: Not addressed as patient not accepting trials. Reviewed results and recommendations from modified barium swallow study on 21 (puree diet/honey thick liquids via tsp) with granddaughter. Aphasia treatment:   - command followin% independently; with MAX cues patient attempted to follow 2 commands across session.   - Yes/ no questions: 0% patient vocalizing some but unable to determine if yes/no and would no nod head despite model.    ID object F2: 2/5 (appeared to be unreliable)  Point to object: 1/5   Patient turning head away from SLP upon attempts to give tactile cues for patient to attend to model INTERDISCIPLINARY COLLABORATION: n/a  PRECAUTIONS/ALLERGIES: Actonel [risedronate], Atorvastatin, Fosamax [alendronate], Ketoprofen, and Moexipril     Tool Used: Dysphagia Outcome and Severity Scale (DINH)    Score Comments   Normal Diet  [] 7 With no strategies or extra time needed   Functional Swallow  [] 6 May have mild oral or pharyngeal delay   Mild Dysphagia  [] 5 Which may require one diet consistency restricted    Mild-Moderate Dysphagia  [] 4 With 1-2 diet consistencies restricted   Moderate Dysphagia  [] 3 With 2 or more diet consistencies restricted   Moderate-Severe Dysphagia  [] 2 With partial PO strategies (trials with ST only)   Severe Dysphagia  [] 1 With inability to tolerate any PO safely      Score:  Initial: 2 Most Recent: 3 (Date 01/06/21 )   Interpretation of Tool: The Dysphagia Outcome and Severity Scale (DINH) is a simple, easy-to-use, 7-point scale developed to systematically rate the functional severity of dysphagia based on objective assessment and make recommendations for diet level, independence level, and type of nutrition. Tool Used: MODIFIED NAN SCALE (mRS)   Score   No Symptoms  [] 0   No significant disability despite symptoms; able to carry out all usual duties and activities  [] 1   Slight disability; unable to carry out all previous activities but able to look after own affairs without assistance. [] 2   Moderate disability; requiring some help but able to walk without assistance  [] 3   Moderately severe disability; unable to walk without assistance and unable to attend to own bodily needs without assistance  [] 4   Severe disability; bedridden, incontinent, and requiring constant nursing care and attention  [] 5      Score:  Initial: 4    Interpretation of Tool: The Modified [place] Scale is a 7-point scaled used to quantify level of disability as it relates to a patient's functional abilities.          SAFETY:  After treatment position/precautions:  · Upright in bed  · granddaughter at bedside    Total Treatment Duration:   Time In: 8345  Time Out: [facility] , [facility], [facility] AMY [facility], CCC-SLP

## 2021-01-06 NOTE — ROUTINE PROCESS
Bedside report received from [facility]. Opportunity for questions, concerns. Patient involved.

## 2021-01-06 NOTE — PROGRESS NOTES
01/06/21 0720   NIH Stroke Scale   Interval Other (comment)  (shift change)   LOC 0   LOC Questions 2   LOC Commands 0   Best Gaze 1   Visual 2   Facial Palsy 1   Motor Right Arm 3   Motor Left Arm 0   Motor Right Leg 0   Motor Left Leg 0   Limb Ataxia 1   Sensory 1   Best Language 2   Dysarthria 2   Extinction and Inattention 1   Total 16

## 2021-01-06 NOTE — PROGRESS NOTES
Bedside and Verbal shift change report given to self (oncoming nurse) by Cem Ferraro (offgoing nurse). Report included the following information SBAR, Kardex and MAR.

## 2021-01-07 VITALS
HEART RATE: 107 BPM | HEIGHT: 59 IN | SYSTOLIC BLOOD PRESSURE: 127 MMHG | RESPIRATION RATE: 16 BRPM | WEIGHT: 108.3 LBS | OXYGEN SATURATION: 97 % | TEMPERATURE: 97.5 F | BODY MASS INDEX: 21.83 KG/M2 | DIASTOLIC BLOOD PRESSURE: 67 MMHG

## 2021-01-07 LAB
ANION GAP SERPL CALC-SCNC: 5 MMOL/L (ref 7–16)
BASOPHILS # BLD: 0 K/UL (ref 0–0.2)
BASOPHILS NFR BLD: 1 % (ref 0–2)
BUN SERPL-MCNC: 12 MG/DL (ref 8–23)
CALCIUM SERPL-MCNC: 8.3 MG/DL (ref 8.3–10.4)
CHLORIDE SERPL-SCNC: 110 MMOL/L (ref 98–107)
CO2 SERPL-SCNC: 26 MMOL/L (ref 21–32)
COVID-19 RAPID TEST, COVR: NOT DETECTED
CREAT SERPL-MCNC: 0.41 MG/DL (ref 0.6–1)
DIFFERENTIAL METHOD BLD: NORMAL
EOSINOPHIL # BLD: 0.2 K/UL (ref 0–0.8)
EOSINOPHIL NFR BLD: 4 % (ref 0.5–7.8)
ERYTHROCYTE [DISTWIDTH] IN BLOOD BY AUTOMATED COUNT: 13.2 % (ref 11.9–14.6)
GLUCOSE SERPL-MCNC: 93 MG/DL (ref 65–100)
HCT VFR BLD AUTO: 36.1 % (ref 35.8–46.3)
HGB BLD-MCNC: 12.2 G/DL (ref 11.7–15.4)
IMM GRANULOCYTES # BLD AUTO: 0 K/UL (ref 0–0.5)
IMM GRANULOCYTES NFR BLD AUTO: 0 % (ref 0–5)
LYMPHOCYTES # BLD: 1.1 K/UL (ref 0.5–4.6)
LYMPHOCYTES NFR BLD: 21 % (ref 13–44)
MAGNESIUM SERPL-MCNC: 1.9 MG/DL (ref 1.8–2.4)
MCH RBC QN AUTO: 29.5 PG (ref 26.1–32.9)
MCHC RBC AUTO-ENTMCNC: 33.8 G/DL (ref 31.4–35)
MCV RBC AUTO: 87.2 FL (ref 79.6–97.8)
MONOCYTES # BLD: 0.4 K/UL (ref 0.1–1.3)
MONOCYTES NFR BLD: 7 % (ref 4–12)
NEUTS SEG # BLD: 3.5 K/UL (ref 1.7–8.2)
NEUTS SEG NFR BLD: 67 % (ref 43–78)
NRBC # BLD: 0 K/UL (ref 0–0.2)
PLATELET # BLD AUTO: 231 K/UL (ref 150–450)
PMV BLD AUTO: 9.5 FL (ref 9.4–12.3)
POTASSIUM SERPL-SCNC: 3.3 MMOL/L (ref 3.5–5.1)
RBC # BLD AUTO: 4.14 M/UL (ref 4.05–5.2)
SARS COV-2, XPGCVT: NEGATIVE
SODIUM SERPL-SCNC: 141 MMOL/L (ref 136–145)
SOURCE, COVRS: NORMAL
WBC # BLD AUTO: 5.1 K/UL (ref 4.3–11.1)

## 2021-01-07 PROCEDURE — 85025 COMPLETE CBC W/AUTO DIFF WBC: CPT

## 2021-01-07 PROCEDURE — 74011250637 HC RX REV CODE- 250/637: Performed by: INTERNAL MEDICINE

## 2021-01-07 PROCEDURE — 87635 SARS-COV-2 COVID-19 AMP PRB: CPT

## 2021-01-07 PROCEDURE — 97110 THERAPEUTIC EXERCISES: CPT

## 2021-01-07 PROCEDURE — 92526 ORAL FUNCTION THERAPY: CPT

## 2021-01-07 PROCEDURE — 83735 ASSAY OF MAGNESIUM: CPT

## 2021-01-07 PROCEDURE — 97112 NEUROMUSCULAR REEDUCATION: CPT

## 2021-01-07 PROCEDURE — 36415 COLL VENOUS BLD VENIPUNCTURE: CPT

## 2021-01-07 PROCEDURE — 80048 BASIC METABOLIC PNL TOTAL CA: CPT

## 2021-01-07 PROCEDURE — 74011000250 HC RX REV CODE- 250: Performed by: INTERNAL MEDICINE

## 2021-01-07 RX ORDER — POLYETHYLENE GLYCOL 3350 17 G/17G
17 POWDER, FOR SOLUTION ORAL DAILY
Qty: 30 PACKET | Refills: 0 | Status: SHIPPED
Start: 2021-01-07 | End: 2021-02-06

## 2021-01-07 RX ORDER — LOSARTAN POTASSIUM 25 MG/1
25 TABLET ORAL DAILY
Qty: 30 TAB | Refills: 0 | Status: SHIPPED
Start: 2021-01-07 | End: 2021-02-06

## 2021-01-07 RX ORDER — ATORVASTATIN CALCIUM 20 MG/1
20 TABLET, FILM COATED ORAL
Qty: 30 TAB | Refills: 0 | Status: SHIPPED
Start: 2021-01-07 | End: 2021-03-18 | Stop reason: ALTCHOICE

## 2021-01-07 RX ORDER — CARVEDILOL 25 MG/1
25 TABLET ORAL 2 TIMES DAILY WITH MEALS
Qty: 60 TAB | Refills: 0 | Status: SHIPPED
Start: 2021-01-07 | End: 2021-02-06

## 2021-01-07 RX ORDER — GUAIFENESIN 100 MG/5ML
81 LIQUID (ML) ORAL DAILY
Qty: 30 TAB | Refills: 0 | Status: SHIPPED
Start: 2021-01-07 | End: 2021-02-06

## 2021-01-07 RX ORDER — DILTIAZEM HYDROCHLORIDE 5 MG/ML
10 INJECTION INTRAVENOUS ONCE
Status: DISCONTINUED | OUTPATIENT
Start: 2021-01-07 | End: 2021-01-07 | Stop reason: HOSPADM

## 2021-01-07 RX ORDER — DILTIAZEM HYDROCHLORIDE 120 MG/1
120 CAPSULE, COATED, EXTENDED RELEASE ORAL DAILY
Qty: 30 CAP | Refills: 0 | Status: SHIPPED
Start: 2021-01-07 | End: 2021-03-18 | Stop reason: ALTCHOICE

## 2021-01-07 RX ADMIN — LEVOTHYROXINE SODIUM 75 MCG: 0.07 TABLET ORAL at 05:41

## 2021-01-07 RX ADMIN — Medication 10 ML: at 05:40

## 2021-01-07 RX ADMIN — LABETALOL HYDROCHLORIDE 20 MG: 5 INJECTION INTRAVENOUS at 03:27

## 2021-01-07 RX ADMIN — LOSARTAN POTASSIUM 25 MG: 25 TABLET, FILM COATED ORAL at 09:43

## 2021-01-07 RX ADMIN — PANTOPRAZOLE SODIUM 40 MG: 40 TABLET, DELAYED RELEASE ORAL at 05:41

## 2021-01-07 RX ADMIN — ASPIRIN 81 MG: 81 TABLET, CHEWABLE ORAL at 09:43

## 2021-01-07 RX ADMIN — RIVAROXABAN 15 MG: 15 TABLET, FILM COATED ORAL at 09:44

## 2021-01-07 RX ADMIN — DORZOLAMIDE HYDROCHLORIDE AND TIMOLOL MALEATE 1 DROP: 20; 5 SOLUTION/ DROPS OPHTHALMIC at 09:44

## 2021-01-07 RX ADMIN — CARVEDILOL 25 MG: 25 TABLET, FILM COATED ORAL at 09:43

## 2021-01-07 RX ADMIN — DILTIAZEM HYDROCHLORIDE 120 MG: 120 CAPSULE, COATED, EXTENDED RELEASE ORAL at 09:43

## 2021-01-07 NOTE — PROGRESS NOTES
Discharge instructions reviewed with patient and daughter. Prescriptions given for sent to [facility] and med info sheets provided for all new medications. Opportunity for questions provided. Patient and dsughter voiced understanding of all discharge instructions.

## 2021-01-07 NOTE — PROGRESS NOTES
TRANSFER - OUT REPORT:    Verbal report given to Vernell(name) on Aileen Alaniz  being transferred to [facility] (unit) for routine progression of care       Report consisted of patients Situation, Background, Assessment and   Recommendations(SBAR). Information from the following report(s) SBAR, Kardex and MAR was reviewed with the receiving nurse. Lines:       Opportunity for questions and clarification was provided.

## 2021-01-07 NOTE — PROGRESS NOTES
Problem: Patient Education: Go to Patient Education Activity  Goal: Patient/Family Education  Outcome: Progressing Towards Goal     Problem: Falls - Risk of  Goal: *Absence of Falls  Description: Document Buck Knapp Fall Risk and appropriate interventions in the flowsheet. Outcome: Progressing Towards Goal  Note: Fall Risk Interventions:  Mobility Interventions: Bed/chair exit alarm, Communicate number of staff needed for ambulation/transfer, Patient to call before getting OOB    Mentation Interventions: Bed/chair exit alarm, Adequate sleep, hydration, pain control, Door open when patient unattended, Evaluate medications/consider consulting pharmacy, Update white board, Reorient patient    Medication Interventions: Bed/chair exit alarm, Evaluate medications/consider consulting pharmacy, Patient to call before getting OOB, Teach patient to arise slowly    Elimination Interventions: Bed/chair exit alarm, Call light in reach, Patient to call for help with toileting needs, Toileting schedule/hourly rounds    History of Falls Interventions: Bed/chair exit alarm, Door open when patient unattended, Evaluate medications/consider consulting pharmacy, Investigate reason for fall, Room close to nurse's station         Problem: Patient Education: Go to Patient Education Activity  Goal: Patient/Family Education  Outcome: Progressing Towards Goal     Problem: Pressure Injury - Risk of  Goal: *Prevention of pressure injury  Description: Document [place] Scale and appropriate interventions in the flowsheet.   Outcome: Progressing Towards Goal  Note: Pressure Injury Interventions:  Sensory Interventions: Assess changes in LOC, Keep linens dry and wrinkle-free, Minimize linen layers, Pressure redistribution bed/mattress (bed type)    Moisture Interventions: Absorbent underpads    Activity Interventions: Pressure redistribution bed/mattress(bed type), PT/OT evaluation    Mobility Interventions: HOB 30 degrees or less, Pressure redistribution bed/mattress (bed type), PT/OT evaluation    Nutrition Interventions: Document food/fluid/supplement intake    Friction and Shear Interventions: Lift sheet                Problem: Patient Education: Go to Patient Education Activity  Goal: Patient/Family Education  Outcome: Progressing Towards Goal

## 2021-01-07 NOTE — DISCHARGE SUMMARY
Hospitalist Discharge Summary     Admit Date:  2020 10:23 AM   Name:  Carmen Hutson   Age:  80 y.o.  :  10/23/1931   MRN:  [id number]   PCP:  Raheem Dickson DO  Treatment Team: Attending Provider: Arthur Echevarria MD; Care Manager: Bill Colon; Nurse Practitioner: Rafael Aguilar NP; Physical Therapy Assistant: Elkin Morin; Occupational Therapy Assistant: MAYURI Tello    Problem List for this Hospitalization:  Hospital Problems as of 2021 Date Reviewed: 2020          Codes Class Noted - Resolved POA    Systolic CHF, acute ([facility] ICD-10-CM: I50.21  ICD-9-CM: 428.21, 428.0  2021 - Present Yes        * (Principal) Acute ischemic stroke ([facility] ICD-10-CM: I63.9  ICD-9-CM: 434.91  2020 - Present Yes        NSTEMI (non-ST elevated myocardial infarction) ([facility] ICD-10-CM: I21.4  ICD-9-CM: 410.70  2020 - Present Yes        Atrial fibrillation with RVR ([facility] ICD-10-CM: I48.91  ICD-9-CM: 427.31  2020 - Present Yes        Primary osteoarthritis involving multiple joints (Chronic) ICD-10-CM: M89.49  ICD-9-CM: 715.98  2017 - Present Yes        Chronic atrial fibrillation ([facility] (Chronic) ICD-10-CM: I48.20  ICD-9-CM: 427.31  2017 - Present Yes        HTN (hypertension), benign (Chronic) ICD-10-CM: I10  ICD-9-CM: 401.1  2017 - Present Yes        Hyperlipidemia, mixed (Chronic) ICD-10-CM: E78.2  ICD-9-CM: 272.2  2017 - Present Yes        Gastroesophageal reflux disease with esophagitis (Chronic) ICD-10-CM: K21.00  ICD-9-CM: 530.11  2017 - Present Yes        Hypothyroidism, adult (Chronic) ICD-10-CM: E03.9  ICD-9-CM: 244.9  2017 - Present Yes        History of CVA (cerebrovascular accident) (Chronic) ICD-10-CM: Z86.73  ICD-9-CM: V12.54  2017 - Present Yes    Overview Signed 2017 11:58 AM by Raheem Dickson DO     2014             Age related osteoporosis (Chronic) ICD-10-CM: M81.0  ICD-9-CM: 733.01  2017 - Present Yes                Admission HPI from 12/31/2020:    Pt is an 81 y/o F with HTN, HLD intolerant to statins, hx CVA, afib on xarelto, who presented to ER with R sided weakness and unresponsiveness. Daughter said pt was in usual state of health this morning until around 730am.  At that point she noticed pt had stopped responding, was having trouble moving her R side. EMS was called. She had code S and CTA showed M3 occlusion but too distal to intervene mechanically. She is compliant with meds including xarelto so no TPA. Troponin came back >23k. Daughter did report pt having CP radiating up her neck often, off and on the past few days, significantly bothersome enough for her to complain multiple times a day. Daughter does not think she has had any other symptoms. Admitted with CVA, afib RVR, and NSTEMI. Neuro and cardio consulted. Hospital Course:  Cardizem and Coreg dosing adjusted during hospitalization. She is discharging on Xarelto for anticoagulation. She remains aphasic. Having some periods of agitation but easily redirected. K+ 3.3, replacement ordered. She is discharging to [facility] in stable condition. CBC and BMP next lab day at the facility and he will follow up with the facility provider next visit day. Follow up instructions and discharge meds at bottom of this note. Plan was discussed with CM, nursing. All questions answered. Patient was stable at time of discharge. 10 systems reviewed and negative except as noted in HPI.     Diagnostic Imaging/Tests:   Ct Perf W Cbf    Result Date: 12/31/2020  EXAMINATION: CT PERFUSION DATE: 12/31/2020 10:49 AM ACCESSION NUMBER:  [id number] INDICATION: : acute neuro changes, possible LVAO COMPARISON: Same-day head CT and CTA head and neck TECHNIQUE: CT perfusion of the brain was obtained after the administration of intravenous contrast. Perfusion maps and perfusion analysis output were generated using the [employer] perfusion processing software algorithm. Radiation dose reduction techniques were used for this study:  Our CT scanners use one or all of the following: Automated exposure control, adjustment of the mA and/or kVp according to patient's size, iterative reconstruction. FINDINGS: There is a focus of Tmax greater than 6 seconds in the left parietal lobe measuring 59 cc. There is a focus of cerebral blood flow less than 30 percent in the left parietal lobe in a similar distribution measuring 29 cc. There is a mismatch volume of 30 cc with a mismatch ratio of 2. Widespread areas of Tmax greater than 4 are likely artifactual or due to chronic oligemia. IMPRESSION: 1. There is an acute infarction in the left parietal lobe in the posterior left MCA distribution. Core infarction volume measures 29 cc. Mismatch volume measures 30 cc. 2. This infarction corresponds to to the left M2 occlusion and the sylvian fissure on axial image 419 of the concurrent CT angiogram. 3. Neuro interventional evaluation is recommended. Discussed with Dr. Mahi Dudley by Dr. Yosvany Dee at 11:35 AM 12/31/2020. VOICE DICTATED BY: Dr. Montez Mcmahon Date: 12/31/2020    Addendum: Addendum: There is occlusion of an M2 branch on the left in the sylvian fissure. This was reported to the bedside team by Dr. Yosvany Dee. Result Date: 12/31/2020  History: Right-sided weakness and left-sided gaze acute onset code stroke. FINDINGS: CT angiography was performed of the neck and head with contrast and three-dimensional CT angiography reconstruction and reformat was performed. NASCET criteria as needed. CT dose reduction was achieved through use of a standardized protocol tailored for this examination and automatic exposure control for dose modulation. Study analyzed by the [employer] software package per the hospital protocol. The results of the analysis are neither reviewed nor provided in this exam interpretation and report. Atherosclerosis of the aortic arch. The left subclavian artery is atherosclerotic but patent. The left vertebral artery is patent. The innominate artery is patent. The right subclavian artery is patent. There is a stenosis at the origin of the right vertebral artery. Atherosclerosis in the intracranial segment of the right vertebral artery. The basilar artery is patent. The left posterior communicating artery provides the dominant flow to the left posterior cerebral artery with a hypoplastic P1 segment on the left. The right posterior communicating artery provides the dominant flow to the right posterior cerebral artery which is patent. Hypoplastic P1 segment on the right. The right common carotid artery has concentric atherosclerosis at the proximal segment without hemodynamically significant stenosis. The left internal carotid artery is patent with no significant stenosis. The right common carotid artery is patent. The right internal carotid artery is patent. The anterior cerebral arteries are patent bilaterally. The right middle cerebral artery is patent. The left middle cerebral artery is patent. Dural venous sinuses are patent. IMPRESSION: No evidence of acute arterial occlusive disease. No hemodynamically significant carotid artery stenosis. Ct Code Neuro Head Wo Contrast    Result Date: 12/31/2020  History: acute neuro changes. Right-sided deficits. Exam: CT head without contrast Technique: Thin section axial CT images were obtained from the skullbase through the vertex. Radiation dose reduction techniques were used for this study. Our CT scanners use one or all of the following: Automated exposure control, adjustment of the mA and/or kV according to patient size, use of iterative reconstruction. Findings: The ventricles are normal in size, shape, and position. Chronic appearing white matter change noted in the corona radiata and centrum semiovale. No extra-axial fluid collection is present.  There is no mass or mass-effect. The basilar cisterns are patent. The paranasal sinuses and mastoid air cells are clear. Impression: Chronic appearing white matter change noted without acute intracranial abnormality. Echocardiogram results:  Results for orders placed or performed during the hospital encounter of 20   2D ECHO COMPLETE ADULT (TTE) [address]  [address] [facility], [address] [facility]  [phone]    Transthoracic Echocardiogram  2D, M-mode, Doppler, and Color Doppler    Patient: Jocelynn Quiroga  MR #: [id number]  : 23-Oct-1931  Age: 80 years  Gender: Female  Study date: 31-Dec-2020  Account #: [de-identified]  Height: 58.7 in  Weight: 113.7 lb  BSA: 1.45 mï¾²  Status:Routine  Location: ERB  BP: 153/ 91    Allergies: RISEDRONATE, ATORVASTATIN, ALENDRONATE, KETOPROFEN, MOEXIPRIL    Sonographer:  Karen Staley [facility]  Group:  [facility]  Referring Physician:  Avery Hernandez. Kishor Taylor MD  Reading Physician:  Steffi Pritchard [address] MD [facility]    INDICATIONS: CVA. *Patient confused and moving throughout study. *    PROCEDURE: This was a routine study. A transthoracic echocardiogram was  performed. The study included complete 2D imaging, M-mode, complete spectral  Doppler, and color Doppler. Intravenous contrast (Definity) was administered. Intravenous contrast (agitated saline) was administered. Echocardiographic  views were limited by poor patient compliance and poor acoustic window  availability. Image quality was adequate. LEFT VENTRICLE: Size was normal. Systolic function was mildly to moderately  reduced. Ejection fraction was estimated in the range of 40 % to 45 %. There  was severe hypokinesis of the basal-mid anteroseptal, basal-mid inferoseptal,  and mid inferior wall(s). Wall thickness was normal. The study was not  technically sufficient to allow evaluation of LV diastolic function.     RIGHT VENTRICLE: The size was at the upper limits of normal. Systolic   function  was low normal. Estimated peak pressure was in the range of 40-45 mmHg. LEFT ATRIUM: The atrium was markedly dilated. ATRIAL SEPTUM: Contrast injection was performed. There was no right-to-left  shunt, with provocative maneuvers to increase right atrial pressure. RIGHT ATRIUM: The atrium was markedly dilated. SYSTEMIC VEINS: IVC: The inferior vena cava was dilated. The respirophasic  change in diameter was less than 50%. AORTIC VALVE: The valve was trileaflet. Leaflets exhibited mild to moderate  sclerosis. There was no evidence for stenosis. There was mild to moderate  regurgitation. MITRAL VALVE: Valve structure was normal. There was no evidence for stenosis. There was mild to moderate regurgitation. TRICUSPID VALVE: The valve structure was normal. There was no evidence for  stenosis. There was moderate regurgitation. PULMONIC VALVE: The valve structure was normal. There was no evidence for  stenosis. There was mild regurgitation. PERICARDIUM: There was no pericardial effusion. AORTA: The root exhibited normal size. SUMMARY:    -  Left ventricle: Systolic function was mildly to moderately reduced. Ejection  fraction was estimated in the range of 40 % to 45 %. There was severe  hypokinesis of the basal-mid anteroseptal, basal-mid inferoseptal, and mid  inferior wall(s). -  Right ventricle: The size was at the upper limits of normal.    -  Left atrium: The atrium was markedly dilated. -  Atrial septum: Contrast injection was performed. There was no   right-to-left  shunt, with provocative maneuvers to increase right atrial pressure.    -  Right atrium: The atrium was markedly dilated. -  Inferior vena cava, hepatic veins: The inferior vena cava was dilated. The  respirophasic change in diameter was less than 50%. -  Aortic valve: There was mild to moderate regurgitation.    -  Mitral valve:  There was mild to moderate regurgitation.    -  Tricuspid valve: There was moderate regurgitation. SYSTEM MEASUREMENT TABLES    2D mode  AoR Diam (2D): 3 cm  Left Atrium Systolic Volume Index; Method of Disks, Biplane; 2D mode;: 61.5  ml/m2  IVS/LVPW (2D): 0.9  IVSd (2D): 0.9 cm  LVIDd (2D): 5.1 cm  LVIDs (2D): 3.8 cm  LVPWd (2D): 1 cm  RVIDd (2D): 2.7 cm    Unspecified Scan Mode  Peak Grad; Mean; Antegrade Flow: 7 mm[Hg]  Vmax; Antegrade Flow: 128 cm/s  RVSP: 41 mm[Hg]    Prepared and signed by    Miller Hunter. [facility] [place]  Signed 31-Dec-2020 15:03:31           All Micro Results     None          Labs: Results:       BMP, Mg, Phos Recent Labs     01/07/21  0334 01/06/21  0310 01/05/21  0318    143 143   K 3.3* 3.9 3.0*   * 114* 110*   CO2 26 23 27   AGAP 5* 6* 6*   BUN 12 18 16   CREA 0.41* 0.48* 0.41*   CA 8.3 8.7 8.3   GLU 93 103* 101*   MG 1.9 1.8  --       CBC Recent Labs     01/07/21  0334 01/06/21  0310   WBC 5.1 6.5   RBC 4.14 3.96*   HGB 12.2 11.7   HCT 36.1 35.5*    197   GRANS 67 76   LYMPH 21 15   EOS 4 2   MONOS 7 7   BASOS 1 1   IG 0 0   ANEU 3.5 4.9   ABL 1.1 1.0   JOSEPH 0.2 0.1   ABM 0.4 0.5   ABB 0.0 0.0   AIG 0.0 0.0      LFT No results for input(s): ALT, TBIL, AP, TP, ALB, GLOB, AGRAT in the last 72 hours.     No lab exists for component: SGOT, GPT   Cardiac Testing No results found for: BNPP, BNP, CPK, RCK1, RCK2, RCK3, RCK4, CKMB, CKNDX, CKND1, TROPT, TROIQ   Coagulation Tests Lab Results   Component Value Date/Time    Prothrombin time 15.0 (H) 12/31/2020 10:30 AM    Prothrombin time 15.1 (H) 08/21/2017 11:06 AM    Prothrombin time 35.3 (H) 08/14/2017 10:20 AM    INR 1.2 12/31/2020 10:30 AM    INR 1.5 (H) 08/21/2017 11:06 AM    INR 3.6 (H) 08/14/2017 10:20 AM    INR (POC) 1.4 (H) 12/31/2020 10:31 AM    INR POC 2.0 08/31/2017 02:13 PM    INR POC 2.0 08/24/2017 02:15 PM    aPTT 32.1 12/31/2020 10:30 AM      A1c Lab Results   Component Value Date/Time    Hemoglobin A1c 5.8 01/01/2021 03:59 AM    Hemoglobin A1c 5.5 05/28/2019 11:10 AM    Hemoglobin A1c 5.8 (H) 03/29/2018 11:30 AM      Lipid Panel Lab Results   Component Value Date/Time    Cholesterol, total 179 01/01/2021 03:59 AM    HDL Cholesterol 61 (H) 01/01/2021 03:59 AM    LDL, calculated 104.8 (H) 01/01/2021 03:59 AM    VLDL, calculated 13.2 01/01/2021 03:59 AM    Triglyceride 66 01/01/2021 03:59 AM    CHOL/HDL Ratio 2.9 01/01/2021 03:59 AM      Thyroid Panel Lab Results   Component Value Date/Time    TSH 3.660 01/06/2021 03:10 AM    TSH 1.410 06/08/2020 12:25 PM    TSH 1.190 11/13/2019 12:18 PM    TSH 0.904 06/18/2018 11:45 AM    T4, Free 1.5 (H) 06/18/2018 11:45 AM        Most Recent UA Lab Results   Component Value Date/Time    Color Yellow 12/30/2020 02:26 PM    Appearance Clear 12/30/2020 02:26 PM    pH (UA) 7.0 12/30/2020 02:26 PM    Ketone Negative 12/30/2020 02:26 PM    Bilirubin Negative 12/30/2020 02:26 PM    Blood Negative 12/30/2020 02:26 PM    Nitrites Negative 12/30/2020 02:26 PM    Leukocyte Esterase Trace (A) 12/30/2020 02:26 PM        Allergies   Allergen Reactions    Actonel [Risedronate] Other (comments)     GI Problems    Atorvastatin Myalgia    Fosamax [Alendronate] Unknown (comments)    Ketoprofen Other (comments)     Peptic ulcer disease    Moexipril Cough     Immunization History   Administered Date(s) Administered    TB Skin Test (PPD) Intradermal 12/31/2020    Tdap 10/13/2019       All Labs from Last 24 Hrs:  Recent Results (from the past 24 hour(s))   CBC WITH AUTOMATED DIFF    Collection Time: 01/07/21  3:34 AM   Result Value Ref Range    WBC 5.1 4.3 - 11.1 K/uL    RBC 4.14 4.05 - 5.2 M/uL    HGB 12.2 11.7 - 15.4 g/dL    HCT 36.1 35.8 - 46.3 %    MCV 87.2 79.6 - 97.8 FL    MCH 29.5 26.1 - 32.9 PG    MCHC 33.8 31.4 - 35.0 g/dL    RDW 13.2 11.9 - 14.6 %    PLATELET 973 045 - 183 K/uL    MPV 9.5 9.4 - 12.3 FL    ABSOLUTE NRBC 0.00 0.0 - 0.2 K/uL    DF AUTOMATED      NEUTROPHILS 67 43 - 78 %    LYMPHOCYTES 21 13 - 44 %    MONOCYTES 7 4.0 - 12.0 %    EOSINOPHILS 4 0.5 - 7.8 %    BASOPHILS 1 0.0 - 2.0 %    IMMATURE GRANULOCYTES 0 0.0 - 5.0 %    ABS. NEUTROPHILS 3.5 1.7 - 8.2 K/UL    ABS. LYMPHOCYTES 1.1 0.5 - 4.6 K/UL    ABS. MONOCYTES 0.4 0.1 - 1.3 K/UL    ABS. EOSINOPHILS 0.2 0.0 - 0.8 K/UL    ABS. BASOPHILS 0.0 0.0 - 0.2 K/UL    ABS. IMM. GRANS. 0.0 0.0 - 0.5 K/UL   METABOLIC PANEL, BASIC    Collection Time: 01/07/21  3:34 AM   Result Value Ref Range    Sodium 141 136 - 145 mmol/L    Potassium 3.3 (L) 3.5 - 5.1 mmol/L    Chloride 110 (H) 98 - 107 mmol/L    CO2 26 21 - 32 mmol/L    Anion gap 5 (L) 7 - 16 mmol/L    Glucose 93 65 - 100 mg/dL    BUN 12 8 - 23 MG/DL    Creatinine 0.41 (L) 0.6 - 1.0 MG/DL    GFR est AA >60 >60 ml/min/1.73m2    GFR est non-AA >60 >60 ml/min/1.73m2    Calcium 8.3 8.3 - 10.4 MG/DL   MAGNESIUM    Collection Time: 01/07/21  3:34 AM   Result Value Ref Range    Magnesium 1.9 1.8 - 2.4 mg/dL       Discharge Exam:  Patient Vitals for the past 24 hrs:   Temp Pulse Resp BP SpO2   01/07/21 1112 97.5 °F (36.4 °C) (!) 107 16 127/67 97 %   01/07/21 0707 98.3 °F (36.8 °C) (!) 101 17 (!) 169/88 95 %   01/07/21 0645 -- (!) 114 -- (!) 165/97 --   01/07/21 0439 -- (!) 101 -- (!) 160/86 --   01/07/21 0308 -- (!) 106 -- (!) 171/85 --   01/07/21 0307 -- (!) 106 -- (!) 177/94 --   01/07/21 0306 98.4 °F (36.9 °C) (!) 105 18 (!) 172/86 96 %   01/06/21 2317 98.2 °F (36.8 °C) (!) 103 18 (!) 155/88 93 %   01/06/21 1944 98.3 °F (36.8 °C) 91 16 136/73 94 %   01/06/21 1542 98.8 °F (37.1 °C) (!) 105 16 (!) 137/90 96 %   01/06/21 1300 -- 98 -- -- --     Oxygen Therapy  O2 Sat (%): 97 % (01/07/21 1112)  Pulse via Oximetry: 92 beats per minute (01/07/21 0306)  O2 Device: Room air (01/07/21 1112)    Intake/Output Summary (Last 24 hours) at 1/7/2021 1156  Last data filed at 1/7/2021 0820  Gross per 24 hour   Intake 230 ml   Output 1375 ml   Net -1145 ml         Physical exam:  General:    Well nourished. Alert. No distress. Eyes:   Normal sclera. Extraocular movements intact. ENT:  Normocephalic, atraumatic. Moist mucous membranes  CV:   Regular rate and rhythm. No murmur, rub, or gallop. Lungs:  Clear to auscultation bilaterally. No wheezing, rhonchi, or rales. Abdomen: Soft, nontender, nondistended. Bowel sounds normal.   Extremities: Warm and dry. No cyanosis or edema. Neurologic: No focal deficits  Skin:     No rashes or jaundice. Psych:  Normal mood and affect. Discharge Info:   Current Discharge Medication List      START taking these medications    Details   aspirin 81 mg chewable tablet Take 1 Tab by mouth daily for 30 days. Qty: 30 Tab, Refills: 0      atorvastatin (LIPITOR) 20 mg tablet Take 1 Tab by mouth nightly. Qty: 30 Tab, Refills: 0      carvediloL (COREG) 25 mg tablet Take 1 Tab by mouth two (2) times daily (with meals) for 30 days. Qty: 60 Tab, Refills: 0      dilTIAZem ER (CARDIZEM CD) 120 mg capsule Take 1 Cap by mouth daily. Qty: 30 Cap, Refills: 0      losartan (COZAAR) 25 mg tablet Take 1 Tab by mouth daily for 30 days. Qty: 30 Tab, Refills: 0      polyethylene glycol (MIRALAX) 17 gram packet Take 1 Packet by mouth daily for 30 days. Qty: 30 Packet, Refills: 0         CONTINUE these medications which have CHANGED    Details   rivaroxaban (XARELTO) 15 mg tab tablet Take 1 Tab by mouth daily for 30 days. Qty: 30 Tab, Refills: 0         CONTINUE these medications which have NOT CHANGED    Details   levothyroxine (SYNTHROID) 75 mcg tablet TAKE 1 TABLET BY MOUTH ONCE DAILY BEFORE BREAKFAST  Qty: 90 Tab, Refills: 1    Associated Diagnoses: Hypothyroidism, adult      omeprazole (PRILOSEC) 40 mg capsule Take 1 Cap by mouth daily.   Qty: 30 Cap, Refills: 5    Comments: DOSE ADJUSTMENT  Associated Diagnoses: Gastroesophageal reflux disease with esophagitis      Lumigan 0.01 % ophthalmic drops INSTILL 1 DROP IN BOTH EYES AT BEDTIME  Qty: 2.5 mL, Refills: 5      dorzolamide-timolol (COSOPT) 22.3-6.8 mg/mL ophthalmic solution PUT 1 DROP INTO BOTH EYES TWICE A DAY  Qty: 10 mL, Refills: 11      calcium-cholecalciferol, d3, (CALCIUM 600 + D) 600-125 mg-unit tab Take  by mouth. STOP taking these medications       metoprolol succinate (TOPROL-XL) 100 mg tablet Comments:   Reason for Stopping:         benazepriL (LOTENSIN) 40 mg tablet Comments:   Reason for Stopping:         amLODIPine (NORVASC) 2.5 mg tablet Comments:   Reason for Stopping:         cholecalciferol, vitamin D3, (VITAMIN D3) 2,000 unit tab Comments:   Reason for Stopping:                 Disposition: [facility]    Activity: Activity as tolerated  Diet: DIET PUREED 3 Honey/3 Moderately Thick    Follow-up Appointments   Procedures    FOLLOW UP VISIT Appointment in: One Month Follow up with Dr. Barry Centeno or Prabhjot Morales NP in [facility] neurology [facility], [address]     Follow up with Dr. Barry Centeno or Prabhjot Morales NP in [facility] neurology [facility] [address]     Standing Status:   Standing     Number of Occurrences:   1     Order Specific Question:   Appointment in     Answer:   One Month         Follow-up Information     Follow up With Specialties Details Why Contact Info    [address] [place] [address] [id number] [place] [address]  [phone]    [facility]   [address] [facility] [address] [place]  [phone]                Time spent in patient discharge planning and coordination 35 minutes.     Signed:  Fred Valenuzela NP

## 2021-01-07 NOTE — PROGRESS NOTES
01/07/21 0713   NIH Stroke Scale   Interval Other (comment)  (shift change)   LOC 0   LOC Questions 2   LOC Commands 0   Best Gaze 1   Visual 2   Facial Palsy 1   Motor Right Arm 3   Motor Left Arm 0   Motor Right Leg 0   Motor Left Leg 0   Limb Ataxia 1   Sensory 1   Best Language 2   Dysarthria 2   Extinction and Inattention 1   Total 16

## 2021-01-07 NOTE — PROGRESS NOTES
Pt with discharge orders this day. Pt to transition to [facility] for [address]. This CM called pt daughter and alerted her to dc orders - she remains agreeable with discharge plan to [facility]. Transportation arranged via [employer] for 3pm pick - daughter aware and agreeable. No additional CM needs at discharge. Milestones met. Care Management Interventions  PCP Verified by CM:  Yes  Mode of Transport at Discharge: BLS  Transition of Care Consult (CM Consult): SNF, Discharge Planning  Discharge Durable Medical Equipment: No  Physical Therapy Consult: Yes  Occupational Therapy Consult: Yes  Speech Therapy Consult: Yes  Current Support Network: Own Home  Confirm Follow Up Transport: Other (see comment)(Transport)  The Plan for Transition of Care is Related to the Following Treatment Goals : SNF for STR  The Patient and/or Patient Representative was Provided with a Choice of Provider and Agrees with the Discharge Plan?: Yes  Name of the Patient Representative Who was Provided with a Choice of Provider and Agrees with the Discharge Plan: Ms Carlos Orourke and daughter  Freedom of Choice List was Provided with Basic Dialogue that Supports the Patient's Individualized Plan of Care/Goals, Treatment Preferences and Shares the Quality Data Associated with the Providers?: Yes  [employer] Information Provided?: No  Discharge Location  Discharge Placement: Skilled nursing facility

## 2021-01-07 NOTE — PROGRESS NOTES
Verbal bedside report given to oncoming RN, Adolph Montenegro. Patient's situation, background, assessment and recommendations provided. Opportunity for questions provided. Oncoming RN assumed care of patient.

## 2021-01-07 NOTE — PROGRESS NOTES
ACUTE PHYSICAL THERAPY GOALS:  (Developed with and agreed upon by patient and/or caregiver. )  LTG:  (1.)Ms. Liss Valerio will move from supine to sit and sit to supine , scoot up and down and roll side to side in bed with MINIMAL ASSISTANCE within 7 treatment day(s). (2.)Ms. Liss Valerio will transfer from bed to chair and chair to bed with MODERATE ASSIST using the least restrictive device within 7 treatment day(s). (3.)Ms. Liss Valerio will ambulate with MODERATE ASSIST for 25 feet with the least restrictive device within 7 treatment day(s). (4.)Ms. Liss Valerio will maintain static/dynamic sitting x 15 minutes with STAND BY ASSIST for improved balance within 7 treatment days. (5.)Ms. Liss Valerio will participate in therapeutic activity/exercises x 25 for increased strength within 7 treatment days. ________________________________________________________________________________________________      PHYSICAL THERAPY ASSESSMENT: Daily Note and AM PT Treatment Day # 5      Yue Valerio is a 80 y.o. female   PRIMARY DIAGNOSIS: Acute ischemic stroke ([facility] [address]  Acute ischemic stroke ([facility] [address] [I63.9]       Reason for Referral:    ICD-10: Treatment Diagnosis: Generalized Muscle Weakness (M62.81)  Difficulty in walking, Not elsewhere classified (R26.2)  INPAT[place] / [place] / Product Type: Medicare /     ASSESSMENT:     REHAB RECOMMENDATIONS:   Recommendation to date pending progress:  Setting:   Short-term Rehab  Equipment:    To Be Determined     PRIOR LEVEL OF FUNCTION:  (Prior to Hospitalization) INITIAL/CURRENT LEVEL OF FUNCTION:  (Most Recently Demonstrated)   Bed Mobility:   Independent  Sit to Stand:   Independent  Transfers:   Independent  Gait/Mobility:   Independent Bed Mobility:   Total Assistance x 2  Sit to Stand:   Maximal Assistance x 2  Transfers:   Maximal Assistance x 2  Gait/Mobility:   Unable to perform     ASSESSMENT:   Ms. Liss Valerio presents supine in bed.   Bed mobility is max assist but the patient is trying to assist.  Scooting to the edge of the bed with max assist.  Sitting balance good after set up. Patient sat edge of bed with good balance. Somewhat antsy. Stood several time with improved posture each time. Patient has noted weight bearing through bilateral LE but of course better on the left. Some weight shifting with mod  assist.   Patient supine with assist x 1 however the patient did assist with positioning. Following some commands today and is trying to communicate. Good session. Bed alarm intact. Making progress. RN present. Ms. Shania Celis could benefit from skilled PT as she is currently functioning below her baseline.   Rehab would be beneficial at d/c.         SUBJECTIVE:   Ms. Shania Celis states, Nonverbal but trying to talk    SOCIAL HISTORY/LIVING ENVIRONMENT: RN reported independent PTA     OBJECTIVE:     PAIN: VITAL SIGNS: LINES/DRAINS:   Pre Treatment:    Post Treatment: 0   IV  O2 Device: Room air       MOBILITY: I Mod I S SBA CGA Min Mod Max Total  NT x2 Comments:   Bed Mobility    Rolling [] [] [] [] [] [] [x] [x] [] [] []    Supne to Sit [] [] [] [] [] [] [x] [x] [x] [] []    Scooting [] [] [] [] [] [] [x] [x] [] [] []    Sit to Supine [] [] [] [] [] [] [x] [x] [] [] []    Transfers    Sit to Stand [] [] [] [] [] [] [x] [x] [] [] []    Bed to Chair [] [] [] [] [] [] [x] [x] [] [] []    Stand to Sit [] [] [] [] [] [] [x] [x] [] [] []    I=Independent, Mod I=Modified Independent, S=Supervision, SBA=Standby Assistance, CGA=Contact Guard Assistance,   Min=Minimal Assistance, Mod=Moderate Assistance, Max=Maximal Assistance, Total=Total Assistance, NT=Not Tested  GAIT: I Mod I S SBA CGA Min Mod Max Total  NT x2 Comments:   Level of Assistance [] [] [] [] [] [] [] [] [] [x] []    Distance NT    DME N/A    Gait Quality nT    I=Independent, Mod I=Modified Independent, S=Supervision, SBA=Standby Assistance, CGA=Contact Guard Assistance,   Min=Minimal Assistance, Mod=Moderate Assistance, Max=Maximal Assistance, Total=Total Assistance, NT=Not Tested    [facility] AM-PAC [facility] Form       How much difficulty does the patient currently have. .. Unable A Lot A Little None   1. Turning over in bed (including adjusting bedclothes, sheets and blankets)? [x] 1   [] 2   [] 3   [] 4   2. Sitting down on and standing up from a chair with arms ( e.g., wheelchair, bedside commode, etc.)   [x] 1   [] 2   [] 3   [] 4   3. Moving from lying on back to sitting on the side of the bed? [x] 1   [] 2   [] 3   [] 4   How much help from another person does the patient currently need. .. Total A Lot A Little None   4. Moving to and from a bed to a chair (including a wheelchair)? [x] 1   [] 2   [] 3   [] 4   5. Need to walk in hospital room? [x] 1   [] 2   [] 3   [] 4   6. Climbing 3-5 steps with a railing? [x] 1   [] 2   [] 3   [] 4   © 2007, Trustees of [facility], under license to [employer]. All rights reserved     Score:  Initial: 6 Most Recent: X (Date: -- )    Interpretation of Tool:  Represents activities that are increasingly more difficult (i.e. Bed mobility, Transfers, Gait). PLAN:   FREQUENCY/DURATION: PT Plan of Care: 3 times/week for duration of hospital stay or until stated goals are met, whichever comes first.    PROBLEM LIST:   (Skilled intervention is medically necessary to address:)  1. Decreased ADL/Functional Activities  2. Decreased Activity Tolerance  3. Decreased AROM/PROM  4. Decreased Balance  5. Decreased Cognition  6. Decreased Coordination  7. Decreased Gait Ability  8. Decreased Strength  9. Decreased Transfer Abilities   INTERVENTIONS PLANNED:   (Benefits and precautions of physical therapy have been discussed with the patient.)  1. Therapeutic Activity  2. Therapeutic Exercise/HEP  3. Neuromuscular Re-education  4. Gait Training  5. Manual Therapy  6.  Education     TREATMENT:       TREATMENT:   ($$ Neuromuscular Re-education: 23-37 mins   )  Neuromuscular Re-education (25 Minutes): Neuromuscular Re-education included Balance Training, Coordination training, Postural training, Sitting balance training and Standing balance training to improve Balance, Coordination, Functional Mobility, Postural Control and Proprioception.      AFTER TREATMENT POSITION/PRECAUTIONS:  Alarm Activated, Bed and RN at bedside    INTERDISCIPLINARY COLLABORATION:  RN/PCT and PT/PTA    TOTAL TREATMENT DURATION:  PT Patient Time In/Time Out  Time In: 1030  Time Out: 1055    Veronica Chavez PTA

## 2021-01-07 NOTE — DISCHARGE INSTRUCTIONS
CBC and BMP next lab day  Facility MD next visit day            Stroke: After Your Visit     Your Care Instructions     You have had a stroke. Risk factors for stroke include being overweight, smoking, and sedentary lifestyle. This means that the blood flow to a part of your brain was blocked for some time, which damages the nerve cells in that part of the brain. The part of your body controlled by that part of your brain may not function properly now. The brain is an amazing organ that can heal itself to some degree. The stroke you had damaged part of your brain, but other parts of your brain may take over in some way for the damaged areas. You have already started this process. Going home may be hard for you and your family. The more you can try to do for yourself, the better. Remember to take each day one at a time. Follow-up care is a key part of your treatment and safety. Be sure to make and go to all appointments, and call your doctor if you are having problems. Its also a good idea to know your test results and keep a list of the medicines you take. How can you care for yourself at home? Enter a stroke rehabilitation (rehab) program, if your doctor recommends it. Physical, speech, and occupational therapies can help you manage bathing, dressing, eating, and other basics of daily living. Eat a heart-healthy diet that is low in cholesterol, saturated fat, and salt. Eat lots of fresh fruits and vegetables and foods high in fiber. Increase your activities slowly. Take short rest breaks when you get tired. Gradually increase the amount you walk. Start out by walking a little more than you did the day before. Do not drive until your doctor says it is okay. It is normal to feel sad or depressed after a stroke. If the blues last, talk to your doctor. If you are having problems with urine leakage, go to the bathroom at regular times, including when you first wake up and at bedtime.  Also, limit fluids after dinner. If you are constipated, drink plenty of fluids, enough so that your urine is light yellow or clear like water. If you have kidney, heart, or liver disease and have to limit fluids, talk with your doctor before you increase the amount of fluids you drink. Set up a regular time for using the toilet. If you continue to have constipation, your doctor may suggest using a bulking agent, such as Metamucil, or a stool softener, laxative, or enema. Medicines  Take your medicines exactly as prescribed. Call your doctor if you think you are having a problem with your medicine. You may be taking several medicines. ACE (angiotensin-converting enzyme) inhibitors, angiotensin II receptor blockers (ARBs), beta-blockers, diuretics (water pills), and calcium channel blockers control your blood pressure. Statins help lower cholesterol. Your doctor may also prescribe medicines for depression, pain, sleep problems, anxiety, or agitation. If your doctor has given you medicine that prevents blood clots, such as warfarin (Coumadin), aspirin combined with extended-release dipyridamole (Aggrenox), clopidogrel (Plavix), or aspirin to prevent another stroke, you should:  Tell your dentist, pharmacist, and other health professionals that you take these medicines. Watch for unusual bruising or bleeding, such as blood in your urine, red or black stools, or bleeding from your nose or gums. Get regular blood tests to check your clotting time if you are taking Coumadin. Wear medical alert jewelry that says you take blood thinners. You can buy this at most drugstores. Do not take any over-the-counter medicines or herbal products without talking to your doctor first.  If you take birth control pills or hormone replacement therapy, talk to your doctor about whether they are right for you. For family members and caregivers  Make the home safe. Set up a room so that your loved one does not have to climb stairs.  Be sure the bathroom is on the same floor. Move throw rugs and furniture that could cause falls, and make sure that the lighting is good. Put grab bars and seats in tubs and showers. Find out what your loved one can do and what he or she needs help with. Try not to do things for your loved one that your loved one can do on his or her own. Help him or her learn and practice new skills. Visit and talk with your loved one often. Try doing activities together that you both enjoy, such as playing cards or board games. Keep in touch with your loved one's friends as much as you can, and encourage them to visit. Take care of yourself. Do not try to do everything yourself. Ask other family members to help. Eat well, get enough rest, and take time to do things that you enjoy. Keep up with your own doctor visits, and make sure to take your medicines regularly. Get out of the house as much as you can. Join a local support group. Find out if you qualify for home health care visits to help with rehab or for adult day care. When should you call for help? Call 911 anytime you think you may need emergency care. For example, call if:  You have signs of another stroke. These may include:  Sudden numbness, paralysis, or weakness in your face, arm, or leg, especially on only one side of your body. New problems with walking or balance. Sudden vision changes. Drooling or slurred speech. New problems speaking or understanding simple statements, or you feel confused. A sudden, severe headache that is different from past headaches. Call 911 even if these symptoms go away in a few minutes. You cough up blood. You vomit blood or what looks like coffee grounds. You pass maroon or very bloody stools. Call your doctor now or seek immediate medical care if:  You have new bruises or blood spots under your skin. You have a nosebleed. Your gums bleed when you brush your teeth. You have blood in your urine.   Your stools are black and tarlike or have streaks of blood. You have vaginal bleeding when you are not having your period, or heavy period bleeding. You have new symptoms that may be related to your stroke, such as falls or trouble swallowing. Watch closely for changes in your health, and be sure to contact your doctor if you have any problems. Where can you learn more? Go to [employer]    Enter C294  in the search box to learn more about \"Stroke: After Your Visit\". © 5322-7838 [employer]. Care instructions adapted under license by [facility] (which disclaims liability or warranty for this information). This care instruction is for use with your licensed healthcare professional. If you have questions about a medical condition or this instruction, always ask your healthcare professional. Rita Hopkins any warranty or liability for your use of this information.

## 2021-01-07 NOTE — PROGRESS NOTES
Problem: Dysphagia (Adult)  Goal: *Acute Goals and Plan of Care (Insert Text)  Description:  STG: Pt will participate in speech/language/cognition evaluation with 100% compliance. MET 1/5/21  STG: Patient will consume pureed diet and honey-thick liquids by spoon with no overt signs or symptoms of airway compromise. Goal added 1/4/21  LTG: Pt will tolerate safest/least restrictive diet with no s/sx of airway compromise. LTG: Pt will communicate effectively/efficiently within direct environment with family/caregivers. STG: Patient will gesture to express basic wants and needs with 80% accuracy with moderate cues. ADDED 1/5/21  STG: Patient will vocalize on command on 80% of trials with moderate cues ADDED 1/5/21. STG: Patient will follow 1 step commands with 80% accuracy given visual model and moderate verbal cues ADDED 1/5/21  STG: Patient will participate in ongoing therapeutic assessment to assist with development of functional goals. ADDED 1/5/21  Outcome: Progressing Towards Goal- Goals updated 1/5/21. Completed goals removed.       SPEECH LANGUAGE PATHOLOGY: DYSPHAGIA AND SPEECH-LANGUAGE/COGNITION: Daily Note 2    NAME/AGE/GENDER: Metta Claude is a 80 y.o. female  DATE: 1/7/2021  PRIMARY DIAGNOSIS: Acute ischemic stroke ([facility] [I63.9]      ICD-10: Treatment Diagnosis: R13.12 Dysphagia, Oropharyngeal Phase    RECOMMENDATIONS   DIET:    PO:  Pureed   Liquids:  honey   by tsp only    MEDICATIONS: Crushed in puree     ASPIRATION PRECAUTIONS  · Slow rate of intake  · Small bites/sips     COMPENSATORY STRATEGIES/MODIFICATIONS  · Alternate liquids/solids  · Tsp sips only     EDUCATION:Recommendations discussed with patient and patient's granddaughter    CONTINUATION OF SKILLED SERVICES/MEDICAL NECESSITY:   Patient is expected to demonstrate progress in  swallow strength, swallow timeliness, swallow function, diet tolerance and swallow safety in order to  improve swallow safety, work toward diet advancement and decrease aspiration risk.  Patient is expected to demonstrate progress in expressive communication and receptive ability to decrease assistance required communication, increase independence with activities of daily living and increase communication with family/caregivers.  Patient continues to require skilled intervention due to dysphagia and aphasia. RECOMMENDATIONS for CONTINUED SPEECH THERAPY: YES: Anticipate need for ongoing speech therapy during this hospitalization and at next level of care. ASSESSMENT   Language: Patient continues to demonstrate severe expressive/receptive aphasia. Expressive language remains profoundly impaired with only some vocalizing/groaning, but remains non verbal. Receptive deficits appeared more impaired this date as evidenced by essentially no command following and minimal benefit from model, no functional response to yes/no. Patient also has no attempt to repeat, automatic speech. RN reports patient required max cues and encouragement to take meds this AM. Patient accepted 3 oz honey-thick liquid by spoon with improved oral containment. Patient may benefit from alternate means of nutrition as likely will be difficult to meet nutritional needs on current diet. Recommend continued speech therapy during this hospitalization and next level of care to address aphasia and dysphagia as patient is functioning significantly below baseline. REHABILITATION POTENTIAL FOR STATED GOALS: Good    PLAN    FREQUENCY/DURATION: Continue to follow patient 3 times a week for duration of hospital stay to address above goals. - Recommendations for next treatment session: Next treatment will address dysphagia, aphasia    SUBJECTIVE   Upright in bed. RN present.     History of Present Injury/Illness: Ms. Mariam Dill  has a past medical history of Age related osteoporosis (7/20/2017), Chronic atrial fibrillation ([facility] (7/20/2017), Chronic pain, DDD (degenerative disc disease), lumbar (2017), Gastroesophageal reflux disease with esophagitis (2017), Glaucoma (2017), History of CVA (cerebrovascular accident) (2017), HTN (hypertension), benign (2017), Hyperlipidemia, mixed (2017), Hypertension, Hypothyroidism, adult (2017), IFG (impaired fasting glucose) (2017), Primary osteoarthritis involving multiple joints (2017), and PUD (peptic ulcer disease). . She also  has a past surgical history that includes hx colonoscopy; hx hermes and bso; and hx  section. Problem List:  (Impairments causing functional limitations):  1. Dysphagia  2. Aphasia    Orientation:   Unable to verbalize. No response to yes/no questiosn    Pain: Pain Scale 1: FLACC  Pain Intensity 1: 0    OBJECTIVE   Swallow treatment: Tolerating honey-thick liquids by spoon.     Aphasia treatment:   - command followin%   - Yes/ no questions: 0%  - Automatic speech: 0%  - Repetition: 0%      INTERDISCIPLINARY COLLABORATION: n/a  PRECAUTIONS/ALLERGIES: Actonel [risedronate], Atorvastatin, Fosamax [alendronate], Ketoprofen, and Moexipril     Tool Used: Dysphagia Outcome and Severity Scale (DINH)    Score Comments   Normal Diet  [] 7 With no strategies or extra time needed   Functional Swallow  [] 6 May have mild oral or pharyngeal delay   Mild Dysphagia  [] 5 Which may require one diet consistency restricted    Mild-Moderate Dysphagia  [] 4 With 1-2 diet consistencies restricted   Moderate Dysphagia  [] 3 With 2 or more diet consistencies restricted   Moderate-Severe Dysphagia  [] 2 With partial PO strategies (trials with ST only)   Severe Dysphagia  [] 1 With inability to tolerate any PO safely      Score:  Initial: 2 Most Recent: 3 (Date 21 )   Interpretation of Tool: The Dysphagia Outcome and Severity Scale (DINH) is a simple, easy-to-use, 7-point scale developed to systematically rate the functional severity of dysphagia based on objective assessment and make recommendations for diet level, independence level, and type of nutrition. Tool Used: MODIFIED NAN SCALE (mRS)   Score   No Symptoms  [] 0   No significant disability despite symptoms; able to carry out all usual duties and activities  [] 1   Slight disability; unable to carry out all previous activities but able to look after own affairs without assistance. [] 2   Moderate disability; requiring some help but able to walk without assistance  [] 3   Moderately severe disability; unable to walk without assistance and unable to attend to own bodily needs without assistance  [] 4   Severe disability; bedridden, incontinent, and requiring constant nursing care and attention  [] 5      Score:  Initial: 4    Interpretation of Tool: The Modified [place] Scale is a 7-point scaled used to quantify level of disability as it relates to a patient's functional abilities.          SAFETY:  After treatment position/precautions:  · Upright in bed  · RN notified    Total Treatment Duration:   Time In: 1050  Time Out: [address] [place], [place], CCC-SLP

## 2021-01-07 NOTE — PROGRESS NOTES
Problem: Patient Education: Go to Patient Education Activity  Goal: Patient/Family Education  Outcome: Resolved/Met     Problem: TIA/CVA Stroke: 0-24 hours  Goal: Off Pathway (Use only if patient is Off Pathway)  Outcome: Resolved/Met  Goal: Activity/Safety  Outcome: Resolved/Met  Goal: Consults, if ordered  Outcome: Resolved/Met  Goal: Diagnostic Test/Procedures  Outcome: Resolved/Met  Goal: Nutrition/Diet  Outcome: Resolved/Met  Goal: Discharge Planning  Outcome: Resolved/Met  Goal: Medications  Outcome: Resolved/Met  Goal: Respiratory  Outcome: Resolved/Met  Goal: Treatments/Interventions/Procedures  Outcome: Resolved/Met  Goal: Minimize risk of bleeding post-thrombolytic infusion  Outcome: Resolved/Met  Goal: Monitor for complications post-thrombolytic infusion  Outcome: Resolved/Met  Goal: Psychosocial  Outcome: Resolved/Met  Goal: *Hemodynamically stable  Outcome: Resolved/Met  Goal: *Neurologically stable  Description: Absence of additional neurological deficits    Outcome: Resolved/Met  Goal: *Verbalizes anxiety and depression are reduced or absent  Outcome: Resolved/Met  Goal: *Absence of Signs of Aspiration on Current Diet  Outcome: Resolved/Met  Goal: *Absence of deep venous thrombosis signs and symptoms(Stroke Metric)  Outcome: Resolved/Met  Goal: *Ability to perform ADLs and demonstrates progressive mobility and function  Outcome: Resolved/Met  Goal: *Stroke education started(Stroke Metric)  Outcome: Resolved/Met  Goal: *Dysphagia screen performed(Stroke Metric)  Outcome: Resolved/Met  Goal: *Rehab consulted(Stroke Metric)  Outcome: Resolved/Met

## 2021-01-07 NOTE — PROGRESS NOTES
Bedside and Verbal shift change report given to self (oncoming nurse) by Yue Blanco (offgoing nurse). Report included the following information SBAR, Kardex and MAR.

## 2021-01-07 NOTE — PROGRESS NOTES
ACUTE OT GOALS:  (Developed with and agreed upon by patient and/or caregiver.)  1. Patient will follow 50% of one step verbal or visual commands to increase participation during ADL performance. 2. Patient will tolerate 15 minutes static sitting balance unsupported with CGA while completing functional activity. 3. Patient will tolerate 25  minutes of OT treatment with 2-3 rest breaks to increase activity tolerance for ADLs. 4. Patient will complete functional transfers with modA and adaptive equipment as needed. 5. Patient will complete upper body bathing and dressing with mod A and adaptive equipment as needed. 6. Patient will complete self-grooming with mod A and adaptive equipment as needed. 7. Patient will attend to R side of environment with min verbal cues from therapist to increase safety awareness during ADL performance.     Timeframe: 7 visits     OCCUPATIONAL THERAPY: Daily Note OT Treatment Day # 4    Yue Paris is a 80 y.o. female   PRIMARY DIAGNOSIS: Acute ischemic stroke ([facility] [address]  Acute ischemic stroke ([facility] [address] [I63.9]       [place] / [place] B / Product Type: Medicare /   ASSESSMENT:     REHAB RECOMMENDATIONS: CURRENT LEVEL OF FUNCTION:  (Most Recently Demonstrated)   Recommendation to date pending progress:  Setting:   Short-term Rehab  Equipment:    To Be Determined Bathing:   Not tested  Dressing:   Not tested  Feeding/Grooming:   Not tested  Toileting:   Not tested  Functional Mobility:   Not tested     ASSESSMENT:  Ms. Ani Paris tolerated PROM/AROM well today. Pt continue to have weakness in RUE but showing signs of strength in extremity. Pt noted to have some elbow flex/extension and some trace shoulder movement. Some progress made this visit with strength/coordination. Will continue to benefit from skilled OT during stay.      SUBJECTIVE:   Ms. Scott Tran HISTORY/LIVING ENVIRONMENT:        OBJECTIVE:     PAIN: VITAL SIGNS: LINES/DRAINS:   Pre Treatment: Pain Screen  Pain Scale 1: Visual  Pain Intensity 1: 0  Post Treatment: 0   IV  O2 Device: Room air     ACTIVITIES OF DAILY LIVING: I Mod I S SBA CGA Min Mod Max Total NT Comments   BASIC ADLs:              Bathing/ Showering [] [] [] [] [] [] [] [] [] [x]    Toileting [] [] [] [] [] [] [] [] [] [x]    Dressing [] [] [] [] [] [] [] [] [] [x]    Feeding [] [] [] [] [] [] [] [] [] [x]    Grooming [] [] [] [] [] [] [] [] [] [x]    Personal Device Care [] [] [] [] [] [] [] [] [] [x]    Functional Mobility [] [] [] [] [] [] [] [] [] [x]    I=Independent, Mod I=Modified Independent, S=Supervision, SBA=Standby Assistance, CGA=Contact Guard Assistance,   Min=Minimal Assistance, Mod=Moderate Assistance, Max=Maximal Assistance, Total=Total Assistance, NT=Not Tested    MOBILITY: I Mod I S SBA CGA Min Mod Max Total  NT x2 Comments:   Supine to sit [] [] [] [] [] [] [] [] [] [x] []    Sit to supine [] [] [] [] [] [] [] [] [] [x] []    Sit to stand [] [] [] [] [] [] [] [] [] [x] []    Bed to chair [] [] [] [] [] [] [] [] [] [x] []    I=Independent, Mod I=Modified Independent, S=Supervision, SBA=Standby Assistance, CGA=Contact Guard Assistance,   Min=Minimal Assistance, Mod=Moderate Assistance, Max=Maximal Assistance, Total=Total Assistance, NT=Not Tested    PLAN:   FREQUENCY/DURATION: OT Plan of Care: 3 times/week for duration of hospital stay or until stated goals are met, whichever comes first.    TREATMENT:   TREATMENT:   ( $$ Therapeutic Exercises: 8-22 mins   )  Therapeutic Exercise (15 Minutes): Therapeutic exercises noted below to improve functional AROM, strength and mobility.      PROM Date:  1/4/21 Date:  1/5/21 Date:  1/7/21   Activity/Exercise Parameters Parameters Parameters   Shoulder Flexion 10 reps 15 reps 15 reps   Elbow Flexion 10 reps 15 reps 15 reps   Shoulder Abd/Adduction 10 reps 15 reps 15 reps   Wrist Flex/Extension 10 reps 10 reps 10 reps   Pro/Supination 10 reps 10 reps 15 reps   Digit Flexion/Extension 10 reps 10 reps 10 reps             AFTER TREATMENT POSITION/PRECAUTIONS:  Bed, Needs within reach, RN notified and Visitors at bedside    INTERDISCIPLINARY COLLABORATION:  RN/PCT and OT/GUPTA    TOTAL TREATMENT DURATION:  OT Patient Time In/Time Out  Time In: 1120  Time Out: 6[address] [facility]

## 2021-01-07 NOTE — PROGRESS NOTES
01/06/21 1911   NIH Stroke Scale   Interval Other (comment)  (shift change)   LOC 0   LOC Questions 2   LOC Commands 0   Best Gaze 1   Visual 2   Facial Palsy 1   Motor Right Arm 3   Motor Left Arm 0   Motor Right Leg 0   Motor Left Leg 0   Limb Ataxia 1   Sensory 1   Best Language 2   Dysarthria 2   Extinction and Inattention 1   Total 16

## 2021-01-07 NOTE — PROGRESS NOTES
Patient's BP was 172/86 as of 0306. Rechecked BP twice after, with readings of 177/94 and 171/85. PRN 20 mg Labetalol IVP given due to SBP >160. . Will continue to monitor.

## 2021-01-22 ENCOUNTER — PATIENT OUTREACH (OUTPATIENT)
Dept: CASE MANAGEMENT | Age: 86
End: 2021-01-22

## 2021-01-22 NOTE — PROGRESS NOTES
Community Care Team documentation for patient in [facility]    The information below provided by:[facility]    PT Update: CGA Transfers; Min A Amb 15 ft hemiwalker; Max A ADL's; Dysphagia w pureed diet and honey thick liquids;  Sev expressive and Mod Receptive aphasia        Nursing Update:Medically stable no new orders      Discharge Date:TBD      Assign to [facility] Manager:BLU

## 2021-01-23 ENCOUNTER — APPOINTMENT (OUTPATIENT)
Dept: GENERAL RADIOLOGY | Age: 86
End: 2021-01-23
Attending: STUDENT IN AN ORGANIZED HEALTH CARE EDUCATION/TRAINING PROGRAM
Payer: MEDICARE

## 2021-01-23 ENCOUNTER — APPOINTMENT (OUTPATIENT)
Dept: CT IMAGING | Age: 86
End: 2021-01-23
Attending: STUDENT IN AN ORGANIZED HEALTH CARE EDUCATION/TRAINING PROGRAM
Payer: MEDICARE

## 2021-01-23 ENCOUNTER — HOSPITAL ENCOUNTER (EMERGENCY)
Age: 86
Discharge: HOME | End: 2021-01-23
Attending: STUDENT IN AN ORGANIZED HEALTH CARE EDUCATION/TRAINING PROGRAM
Payer: MEDICARE

## 2021-01-23 VITALS
TEMPERATURE: 98.4 F | HEIGHT: 59 IN | RESPIRATION RATE: 19 BRPM | HEART RATE: 79 BPM | OXYGEN SATURATION: 97 % | WEIGHT: 108 LBS | DIASTOLIC BLOOD PRESSURE: 68 MMHG | BODY MASS INDEX: 21.77 KG/M2 | SYSTOLIC BLOOD PRESSURE: 136 MMHG

## 2021-01-23 DIAGNOSIS — S09.90XA CLOSED HEAD INJURY, INITIAL ENCOUNTER: ICD-10-CM
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
## 2021-01-23 LAB
ALBUMIN SERPL-MCNC: 2.7 G/DL (ref 3.2–4.6)
ALBUMIN/GLOB SERPL: 0.8 {RATIO} (ref 1.2–3.5)
ALP SERPL-CCNC: 132 U/L (ref 50–136)
ALT SERPL-CCNC: 30 U/L (ref 12–65)
ANION GAP SERPL CALC-SCNC: 3 MMOL/L (ref 7–16)
AST SERPL-CCNC: 25 U/L (ref 15–37)
BASOPHILS # BLD: 0 K/UL (ref 0–0.2)
BASOPHILS NFR BLD: 1 % (ref 0–2)
BILIRUB SERPL-MCNC: 0.8 MG/DL (ref 0.2–1.1)
BUN SERPL-MCNC: 19 MG/DL (ref 8–23)
CALCIUM SERPL-MCNC: 8 MG/DL (ref 8.3–10.4)
CHLORIDE SERPL-SCNC: 110 MMOL/L (ref 98–107)
CO2 SERPL-SCNC: 30 MMOL/L (ref 21–32)
CREAT SERPL-MCNC: 0.59 MG/DL (ref 0.6–1)
DIFFERENTIAL METHOD BLD: ABNORMAL
EOSINOPHIL # BLD: 0.1 K/UL (ref 0–0.8)
EOSINOPHIL NFR BLD: 2 % (ref 0.5–7.8)
ERYTHROCYTE [DISTWIDTH] IN BLOOD BY AUTOMATED COUNT: 13.8 % (ref 11.9–14.6)
GLOBULIN SER CALC-MCNC: 3.5 G/DL (ref 2.3–3.5)
GLUCOSE SERPL-MCNC: 103 MG/DL (ref 65–100)
HCT VFR BLD AUTO: 33 % (ref 35.8–46.3)
HGB BLD-MCNC: 10.5 G/DL (ref 11.7–15.4)
IMM GRANULOCYTES # BLD AUTO: 0 K/UL (ref 0–0.5)
IMM GRANULOCYTES NFR BLD AUTO: 1 % (ref 0–5)
LYMPHOCYTES # BLD: 0.8 K/UL (ref 0.5–4.6)
LYMPHOCYTES NFR BLD: 19 % (ref 13–44)
MCH RBC QN AUTO: 29.4 PG (ref 26.1–32.9)
MCHC RBC AUTO-ENTMCNC: 31.8 G/DL (ref 31.4–35)
MCV RBC AUTO: 92.4 FL (ref 79.6–97.8)
MONOCYTES # BLD: 0.4 K/UL (ref 0.1–1.3)
MONOCYTES NFR BLD: 9 % (ref 4–12)
NEUTS SEG # BLD: 2.9 K/UL (ref 1.7–8.2)
NEUTS SEG NFR BLD: 69 % (ref 43–78)
NRBC # BLD: 0 K/UL (ref 0–0.2)
PLATELET # BLD AUTO: 209 K/UL (ref 150–450)
PMV BLD AUTO: 9.5 FL (ref 9.4–12.3)
POTASSIUM SERPL-SCNC: 3.9 MMOL/L (ref 3.5–5.1)
PROT SERPL-MCNC: 6.2 G/DL (ref 6.3–8.2)
RBC # BLD AUTO: 3.57 M/UL (ref 4.05–5.2)
SODIUM SERPL-SCNC: 143 MMOL/L (ref 136–145)
WBC # BLD AUTO: 4.2 K/UL (ref 4.3–11.1)

## 2021-01-23 PROCEDURE — 80053 COMPREHEN METABOLIC PANEL: CPT

## 2021-01-23 PROCEDURE — 85025 COMPLETE CBC W/AUTO DIFF WBC: CPT

## 2021-01-23 PROCEDURE — 73030 X-RAY EXAM OF SHOULDER: CPT

## 2021-01-23 PROCEDURE — 70450 CT HEAD/BRAIN W/O DYE: CPT

## 2021-01-23 PROCEDURE — 99283 EMERGENCY DEPT VISIT LOW MDM: CPT

## 2021-01-23 NOTE — ED TRIAGE NOTES
Pt arrives from [facility] in [place] with ems after a fall from a seated position. On blood thinners and presents with a possible hematoma to right side of head. Pt aphasic at baseline post stroke this month. Pt follows commands. 113/63, 89hr, 98% RA.

## 2021-01-23 NOTE — DISCHARGE INSTRUCTIONS
Follow-up with primary care physician as needed. Return to the ER for worsening or worrisome symptoms.

## 2021-01-23 NOTE — ED PROVIDER NOTES
Patient is a 80-year-old female, history of recent CVA with baseline right upper and right lower extremity weakness, aphasia as well as facial droop along with decreased range of motion of the right eye. Patient presents back to the emergency department from local rehab facility where she had a witnessed fall this morning. Therefore patient fell from sitting, states she did hit her head, did not lose consciousness. Reports her mental status and neurologic deficits are at baseline. Difficult to assess patient given her lack of verbal response, initial report of some swelling and bruising to the right shoulder, will obtain images.            Past Medical History:   Diagnosis Date    Age related osteoporosis 2017    Chronic atrial fibrillation ([facility] [address] 2017    Chronic pain     shoulder    DDD (degenerative disc disease), lumbar 2017    Gastroesophageal reflux disease with esophagitis 2017    controlled with medication    Glaucoma 2017    History of CVA (cerebrovascular accident) 2017    HTN (hypertension), benign 2017    Hyperlipidemia, mixed 2017    Hypertension     Hypothyroidism, adult 2017    IFG (impaired fasting glucose) 2017    Primary osteoarthritis involving multiple joints 2017    PUD (peptic ulcer disease)        Past Surgical History:   Procedure Laterality Date    HX  SECTION      x2    HX COLONOSCOPY      HX URIEL AND BSO      hysterectomy         Family History:   Problem Relation Age of Onset    No Known Problems Mother     Cancer Father         stomach    No Known Problems Sister     No Known Problems Brother     No Known Problems Sister     No Known Problems Sister     No Known Problems Sister     No Known Problems Brother     No Known Problems Brother     No Known Problems Brother     No Known Problems Brother     Coronary Artery Disease Neg Hx        Social History     Socioeconomic History    Marital status:      Spouse name: Not on file    Number of children: Not on file    Years of education: Not on file    Highest education level: Not on file   Occupational History    Not on file   Social Needs    Financial resource strain: Not on file    Food insecurity     Worry: Not on file     Inability: Not on file    Transportation needs     Medical: Not on file     Non-medical: Not on file   Tobacco Use    Smoking status: Never Smoker    Smokeless tobacco: Never Used   Substance and Sexual Activity    Alcohol use: No    Drug use: Not on file    Sexual activity: Not on file   Lifestyle    Physical activity     Days per week: Not on file     Minutes per session: Not on file    Stress: Not on file   Relationships    Social connections     Talks on phone: Not on file     Gets together: Not on file     Attends Sabianist service: Not on file     Active member of club or organization: Not on file     Attends meetings of clubs or organizations: Not on file     Relationship status: Not on file    Intimate partner violence     Fear of current or ex partner: Not on file     Emotionally abused: Not on file     Physically abused: Not on file     Forced sexual activity: Not on file   Other Topics Concern    Not on file   Social History Narrative    Not on file         ALLERGIES: Actonel [risedronate], Atorvastatin, Fosamax [alendronate], Ketoprofen, and Moexipril    Review of Systems   Unable to perform ROS: Patient nonverbal       Vitals:    01/23/21 0924   BP: 136/68   Pulse: 79   Resp: 19   SpO2: 97%   Weight: 49 kg (108 lb)   Height: 4' 11\" (1.499 m)            Physical Exam  Vitals signs and nursing note reviewed. Constitutional:       Appearance: Normal appearance. She is not ill-appearing or toxic-appearing. HENT:      Head: Normocephalic and atraumatic.       Nose: Nose normal.      Mouth/Throat:      Mouth: Mucous membranes are moist.   Eyes:      Pupils: Pupils are equal, round, and reactive to light.      Comments: Left eye: Extraocular muscle movements are intact. Right eye: left side extraocular muscle movements are intact, will not look to the right past midline. Neck:      Musculoskeletal: Normal range of motion. No neck rigidity. Cardiovascular:      Rate and Rhythm: Normal rate and regular rhythm. Pulses: Normal pulses. Heart sounds: Normal heart sounds. Pulmonary:      Effort: Pulmonary effort is normal. No respiratory distress. Breath sounds: Normal breath sounds. Abdominal:      General: Abdomen is flat. There is no distension. Palpations: Abdomen is soft. Tenderness: There is no abdominal tenderness. Musculoskeletal: Normal range of motion. Comments: 4/5 muscle strength right lower extremity. Right upper extremity with minimal range of motion, some bruising as well as mild edema to the right shoulder and mid right humerus. Normal radial and ulnar pulses. Skin:     General: Skin is warm and dry. Neurological:      Mental Status: She is alert. Mental status is at baseline. Cranial Nerves: Cranial nerve deficit present. Motor: Weakness present. Comments: Baseline neurologic deficits with patient being aphasic, right-sided facial droop, right upper right lower extremity weakness   Psychiatric:         Mood and Affect: Mood normal.          MDM  Number of Diagnoses or Management Options  Closed head injury, initial encounter  Fall, initial encounter  Diagnosis management comments: I was called to the patient's room given her lack of verbal response, baseline per EMS. Patient is at local rehab [address] [place] [address] where she had a witnessed fall, no LOC. Patient is on blood thinners. Will obtain basic lab work as well as imaging to rule out intracranial hemorrhage as well as injury to the right shoulder and right upper extremity. Labs show white count 4.2, stable H&H, no other emergent findings.   Head CT revealed evolution of the left MCA infarct, no acute intracranial hemorrhage. X-ray of the right shoulder without emergent findings, chronic changes. Patient is at baseline, confirmed this with speaking with [address], patient's nurse. Reports baseline aphasia and right-sided weakness from previous CVA. Patient has remained stable will be discharged back to the rehab facility.          Procedures

## 2021-02-01 ENCOUNTER — PATIENT OUTREACH (OUTPATIENT)
Dept: CASE MANAGEMENT | Age: 86
End: 2021-02-01

## 2021-02-01 NOTE — PROGRESS NOTES
Community Care Team documentation for patient in [facility]    The information below provided by:[facility]    PT Update: Min-Mod A Bed Mob; Min A Transfers; Min A Amb 20-30 ft hemiwalker;  Mod A UB & Max A LB ADL's; Mod-Sev Dysphagia w pureed diet and honey thick liquids; Global aphasia        Nursing Update:      Discharge Date:TBD      Assign to [id number]

## 2021-02-04 ENCOUNTER — PATIENT OUTREACH (OUTPATIENT)
Dept: CASE MANAGEMENT | Age: 86
End: 2021-02-04

## 2021-02-04 NOTE — PROGRESS NOTES
Community Care Team documentation for patient in [facility]    The information below provided by:[facility]  PT Update: Min A Bed Mob; Min-Mod A Transfers; Min A Amb 50-60 ft HHA; Mod A UB & Max A LB ADL's; Mod Dysphagia w Pureed diet; increased to nectar thick liquids; Sev Expressive & Receptive Aphasia which greatly impacts rate of progress        Nursing Update:Medically stable, no new orders.       Discharge Date:TBD      Assign to [id number]

## 2021-02-11 ENCOUNTER — PATIENT OUTREACH (OUTPATIENT)
Dept: CASE MANAGEMENT | Age: 86
End: 2021-02-11

## 2021-02-11 NOTE — PROGRESS NOTES
Community Care Team documentation for patient in [facility]    The information below provided by:[facility]    PT Update: Min A Bed Mob & Transfers; CGA Amb 100 ft HHA; Mod A UB & Mod-Max A LB ADL's; Global Aphasia with Sev deficits; Mod Dysphagia w Pureed diet and nectar thick liquids.         Nursing Update:Medically stable, no new orders      Discharge Date:TBD    Assign to [facility]

## 2021-02-25 ENCOUNTER — PATIENT OUTREACH (OUTPATIENT)
Dept: CASE MANAGEMENT | Age: 86
End: 2021-02-25

## 2021-02-25 NOTE — PROGRESS NOTES
Community Care Team documentation for patient in [facility]    The information below provided by:[facility]    PT Update: Mod A- Max Ax2 for Bed Mob, Transfers; CGA-Min A AMb 100 ft HHA hemiwalker; Mod A UB & Max A LB ADL's; GLobal aphasia, Mod Dysphagia with pureed diet and nectar thick liquids. Nursing Update:Medically stable, recovered from Covid without complication.        Discharge Date:TBD Plan is for patient to stay short term and return home where she lives in a basement apartment under her daughter and son in laws home.       Assign to [id number]

## 2021-03-04 ENCOUNTER — PATIENT OUTREACH (OUTPATIENT)
Dept: CASE MANAGEMENT | Age: 86
End: 2021-03-04

## 2021-03-04 NOTE — PROGRESS NOTES
Community Care Team documentation for patient in [facility]    The information below provided by:[facility]    PT Update: Min-Max A Bed Mob; CGA-Max Aof 2 Transfers; AMb HHA 30-50 ft Min-Max A; Mod A ADL's; Sev language deficits; Mod Dysphagia with pureed diet and nectar thick liquids. Assist varies d/t global aphasia, s/p COVID and pneumonia. caregiver training completed. Nursing Update:Medically stable, remains stable after CV19 dx. Discharge Date:3/7/21 Per request. W/ [facility].  -Plan is for patient to stay short term and return home where she lives in a basement apartment under her daughter and son in laws home.       Assign to [id number]: Ean Foy

## 2021-03-11 ENCOUNTER — HOSPITAL ENCOUNTER (INPATIENT)
Age: 86
LOS: 3 days | Discharge: HOME HEALTH | DRG: 536 | End: 2021-03-14
Attending: EMERGENCY MEDICINE | Admitting: FAMILY MEDICINE
Payer: MEDICARE

## 2021-03-11 ENCOUNTER — APPOINTMENT (OUTPATIENT)
Dept: GENERAL RADIOLOGY | Age: 86
DRG: 536 | End: 2021-03-11
Attending: EMERGENCY MEDICINE
Payer: MEDICARE

## 2021-03-11 DIAGNOSIS — S32.592A CLOSED FRACTURE OF MULTIPLE PUBIC RAMI, LEFT, INITIAL ENCOUNTER (HCC): ICD-10-CM

## 2021-03-11 DIAGNOSIS — R65.20 SEVERE SEPSIS (HCC): Primary | ICD-10-CM

## 2021-03-11 DIAGNOSIS — A41.9 SEVERE SEPSIS (HCC): Primary | ICD-10-CM

## 2021-03-11 PROBLEM — G93.41 METABOLIC ENCEPHALOPATHY: Status: ACTIVE | Noted: 2021-03-11

## 2021-03-11 PROBLEM — J18.9 HCAP (HEALTHCARE-ASSOCIATED PNEUMONIA): Status: ACTIVE | Noted: 2021-03-11

## 2021-03-11 PROBLEM — M25.552 LEFT HIP PAIN: Status: ACTIVE | Noted: 2021-03-11

## 2021-03-11 LAB
ALBUMIN SERPL-MCNC: 2 G/DL (ref 3.2–4.6)
ALBUMIN/GLOB SERPL: 0.8 {RATIO} (ref 1.2–3.5)
ALP SERPL-CCNC: 92 U/L (ref 50–136)
ALT SERPL-CCNC: 22 U/L (ref 12–65)
ANION GAP SERPL CALC-SCNC: 5 MMOL/L (ref 7–16)
APPEARANCE UR: CLEAR
APTT PPP: 42.1 SEC (ref 24.1–35.1)
AST SERPL-CCNC: 17 U/L (ref 15–37)
BASOPHILS # BLD: 0 K/UL (ref 0–0.2)
BASOPHILS NFR BLD: 1 % (ref 0–2)
BILIRUB SERPL-MCNC: 0.8 MG/DL (ref 0.2–1.1)
BILIRUB UR QL: NEGATIVE
BUN SERPL-MCNC: 13 MG/DL (ref 8–23)
CALCIUM SERPL-MCNC: 7.4 MG/DL (ref 8.3–10.4)
CHLORIDE SERPL-SCNC: 114 MMOL/L (ref 98–107)
CO2 SERPL-SCNC: 26 MMOL/L (ref 21–32)
COLOR UR: YELLOW
CREAT SERPL-MCNC: 0.51 MG/DL (ref 0.6–1)
DIFFERENTIAL METHOD BLD: ABNORMAL
EOSINOPHIL # BLD: 0.1 K/UL (ref 0–0.8)
EOSINOPHIL NFR BLD: 3 % (ref 0.5–7.8)
ERYTHROCYTE [DISTWIDTH] IN BLOOD BY AUTOMATED COUNT: 16.1 % (ref 11.9–14.6)
GLOBULIN SER CALC-MCNC: 2.5 G/DL (ref 2.3–3.5)
GLUCOSE SERPL-MCNC: 86 MG/DL (ref 65–100)
GLUCOSE UR STRIP.AUTO-MCNC: NEGATIVE MG/DL
HCT VFR BLD AUTO: 28.8 % (ref 35.8–46.3)
HGB BLD-MCNC: 9.1 G/DL (ref 11.7–15.4)
HGB UR QL STRIP: NEGATIVE
IMM GRANULOCYTES # BLD AUTO: 0 K/UL (ref 0–0.5)
IMM GRANULOCYTES NFR BLD AUTO: 1 % (ref 0–5)
INR PPP: 3.7
KETONES UR QL STRIP.AUTO: NEGATIVE MG/DL
LACTATE SERPL-SCNC: 0.8 MMOL/L (ref 0.4–2)
LEUKOCYTE ESTERASE UR QL STRIP.AUTO: NEGATIVE
LIPASE SERPL-CCNC: 165 U/L (ref 73–393)
LYMPHOCYTES # BLD: 0.6 K/UL (ref 0.5–4.6)
LYMPHOCYTES NFR BLD: 17 % (ref 13–44)
MAGNESIUM SERPL-MCNC: 2 MG/DL (ref 1.8–2.4)
MCH RBC QN AUTO: 28.5 PG (ref 26.1–32.9)
MCHC RBC AUTO-ENTMCNC: 31.6 G/DL (ref 31.4–35)
MCV RBC AUTO: 90.3 FL (ref 79.6–97.8)
MONOCYTES # BLD: 0.2 K/UL (ref 0.1–1.3)
MONOCYTES NFR BLD: 5 % (ref 4–12)
NEUTS SEG # BLD: 2.7 K/UL (ref 1.7–8.2)
NEUTS SEG NFR BLD: 74 % (ref 43–78)
NITRITE UR QL STRIP.AUTO: NEGATIVE
NRBC # BLD: 0 K/UL (ref 0–0.2)
PH UR STRIP: 7 [PH] (ref 5–9)
PLATELET # BLD AUTO: 136 K/UL (ref 150–450)
PMV BLD AUTO: 9.7 FL (ref 9.4–12.3)
POTASSIUM SERPL-SCNC: 3.4 MMOL/L (ref 3.5–5.1)
PROT SERPL-MCNC: 4.5 G/DL (ref 6.3–8.2)
PROT UR STRIP-MCNC: NEGATIVE MG/DL
PROTHROMBIN TIME: 36.5 SEC (ref 12.5–14.7)
RBC # BLD AUTO: 3.19 M/UL (ref 4.05–5.2)
SODIUM SERPL-SCNC: 145 MMOL/L (ref 136–145)
SP GR UR REFRACTOMETRY: 1.01 (ref 1–1.02)
UROBILINOGEN UR QL STRIP.AUTO: 1 EU/DL (ref 0.2–1)
WBC # BLD AUTO: 3.6 K/UL (ref 4.3–11.1)

## 2021-03-11 PROCEDURE — 80053 COMPREHEN METABOLIC PANEL: CPT

## 2021-03-11 PROCEDURE — 74011250636 HC RX REV CODE- 250/636: Performed by: EMERGENCY MEDICINE

## 2021-03-11 PROCEDURE — 85610 PROTHROMBIN TIME: CPT

## 2021-03-11 PROCEDURE — 85025 COMPLETE CBC W/AUTO DIFF WBC: CPT

## 2021-03-11 PROCEDURE — 87186 SC STD MICRODIL/AGAR DIL: CPT

## 2021-03-11 PROCEDURE — 83605 ASSAY OF LACTIC ACID: CPT

## 2021-03-11 PROCEDURE — 73502 X-RAY EXAM HIP UNI 2-3 VIEWS: CPT

## 2021-03-11 PROCEDURE — 71045 X-RAY EXAM CHEST 1 VIEW: CPT

## 2021-03-11 PROCEDURE — 85730 THROMBOPLASTIN TIME PARTIAL: CPT

## 2021-03-11 PROCEDURE — 99285 EMERGENCY DEPT VISIT HI MDM: CPT

## 2021-03-11 PROCEDURE — 83690 ASSAY OF LIPASE: CPT

## 2021-03-11 PROCEDURE — 36415 COLL VENOUS BLD VENIPUNCTURE: CPT

## 2021-03-11 PROCEDURE — 83735 ASSAY OF MAGNESIUM: CPT

## 2021-03-11 PROCEDURE — 87040 BLOOD CULTURE FOR BACTERIA: CPT

## 2021-03-11 PROCEDURE — 65270000029 HC RM PRIVATE

## 2021-03-11 PROCEDURE — 81003 URINALYSIS AUTO W/O SCOPE: CPT

## 2021-03-11 PROCEDURE — 87088 URINE BACTERIA CULTURE: CPT

## 2021-03-11 PROCEDURE — 87086 URINE CULTURE/COLONY COUNT: CPT

## 2021-03-11 PROCEDURE — 96374 THER/PROPH/DIAG INJ IV PUSH: CPT

## 2021-03-11 PROCEDURE — 96365 THER/PROPH/DIAG IV INF INIT: CPT

## 2021-03-11 RX ORDER — LEVOTHYROXINE SODIUM 75 UG/1
75 TABLET ORAL
Status: DISCONTINUED | OUTPATIENT
Start: 2021-03-12 | End: 2021-03-14 | Stop reason: HOSPADM

## 2021-03-11 RX ORDER — OXYCODONE HYDROCHLORIDE 5 MG/1
5 TABLET ORAL
Status: DISCONTINUED | OUTPATIENT
Start: 2021-03-11 | End: 2021-03-13

## 2021-03-11 RX ORDER — MORPHINE SULFATE 2 MG/ML
2 INJECTION, SOLUTION INTRAMUSCULAR; INTRAVENOUS ONCE
Status: COMPLETED | OUTPATIENT
Start: 2021-03-11 | End: 2021-03-11

## 2021-03-11 RX ORDER — ACETAMINOPHEN 650 MG/1
650 SUPPOSITORY RECTAL
Status: DISCONTINUED | OUTPATIENT
Start: 2021-03-11 | End: 2021-03-14 | Stop reason: HOSPADM

## 2021-03-11 RX ORDER — LANOLIN ALCOHOL/MO/W.PET/CERES
400 CREAM (GRAM) TOPICAL DAILY
Status: DISCONTINUED | OUTPATIENT
Start: 2021-03-12 | End: 2021-03-14 | Stop reason: HOSPADM

## 2021-03-11 RX ORDER — DORZOLAMIDE HYDROCHLORIDE AND TIMOLOL MALEATE 20; 5 MG/ML; MG/ML
1 SOLUTION/ DROPS OPHTHALMIC 2 TIMES DAILY
Status: DISCONTINUED | OUTPATIENT
Start: 2021-03-12 | End: 2021-03-14 | Stop reason: HOSPADM

## 2021-03-11 RX ORDER — DILTIAZEM HYDROCHLORIDE 120 MG/1
120 CAPSULE, COATED, EXTENDED RELEASE ORAL DAILY
Status: DISCONTINUED | OUTPATIENT
Start: 2021-03-12 | End: 2021-03-14 | Stop reason: HOSPADM

## 2021-03-11 RX ORDER — POTASSIUM CHLORIDE 20 MEQ/1
20 TABLET, EXTENDED RELEASE ORAL
Status: DISCONTINUED | OUTPATIENT
Start: 2021-03-11 | End: 2021-03-12

## 2021-03-11 RX ORDER — VANCOMYCIN HYDROCHLORIDE
1250
Status: COMPLETED | OUTPATIENT
Start: 2021-03-11 | End: 2021-03-12

## 2021-03-11 RX ORDER — ACETAMINOPHEN 325 MG/1
650 TABLET ORAL
Status: DISCONTINUED | OUTPATIENT
Start: 2021-03-11 | End: 2021-03-14 | Stop reason: HOSPADM

## 2021-03-11 RX ORDER — CARVEDILOL 12.5 MG/1
25 TABLET ORAL 2 TIMES DAILY WITH MEALS
Status: DISCONTINUED | OUTPATIENT
Start: 2021-03-12 | End: 2021-03-12

## 2021-03-11 RX ORDER — ONDANSETRON 2 MG/ML
4 INJECTION INTRAMUSCULAR; INTRAVENOUS
Status: DISCONTINUED | OUTPATIENT
Start: 2021-03-11 | End: 2021-03-14 | Stop reason: HOSPADM

## 2021-03-11 RX ORDER — PANTOPRAZOLE SODIUM 40 MG/1
40 TABLET, DELAYED RELEASE ORAL DAILY
Status: DISCONTINUED | OUTPATIENT
Start: 2021-03-12 | End: 2021-03-14 | Stop reason: HOSPADM

## 2021-03-11 RX ORDER — POLYETHYLENE GLYCOL 3350 17 G/17G
17 POWDER, FOR SOLUTION ORAL DAILY PRN
Status: DISCONTINUED | OUTPATIENT
Start: 2021-03-11 | End: 2021-03-14 | Stop reason: HOSPADM

## 2021-03-11 RX ADMIN — VANCOMYCIN HYDROCHLORIDE 1250 MG: 10 INJECTION, POWDER, LYOPHILIZED, FOR SOLUTION INTRAVENOUS at 23:08

## 2021-03-11 RX ADMIN — MORPHINE SULFATE 2 MG: 2 INJECTION, SOLUTION INTRAMUSCULAR; INTRAVENOUS at 23:13

## 2021-03-12 ENCOUNTER — APPOINTMENT (OUTPATIENT)
Dept: MRI IMAGING | Age: 86
DRG: 536 | End: 2021-03-12
Attending: HOSPITALIST
Payer: MEDICARE

## 2021-03-12 LAB
ANION GAP SERPL CALC-SCNC: 4 MMOL/L (ref 7–16)
BASOPHILS # BLD: 0 K/UL (ref 0–0.2)
BASOPHILS NFR BLD: 1 % (ref 0–2)
BUN SERPL-MCNC: 12 MG/DL (ref 8–23)
CALCIUM SERPL-MCNC: 7.6 MG/DL (ref 8.3–10.4)
CHLORIDE SERPL-SCNC: 112 MMOL/L (ref 98–107)
CO2 SERPL-SCNC: 27 MMOL/L (ref 21–32)
CREAT SERPL-MCNC: 0.43 MG/DL (ref 0.6–1)
DIFFERENTIAL METHOD BLD: ABNORMAL
EOSINOPHIL # BLD: 0.1 K/UL (ref 0–0.8)
EOSINOPHIL NFR BLD: 4 % (ref 0.5–7.8)
ERYTHROCYTE [DISTWIDTH] IN BLOOD BY AUTOMATED COUNT: 16.2 % (ref 11.9–14.6)
GLUCOSE SERPL-MCNC: 78 MG/DL (ref 65–100)
HCT VFR BLD AUTO: 30.5 % (ref 35.8–46.3)
HGB BLD-MCNC: 9.5 G/DL (ref 11.7–15.4)
IMM GRANULOCYTES # BLD AUTO: 0 K/UL (ref 0–0.5)
IMM GRANULOCYTES NFR BLD AUTO: 1 % (ref 0–5)
LYMPHOCYTES # BLD: 0.8 K/UL (ref 0.5–4.6)
LYMPHOCYTES NFR BLD: 23 % (ref 13–44)
MCH RBC QN AUTO: 28.3 PG (ref 26.1–32.9)
MCHC RBC AUTO-ENTMCNC: 31.1 G/DL (ref 31.4–35)
MCV RBC AUTO: 90.8 FL (ref 79.6–97.8)
MONOCYTES # BLD: 0.2 K/UL (ref 0.1–1.3)
MONOCYTES NFR BLD: 6 % (ref 4–12)
NEUTS SEG # BLD: 2.2 K/UL (ref 1.7–8.2)
NEUTS SEG NFR BLD: 67 % (ref 43–78)
NRBC # BLD: 0 K/UL (ref 0–0.2)
PLATELET # BLD AUTO: 136 K/UL (ref 150–450)
PMV BLD AUTO: 9.9 FL (ref 9.4–12.3)
POTASSIUM SERPL-SCNC: 3 MMOL/L (ref 3.5–5.1)
PROCALCITONIN SERPL-MCNC: <0.05 NG/ML
RBC # BLD AUTO: 3.36 M/UL (ref 4.05–5.2)
SODIUM SERPL-SCNC: 143 MMOL/L (ref 136–145)
WBC # BLD AUTO: 3.3 K/UL (ref 4.3–11.1)

## 2021-03-12 PROCEDURE — 36415 COLL VENOUS BLD VENIPUNCTURE: CPT

## 2021-03-12 PROCEDURE — 85025 COMPLETE CBC W/AUTO DIFF WBC: CPT

## 2021-03-12 PROCEDURE — 74011250637 HC RX REV CODE- 250/637: Performed by: HOSPITALIST

## 2021-03-12 PROCEDURE — 74011250636 HC RX REV CODE- 250/636: Performed by: FAMILY MEDICINE

## 2021-03-12 PROCEDURE — 2709999900 HC NON-CHARGEABLE SUPPLY

## 2021-03-12 PROCEDURE — 84145 PROCALCITONIN (PCT): CPT

## 2021-03-12 PROCEDURE — 74011250637 HC RX REV CODE- 250/637: Performed by: FAMILY MEDICINE

## 2021-03-12 PROCEDURE — 80048 BASIC METABOLIC PNL TOTAL CA: CPT

## 2021-03-12 PROCEDURE — 74011000250 HC RX REV CODE- 250: Performed by: FAMILY MEDICINE

## 2021-03-12 PROCEDURE — 74011000258 HC RX REV CODE- 258: Performed by: FAMILY MEDICINE

## 2021-03-12 PROCEDURE — 73721 MRI JNT OF LWR EXTRE W/O DYE: CPT

## 2021-03-12 PROCEDURE — 65270000029 HC RM PRIVATE

## 2021-03-12 RX ORDER — CARVEDILOL 3.12 MG/1
3.12 TABLET ORAL 2 TIMES DAILY WITH MEALS
Status: DISCONTINUED | OUTPATIENT
Start: 2021-03-12 | End: 2021-03-13

## 2021-03-12 RX ORDER — POTASSIUM CHLORIDE 20 MEQ/1
40 TABLET, EXTENDED RELEASE ORAL 2 TIMES DAILY
Status: DISCONTINUED | OUTPATIENT
Start: 2021-03-12 | End: 2021-03-13

## 2021-03-12 RX ADMIN — POTASSIUM CHLORIDE 40 MEQ: 1500 TABLET, EXTENDED RELEASE ORAL at 17:21

## 2021-03-12 RX ADMIN — Medication 400 MG: at 09:38

## 2021-03-12 RX ADMIN — RIVAROXABAN 15 MG: 15 TABLET, FILM COATED ORAL at 11:11

## 2021-03-12 RX ADMIN — DORZOLAMIDE HYDROCHLORIDE AND TIMOLOL MALEATE 1 DROP: 20; 5 SOLUTION/ DROPS OPHTHALMIC at 10:08

## 2021-03-12 RX ADMIN — DORZOLAMIDE HYDROCHLORIDE AND TIMOLOL MALEATE 1 DROP: 20; 5 SOLUTION/ DROPS OPHTHALMIC at 17:21

## 2021-03-12 RX ADMIN — POTASSIUM CHLORIDE 40 MEQ: 1500 TABLET, EXTENDED RELEASE ORAL at 09:38

## 2021-03-12 RX ADMIN — PIPERACILLIN SODIUM AND TAZOBACTAM SODIUM 4.5 G: 4; .5 INJECTION, POWDER, LYOPHILIZED, FOR SOLUTION INTRAVENOUS at 05:31

## 2021-03-12 RX ADMIN — OXYCODONE 5 MG: 5 TABLET ORAL at 12:10

## 2021-03-12 RX ADMIN — PANTOPRAZOLE SODIUM 40 MG: 40 TABLET, DELAYED RELEASE ORAL at 09:38

## 2021-03-12 RX ADMIN — ACETAMINOPHEN 650 MG: 325 TABLET ORAL at 14:40

## 2021-03-12 RX ADMIN — DILTIAZEM HYDROCHLORIDE 120 MG: 120 CAPSULE, COATED, EXTENDED RELEASE ORAL at 09:38

## 2021-03-12 RX ADMIN — LEVOTHYROXINE SODIUM 75 MCG: 0.07 TABLET ORAL at 09:47

## 2021-03-12 RX ADMIN — CARVEDILOL 25 MG: 12.5 TABLET, FILM COATED ORAL at 09:38

## 2021-03-12 RX ADMIN — CARVEDILOL 3.12 MG: 3.12 TABLET, FILM COATED ORAL at 17:20

## 2021-03-12 NOTE — PROGRESS NOTES
Problem: Falls - Risk of  Goal: *Absence of Falls  Description: Document Bard Ramos Fall Risk and appropriate interventions in the flowsheet. Outcome: Progressing Towards Goal  Note: Fall Risk Interventions:  Mobility Interventions: Assess mobility with egress test, Bed/chair exit alarm, Communicate number of staff needed for ambulation/transfer    Mentation Interventions: Bed/chair exit alarm, Adequate sleep, hydration, pain control, Door open when patient unattended    Medication Interventions: Assess postural VS orthostatic hypotension, Evaluate medications/consider consulting pharmacy, Patient to call before getting OOB, Teach patient to arise slowly    Elimination Interventions: Bed/chair exit alarm, Call light in reach    History of Falls Interventions: Bed/chair exit alarm         Problem: Patient Education: Go to Patient Education Activity  Goal: Patient/Family Education  Outcome: Progressing Towards Goal     Problem: Pressure Injury - Risk of  Goal: *Prevention of pressure injury  Description: Document Tyrone Scale and appropriate interventions in the flowsheet.   Outcome: Progressing Towards Goal  Note: Pressure Injury Interventions:       Moisture Interventions: Absorbent underpads, Apply protective barrier, creams and emollients, Assess need for specialty bed    Activity Interventions: PT/OT evaluation, Pressure redistribution bed/mattress(bed type)    Mobility Interventions: HOB 30 degrees or less, PT/OT evaluation    Nutrition Interventions: Offer support with meals,snacks and hydration    Friction and Shear Interventions: HOB 30 degrees or less, Sit at 90-degree angle

## 2021-03-12 NOTE — PROGRESS NOTES
03/11/21 0030   Dual Skin Pressure Injury Assessment   Dual Skin Pressure Injury Assessment WDL   Second Care Provider (Based on Facility Policy) Arsalan Westbrook RN   Skin Integumentary   Skin Integumentary (WDL) WDL   Skin Color Appropriate for ethnicity   Skin Condition/Temp Dry; Warm   Skin Integrity Intact   Wound Prevention and Protection Methods   Orientation of Wound Prevention Posterior   Location of Wound Prevention Sacrum/Coccyx   Wound Offloading (Prevention Methods) Bed, pressure reduction mattress

## 2021-03-12 NOTE — ED PROVIDER NOTES
57-year-old female presenting from home for weakness and altered mental status. Home health was visiting with the patient found that she had a low blood pressure and was extremely weak. EMS was called found the patient had a systolic blood pressure in the 70s with a temperature of 101  IVs were started, fluids were given, blood cultures were drawn and Zosyn was given patient was given 1.6 L in route and now has normalized vital signs  Family member who was at home with her was concerned because she had foul-smelling urine    The history is provided by the EMS personnel. Hypotension   This is a new problem. The current episode started 1 to 2 hours ago. The problem has been gradually improving. Associated symptoms include confusion, somnolence and weakness. Pertinent negatives include no seizures, no unresponsiveness, no agitation, no delusions, no hallucinations, no self-injury, no violence, no tingling and no numbness. Risk factors include dementia. Her past medical history is significant for CVA.         Past Medical History:   Diagnosis Date    Age related osteoporosis 2017    Chronic atrial fibrillation ([address] 2017    Chronic pain     shoulder    DDD (degenerative disc disease), lumbar 2017    Gastroesophageal reflux disease with esophagitis 2017    controlled with medication    Glaucoma 2017    History of CVA (cerebrovascular accident) 2017    HTN (hypertension), benign 2017    Hyperlipidemia, mixed 2017    Hypertension     Hypothyroidism, adult 2017    IFG (impaired fasting glucose) 2017    Primary osteoarthritis involving multiple joints 2017    PUD (peptic ulcer disease)        Past Surgical History:   Procedure Laterality Date    HX  SECTION      x2    HX COLONOSCOPY      HX URIEL AND BSO      hysterectomy         Family History:   Problem Relation Age of Onset    No Known Problems Mother     Cancer Father stomach    No Known Problems Sister     No Known Problems Brother     No Known Problems Sister     No Known Problems Sister     No Known Problems Sister     No Known Problems Brother     No Known Problems Brother     No Known Problems Brother     No Known Problems Brother     Coronary Artery Disease Neg Hx        Social History     Socioeconomic History    Marital status:      Spouse name: Not on file    Number of children: Not on file    Years of education: Not on file    Highest education level: Not on file   Occupational History    Not on file   Social Needs    Financial resource strain: Not on file    Food insecurity     Worry: Not on file     Inability: Not on file   [employer] needs     Medical: Not on file     Non-medical: Not on file   Tobacco Use    Smoking status: Never Smoker    Smokeless tobacco: Never Used   Substance and Sexual Activity    Alcohol use: No    Drug use: Not on file    Sexual activity: Not on file   Lifestyle    Physical activity     Days per week: Not on file     Minutes per session: Not on file    Stress: Not on file   Relationships    Social connections     Talks on phone: Not on file     Gets together: Not on file     Attends Taoism service: Not on file     Active member of club or organization: Not on file     Attends meetings of clubs or organizations: Not on file     Relationship status: Not on file    Intimate partner violence     Fear of current or ex partner: Not on file     Emotionally abused: Not on file     Physically abused: Not on file     Forced sexual activity: Not on file   Other Topics Concern    Not on file   Social History Narrative    Not on file         ALLERGIES: Actonel [risedronate], Atorvastatin, Fosamax [alendronate], Ketoprofen, and Moexipril    Review of Systems   Constitutional: Positive for activity change, fatigue and fever. Genitourinary: Positive for difficulty urinating and dysuria.    Neurological: Positive for speech difficulty and weakness. Negative for tingling, seizures and numbness. Psychiatric/Behavioral: Positive for confusion. Negative for agitation, hallucinations and self-injury. All other systems reviewed and are negative. Vitals:    03/11/21 2117   BP: (!) 143/72   Pulse: 79   Resp: 20   SpO2: 95%   Weight: 47.6 kg (105 lb)   Height: 4' 11\" (1.499 m)            Physical Exam  Vitals signs and nursing note reviewed. Constitutional:       Appearance: She is well-developed. She is ill-appearing. HENT:      Head: Normocephalic and atraumatic. Eyes:      Conjunctiva/sclera: Conjunctivae normal.      Pupils: Pupils are equal, round, and reactive to light. Neck:      Musculoskeletal: Neck supple. Cardiovascular:      Rate and Rhythm: Regular rhythm. Tachycardia present. Pulmonary:      Effort: Pulmonary effort is normal.      Breath sounds: Normal breath sounds. Abdominal:      General: Bowel sounds are normal.      Palpations: Abdomen is soft. Musculoskeletal: Normal range of motion. Skin:     General: Skin is warm and dry. Neurological:      Mental Status: She is alert and oriented to person, place, and time. Comments: Chronic left-sided facial droop, slurred speech          MDM  Number of Diagnoses or Management Options  Severe sepsis [facility])  Diagnosis management comments: 68-year-old female presenting for hypotension fever concerning for sepsis of urinary origin.        Amount and/or Complexity of Data Reviewed  Clinical lab tests: ordered and reviewed (Results for orders placed or performed during the hospital encounter of 03/11/21  -CBC WITH AUTOMATED DIFF       Result                      Value             Ref Range           WBC                         3.6 (L)           4.3 - 11.1 K*       RBC                         3.19 (L)          4.05 - 5.2 M*       HGB                         9.1 (L)           11.7 - 15.4 *       HCT                         28.8 (L)          35.8 - 46.3 %       MCV                         90.3              79.6 - 97.8 *       MCH                         28.5              26.1 - 32.9 *       MCHC                        31.6              31.4 - 35.0 *       RDW                         16.1 (H)          11.9 - 14.6 %       PLATELET                    136 (L)           150 - 450 K/*       MPV                         9.7               9.4 - 12.3 FL       ABSOLUTE NRBC               0.00              0.0 - 0.2 K/*       DF                          AUTOMATED                             NEUTROPHILS                 74                43 - 78 %           LYMPHOCYTES                 17                13 - 44 %           MONOCYTES                   5                 4.0 - 12.0 %        EOSINOPHILS                 3                 0.5 - 7.8 %         BASOPHILS                   1                 0.0 - 2.0 %         IMMATURE GRANULOCYTES       1                 0.0 - 5.0 %         ABS. NEUTROPHILS            2.7               1.7 - 8.2 K/*       ABS. LYMPHOCYTES            0.6               0.5 - 4.6 K/*       ABS. MONOCYTES              0.2               0.1 - 1.3 K/*       ABS. EOSINOPHILS            0.1               0.0 - 0.8 K/*       ABS. BASOPHILS              0.0               0.0 - 0.2 K/*       ABS. IMM.  GRANS.            0.0               0.0 - 0.5 K/*  -PROTHROMBIN TIME + INR       Result                      Value             Ref Range           Prothrombin time            36.5 (H)          12.5 - 14.7 *       INR                         3.7                              -PTT       Result                      Value             Ref Range           aPTT                        42.1 (H)          24.1 - 35.1 *  -LIPASE       Result                      Value             Ref Range           Lipase                      165               73 - 393 U/L   -MAGNESIUM       Result                      Value             Ref Range           Magnesium                   2.0 1.8 - 2.4 mg*  -METABOLIC PANEL, COMPREHENSIVE       Result                      Value             Ref Range           Sodium                      145               136 - 145 mm*       Potassium                   3.4 (L)           3.5 - 5.1 mm*       Chloride                    114 (H)           98 - 107 mmo*       CO2                         26                21 - 32 mmol*       Anion gap                   5 (L)             7 - 16 mmol/L       Glucose                     86                65 - 100 mg/*       BUN                         13                8 - 23 MG/DL        Creatinine                  0.51 (L)          0.6 - 1.0 MG*       GFR est AA                  >60               >60 ml/min/1*       GFR est non-AA              >60               >60 ml/min/1*       Calcium                     7.4 (L)           8.3 - 10.4 M*       Bilirubin, total            0.8               0.2 - 1.1 MG*       ALT (SGPT)                  22                12 - 65 U/L         AST (SGOT)                  17                15 - 37 U/L         Alk.  phosphatase            92                50 - 136 U/L        Protein, total              4.5 (L)           6.3 - 8.2 g/*       Albumin                     2.0 (L)           3.2 - 4.6 g/*       Globulin                    2.5               2.3 - 3.5 g/*       A-G Ratio                   0.8 (L)           1.2 - 3.5      -URINALYSIS W/ RFLX MICROSCOPIC       Result                      Value             Ref Range           Color                       YELLOW                                Appearance                  CLEAR                                 Specific gravity            1.009             1.001 - 1.02*       pH (UA)                     7.0               5.0 - 9.0           Protein                     Negative          NEG mg/dL           Glucose                     Negative          mg/dL               Ketone                      Negative          NEG mg/dL Bilirubin                   Negative          NEG                 Blood                       Negative          NEG                 Urobilinogen                1.0               0.2 - 1.0 EU*       Nitrites                    Negative          NEG                 Leukocyte Esterase          Negative          NEG            -LACTIC ACID       Result                      Value             Ref Range           Lactic acid                 0.8               0.4 - 2.0 MM*  )  Tests in the radiology section of CPT®: ordered and reviewed (Xr Chest Port    Result Date: 3/11/2021  Portable chest xray  COMPARISON: None. CLINICAL HISTORY: Fever. FINDINGS: Mild bibasilar opacities, likely atelectasis. No pneumothorax, pulmonary edema or large pleural effusion. Heart is enlarged. Mediastinal contour is within normal limits. Bones are osteopenic. 1. Mild bibasilar opacities, likely atelectasis. No definite pulmonary consolidation.    )  Tests in the medicine section of CPT®: ordered and reviewed  Decide to obtain previous medical records or to obtain history from someone other than the patient: yes  Discuss the patient with other providers: yes  Independent visualization of images, tracings, or specimens: yes    Risk of Complications, Morbidity, and/or Mortality  Presenting problems: high  Diagnostic procedures: high  Management options: high  General comments: The patient has normalized from a hemodynamic standpoint. Family is at bedside is concerned about pain in the left hip so added an x-ray. We have carried for the patient sepsis work-up started by EMS and added vancomycin. Pain has been controlled and patient will be hospitalized for further management and monitoring. I personally reviewed the patient's vital signs, laboratory tests, and/or radiological findings. I discussed these findings with the patient and their significance.   I answered all questions and explained that given these findings there is significant concern for increased morbidity and/or mortality without immediate intervention.   As a result, I recommended admission to the hospital, consulted the appropriate service, and transitioned care to that service in improved condition      Patient Progress  Patient progress: stable         Procedures

## 2021-03-12 NOTE — PROGRESS NOTES
Pharmacokinetic Consult to Pharmacist    Debbie Mcguire is a 80 y.o. female being treated for HAP with  vanc. Height: 4' 11\" (149.9 cm)  Weight: 47.6 kg (105 lb)  Lab Results   Component Value Date/Time    BUN 13 03/11/2021 09:22 PM    Creatinine 0.51 (L) 03/11/2021 09:22 PM    WBC 3.6 (L) 03/11/2021 09:22 PM    Lactic acid 0.8 03/11/2021 09:22 PM      Estimated Creatinine Clearance: 40.9 mL/min (A) (by C-G formula based on SCr of 0.51 mg/dL (L)). CULTURES:  BCx2 pending  UA pending    Day 1 of vancomycin. Goal trough is 15-20mcg/ml. Vancomycin dose initiated at 1.25gm x1  Then 750mg q24hr. Pharmacy to monitor and adjust per protocol. Will continue to follow patient and order levels when clinically indicated.     Thank you for this consult,  Alessia Antunez, PharmD

## 2021-03-12 NOTE — PROGRESS NOTES
Hospitalist Progress Note    3/12/2021  Admit Date: 3/11/2021  9:14 PM   NAME: Micha Grayson   :  10/23/1931   MRN:  [id number]   Attending: Emily Mccollum MD  PCP:  Wayne Kern DO    SUBJECTIVE:     Micha Grayson is a 49-year-old female with history of HTN, HLD, hypothyroidism, GERD, systolic CHF EF 01%, CVA last year with residual right-sided weakness, wheelchair-bound, aphasic and multiple falls admitted on 3/11 in view of acute encephalopathy and left hip pain. Patient was initially suspected to have hospital-acquired pneumonia however chest x-ray is not remarkable for any acute infiltrates or consolidation. Patient is not hypoxic or febrile, with normal PCT and lactic acid. Empiric antibiotics have been stopped. MRI of left hip is pending as left hip x-ray was unremarkable for acute fracture. 3/12: Patient is sitting up in bed, alert awake, aphasic at baseline. Not able to speak clearly convey any information. Confirmed with patient's family the patient is aphasic and is currently at baseline. Patient was able to eat this morning without having difficulty. Review of Systems negative with exception of pertinent positives noted above      PHYSICAL EXAM       Visit Vitals  /69 (BP 1 Location: Left upper arm, BP Patient Position: At rest)   Pulse 61   Temp 97.4 °F (36.3 °C)   Resp 19   Ht 4' 11\" (1.499 m)   Wt 47.6 kg (105 lb)   SpO2 91%   BMI 21.21 kg/m²      Temp (24hrs), Av.4 °F (36.3 °C), Min:97.1 °F (36.2 °C), Max:97.7 °F (36.5 °C)    Oxygen Therapy  O2 Sat (%): 91 % (21 0348)  Pulse via Oximetry: 72 beats per minute (21 0020)  O2 Device: Room air (21 0020)  No intake or output data in the 24 hours ending 21 0709       General: No alert, awake, elderly, NAD, on room air  Head:  Atraumatic Normocephalic. Eyes:  PERRLA, EOMI, Anicteric. ENT:  No discharges/lesions. Lungs:  CTA Bilaterally.    CVS:  Regular rate and rhythm,  No murmur, rub, or gallop, No JVD, No lower   extremity edema. Abdomen: Soft, Non distended, Non tender, Positive bowel sounds. MSK:  No deformities, lesions, Spontaneously moves extremities. Neurologic:  GCS 15, speaks incomprehensibly and not following commands, moving all 4 extremities spontaneously  Psychiatry:      Unable to assess  Skin:   No rash/lesions. Good skin turgor  Heme/Lymph/Immune:  No petechiae, ecchymoses, overt signs of bleeding or    lymphadenopathy noted. Recent Results (from the past 24 hour(s))   CBC WITH AUTOMATED DIFF    Collection Time: 03/11/21  9:22 PM   Result Value Ref Range    WBC 3.6 (L) 4.3 - 11.1 K/uL    RBC 3.19 (L) 4.05 - 5.2 M/uL    HGB 9.1 (L) 11.7 - 15.4 g/dL    HCT 28.8 (L) 35.8 - 46.3 %    MCV 90.3 79.6 - 97.8 FL    MCH 28.5 26.1 - 32.9 PG    MCHC 31.6 31.4 - 35.0 g/dL    RDW 16.1 (H) 11.9 - 14.6 %    PLATELET 338 (L) 126 - 450 K/uL    MPV 9.7 9.4 - 12.3 FL    ABSOLUTE NRBC 0.00 0.0 - 0.2 K/uL    DF AUTOMATED      NEUTROPHILS 74 43 - 78 %    LYMPHOCYTES 17 13 - 44 %    MONOCYTES 5 4.0 - 12.0 %    EOSINOPHILS 3 0.5 - 7.8 %    BASOPHILS 1 0.0 - 2.0 %    IMMATURE GRANULOCYTES 1 0.0 - 5.0 %    ABS. NEUTROPHILS 2.7 1.7 - 8.2 K/UL    ABS. LYMPHOCYTES 0.6 0.5 - 4.6 K/UL    ABS. MONOCYTES 0.2 0.1 - 1.3 K/UL    ABS. EOSINOPHILS 0.1 0.0 - 0.8 K/UL    ABS. BASOPHILS 0.0 0.0 - 0.2 K/UL    ABS. IMM.  GRANS. 0.0 0.0 - 0.5 K/UL   PROTHROMBIN TIME + INR    Collection Time: 03/11/21  9:22 PM   Result Value Ref Range    Prothrombin time 36.5 (H) 12.5 - 14.7 sec    INR 3.7     PTT    Collection Time: 03/11/21  9:22 PM   Result Value Ref Range    aPTT 42.1 (H) 24.1 - 35.1 SEC   LIPASE    Collection Time: 03/11/21  9:22 PM   Result Value Ref Range    Lipase 165 73 - 393 U/L   MAGNESIUM    Collection Time: 03/11/21  9:22 PM   Result Value Ref Range    Magnesium 2.0 1.8 - 2.4 mg/dL   METABOLIC PANEL, COMPREHENSIVE    Collection Time: 03/11/21  9:22 PM   Result Value Ref Range    Sodium 145 136 - 145 mmol/L    Potassium 3.4 (L) 3.5 - 5.1 mmol/L    Chloride 114 (H) 98 - 107 mmol/L    CO2 26 21 - 32 mmol/L    Anion gap 5 (L) 7 - 16 mmol/L    Glucose 86 65 - 100 mg/dL    BUN 13 8 - 23 MG/DL    Creatinine 0.51 (L) 0.6 - 1.0 MG/DL    GFR est AA >60 >60 ml/min/1.73m2    GFR est non-AA >60 >60 ml/min/1.73m2    Calcium 7.4 (L) 8.3 - 10.4 MG/DL    Bilirubin, total 0.8 0.2 - 1.1 MG/DL    ALT (SGPT) 22 12 - 65 U/L    AST (SGOT) 17 15 - 37 U/L    Alk.  phosphatase 92 50 - 136 U/L    Protein, total 4.5 (L) 6.3 - 8.2 g/dL    Albumin 2.0 (L) 3.2 - 4.6 g/dL    Globulin 2.5 2.3 - 3.5 g/dL    A-G Ratio 0.8 (L) 1.2 - 3.5     LACTIC ACID    Collection Time: 03/11/21  9:22 PM   Result Value Ref Range    Lactic acid 0.8 0.4 - 2.0 MMOL/L   URINALYSIS W/ RFLX MICROSCOPIC    Collection Time: 03/11/21  9:42 PM   Result Value Ref Range    Color YELLOW      Appearance CLEAR      Specific gravity 1.009 1.001 - 1.023      pH (UA) 7.0 5.0 - 9.0      Protein Negative NEG mg/dL    Glucose Negative mg/dL    Ketone Negative NEG mg/dL    Bilirubin Negative NEG      Blood Negative NEG      Urobilinogen 1.0 0.2 - 1.0 EU/dL    Nitrites Negative NEG      Leukocyte Esterase Negative NEG     METABOLIC PANEL, BASIC    Collection Time: 03/12/21  4:38 AM   Result Value Ref Range    Sodium 143 136 - 145 mmol/L    Potassium 3.0 (L) 3.5 - 5.1 mmol/L    Chloride 112 (H) 98 - 107 mmol/L    CO2 27 21 - 32 mmol/L    Anion gap 4 (L) 7 - 16 mmol/L    Glucose 78 65 - 100 mg/dL    BUN 12 8 - 23 MG/DL    Creatinine 0.43 (L) 0.6 - 1.0 MG/DL    GFR est AA >60 >60 ml/min/1.73m2    GFR est non-AA >60 >60 ml/min/1.73m2    Calcium 7.6 (L) 8.3 - 10.4 MG/DL   CBC WITH AUTOMATED DIFF    Collection Time: 03/12/21  4:38 AM   Result Value Ref Range    WBC 3.3 (L) 4.3 - 11.1 K/uL    RBC 3.36 (L) 4.05 - 5.2 M/uL    HGB 9.5 (L) 11.7 - 15.4 g/dL    HCT 30.5 (L) 35.8 - 46.3 %    MCV 90.8 79.6 - 97.8 FL    MCH 28.3 26.1 - 32.9 PG    MCHC 31.1 (L) 31.4 - 35.0 g/dL    RDW 16.2 (H) 11.9 - 14.6 %    PLATELET 136 (L) 150 - 450 K/uL    MPV 9.9 9.4 - 12.3 FL    ABSOLUTE NRBC 0.00 0.0 - 0.2 K/uL    DF AUTOMATED      NEUTROPHILS 67 43 - 78 %    LYMPHOCYTES 23 13 - 44 %    MONOCYTES 6 4.0 - 12.0 %    EOSINOPHILS 4 0.5 - 7.8 %    BASOPHILS 1 0.0 - 2.0 %    IMMATURE GRANULOCYTES 1 0.0 - 5.0 %    ABS. NEUTROPHILS 2.2 1.7 - 8.2 K/UL    ABS. LYMPHOCYTES 0.8 0.5 - 4.6 K/UL    ABS. MONOCYTES 0.2 0.1 - 1.3 K/UL    ABS. EOSINOPHILS 0.1 0.0 - 0.8 K/UL    ABS. BASOPHILS 0.0 0.0 - 0.2 K/UL    ABS. IMM. GRANS. 0.0 0.0 - 0.5 K/UL         Imaging /Procedures /Studies     Portable chest xray       COMPARISON: None.     CLINICAL HISTORY: Fever.     FINDINGS:     Mild bibasilar opacities, likely atelectasis. No pneumothorax, pulmonary edema  or large pleural effusion. Heart is enlarged. Mediastinal contour is within  normal limits. Bones are osteopenic.        IMPRESSION     1. Mild bibasilar opacities, likely atelectasis. No definite pulmonary  consolidation.   ASSESSMENT      Hospital Problems as of 3/12/2021 Date Reviewed: 12/30/2020          Codes Class Noted - Resolved POA    * (Principal) Metabolic encephalopathy CBI-77-XY: G93.41  ICD-9-CM: 348.31  3/11/2021 - Present Unknown        Left hip pain ICD-10-CM: M25.552  ICD-9-CM: 719.45  3/11/2021 - Present Unknown        Sepsis ([facility] [address] ICD-10-CM: A41.9  ICD-9-CM: 038.9, 995.91  3/11/2021 - Present         HCAP (healthcare-associated pneumonia) ICD-10-CM: J18.9  ICD-9-CM: 426  3/11/2021 - Present Unknown        Chronic atrial fibrillation (HCC) (Chronic) ICD-10-CM: I48.20  ICD-9-CM: 427.31  7/20/2017 - Present Yes        HTN (hypertension), benign (Chronic) ICD-10-CM: I10  ICD-9-CM: 401.1  7/20/2017 - Present Yes        Hyperlipidemia, mixed (Chronic) ICD-10-CM: E78.2  ICD-9-CM: 272.2  7/20/2017 - Present Yes        Gastroesophageal reflux disease with esophagitis (Chronic) ICD-10-CM: K21.00  ICD-9-CM: 530.11  7/20/2017 - Present Yes        Hypothyroidism, adult (Chronic) ICD-10-CM: E03.9  ICD-9-CM: 244.9  7/20/2017 - Present Yes        History of CVA (cerebrovascular accident) (Chronic) ICD-10-CM: Z86.73  ICD-9-CM: V12.54  7/20/2017 - Present Yes    Overview Signed 7/20/2017 11:58 AM by Radha Rangel DO     2014                       Plan:  #Metabolic encephalopathy:  3/12:Unclear etiology, it appears patient's mentation is at baseline and is aphasic confirmed with patient's family. We will continue to monitor closely for any acute changes in mentation. #Sepsis: Ruled out  3/12:Patient does not have any obvious source of infection. Chest x-ray appears unremarkable and UA is clean. Patient does not have any fever or leukocytosis with normal pro-Rolan.  Empiric broad-spectrum antibiotic including vancomycin and Zosyn will be discontinued today. #Left hip pain:  3/12:Ordered for MRI without contrast to rule out any fracture. Pain control with narcotics as needed    #Hypokalemia:  3/12: 3.0 today. Ordered for oral KCl replacement 40 mEq p.o. twice daily for 2 days. With repeat BMP tomorrow. #Atrial fibrillation:  3/12: Rate is optimally controlled  Continue diltiazem  mg p.o. daily and Coreg 25 p.o. twice daily. Continue Xarelto 15 mg p.o. daily with breakfast.  #Hypothyroidism:  3/12: Restarted Synthroid 75 mcg p.o. daily. #Chronic systolic CHF:  Currently euvolemic and compensated. Continue medical management for now.     PT and OT eval        DVT Prophylaxis: Xarelto  Disposition: Pending clinical course    Brett Mcelroy MD

## 2021-03-12 NOTE — PROGRESS NOTES
Chart screened by  for potential discharge needs or concerns. SW notified by community RNCM that the PCP has discussed hospice with the pt/fmaily and plan to address it again at the pt's PCP appt next week. Pt was dc'd from [facility] on 3/5/21 after an almost 2 month STR stay. Pt is current for RN/PT/OT services with [facility]. These services can resume at [place]. No dc needs identified at present. Please notify/consult  if other discharge needs arise. Care Management Interventions  PCP Verified by CM: Yes  Last Visit to PCP: 12/30/20  Mode of Transport at Discharge: Other (see comment)  Discharge Durable Medical Equipment: No  Physical Therapy Consult: No  Occupational Therapy Consult: No  Speech Therapy Consult: No  Current Support Network: Relative's Home, Other(lives with dtr/[facility]; [facility];  Community RNCM involved)  Confirm Follow Up Transport: Family  Discharge Location  Discharge Placement: Unable to determine at this time

## 2021-03-12 NOTE — ED NOTES
Per the family member, pt has left hip pain and right shoulder pain. Requesting pain meds.  MD aware

## 2021-03-12 NOTE — H&P
Hospitalist Admission History and Physical     NAME:  Yue Joseph   Age:  89 y.o.  :   10/23/1931   MRN:   [id number]  PCP: Rick Costa DO  Consulting MD:  Treatment Team: Attending Provider: Corry Tobin DO; Primary Nurse: Linda Brower RN    Chief Complaint   Patient presents with   • Hypotension         HPI:   Patient is a 89 y.o. female who presented to the ED for cc AMS. 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 Recently dc from rehab facility and now living with her daughter for the past week. Daughter concerned also for her left hip since she states her mother screams in pain at this area. Eating and drinking well.     Vitals - stable, but was tachycardic on arrival    Labs- WBC 3.6. K 3.4.     Chest x ray - Mild bibasilar opacities, likely atelectasis. No definite pulmonary  consolidation.  Past Medical History:   Diagnosis Date   • Age related osteoporosis 2017   • Chronic atrial fibrillation (HCC) 2017   • Chronic pain     shoulder   • DDD (degenerative disc disease), lumbar 2017   • Gastroesophageal reflux disease with esophagitis 2017    controlled with medication   • Glaucoma 2017   • History of CVA (cerebrovascular accident) 2017   • HTN (hypertension), benign 2017   • Hyperlipidemia, mixed 2017   • Hypertension    • Hypothyroidism, adult 2017   • IFG (impaired fasting glucose) 2017   • Primary osteoarthritis involving multiple joints 2017   • PUD (peptic ulcer disease)         Past Surgical History:   Procedure Laterality Date   • HX  SECTION      x2   • HX COLONOSCOPY     • HX URIEL AND BSO      hysterectomy        Family History   Problem Relation Age of Onset   • No Known Problems Mother    • Cancer Father         stomach   • No Known Problems Sister    • No Known Problems Brother    • No Known  Problems Sister     No Known Problems Sister     No Known Problems Sister     No Known Problems Brother     No Known Problems Brother     No Known Problems Brother     No Known Problems Brother     Coronary Artery Disease Neg Hx        Social History     Social History Narrative    Not on file        Social History     Tobacco Use    Smoking status: Never Smoker    Smokeless tobacco: Never Used   Substance Use Topics    Alcohol use: No        Social History     Substance and Sexual Activity   Drug Use Not on file         Allergies   Allergen Reactions    Actonel [Risedronate] Other (comments)     GI Problems    Atorvastatin Myalgia    Fosamax [Alendronate] Unknown (comments)    Ketoprofen Other (comments)     Peptic ulcer disease    Moexipril Cough       Prior to Admission medications    Medication Sig Start Date End Date Taking? Authorizing Provider   atorvastatin (LIPITOR) 20 mg tablet Take 1 Tab by mouth nightly. 1/7/21   Rodri Araiza NP   dilTIAZem ER (CARDIZEM CD) 120 mg capsule Take 1 Cap by mouth daily. 1/7/21   Rodri Araiza NP   levothyroxine (SYNTHROID) 75 mcg tablet TAKE 1 TABLET BY MOUTH ONCE DAILY BEFORE BREAKFAST 12/24/20   Igor [facility] T, DO   omeprazole (PRILOSEC) 40 mg capsule Take 1 Cap by mouth daily. 9/15/20   [facility] T, DO   Lumigan 0.01 % ophthalmic drops INSTILL 1 DROP IN BOTH EYES AT BEDTIME 4/9/20   [place] [facility] T, DO   dorzolamide-timolol (COSOPT) 22.3-6.8 mg/mL ophthalmic solution PUT 1 DROP INTO BOTH EYES TWICE A DAY 8/8/19   HELENE Choi   calcium-cholecalciferol, d3, (CALCIUM 600 + D) 600-125 mg-unit tab Take  by mouth. Provider, Historical           Review of Systems    Cannot obtain due to aphasia       Objective:     Visit Vitals  /66   Pulse 72   Temp 97.7 °F (36.5 °C)   Resp 20   Ht 4' 11\" (1.499 m)   Wt 47.6 kg (105 lb)   SpO2 97%   BMI 21.21 kg/m²        No intake/output data recorded. No intake/output data recorded.     Data Review:   Recent Results (from the past 24 hour(s))   CBC WITH AUTOMATED DIFF    Collection Time: 03/11/21  9:22 PM   Result Value Ref Range    WBC 3.6 (L) 4.3 - 11.1 K/uL    RBC 3.19 (L) 4.05 - 5.2 M/uL    HGB 9.1 (L) 11.7 - 15.4 g/dL    HCT 28.8 (L) 35.8 - 46.3 %    MCV 90.3 79.6 - 97.8 FL    MCH 28.5 26.1 - 32.9 PG    MCHC 31.6 31.4 - 35.0 g/dL    RDW 16.1 (H) 11.9 - 14.6 %    PLATELET 989 (L) 901 - 450 K/uL    MPV 9.7 9.4 - 12.3 FL    ABSOLUTE NRBC 0.00 0.0 - 0.2 K/uL    DF AUTOMATED      NEUTROPHILS 74 43 - 78 %    LYMPHOCYTES 17 13 - 44 %    MONOCYTES 5 4.0 - 12.0 %    EOSINOPHILS 3 0.5 - 7.8 %    BASOPHILS 1 0.0 - 2.0 %    IMMATURE GRANULOCYTES 1 0.0 - 5.0 %    ABS. NEUTROPHILS 2.7 1.7 - 8.2 K/UL    ABS. LYMPHOCYTES 0.6 0.5 - 4.6 K/UL    ABS. MONOCYTES 0.2 0.1 - 1.3 K/UL    ABS. EOSINOPHILS 0.1 0.0 - 0.8 K/UL    ABS. BASOPHILS 0.0 0.0 - 0.2 K/UL    ABS. IMM. GRANS. 0.0 0.0 - 0.5 K/UL   PROTHROMBIN TIME + INR    Collection Time: 03/11/21  9:22 PM   Result Value Ref Range    Prothrombin time 36.5 (H) 12.5 - 14.7 sec    INR 3.7     PTT    Collection Time: 03/11/21  9:22 PM   Result Value Ref Range    aPTT 42.1 (H) 24.1 - 35.1 SEC   LIPASE    Collection Time: 03/11/21  9:22 PM   Result Value Ref Range    Lipase 165 73 - 393 U/L   MAGNESIUM    Collection Time: 03/11/21  9:22 PM   Result Value Ref Range    Magnesium 2.0 1.8 - 2.4 mg/dL   METABOLIC PANEL, COMPREHENSIVE    Collection Time: 03/11/21  9:22 PM   Result Value Ref Range    Sodium 145 136 - 145 mmol/L    Potassium 3.4 (L) 3.5 - 5.1 mmol/L    Chloride 114 (H) 98 - 107 mmol/L    CO2 26 21 - 32 mmol/L    Anion gap 5 (L) 7 - 16 mmol/L    Glucose 86 65 - 100 mg/dL    BUN 13 8 - 23 MG/DL    Creatinine 0.51 (L) 0.6 - 1.0 MG/DL    GFR est AA >60 >60 ml/min/1.73m2    GFR est non-AA >60 >60 ml/min/1.73m2    Calcium 7.4 (L) 8.3 - 10.4 MG/DL    Bilirubin, total 0.8 0.2 - 1.1 MG/DL    ALT (SGPT) 22 12 - 65 U/L    AST (SGOT) 17 15 - 37 U/L    Alk.  phosphatase 92 50 - 136 U/L    Protein, total 4.5 (L) 6.3 - 8.2 g/dL    Albumin 2.0 (L) 3.2 - 4.6 g/dL    Globulin 2.5 2.3 - 3.5 g/dL    A-G Ratio 0.8 (L) 1.2 - 3.5     LACTIC ACID    Collection Time: 03/11/21  9:22 PM   Result Value Ref Range    Lactic acid 0.8 0.4 - 2.0 MMOL/L   URINALYSIS W/ RFLX MICROSCOPIC    Collection Time: 03/11/21  9:42 PM   Result Value Ref Range    Color YELLOW      Appearance CLEAR      Specific gravity 1.009 1.001 - 1.023      pH (UA) 7.0 5.0 - 9.0      Protein Negative NEG mg/dL    Glucose Negative mg/dL    Ketone Negative NEG mg/dL    Bilirubin Negative NEG      Blood Negative NEG      Urobilinogen 1.0 0.2 - 1.0 EU/dL    Nitrites Negative NEG      Leukocyte Esterase Negative NEG         Physical Exam:     General:  Alert, cooperative, no distress, frail appearing. .   Eyes:  Right eye with medial deviation   Ears:  Normal TMs and external ear canals both ears. Nose: Nares normal. Septum midline. Mouth/Throat: Tongue with good moisture. Poor dentition   Neck:  no JVD. Back:   deferred   Lungs:   Clear to auscultation bilaterally. Heart:  Regular rate and rhythm, S1, S2 normal, did not hear irregular rhythm    Abdomen:   Soft, non-tender. Bowel sounds normal.    Extremities: Non tender to left lateral hip. Able to internally rotate her left LE. Good sensation to feet. Pulses: 2+ and symmetric all extremities. Skin: No obvious open skin wounds. Lymph nodes: Cervical, supraclavicular, and axillary nodes normal.   Neurologic: Aphasic, will follow commands.       Assessment and Plan     Principal Problem:    Sepsis ([facility] [address] (3/11/2021)    Active Problems:    Chronic atrial fibrillation ([address] (7/20/2017)      HTN (hypertension), benign (7/20/2017)      Hyperlipidemia, mixed (7/20/2017)      Gastroesophageal reflux disease with esophagitis (7/20/2017)      Hypothyroidism, adult (7/20/2017)      History of CVA (cerebrovascular accident) (7/20/2017)      Overview: 1819      Metabolic encephalopathy (3/11/2021)      Left hip pain (3/11/2021)      HCAP (healthcare-associated pneumonia) (3/11/2021)    Sepsis - Due to suspected HCAP. UA normal. Due to hx of sCHF and appearing euvolemic on exam I will not order for fluid bolus. Tx HCAP. Metabolic encephalopathy - Could be due to HCAP versus hip fracture. X ray pending. Baseline poor and no noted confusion when I am in room since she is following commands appropriately. If mentation starts to worsen, can consider CT head but she has not fallen since moving in with daughter. My suspicion for hemorrhagic CVA is very low. HCAP - Vanc/Zosyn. Left hip pain - X ray pending. A fib - Xarelto. Diltiazem. sCHF - compensated. Hypothyroidism - TSH normal in Jan. Levothyroxine. Dc Minerva. Daughter very helpful and informative with care. Daughter states patient is DNR. DC Home with [facility]. Does not want rehab.      DVT prophylaxis- Xarelto    Signed By: Vincent Simon DO   March 11, 2021

## 2021-03-12 NOTE — ED NOTES
TRANSFER - OUT REPORT:    Verbal report given to Camryn(name) on Nish Carrasco  being transferred to 706(unit) for routine progression of care       Report consisted of patients Situation, Background, Assessment and   Recommendations(SBAR). Information from the following report(s) SBAR was reviewed with the receiving nurse. Lines:   Peripheral IV 03/11/21 Right Antecubital (Active)   Site Assessment Clean, dry, & intact 03/11/21 2136   Phlebitis Assessment 0 03/11/21 2136   Infiltration Assessment 0 03/11/21 2136   Dressing Status Clean, dry, & intact 03/11/21 2136       Peripheral IV 03/11/21 Left Antecubital (Active)   Site Assessment Clean, dry, & intact 03/11/21 2136   Phlebitis Assessment 0 03/11/21 2136   Infiltration Assessment 0 03/11/21 2136   Dressing Status Clean, dry, & intact 03/11/21 2136        Opportunity for questions and clarification was provided.       Patient transported with:   [employer]

## 2021-03-12 NOTE — ED TRIAGE NOTES
Patient arrives to ED via EMS from home. Home health nurse called out related to hypotension. Patient's initial BP was 70 palp. 1600 cc fluids given in route with BP improvement to 120/60. Per daughter patient has foul smelling urine.      In route:  Blood cultures x 1  Zosyn 3.375  1600 fluids    Temp: 101  HR: 60  BGL: 112

## 2021-03-13 LAB
ANION GAP SERPL CALC-SCNC: 5 MMOL/L (ref 7–16)
BUN SERPL-MCNC: 22 MG/DL (ref 8–23)
CALCIUM SERPL-MCNC: 8.1 MG/DL (ref 8.3–10.4)
CHLORIDE SERPL-SCNC: 115 MMOL/L (ref 98–107)
CO2 SERPL-SCNC: 24 MMOL/L (ref 21–32)
CREAT SERPL-MCNC: 0.58 MG/DL (ref 0.6–1)
GLUCOSE SERPL-MCNC: 99 MG/DL (ref 65–100)
POTASSIUM SERPL-SCNC: 4.7 MMOL/L (ref 3.5–5.1)
SODIUM SERPL-SCNC: 144 MMOL/L (ref 136–145)

## 2021-03-13 PROCEDURE — 65270000029 HC RM PRIVATE

## 2021-03-13 PROCEDURE — 77030040393 HC DRSG OPTIFOAM GENT MDII -B

## 2021-03-13 PROCEDURE — 97162 PT EVAL MOD COMPLEX 30 MIN: CPT

## 2021-03-13 PROCEDURE — 74011250637 HC RX REV CODE- 250/637: Performed by: HOSPITALIST

## 2021-03-13 PROCEDURE — 99223 1ST HOSP IP/OBS HIGH 75: CPT | Performed by: ORTHOPAEDIC SURGERY

## 2021-03-13 PROCEDURE — 36415 COLL VENOUS BLD VENIPUNCTURE: CPT

## 2021-03-13 PROCEDURE — 2709999900 HC NON-CHARGEABLE SUPPLY

## 2021-03-13 PROCEDURE — 97530 THERAPEUTIC ACTIVITIES: CPT

## 2021-03-13 PROCEDURE — 80048 BASIC METABOLIC PNL TOTAL CA: CPT

## 2021-03-13 PROCEDURE — 77030040361 HC SLV COMPR DVT MDII -B

## 2021-03-13 PROCEDURE — 51798 US URINE CAPACITY MEASURE: CPT

## 2021-03-13 PROCEDURE — 74011250637 HC RX REV CODE- 250/637: Performed by: FAMILY MEDICINE

## 2021-03-13 RX ORDER — TRAMADOL HYDROCHLORIDE 50 MG/1
100 TABLET ORAL
Status: DISCONTINUED | OUTPATIENT
Start: 2021-03-13 | End: 2021-03-14 | Stop reason: HOSPADM

## 2021-03-13 RX ORDER — CYCLOBENZAPRINE HCL 10 MG
5 TABLET ORAL
Status: DISCONTINUED | OUTPATIENT
Start: 2021-03-13 | End: 2021-03-14 | Stop reason: HOSPADM

## 2021-03-13 RX ORDER — CYCLOBENZAPRINE HCL 10 MG
10 TABLET ORAL ONCE
Status: COMPLETED | OUTPATIENT
Start: 2021-03-13 | End: 2021-03-13

## 2021-03-13 RX ADMIN — OXYCODONE 5 MG: 5 TABLET ORAL at 11:27

## 2021-03-13 RX ADMIN — ACETAMINOPHEN 650 MG: 325 TABLET ORAL at 00:17

## 2021-03-13 RX ADMIN — PANTOPRAZOLE SODIUM 40 MG: 40 TABLET, DELAYED RELEASE ORAL at 08:05

## 2021-03-13 RX ADMIN — CYCLOBENZAPRINE 10 MG: 10 TABLET, FILM COATED ORAL at 15:44

## 2021-03-13 RX ADMIN — ACETAMINOPHEN 650 MG: 325 TABLET ORAL at 08:05

## 2021-03-13 RX ADMIN — TRAMADOL HYDROCHLORIDE 100 MG: 50 TABLET, FILM COATED ORAL at 13:13

## 2021-03-13 RX ADMIN — DORZOLAMIDE HYDROCHLORIDE AND TIMOLOL MALEATE 1 DROP: 20; 5 SOLUTION/ DROPS OPHTHALMIC at 18:44

## 2021-03-13 RX ADMIN — RIVAROXABAN 15 MG: 15 TABLET, FILM COATED ORAL at 08:05

## 2021-03-13 RX ADMIN — DORZOLAMIDE HYDROCHLORIDE AND TIMOLOL MALEATE 1 DROP: 20; 5 SOLUTION/ DROPS OPHTHALMIC at 08:22

## 2021-03-13 RX ADMIN — LEVOTHYROXINE SODIUM 75 MCG: 0.07 TABLET ORAL at 05:31

## 2021-03-13 RX ADMIN — POTASSIUM CHLORIDE 40 MEQ: 1500 TABLET, EXTENDED RELEASE ORAL at 08:05

## 2021-03-13 RX ADMIN — Medication 400 MG: at 08:05

## 2021-03-13 NOTE — PROGRESS NOTES
Patient unable to communicate much. No pain with hip ROM. Some pain with pressure in the pubic area. Mobilize as needed. Appt Dr. Vora Shall 1 month for follow rami fracture. Note dictated.

## 2021-03-13 NOTE — CONSULTS
[address]    Name:  Rena Reynaga  MR#:  [id number]  :  10/23/1931  ACCOUNT #:  [de-identified]  DATE OF SERVICE:  2021    Consultation was from Dr. Leena Brown. HISTORY OF PRESENT ILLNESS:  The patient has had some encephalopathy concerns. She has also had issues of falling and was admitted to the hospital for workup with this. She is aphasic. PAST MEDICAL HISTORY:  As reviewed. PHYSICAL EXAMINATION:  She has no pain with hip range of motion and she is able to move her hip to some degree. She has some pain to palpation deep in the pubic region. DIAGNOSTIC DATA:  X-rays were reviewed revealed no acute hip fracture. She has got a rami fracture. IMPRESSION:  She has got aphasia, multiple medical problems, and a pubic rami fracture. PLAN:  She can get up and weight bear as tolerated with walker. No restriction in respect to her pubic rami fracture. She will not need any further adjustment of her hip with mild effusions noted and no fracture. She can follow up with Dr. Mary Smith in four weeks for x-rays of the rami fracture if she is not doing well and having troubles.         MD SEUN [email or web address]  D:  2021 7:41  T:  2021 11:01  JOB #:  [id number]

## 2021-03-13 NOTE — PROGRESS NOTES
ACUTE PHYSICAL THERAPY GOALS:  (Developed with and agreed upon by patient and/or caregiver.)  LTG:  (1.)Ms. Joseph will move from supine to sit and sit to supine , scoot up and down and roll side to side in bed with MODIFIED INDEPENDENCE within 7 treatment day(s).    (2.)Ms. Joseph will transfer from bed to chair and chair to bed with MINIMAL ASSIST using the least restrictive device within 7 treatment day(s).    (3.)Ms. Joseph will ambulate with MODERATE ASSIST for 6 feet with the least restrictive device within 7 treatment day(s).  (4.)Ms. Joseph will perform exercises per HEP with verbal/visual cues for 10+ minutes to improve strength and mobility within 7 days.  ________________________________________________________________________________________________      PHYSICAL THERAPY ASSESSMENT: Initial Assessment and PM PT Treatment Day # 1     R pelvic fracture, WBAT      Yue SONIA Joseph is a 89 y.o. female   PRIMARY DIAGNOSIS: Metabolic encephalopathy  Sepsis (HCC) [A41.9]  Metabolic encephalopathy [G93.41]  Left hip pain [M25.552]       Reason for Referral:  Weakness, pelvic fracture, prior CVA  ICD-10: Treatment Diagnosis: Generalized Muscle Weakness (M62.81)  Difficulty in walking, Not elsewhere classified (R26.2)  Other abnormalities of gait and mobility (R26.89)  Repeated Falls (R29.6)  [place]PAT[place]: [place] / [place] / Product Type: Medicare /     ASSESSMENT:     REHAB RECOMMENDATIONS:   Recommendation to date pending progress:  Setting:  • Home Health Therapy  Equipment:   • To Be Determined     PRIOR LEVEL OF FUNCTION:  (Prior to Hospitalization) INITIAL/CURRENT LEVEL OF FUNCTION:  (Most Recently Demonstrated)   Bed Mobility:  • Minimal Assistance  Sit to Stand:  • Minimal Assistance  Transfers:  • Minimal Assistance  Gait/Mobility:  • Moderate Assistance Bed Mobility:  • Minimal Assistance  Sit to Stand:  • Minimal Assistance  Transfers:  • Minimal  Assistance  Gait/Mobility:   Moderate Assistance     ASSESSMENT:  Ms. Rupal Bucio presents from home with hip pain, found with right pelvic fracture and is WBAT per ortho orders. Also found with muscle strain of left gluteus medius and lane, right gluteus medius, and B adductors. History obtained from chart review and RN as no family is present today. Was apparently at rehab facility then went home with family a few weeks ago. Pt with prior left CVA earlier this year with residual right sided weakness. Pt noted with significant expressive aphasia (unable to verbalize) and seems to have receptive aphasia as well. Noted right extremity weakness, upper extremity more involved than lower. She performs bed mobility with CGA/min assist. Fair to good seated balance. Practiced transfers x several reps with min handheld assist and visual/verbal cues. Fair to poor static balance, poor dynamic balance in standing. Initially needing mod-max A for taking a few steps. On second and third trial pt only needing mod HHA and was able to walk to chair and back to bed. Does not appear to be in significant pain during mobility/transfers today however recently received pain medication per RN. Pt returned to supine after activity and was positioned comfortably with bed alarm on, needs in reach, and RN notified. Difficult to get a clear picture of Ms. Grant mobility level prior to admission however RN spoke with daughter who reports pt has mostly been in wc recently. Anticipate she could dc home with family and HH PT.      SUBJECTIVE:   Ms. Rupal Bucio has significant expressive aphasia and was unable to effectively communicate verablly    SOCIAL HISTORY/LIVING ENVIRONMENT: Has been living with family for a few weeks after dc from rehab facility following left CVA. Was apparently ambulating with brian walker at facility however mostly wc bound with family per RN.   Home Environment: Private residence  Living Alone: No  Support Systems: Family member(s), Child(abbey)  OBJECTIVE:     PAIN: VITAL SIGNS: LINES/DRAINS:   Pre Treatment: Pain Screen  Pain Scale 1: FACES  Pain Intensity 1: 0  Post Treatment: 0/10 FLACC Vital Signs  O2 Device: Room air none  O2 Device: Room air     GROSS EVALUATION:   Within Functional Limits Abnormal/ Functional Abnormal/ Non-Functional (see comments) Not Tested Comments:   AROM [] [x] [] [] R UE   PROM [] [] [] []    Strength [] [x] [] [] R LE/LE   Balance [] [x] [] [] Seated and standing   Posture [] [x] [] []    Sensation [] [] [] []    Coordination [] [] [] []    Tone [] [] [] []    Edema [] [] [] []    Activity Tolerance [] [x] [] []     [] [] [] []      COGNITION/  PERCEPTION: Intact Impaired   (see comments) Comments:   Orientation [] [] Unable to verbalize   Vision [] []    Hearing [] []    Command Following [] [x]    Safety Awareness [] [x]     [] []      MOBILITY: I Mod I S SBA CGA Min Mod Max Total  NT x2 Comments:   Bed Mobility    Rolling [] [] [] [] [x] [x] [] [] [] [] []    Supine to Sit [] [] [] [] [x] [x] [] [] [] [] []    Scooting [] [] [] [] [x] [x] [] [] [] [] []    Sit to Supine [] [] [] [] [x] [] [] [] [] [] []    Transfers    Sit to Stand [] [] [] [] [] [x] [] [] [] [] []    Bed to Chair [] [] [] [] [] [x] [] [] [] [] []    Stand to Sit [] [] [] [] [] [x] [] [] [] [] []    I=Independent, Mod I=Modified Independent, S=Supervision, SBA=Standby Assistance, CGA=Contact Guard Assistance,   Min=Minimal Assistance, Mod=Moderate Assistance, Max=Maximal Assistance, Total=Total Assistance, NT=Not Tested  GAIT: I Mod I S SBA CGA Min Mod Max Total  NT x2 Comments:   Level of Assistance [] [] [] [] [] [] [x] [] [] [] []    Distance 6    DME mod handheld assist    Gait Quality Antalgic, R LE weak    Weightbearing Status WBAT     I=Independent, Mod I=Modified Independent, S=Supervision, SBA=Standby Assistance, CGA=Contact Guard Assistance,   Min=Minimal Assistance, Mod=Moderate Assistance, Max=Maximal Assistance, Total=Total Assistance, NT=Not Tested    [facility] [address] [facility] Inpatient Short Form       How much difficulty does the patient currently have. .. Unable A Lot A Little None   1. Turning over in bed (including adjusting bedclothes, sheets and blankets)? [] 1   [] 2   [x] 3   [] 4   2. Sitting down on and standing up from a chair with arms ( e.g., wheelchair, bedside commode, etc.)   [] 1   [] 2   [x] 3   [] 4   3. Moving from lying on back to sitting on the side of the bed? [] 1   [] 2   [x] 3   [] 4   How much help from another person does the patient currently need. .. Total A Lot A Little None   4. Moving to and from a bed to a chair (including a wheelchair)? [] 1   [x] 2   [] 3   [] 4   5. Need to walk in hospital room? [x] 1   [] 2   [] 3   [] 4   6. Climbing 3-5 steps with a railing? [x] 1   [] 2   [] 3   [] 4   © 2007, Trustees of [facility], under license to [employer]. All rights reserved     Score:  Initial: 13 Most Recent: X (Date: -- )    Interpretation of Tool:  Represents activities that are increasingly more difficult (i.e. Bed mobility, Transfers, Gait). PLAN:   FREQUENCY/DURATION: PT Plan of Care: 5 times/week for duration of hospital stay or until stated goals are met, whichever comes first.    PROBLEM LIST:   (Skilled intervention is medically necessary to address:)  1. Decreased Activity Tolerance  2. Decreased Balance  3. Decreased Cognition  4. Decreased Coordination  5. Decreased Gait Ability  6. Decreased Strength  7. Decreased Transfer Abilities  8. Increased Pain   INTERVENTIONS PLANNED:   (Benefits and precautions of physical therapy have been discussed with the patient.)  1. Therapeutic Activity  2. Therapeutic Exercise/HEP  3. Neuromuscular Re-education  4. Gait Training  5. Manual Therapy  6.  Education     TREATMENT:     EVALUATION: Moderate Complexity : (Untimed Charge)    TREATMENT:   ($$ Therapeutic Activity: 8-22 mins )  Therapeutic Activity (15 Minutes): Therapeutic activity included Rolling, Supine to Sit, Sit to Supine, Scooting, Transfer Training, Ambulation on level ground, Sitting balance , Standing balance and mobility with HHA then brian walker to improve functional Mobility, Strength, Activity tolerance and balance.     TREATMENT GRID:  N/A    AFTER TREATMENT POSITION/PRECAUTIONS:  Alarm Activated, Bed, Needs within reach and RN notified    INTERDISCIPLINARY COLLABORATION:  RN/PCT, PT/PTA and OT/GUPTA    TOTAL TREATMENT DURATION:  PT Patient Time In/Time Out  Time In: 1330  Time Out: [address]

## 2021-03-13 NOTE — PROGRESS NOTES
Problem: Falls - Risk of  Goal: *Absence of Falls  Description: Document Yumiko Funk Fall Risk and appropriate interventions in the flowsheet. Outcome: Progressing Towards Goal  Note: Fall Risk Interventions:  Mobility Interventions: Assess mobility with egress test, Bed/chair exit alarm, OT consult for ADLs, Patient to call before getting OOB    Mentation Interventions: Bed/chair exit alarm, Adequate sleep, hydration, pain control, Door open when patient unattended    Medication Interventions: Bed/chair exit alarm, Patient to call before getting OOB, Teach patient to arise slowly    Elimination Interventions: Call light in reach, Bed/chair exit alarm    History of Falls Interventions: Bed/chair exit alarm, Door open when patient unattended         Problem: Patient Education: Go to Patient Education Activity  Goal: Patient/Family Education  Outcome: Progressing Towards Goal     Problem: Pressure Injury - Risk of  Goal: *Prevention of pressure injury  Description: Document Tyrone Scale and appropriate interventions in the flowsheet. Outcome: Progressing Towards Goal  Note: Pressure Injury Interventions:  Sensory Interventions: Assess changes in LOC, Keep linens dry and wrinkle-free, Minimize linen layers, Turn and reposition approx. every two hours (pillows and wedges if needed)    Moisture Interventions: Absorbent underpads, Apply protective barrier, creams and emollients, Check for incontinence Q2 hours and as needed    Activity Interventions: Increase time out of bed, PT/OT evaluation    Mobility Interventions: HOB 30 degrees or less, PT/OT evaluation, Turn and reposition approx.  every two hours(pillow and wedges)    Nutrition Interventions: Document food/fluid/supplement intake, Discuss nutritional consult with provider    Friction and Shear Interventions: HOB 30 degrees or less, Lift sheet, Transferring/repositioning devices                Problem: Patient Education: Go to Patient Education Activity  Goal: Patient/Family Education  Outcome: Progressing Towards Goal

## 2021-03-13 NOTE — PROGRESS NOTES
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 Patient was initially suspected to have hospital-acquired pneumonia however chest x-ray is not remarkable for any acute infiltrates or consolidation. Patient is not hypoxic or febrile, with normal PCT and lactic acid. Empiric antibiotics have been stopped. MRI of left hip is pending as left hip x-ray was unremarkable for acute fracture. 3/13:   Patient currently sitting up comfortably in bed. At baseline she is aphasic and not able to communicate. Have talked with patient's daughter at bedside and discussed about MRI findings and Ortho recommendations. Patient will be evaluated by PT and OT today. No fever, chills, nausea vomiting.       Review of Systems negative with exception of pertinent positives noted above      PHYSICAL EXAM       Visit Vitals  /75 (BP 1 Location: Left upper arm, BP Patient Position: At rest;Lying)   Pulse 86   Temp 98.1 °F (36.7 °C)   Resp 18   Ht 4' 11\" (1.499 m)   Wt 49.4 kg (108 lb 14.5 oz)   SpO2 96%   BMI 22.00 kg/m²      Temp (24hrs), Av.3 °F (36.3 °C), Min:96.6 °F (35.9 °C), Max:98.1 °F (36.7 °C)    Oxygen Therapy  O2 Sat (%): 96 % (21 0419)  Pulse via Oximetry: 72 beats per minute (21 0020)  O2 Device: Room air (21 0020)    Intake/Output Summary (Last 24 hours) at 3/13/2021 0705  Last data filed at 3/13/2021 0530  Gross per 24 hour   Intake 598 ml   Output    Net 598 ml          General: No alert, awake, elderly, NAD, on room air  Head:  Atraumatic Normocephalic. Eyes:  PERRLA, EOMI, Anicteric. ENT:  No discharges/lesions. Lungs:  CTA Bilaterally. CVS:  Regular rate and rhythm,  No murmur, rub, or gallop, No JVD, No lower   extremity edema. Abdomen: Soft, Non distended, Non tender, Positive bowel sounds. MSK:  No deformities, lesions, Spontaneously moves extremities. Neurologic:  GCS 15, speaks incomprehensibly and not following commands, moving all 4 extremities spontaneously  Psychiatry:      Unable to assess  Skin:   No rash/lesions. Good skin turgor  Heme/Lymph/Immune:  No petechiae, ecchymoses, overt signs of bleeding or    lymphadenopathy noted. No results found for this or any previous visit (from the past 24 hour(s)). Imaging /Procedures /Studies     Portable chest xray       COMPARISON: None.     CLINICAL HISTORY: Fever.     FINDINGS:     Mild bibasilar opacities, likely atelectasis. No pneumothorax, pulmonary edema  or large pleural effusion. Heart is enlarged. Mediastinal contour is within  normal limits. Bones are osteopenic.        IMPRESSION     1. Mild bibasilar opacities, likely atelectasis. No definite pulmonary  consolidation.   ASSESSMENT      Hospital Problems as of 3/13/2021 Date Reviewed: 12/30/2020          Codes Class Noted - Resolved POA    * (Principal) Metabolic encephalopathy [id number]-[id number]: G93.41  ICD-9-CM: 348.31  3/11/2021 - Present Unknown        Left hip pain ICD-10-CM: M25.552  ICD-9-CM: 719.45  3/11/2021 - Present Unknown        Sepsis ([facility] [address] ICD-10-CM: A41.9  ICD-9-CM: 038.9, 995.91  3/11/2021 - Present         HCAP (healthcare-associated pneumonia) ICD-10-CM: J18.9  ICD-9-CM: 360  3/11/2021 - Present Unknown        Chronic atrial fibrillation (HCC) (Chronic) ICD-10-CM: I48.20  ICD-9-CM: 427.31  7/20/2017 - Present Yes        HTN (hypertension), benign (Chronic) ICD-10-CM: I10  ICD-9-CM: 401.1  7/20/2017 - Present Yes        Hyperlipidemia, mixed (Chronic) ICD-10-CM: E78.2  ICD-9-CM: 272.2  7/20/2017 - Present Yes Gastroesophageal reflux disease with esophagitis (Chronic) ICD-10-CM: K21.00  ICD-9-CM: 530.11  7/20/2017 - Present Yes        Hypothyroidism, adult (Chronic) ICD-10-CM: E03.9  ICD-9-CM: 244.9  7/20/2017 - Present Yes        History of CVA (cerebrovascular accident) (Chronic) ICD-10-CM: Z86.73  ICD-9-CM: V12.54  7/20/2017 - Present Yes    Overview Signed 7/20/2017 11:58 AM by Fransisco Hahn DO     2014                       Plan:  #Metabolic encephalopathy: Resolved  3/13:  Unclear etiology, it appears patient's mentation is at baseline and is aphasic confirmed with patient's family. We will continue to monitor closely for any acute changes in mentation. #Sepsis: Ruled out  3/13:Patient does not have any obvious source of infection. Chest x-ray appears unremarkable and UA is clean. Patient does not have any fever or leukocytosis with normal pro-Rolan.  Empiric broad-spectrum antibiotic including vancomycin and Zosyn will be discontinued on 3/12. #Left hip pain:  3/13:  MRI of hip showed acute right ischial tuberosity/inferior pubic ramus fracture along with mild bilateral hip joint effusion. Orthopedics recommend weightbearing as tolerated with a walker with no restriction respect to her pubic ramus fracture. No intervention recommended for joint effusions. Roxicodone switched to tramadol 100 mg. Every 6 hours as needed. #Hypokalemia: Resolved  3/13: 4.7 today. #Atrial fibrillation:  3/13: Rate is optimally controlled  Continue diltiazem  p.o. daily. Continue Xarelto 15 mg p.o. daily with breakfast.    #Hypothyroidism:  3/13: Continue Synthroid 75 mcg p.o. daily. #HTN:  3/13: Stopping Coreg in view of recurrent episodes of hypertension. Patient BP is optimally controlled for now and does not need any aggressive control given her advanced age and frailty with high risk of recurrent falls    #Chronic systolic CHF:  Currently euvolemic and compensated.   Continue medical management for now. PT and OT eval        DVT Prophylaxis: Xarelto  Disposition: Discussed with patient's daughter about possible discharge to home tomorrow with home health aide PT and OT.     Fabio Hernandez MD

## 2021-03-14 VITALS
DIASTOLIC BLOOD PRESSURE: 70 MMHG | WEIGHT: 108.2 LBS | TEMPERATURE: 98 F | HEIGHT: 59 IN | BODY MASS INDEX: 21.81 KG/M2 | RESPIRATION RATE: 16 BRPM | HEART RATE: 106 BPM | SYSTOLIC BLOOD PRESSURE: 132 MMHG | OXYGEN SATURATION: 93 %

## 2021-03-14 LAB
ANION GAP SERPL CALC-SCNC: 3 MMOL/L (ref 7–16)
BUN SERPL-MCNC: 22 MG/DL (ref 8–23)
CALCIUM SERPL-MCNC: 7.9 MG/DL (ref 8.3–10.4)
CHLORIDE SERPL-SCNC: 115 MMOL/L (ref 98–107)
CO2 SERPL-SCNC: 27 MMOL/L (ref 21–32)
CREAT SERPL-MCNC: 0.48 MG/DL (ref 0.6–1)
GLUCOSE SERPL-MCNC: 80 MG/DL (ref 65–100)
POTASSIUM SERPL-SCNC: 4.2 MMOL/L (ref 3.5–5.1)
SODIUM SERPL-SCNC: 145 MMOL/L (ref 136–145)

## 2021-03-14 PROCEDURE — 77030019905 HC CATH URETH INTMIT MDII -A

## 2021-03-14 PROCEDURE — 2709999900 HC NON-CHARGEABLE SUPPLY

## 2021-03-14 PROCEDURE — 74011250637 HC RX REV CODE- 250/637: Performed by: FAMILY MEDICINE

## 2021-03-14 PROCEDURE — 77030040393 HC DRSG OPTIFOAM GENT MDII -B

## 2021-03-14 PROCEDURE — 80048 BASIC METABOLIC PNL TOTAL CA: CPT

## 2021-03-14 PROCEDURE — 51798 US URINE CAPACITY MEASURE: CPT

## 2021-03-14 PROCEDURE — 36415 COLL VENOUS BLD VENIPUNCTURE: CPT

## 2021-03-14 PROCEDURE — 74011250637 HC RX REV CODE- 250/637: Performed by: HOSPITALIST

## 2021-03-14 RX ORDER — CYCLOBENZAPRINE HCL 10 MG
5 TABLET ORAL
Qty: 30 TAB | Refills: 1 | Status: SHIPPED | OUTPATIENT
Start: 2021-03-14 | End: 2021-03-18 | Stop reason: ALTCHOICE

## 2021-03-14 RX ADMIN — CYCLOBENZAPRINE 5 MG: 10 TABLET, FILM COATED ORAL at 13:57

## 2021-03-14 RX ADMIN — DILTIAZEM HYDROCHLORIDE 120 MG: 120 CAPSULE, COATED, EXTENDED RELEASE ORAL at 08:31

## 2021-03-14 RX ADMIN — RIVAROXABAN 15 MG: 15 TABLET, FILM COATED ORAL at 08:31

## 2021-03-14 RX ADMIN — DORZOLAMIDE HYDROCHLORIDE AND TIMOLOL MALEATE 1 DROP: 20; 5 SOLUTION/ DROPS OPHTHALMIC at 08:34

## 2021-03-14 RX ADMIN — Medication 400 MG: at 08:31

## 2021-03-14 RX ADMIN — LEVOTHYROXINE SODIUM 75 MCG: 0.07 TABLET ORAL at 06:42

## 2021-03-14 RX ADMIN — PANTOPRAZOLE SODIUM 40 MG: 40 TABLET, DELAYED RELEASE ORAL at 08:31

## 2021-03-14 NOTE — PROGRESS NOTES
Pt discharged home today with daughter Lakeshia King, at bedside at time of discharge. Transport home via Justina Epley to [facility] (addres listed on face sheet). [facility] called [phone]/faxed [phone] orders to resume pt's home care services. Daughter denied any additional needs at this time. Care Management Interventions  PCP Verified by CM: Yes  Last Visit to PCP: 12/30/20  Mode of Transport at Discharge: Liudmila John)  Transition of Care Consult (CM Consult): [address]: No  Reason Outside [place]: Patient already serviced by other home care/hospice agency([facility])  Discharge Durable Medical Equipment: No  Physical Therapy Consult: Yes  Occupational Therapy Consult: Yes  Speech Therapy Consult: No  Current Support Network: Lives with Caregiver(LIves with daughter at address listed in pt face sheet.)  Confirm Follow Up Transport: Family  The Plan for Transition of Care is Related to the Following Treatment Goals : Pt will need continued home care rehab services to return to her functional baseline.     The Patient and/or Patient Representative was Provided with a Choice of Provider and Agrees with the Discharge Plan?: Yes  Name of the Patient Representative Who was Provided with a Choice of Provider and Agrees with the Discharge Plan: braeden Stock Keay of Choice List was Provided with Basic Dialogue that Supports the Patient's Individualized Plan of Care/Goals, Treatment Preferences and Shares the Quality Data Associated with the Providers?: Yes  [place] Resource Information Provided?: No  Discharge Location  Discharge Placement: Home with home health(Home with resumed [facility].)

## 2021-03-14 NOTE — PROGRESS NOTES
Patient not voiding. Primary care physician (Reyes Anaya) notified. Order from physician to straight cath patient and on third time straight cath patient, a brunner can be placed.

## 2021-03-14 NOTE — DISCHARGE SUMMARY
Hospitalist Discharge Summary     Patient ID:  Michael Solomon  [id number]  27 y.o.  10/23/1931  Admit date: 3/11/2021  9:14 PM  Discharge date and time: 3/14/2021  Attending: Carlee Zambrano MD  PCP:  Caryle Grief, DO  Treatment Team: Attending Provider: Carlee Zambrano MD; Utilization Review: Jasmyn Chester; Care Manager: Rich Bautista LMSW; Consulting Provider: Meg Holstein, MD; Primary Nurse: Benny Lucas RN; Charge Nurse: Adela Andersen RN; Charge Nurse: Rea Lesches    Principal Diagnosis Metabolic encephalopathy   Principal Problem:    Metabolic encephalopathy (6/75/3313)    Active Problems:    Chronic atrial fibrillation ([address] (7/20/2017)      HTN (hypertension), benign (7/20/2017)      Hyperlipidemia, mixed (7/20/2017)      Gastroesophageal reflux disease with esophagitis (7/20/2017)      Hypothyroidism, adult (7/20/2017)      History of CVA (cerebrovascular accident) (7/20/2017)      Overview: 2014      Left hip pain (3/11/2021)      HCAP (healthcare-associated pneumonia) (3/11/2021)             Hospital Course:  Please refer to the admission H&P for details of presentation. 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 Patient was initially suspected to have hospital-acquired pneumonia however chest x-ray is not remarkable for any acute infiltrates or consolidation. Patient is not hypoxic or febrile, with normal PCT and lactic acid. Empiric antibiotics have been stopped. MRI of hip showed acute right ischial tuberosity/inferior pubic ramus fracture along with mild bilateral hip joint effusion. Orthopedics recommend weightbearing as tolerated with a walker with no restriction respect to her pubic ramus fracture.   No intervention recommended for joint effusions. Pt is doing better with tramadol and flexeril. Pt is hemodynamically stable and medically cleared for discharge today. Rest of the hospital course was uneventful, for further details, please refer to daily progress notes. Significant Diagnostic Studies:   EXAM: Left hip MRI without contrast.  INDICATION: Fall with left hip pain. COMPARISON: Pelvic and left hip films dated March 11, 2021. Technique: Multiplanar multisequence imaging of the left hip without contrast.     FINDINGS:  Moderate pubic symphysis degenerative change. Acute right ischial and inferior  pubic ramus fracture. Bone edema along the superior left labrum without a  hypointense fracture line. Mild presacral soft tissue edema. No evidence of  femoral fracture. There is muscular strain of the left gluteus medius and left  gluteus lane as well as of the right gluteus medius medius. Bilateral rectus  femoris and iliopsoas tendons are intact. Muscular edema of the bilateral  adductor compartment musculature. Postsurgical change of hysterectomy. Mild  bilateral hip joint degenerative changes with a moderate right and small left  hip joint effusion.        IMPRESSION  1. Acute RIGHT ischial tuberosity/inferior pubic ramus fracture. 2. Muscular strain of the left gluteus medius and lane and right gluteus  medius muscles and of the bilateral adductor compartment musculature. 3. Moderate right and small left hip joint effusion. Portable chest xray       COMPARISON: None.     CLINICAL HISTORY: Fever.     FINDINGS:     Mild bibasilar opacities, likely atelectasis. No pneumothorax, pulmonary edema  or large pleural effusion. Heart is enlarged. Mediastinal contour is within  normal limits. Bones are osteopenic.        IMPRESSION     1. Mild bibasilar opacities, likely atelectasis.  No definite pulmonary  consolidation.       Labs: Results:       Chemistry Recent Labs     03/14/21  0353 03/13/21  5329 03/12/21 0438 03/11/21 2122   GLU 80 99 78 86    144 143 145   K 4.2 4.7 3.0* 3.4*   * 115* 112* 114*   CO2 27 24 27 26   BUN 22 22 12 13   CREA 0.48* 0.58* 0.43* 0.51*   CA 7.9* 8.1* 7.6* 7.4*   AGAP 3* 5* 4* 5*   AP  --   --   --  92   TP  --   --   --  4.5*   ALB  --   --   --  2.0*   GLOB  --   --   --  2.5   AGRAT  --   --   --  0.8*      CBC w/Diff Recent Labs     03/12/21 0438 03/11/21 2122   WBC 3.3* 3.6*   RBC 3.36* 3.19*   HGB 9.5* 9.1*   HCT 30.5* 28.8*   * 136*   GRANS 67 74   LYMPH 23 17   EOS 4 3      Cardiac Enzymes No results for input(s): CPK, CKND1, HEATHER in the last 72 hours. No lab exists for component: CKRMB, TROIP   Coagulation Recent Labs     03/11/21 2122   PTP 36.5*   INR 3.7   APTT 42.1*       Lipid Panel Lab Results   Component Value Date/Time    Cholesterol, total 179 01/01/2021 03:59 AM    HDL Cholesterol 61 (H) 01/01/2021 03:59 AM    LDL, calculated 104.8 (H) 01/01/2021 03:59 AM    VLDL, calculated 13.2 01/01/2021 03:59 AM    Triglyceride 66 01/01/2021 03:59 AM    CHOL/HDL Ratio 2.9 01/01/2021 03:59 AM      BNP No results for input(s): BNPP in the last 72 hours. Liver Enzymes Recent Labs     03/11/21 2122   TP 4.5*   ALB 2.0*   AP 92      Thyroid Studies Lab Results   Component Value Date/Time    TSH 3.660 01/06/2021 03:10 AM            Discharge Exam:  Visit Vitals  /84 (BP 1 Location: Left upper arm, BP Patient Position: At rest;Lying)   Pulse 100   Temp 97.4 °F (36.3 °C)   Resp 16   Ht 4' 11\" (1.499 m)   Wt 49.1 kg (108 lb 3.2 oz)   SpO2 97%   BMI 21.85 kg/m²     General appearance: alert, awake, NAD, aphasic at baseline, on RA  Lungs: clear to auscultation bilaterally  Heart: regular rate and rhythm, S1, S2 normal, no murmur, click, rub or gallop  Abdomen: soft, non-tender.  Bowel sounds normal. No masses,  no organomegaly  Extremities: no cyanosis or edema  Neurologic: Grossly normal    Disposition: HOME WITH HH/PT  Discharge Condition: stable  Patient Instructions:   Current Discharge Medication List      START taking these medications    Details   cyclobenzaprine (FLEXERIL) 10 mg tablet Take 0.5 Tabs by mouth three (3) times daily as needed for Muscle Spasm(s). Qty: 30 Tab, Refills: 1      rivaroxaban (XARELTO) 15 mg tab tablet Take 1 Tab by mouth daily (with breakfast) for 30 days. Qty: 30 Tab, Refills: 3         CONTINUE these medications which have NOT CHANGED    Details   atorvastatin (LIPITOR) 20 mg tablet Take 1 Tab by mouth nightly. Qty: 30 Tab, Refills: 0      dilTIAZem ER (CARDIZEM CD) 120 mg capsule Take 1 Cap by mouth daily. Qty: 30 Cap, Refills: 0      levothyroxine (SYNTHROID) 75 mcg tablet TAKE 1 TABLET BY MOUTH ONCE DAILY BEFORE BREAKFAST  Qty: 90 Tab, Refills: 1    Associated Diagnoses: Hypothyroidism, adult      omeprazole (PRILOSEC) 40 mg capsule Take 1 Cap by mouth daily. Qty: 30 Cap, Refills: 5    Comments: DOSE ADJUSTMENT  Associated Diagnoses: Gastroesophageal reflux disease with esophagitis      Lumigan 0.01 % ophthalmic drops INSTILL 1 DROP IN BOTH EYES AT BEDTIME  Qty: 2.5 mL, Refills: 5      dorzolamide-timolol (COSOPT) 22.3-6.8 mg/mL ophthalmic solution PUT 1 DROP INTO BOTH EYES TWICE A DAY  Qty: 10 mL, Refills: 11      calcium-cholecalciferol, d3, (CALCIUM 600 + D) 600-125 mg-unit tab Take  by mouth.              Activity: PT/OT per Home Health  Diet: Regular Diet  Wound Care: None needed    Follow-up  · FOLLOW UP WITH PCP IN 1-2 WEEKS AS SCHEDULED    Time spent to discharge patient 35 minutes  Signed:  Rupal Hagen MD  3/14/2021  8:04 AM

## 2021-03-14 NOTE — DISCHARGE INSTRUCTIONS
Patient Education        Learning About Metabolic Encephalopathy  What is metabolic encephalopathy? Metabolic encephalopathy is a problem in the brain. It is caused by a chemical imbalance in the blood. The imbalance is caused by an illness or organs that are not working as well as they should. It is not caused by a head injury. When the imbalance affects the brain, it can lead to personality changes. It can also make it harder to think clearly and remember things. The problems may only last a short time if you get treatment right away. But this depends on the cause. If the imbalance has been building up because you've been sick for a long time, the mental changes may be more severe. They may also last longer. What happens when you have this problem? When things are working right, your body has many ways to keep the chemicals in your blood in balance. For example, your liver and kidneys remove waste from your blood. The kidneys also help keep fluids and sodium in balance. And your pancreas makes insulin. It is a hormone that helps control the amount of sugar in your blood. But the chemicals in your blood can get out of balance and damage parts of your body because of a medical problem. This may be kidney or liver failure. Or it could be diabetes that isn't controlled well. When the imbalance affects the brain, normal thinking and behavior can change. What are the symptoms? Symptoms may include:  · Confusion. · Problems with thinking and remembering. · Being grouchy and depressed. · Feeling drowsy. · Not being able to sleep. · Passing out (fainting) now and then. How is it treated? The doctor will try to find the illness that's causing the problem. He or she may ask questions about your past health. The doctor will also do tests to find what is causing the chemical imbalance and to see how severe it is. The doctor may treat the organ system that's causing the problem.  For example, if it's a kidney problem, you may have treatment to help your kidneys work better. If you have an infection, you may need antibiotics. If the doctor can't treat the cause of the problem, he or she will treat the symptoms. The doctor will carefully watch your blood chemicals to make sure that your treatment is being done safely. Follow-up care is a key part of your treatment and safety. Be sure to make and go to all appointments, and call your doctor if you are having problems. It's also a good idea to know your test results and keep a list of the medicines you take. Where can you learn more? Go to http://www.gray.com/  Enter N049 in the search box to learn more about \"Learning About Metabolic Encephalopathy. \"  Current as of: April 15, 2020               Content Version: 12.6  © 2778-4984 [employer]. Care instructions adapted under license by [employer] (which disclaims liability or warranty for this information). If you have questions about a medical condition or this instruction, always ask your healthcare professional. [place] [address] any warranty or liability for your use of this information. Patient Education        Altered Mental Status: Care Instructions  Your Care Instructions     Altered mental status is a change in how well your brain is working. As a result, you may be confused, be less alert than usual, or act in odd ways. This may include seeing or hearing things that aren't really there (hallucinations). A mental status change has many possible causes. For example, it may be the result of an infection, an imbalance of chemicals in the body, or a chronic disease such as diabetes or COPD. It can also be caused by things such as a head injury, taking certain medicines, or using alcohol or drugs. The doctor may do tests to look for the cause. These tests may include urine tests, blood tests, and imaging tests such as a CT scan.  Sometimes a clear cause isn't found. But tests can help the doctor rule out a serious cause of your symptoms. A change in mental status can be scary. But mental status will often return to normal when the cause is treated. So it is important to get any follow-up testing or treatment the doctor has suggested. The doctor has checked you carefully, but problems can develop later. If you notice any problems or new symptoms, get medical treatment right away. Follow-up care is a key part of your treatment and safety. Be sure to make and go to all appointments, and call your doctor if you are having problems. It's also a good idea to know your test results and keep a list of the medicines you take. How can you care for yourself at home? · Be safe with medicines. Take your medicines exactly as prescribed. Call your doctor if you think you are having a problem with your medicine. · Have another adult stay with you until you are better. This can help keep you safe. Ask that person to watch for signs that your mental status is getting worse. When should you call for help? Call 911 anytime you think you may need emergency care. For example, call if:    · You passed out (lost consciousness). Call your doctor now or seek immediate medical care if:    · Your mental status is getting worse.     · You have new symptoms, such as a fever, chills, or shortness of breath.     · You do not feel safe. Watch closely for changes in your health, and be sure to contact your doctor if:    · You do not get better as expected. Where can you learn more? Go to http://www."[employer].com/  Enter J452 in the search box to learn more about \"Altered Mental Status: Care Instructions. \"  Current as of: November 20, 2019               Content Version: 12.6  © 5769-0692 "[employer]. Care instructions adapted under license by [employer] (which disclaims liability or warranty for this information).  If you have questions about a medical condition or this instruction, always ask your healthcare professional. [place] [address] any warranty or liability for your use of this information.

## 2021-03-15 LAB
BACTERIA SPEC CULT: ABNORMAL
SERVICE CMNT-IMP: ABNORMAL

## 2021-03-16 ENCOUNTER — HOSPICE ADMISSION (OUTPATIENT)
Dept: HOSPICE | Facility: HOSPICE | Age: 86
End: 2021-03-16
Payer: MEDICARE

## 2021-03-16 PROBLEM — S32.591A CLOSED FRACTURE OF RAMUS OF RIGHT PUBIS (HCC): Status: ACTIVE | Noted: 2021-03-16

## 2021-03-16 LAB
BACTERIA SPEC CULT: NORMAL
SERVICE CMNT-IMP: NORMAL

## 2021-03-17 ENCOUNTER — HOME CARE VISIT (OUTPATIENT)
Dept: SCHEDULING | Facility: HOME HEALTH | Age: 86
End: 2021-03-17
Payer: MEDICARE

## 2021-03-17 ENCOUNTER — HOME CARE VISIT (OUTPATIENT)
Dept: HOSPICE | Facility: HOSPICE | Age: 86
End: 2021-03-17
Payer: MEDICARE

## 2021-03-17 VITALS
SYSTOLIC BLOOD PRESSURE: 100 MMHG | RESPIRATION RATE: 17 BRPM | DIASTOLIC BLOOD PRESSURE: 60 MMHG | OXYGEN SATURATION: 95 % | HEART RATE: 64 BPM | TEMPERATURE: 98.6 F

## 2021-03-17 LAB
BACTERIA SPEC CULT: NORMAL
SERVICE CMNT-IMP: NORMAL

## 2021-03-17 PROCEDURE — HOSPICE MEDICATION HC HH HOSPICE MEDICATION

## 2021-03-17 PROCEDURE — 3336500001 HSPC ELECTION

## 2021-03-17 PROCEDURE — G0299 HHS/HOSPICE OF RN EA 15 MIN: HCPCS

## 2021-03-17 PROCEDURE — 0651 HSPC ROUTINE HOME CARE

## 2021-03-17 NOTE — PROGRESS NOTES
Physician Progress Note      PATIENT:               Hallie Brooks  CSN #:                  [id number]  :                       10/23/1931  ADMIT DATE:       3/11/2021 9:14 PM  [address] DATE:        3/14/2021 2:09 PM  RESPONDING  PROVIDER #:        Lea Landau MD Ulysees Gum MD          QUERY TEXT:    Patient admitted with left hip pain and was diagnosed with acute right ischial tuberosity and inferior pubic ramus fractures. Can the predominant cause of the fracture be specified as: The medical record reflects the following:  Risk Factors: 80 yr, history of age related osteoporosis  Clinical Indicators: pubic fracture and ischium fracture, wheelchair bound and has a history of multiple falls, xray shows bones are osteopenic  Treatment: home medication of Vitamin D and Calcium  Options provided:  -- Osteoporosis in the setting of minimal trauma (Pathologic)  -- Traumatic fracture  -- Other - I will add my own diagnosis  -- Disagree - Not applicable / Not valid  -- Disagree - Clinically unable to determine / Unknown  -- Refer to Clinical Documentation Reviewer    PROVIDER RESPONSE TEXT:    This patient has a traumatic fracture. Query created by: Elvis Mo on 3/17/2021 8:56 AM      QUERY TEXT:    Patient admitted with left hip pain and was diagnosed with acute right ischial tuberosity and inferior pubic ramus fractures. Noted documentation of metabolic encephalopathy. In order to support the diagnosis of metabolic encephalopathy, please include additional clinical indicators in your documentation. Or please document if the diagnosis of metabolic encephalopathy has been ruled out after further study. The medical record reflects the following:  Risk Factors: 80 yr, hx cva, aspasic  Clinical Indicators: documentation on 3/12 progress note it appears patient's mentation is at baseline and is aphasic confirmed with patient's family.   Treatment:  lab monitoring,  Options provided:  -- Metabolic encephalopathy present as evidenced by, Please document evidence. -- Metabolic encephalopathy  was ruled out  -- Other - I will add my own diagnosis  -- Disagree - Not applicable / Not valid  -- Disagree - Clinically unable to determine / Unknown  -- Refer to Clinical Documentation Reviewer    PROVIDER RESPONSE TEXT:    Metabolic encephalopathy was ruled out after study.     Query created by: Sima Lopez on 3/17/2021 9:28 AM      Electronically signed by:  Yoana Graham MD 3/17/2021 3:04 PM

## 2021-03-18 ENCOUNTER — HOME CARE VISIT (OUTPATIENT)
Dept: SCHEDULING | Facility: HOME HEALTH | Age: 86
End: 2021-03-18
Payer: MEDICARE

## 2021-03-18 VITALS
SYSTOLIC BLOOD PRESSURE: 138 MMHG | OXYGEN SATURATION: 91 % | DIASTOLIC BLOOD PRESSURE: 64 MMHG | TEMPERATURE: 97.9 F | HEART RATE: 81 BPM | RESPIRATION RATE: 18 BRPM

## 2021-03-18 PROCEDURE — G0299 HHS/HOSPICE OF RN EA 15 MIN: HCPCS

## 2021-03-18 PROCEDURE — G0156 HHCP-SVS OF AIDE,EA 15 MIN: HCPCS

## 2021-03-18 PROCEDURE — 0651 HSPC ROUTINE HOME CARE

## 2021-03-18 NOTE — PROGRESS NOTES
phoned dtr Lakeshia to offer/schedule initial visit.  agreed to visit Friday, 3/19, between 14:00 and 15:00.

## 2021-03-19 ENCOUNTER — HOME CARE VISIT (OUTPATIENT)
Dept: HOSPICE | Facility: HOSPICE | Age: 86
End: 2021-03-19
Payer: MEDICARE

## 2021-03-19 ENCOUNTER — HOME CARE VISIT (OUTPATIENT)
Dept: SCHEDULING | Facility: HOME HEALTH | Age: 86
End: 2021-03-19
Payer: MEDICARE

## 2021-03-19 PROCEDURE — G0155 HHCP-SVS OF CSW,EA 15 MIN: HCPCS

## 2021-03-19 PROCEDURE — 0651 HSPC ROUTINE HOME CARE

## 2021-03-20 PROCEDURE — 0651 HSPC ROUTINE HOME CARE

## 2021-03-21 PROCEDURE — 0651 HSPC ROUTINE HOME CARE

## 2021-03-22 PROCEDURE — 0651 HSPC ROUTINE HOME CARE

## 2021-03-23 ENCOUNTER — HOME CARE VISIT (OUTPATIENT)
Dept: HOSPICE | Facility: HOSPICE | Age: 86
End: 2021-03-23
Payer: MEDICARE

## 2021-03-23 PROCEDURE — 0651 HSPC ROUTINE HOME CARE

## 2021-03-24 PROBLEM — Z51.5 HOSPICE CARE PATIENT: Status: ACTIVE | Noted: 2021-03-24

## 2021-03-24 PROCEDURE — 0651 HSPC ROUTINE HOME CARE

## 2021-03-25 ENCOUNTER — HOME CARE VISIT (OUTPATIENT)
Dept: SCHEDULING | Facility: HOME HEALTH | Age: 86
End: 2021-03-25
Payer: MEDICARE

## 2021-03-25 ENCOUNTER — HOME CARE VISIT (OUTPATIENT)
Dept: HOSPICE | Facility: HOSPICE | Age: 86
End: 2021-03-25
Payer: MEDICARE

## 2021-03-25 VITALS
TEMPERATURE: 97 F | RESPIRATION RATE: 18 BRPM | DIASTOLIC BLOOD PRESSURE: 70 MMHG | SYSTOLIC BLOOD PRESSURE: 110 MMHG | HEART RATE: 78 BPM | OXYGEN SATURATION: 96 %

## 2021-03-25 PROCEDURE — G0299 HHS/HOSPICE OF RN EA 15 MIN: HCPCS

## 2021-03-25 PROCEDURE — G0155 HHCP-SVS OF CSW,EA 15 MIN: HCPCS

## 2021-03-25 PROCEDURE — G0156 HHCP-SVS OF AIDE,EA 15 MIN: HCPCS

## 2021-03-25 PROCEDURE — 0651 HSPC ROUTINE HOME CARE

## 2021-03-26 ENCOUNTER — HOME CARE VISIT (OUTPATIENT)
Dept: SCHEDULING | Facility: HOME HEALTH | Age: 86
End: 2021-03-26
Payer: MEDICARE

## 2021-03-26 PROCEDURE — 0651 HSPC ROUTINE HOME CARE

## 2021-03-27 PROCEDURE — 0651 HSPC ROUTINE HOME CARE

## 2021-03-28 PROCEDURE — 0651 HSPC ROUTINE HOME CARE

## 2021-03-29 PROCEDURE — 0651 HSPC ROUTINE HOME CARE

## 2021-03-30 ENCOUNTER — HOME CARE VISIT (OUTPATIENT)
Dept: HOSPICE | Facility: HOSPICE | Age: 86
End: 2021-03-30
Payer: MEDICARE

## 2021-03-30 ENCOUNTER — HOME CARE VISIT (OUTPATIENT)
Dept: SCHEDULING | Facility: HOME HEALTH | Age: 86
End: 2021-03-30
Payer: MEDICARE

## 2021-03-30 VITALS
RESPIRATION RATE: 16 BRPM | SYSTOLIC BLOOD PRESSURE: 96 MMHG | HEART RATE: 85 BPM | OXYGEN SATURATION: 98 % | DIASTOLIC BLOOD PRESSURE: 64 MMHG | TEMPERATURE: 97.7 F

## 2021-03-30 PROCEDURE — 0651 HSPC ROUTINE HOME CARE

## 2021-03-30 PROCEDURE — HOSPICE MEDICATION HC HH HOSPICE MEDICATION

## 2021-03-30 PROCEDURE — G0299 HHS/HOSPICE OF RN EA 15 MIN: HCPCS

## 2021-03-31 PROCEDURE — 0651 HSPC ROUTINE HOME CARE

## 2021-04-01 ENCOUNTER — HOME CARE VISIT (OUTPATIENT)
Dept: SCHEDULING | Facility: HOME HEALTH | Age: 86
End: 2021-04-01
Payer: MEDICARE

## 2021-04-01 PROCEDURE — G0156 HHCP-SVS OF AIDE,EA 15 MIN: HCPCS

## 2021-04-01 PROCEDURE — 0651 HSPC ROUTINE HOME CARE

## 2021-04-02 PROCEDURE — 0651 HSPC ROUTINE HOME CARE

## 2021-04-03 PROCEDURE — 0651 HSPC ROUTINE HOME CARE

## 2021-04-04 PROCEDURE — 0651 HSPC ROUTINE HOME CARE

## 2021-04-05 PROCEDURE — 0651 HSPC ROUTINE HOME CARE

## 2021-04-06 ENCOUNTER — HOME CARE VISIT (OUTPATIENT)
Dept: SCHEDULING | Facility: HOME HEALTH | Age: 86
End: 2021-04-06
Payer: MEDICARE

## 2021-04-06 PROCEDURE — G0156 HHCP-SVS OF AIDE,EA 15 MIN: HCPCS

## 2021-04-06 PROCEDURE — 0651 HSPC ROUTINE HOME CARE

## 2021-04-07 PROCEDURE — 0651 HSPC ROUTINE HOME CARE

## 2021-04-08 ENCOUNTER — HOME CARE VISIT (OUTPATIENT)
Dept: HOSPICE | Facility: HOSPICE | Age: 86
End: 2021-04-08
Payer: MEDICARE

## 2021-04-08 ENCOUNTER — HOME CARE VISIT (OUTPATIENT)
Dept: SCHEDULING | Facility: HOME HEALTH | Age: 86
End: 2021-04-08
Payer: MEDICARE

## 2021-04-08 VITALS
SYSTOLIC BLOOD PRESSURE: 118 MMHG | DIASTOLIC BLOOD PRESSURE: 80 MMHG | HEART RATE: 88 BPM | TEMPERATURE: 97.8 F | RESPIRATION RATE: 18 BRPM | OXYGEN SATURATION: 92 %

## 2021-04-08 PROCEDURE — G0299 HHS/HOSPICE OF RN EA 15 MIN: HCPCS

## 2021-04-08 PROCEDURE — 0651 HSPC ROUTINE HOME CARE

## 2021-04-08 PROCEDURE — G0156 HHCP-SVS OF AIDE,EA 15 MIN: HCPCS

## 2021-04-08 PROCEDURE — G0155 HHCP-SVS OF CSW,EA 15 MIN: HCPCS

## 2021-04-09 PROCEDURE — HOSPICE MEDICATION HC HH HOSPICE MEDICATION

## 2021-04-09 PROCEDURE — 0651 HSPC ROUTINE HOME CARE

## 2021-04-10 PROCEDURE — 0651 HSPC ROUTINE HOME CARE

## 2021-04-11 PROCEDURE — 0651 HSPC ROUTINE HOME CARE

## 2021-04-12 PROCEDURE — 0651 HSPC ROUTINE HOME CARE

## 2021-04-13 ENCOUNTER — HOME CARE VISIT (OUTPATIENT)
Dept: SCHEDULING | Facility: HOME HEALTH | Age: 86
End: 2021-04-13
Payer: MEDICARE

## 2021-04-13 PROCEDURE — 0651 HSPC ROUTINE HOME CARE

## 2021-04-13 PROCEDURE — G0156 HHCP-SVS OF AIDE,EA 15 MIN: HCPCS

## 2021-04-14 PROCEDURE — 0651 HSPC ROUTINE HOME CARE

## 2021-04-15 ENCOUNTER — HOME CARE VISIT (OUTPATIENT)
Dept: SCHEDULING | Facility: HOME HEALTH | Age: 86
End: 2021-04-15
Payer: MEDICARE

## 2021-04-15 VITALS
RESPIRATION RATE: 18 BRPM | HEART RATE: 88 BPM | TEMPERATURE: 96 F | SYSTOLIC BLOOD PRESSURE: 128 MMHG | DIASTOLIC BLOOD PRESSURE: 78 MMHG | OXYGEN SATURATION: 99 %

## 2021-04-15 PROCEDURE — 0651 HSPC ROUTINE HOME CARE

## 2021-04-15 PROCEDURE — G0156 HHCP-SVS OF AIDE,EA 15 MIN: HCPCS

## 2021-04-15 PROCEDURE — G0299 HHS/HOSPICE OF RN EA 15 MIN: HCPCS

## 2021-04-16 PROCEDURE — 0651 HSPC ROUTINE HOME CARE

## 2021-04-17 PROCEDURE — 0651 HSPC ROUTINE HOME CARE

## 2021-04-18 PROCEDURE — 0651 HSPC ROUTINE HOME CARE

## 2021-04-19 PROCEDURE — 0651 HSPC ROUTINE HOME CARE

## 2021-04-20 ENCOUNTER — HOME CARE VISIT (OUTPATIENT)
Dept: SCHEDULING | Facility: HOME HEALTH | Age: 86
End: 2021-04-20
Payer: MEDICARE

## 2021-04-20 PROCEDURE — G0156 HHCP-SVS OF AIDE,EA 15 MIN: HCPCS

## 2021-04-20 PROCEDURE — 0651 HSPC ROUTINE HOME CARE

## 2021-04-21 PROCEDURE — 0651 HSPC ROUTINE HOME CARE

## 2021-04-22 ENCOUNTER — HOME CARE VISIT (OUTPATIENT)
Dept: SCHEDULING | Facility: HOME HEALTH | Age: 86
End: 2021-04-22
Payer: MEDICARE

## 2021-04-22 ENCOUNTER — HOME CARE VISIT (OUTPATIENT)
Dept: HOSPICE | Facility: HOSPICE | Age: 86
End: 2021-04-22
Payer: MEDICARE

## 2021-04-22 PROCEDURE — G0156 HHCP-SVS OF AIDE,EA 15 MIN: HCPCS

## 2021-04-22 PROCEDURE — HOSPICE MEDICATION HC HH HOSPICE MEDICATION

## 2021-04-22 PROCEDURE — G0299 HHS/HOSPICE OF RN EA 15 MIN: HCPCS

## 2021-04-22 PROCEDURE — 0651 HSPC ROUTINE HOME CARE

## 2021-04-23 VITALS
TEMPERATURE: 97.2 F | HEART RATE: 100 BPM | DIASTOLIC BLOOD PRESSURE: 72 MMHG | RESPIRATION RATE: 16 BRPM | SYSTOLIC BLOOD PRESSURE: 136 MMHG

## 2021-04-23 PROCEDURE — 0651 HSPC ROUTINE HOME CARE

## 2021-04-24 PROCEDURE — 0651 HSPC ROUTINE HOME CARE

## 2021-04-25 PROCEDURE — 0651 HSPC ROUTINE HOME CARE

## 2021-04-26 PROCEDURE — 0651 HSPC ROUTINE HOME CARE

## 2021-04-27 ENCOUNTER — HOME CARE VISIT (OUTPATIENT)
Dept: SCHEDULING | Facility: HOME HEALTH | Age: 86
End: 2021-04-27
Payer: MEDICARE

## 2021-04-27 PROCEDURE — G0156 HHCP-SVS OF AIDE,EA 15 MIN: HCPCS

## 2021-04-27 PROCEDURE — 0651 HSPC ROUTINE HOME CARE

## 2021-04-28 PROCEDURE — 0651 HSPC ROUTINE HOME CARE

## 2021-04-29 ENCOUNTER — HOME CARE VISIT (OUTPATIENT)
Dept: HOSPICE | Facility: HOSPICE | Age: 86
End: 2021-04-29
Payer: MEDICARE

## 2021-04-29 ENCOUNTER — HOME CARE VISIT (OUTPATIENT)
Dept: SCHEDULING | Facility: HOME HEALTH | Age: 86
End: 2021-04-29
Payer: MEDICARE

## 2021-04-29 PROCEDURE — 0651 HSPC ROUTINE HOME CARE

## 2021-04-29 PROCEDURE — G0156 HHCP-SVS OF AIDE,EA 15 MIN: HCPCS

## 2021-04-30 ENCOUNTER — HOSPITAL ENCOUNTER (OUTPATIENT)
Dept: LAB | Age: 86
Discharge: HOME | End: 2021-04-30
Payer: OTHER MISCELLANEOUS

## 2021-04-30 ENCOUNTER — HOME CARE VISIT (OUTPATIENT)
Dept: HOSPICE | Facility: HOSPICE | Age: 86
End: 2021-04-30
Payer: MEDICARE

## 2021-04-30 LAB
ERYTHROCYTE [DISTWIDTH] IN BLOOD BY AUTOMATED COUNT: 16.2 % (ref 11.9–14.6)
HCT VFR BLD AUTO: 35.1 % (ref 35.8–46.3)
HGB BLD-MCNC: 11.1 G/DL (ref 11.7–15.4)
MCH RBC QN AUTO: 28.8 PG (ref 26.1–32.9)
MCHC RBC AUTO-ENTMCNC: 31.6 G/DL (ref 31.4–35)
MCV RBC AUTO: 91.2 FL (ref 79.6–97.8)
NRBC # BLD: 0 K/UL (ref 0–0.2)
PLATELET # BLD AUTO: 203 K/UL (ref 150–450)
PMV BLD AUTO: 10.2 FL (ref 9.4–12.3)
RBC # BLD AUTO: 3.85 M/UL (ref 4.05–5.2)
WBC # BLD AUTO: 3.7 K/UL (ref 4.3–11.1)

## 2021-04-30 PROCEDURE — G0299 HHS/HOSPICE OF RN EA 15 MIN: HCPCS

## 2021-04-30 PROCEDURE — 85027 COMPLETE CBC AUTOMATED: CPT

## 2021-04-30 PROCEDURE — 0651 HSPC ROUTINE HOME CARE

## 2021-05-01 PROCEDURE — 0651 HSPC ROUTINE HOME CARE

## 2021-05-02 ENCOUNTER — HOME CARE VISIT (OUTPATIENT)
Dept: SCHEDULING | Facility: HOME HEALTH | Age: 86
End: 2021-05-02
Payer: MEDICARE

## 2021-05-02 PROCEDURE — 0651 HSPC ROUTINE HOME CARE

## 2021-05-03 VITALS
HEART RATE: 92 BPM | DIASTOLIC BLOOD PRESSURE: 70 MMHG | RESPIRATION RATE: 20 BRPM | SYSTOLIC BLOOD PRESSURE: 146 MMHG | TEMPERATURE: 97.6 F

## 2021-05-03 PROCEDURE — 0651 HSPC ROUTINE HOME CARE

## 2021-05-04 ENCOUNTER — HOME CARE VISIT (OUTPATIENT)
Dept: SCHEDULING | Facility: HOME HEALTH | Age: 86
End: 2021-05-04
Payer: MEDICARE

## 2021-05-04 PROCEDURE — 0651 HSPC ROUTINE HOME CARE

## 2021-05-04 PROCEDURE — G0156 HHCP-SVS OF AIDE,EA 15 MIN: HCPCS

## 2021-05-05 PROCEDURE — 0651 HSPC ROUTINE HOME CARE

## 2021-05-06 ENCOUNTER — HOME CARE VISIT (OUTPATIENT)
Dept: SCHEDULING | Facility: HOME HEALTH | Age: 86
End: 2021-05-06
Payer: MEDICARE

## 2021-05-06 VITALS
RESPIRATION RATE: 18 BRPM | DIASTOLIC BLOOD PRESSURE: 68 MMHG | OXYGEN SATURATION: 97 % | SYSTOLIC BLOOD PRESSURE: 130 MMHG | TEMPERATURE: 98 F | HEART RATE: 96 BPM

## 2021-05-06 PROCEDURE — 0651 HSPC ROUTINE HOME CARE

## 2021-05-06 PROCEDURE — HOSPICE MEDICATION HC HH HOSPICE MEDICATION

## 2021-05-06 PROCEDURE — G0156 HHCP-SVS OF AIDE,EA 15 MIN: HCPCS

## 2021-05-06 PROCEDURE — G0299 HHS/HOSPICE OF RN EA 15 MIN: HCPCS

## 2021-05-07 PROCEDURE — 0651 HSPC ROUTINE HOME CARE

## 2021-05-08 PROCEDURE — 0651 HSPC ROUTINE HOME CARE

## 2021-05-09 PROCEDURE — 0651 HSPC ROUTINE HOME CARE

## 2021-05-10 ENCOUNTER — HOME CARE VISIT (OUTPATIENT)
Dept: HOSPICE | Facility: HOSPICE | Age: 86
End: 2021-05-10
Payer: MEDICARE

## 2021-05-10 PROCEDURE — 0651 HSPC ROUTINE HOME CARE

## 2021-05-11 ENCOUNTER — HOME CARE VISIT (OUTPATIENT)
Dept: SCHEDULING | Facility: HOME HEALTH | Age: 86
End: 2021-05-11
Payer: MEDICARE

## 2021-05-11 PROCEDURE — G0156 HHCP-SVS OF AIDE,EA 15 MIN: HCPCS

## 2021-05-11 PROCEDURE — 0651 HSPC ROUTINE HOME CARE

## 2021-05-12 ENCOUNTER — HOME CARE VISIT (OUTPATIENT)
Dept: SCHEDULING | Facility: HOME HEALTH | Age: 86
End: 2021-05-12
Payer: MEDICARE

## 2021-05-12 VITALS
HEART RATE: 83 BPM | DIASTOLIC BLOOD PRESSURE: 78 MMHG | RESPIRATION RATE: 16 BRPM | TEMPERATURE: 97.5 F | OXYGEN SATURATION: 97 % | SYSTOLIC BLOOD PRESSURE: 146 MMHG

## 2021-05-12 PROCEDURE — G0299 HHS/HOSPICE OF RN EA 15 MIN: HCPCS

## 2021-05-12 PROCEDURE — 0651 HSPC ROUTINE HOME CARE

## 2021-05-13 ENCOUNTER — HOME CARE VISIT (OUTPATIENT)
Dept: SCHEDULING | Facility: HOME HEALTH | Age: 86
End: 2021-05-13
Payer: MEDICARE

## 2021-05-13 PROCEDURE — G0156 HHCP-SVS OF AIDE,EA 15 MIN: HCPCS

## 2021-05-13 PROCEDURE — 0651 HSPC ROUTINE HOME CARE

## 2021-05-14 PROCEDURE — 0651 HSPC ROUTINE HOME CARE

## 2021-05-15 PROCEDURE — 0651 HSPC ROUTINE HOME CARE

## 2021-05-16 PROCEDURE — 0651 HSPC ROUTINE HOME CARE

## 2021-05-17 PROCEDURE — 0651 HSPC ROUTINE HOME CARE

## 2021-05-18 ENCOUNTER — HOME CARE VISIT (OUTPATIENT)
Dept: SCHEDULING | Facility: HOME HEALTH | Age: 86
End: 2021-05-18
Payer: MEDICARE

## 2021-05-18 PROCEDURE — 0651 HSPC ROUTINE HOME CARE

## 2021-05-18 PROCEDURE — G0156 HHCP-SVS OF AIDE,EA 15 MIN: HCPCS

## 2021-05-19 ENCOUNTER — HOME CARE VISIT (OUTPATIENT)
Dept: SCHEDULING | Facility: HOME HEALTH | Age: 86
End: 2021-05-19
Payer: MEDICARE

## 2021-05-19 ENCOUNTER — HOME CARE VISIT (OUTPATIENT)
Dept: HOSPICE | Facility: HOSPICE | Age: 86
End: 2021-05-19
Payer: MEDICARE

## 2021-05-19 VITALS
RESPIRATION RATE: 16 BRPM | SYSTOLIC BLOOD PRESSURE: 120 MMHG | HEART RATE: 88 BPM | DIASTOLIC BLOOD PRESSURE: 80 MMHG | TEMPERATURE: 97.8 F

## 2021-05-19 PROCEDURE — 0651 HSPC ROUTINE HOME CARE

## 2021-05-19 PROCEDURE — G0155 HHCP-SVS OF CSW,EA 15 MIN: HCPCS

## 2021-05-19 PROCEDURE — HOSPICE MEDICATION HC HH HOSPICE MEDICATION

## 2021-05-19 PROCEDURE — G0299 HHS/HOSPICE OF RN EA 15 MIN: HCPCS

## 2021-05-19 NOTE — HOSPICE
Pt./ CG screend for COVID - 19 Negative  RN assessment completed / Pt. is more alert today and is moving better. Resting on couch. Vitals assessed : B/P 120/80  Pulse 88  O2 sat 98 % as per CG report on RA  Pt. alert, sitting on the couch in living room. CG reports pt. is not sleeping downstairs. She refuses to sleep in the hospital bed, CG ask for [facility] number to arrange for them to pick it up. Skin warm and dry. Small sacral wound is healed   Appetite/ Fluid intake. Fair 75%. Per CG pt. eats small meals all day long. Encouraged Cg to offer more water. Continent of B/B. Wears pull-ups. Per CG pt. not have as much constipation so she  decreased  the Senna to 2 tabs po daily  Last BM 5/17/21. Per CG pt is going QOD. Medications reviewed with CG: Neurontin, Levothryoxine, Celexa  DME needed: None  Pt. totally dependent for all ADLS. Pt. has always been so independent. This has been hard for her. Pt. is having tearing and emotional issues. Pt. requires max assist to transfer from couch to W/C. Although Cg reports she caught pt. trying to transfer herself to from the couch to the W/C. Pt. dislocated her right shoulder, pubic ramis fx. 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 Per CG with the most recent med changes pt. is doing much better. She is still wincing some  when you touch her arm and she is actually moving the arm which is an improvement. Discussed with Daughter regarding PT eval. for tolerance of PT and assess for safety of transfers. Daughter understands that this will be a 1 time eval, then if PT feels the pt. would benefit from more PT she would have to be transferred to [facility] for services.

## 2021-05-20 ENCOUNTER — HOME CARE VISIT (OUTPATIENT)
Dept: SCHEDULING | Facility: HOME HEALTH | Age: 86
End: 2021-05-20
Payer: MEDICARE

## 2021-05-20 PROCEDURE — 0651 HSPC ROUTINE HOME CARE

## 2021-05-20 PROCEDURE — G0156 HHCP-SVS OF AIDE,EA 15 MIN: HCPCS

## 2021-05-21 PROCEDURE — 0651 HSPC ROUTINE HOME CARE

## 2021-05-21 NOTE — HOSPICE
Patient is sitting up on her sofa. . She has basically moved back up stairs with Maddie Enrique as she became depressed acting. She is apparently doing well with the interactions of upstairs. The granddaughter is returniing to [place] ths weekend. Sh has been helping with her grandmother for a couple of months now. Maddie Enrique has completed applications for [employer] and for [employer]. SW and RN are exploring with Maddie Enrique and Fermin Osei the possibility of a physical therapy evaluation to see if her mom would benefit from some therapy. Patient is more alert and seemingly wants to be moving and doing in the home but is simply unable. The question is wether patient will be able to understand and follow through with commands. They agree that an evaluation would be useful. [facility] will be referred to. Patient did not seems \"down\" or depressed today. SW offere demotional support and teaching ref benefits of PT eval. SW to follow for needs.   They are currently coping adequately

## 2021-05-22 PROCEDURE — 0651 HSPC ROUTINE HOME CARE

## 2021-05-23 PROCEDURE — 0651 HSPC ROUTINE HOME CARE

## 2021-05-24 PROCEDURE — 0651 HSPC ROUTINE HOME CARE

## 2021-05-25 ENCOUNTER — HOME CARE VISIT (OUTPATIENT)
Dept: SCHEDULING | Facility: HOME HEALTH | Age: 86
End: 2021-05-25
Payer: MEDICARE

## 2021-05-25 PROCEDURE — 0651 HSPC ROUTINE HOME CARE

## 2021-05-25 PROCEDURE — G0156 HHCP-SVS OF AIDE,EA 15 MIN: HCPCS

## 2021-05-25 NOTE — CASE COMMUNICATION
Phoned dtr Yodit Calloway to confirm today's 13:00 visit. Dtr said that today is too hectic.  agreed to attempt visit next week.

## 2021-05-26 ENCOUNTER — HOME CARE VISIT (OUTPATIENT)
Dept: SCHEDULING | Facility: HOME HEALTH | Age: 86
End: 2021-05-26
Payer: MEDICARE

## 2021-05-26 VITALS
TEMPERATURE: 98.7 F | DIASTOLIC BLOOD PRESSURE: 80 MMHG | SYSTOLIC BLOOD PRESSURE: 148 MMHG | OXYGEN SATURATION: 97 % | HEART RATE: 68 BPM | RESPIRATION RATE: 18 BRPM

## 2021-05-26 PROCEDURE — 0651 HSPC ROUTINE HOME CARE

## 2021-05-26 PROCEDURE — G0299 HHS/HOSPICE OF RN EA 15 MIN: HCPCS

## 2021-05-27 ENCOUNTER — HOME CARE VISIT (OUTPATIENT)
Dept: SCHEDULING | Facility: HOME HEALTH | Age: 86
End: 2021-05-27
Payer: MEDICARE

## 2021-05-27 PROCEDURE — 0651 HSPC ROUTINE HOME CARE

## 2021-05-27 PROCEDURE — G0156 HHCP-SVS OF AIDE,EA 15 MIN: HCPCS

## 2021-05-28 ENCOUNTER — HOME CARE VISIT (OUTPATIENT)
Dept: HOSPICE | Facility: HOSPICE | Age: 86
End: 2021-05-28
Payer: MEDICARE

## 2021-05-28 PROCEDURE — 0651 HSPC ROUTINE HOME CARE

## 2021-05-29 PROCEDURE — 0651 HSPC ROUTINE HOME CARE

## 2021-05-30 PROCEDURE — 0651 HSPC ROUTINE HOME CARE

## 2021-05-31 PROCEDURE — 0651 HSPC ROUTINE HOME CARE

## 2021-06-01 ENCOUNTER — HOME CARE VISIT (OUTPATIENT)
Dept: SCHEDULING | Facility: HOME HEALTH | Age: 86
End: 2021-06-01
Payer: MEDICARE

## 2021-06-01 PROCEDURE — 0651 HSPC ROUTINE HOME CARE

## 2021-06-01 PROCEDURE — G0156 HHCP-SVS OF AIDE,EA 15 MIN: HCPCS

## 2021-06-02 ENCOUNTER — HOME CARE VISIT (OUTPATIENT)
Dept: SCHEDULING | Facility: HOME HEALTH | Age: 86
End: 2021-06-02
Payer: MEDICARE

## 2021-06-02 VITALS
RESPIRATION RATE: 16 BRPM | HEART RATE: 66 BPM | BODY MASS INDEX: 23.23 KG/M2 | TEMPERATURE: 97.8 F | OXYGEN SATURATION: 99 % | DIASTOLIC BLOOD PRESSURE: 64 MMHG | SYSTOLIC BLOOD PRESSURE: 113 MMHG | WEIGHT: 115 LBS

## 2021-06-02 PROCEDURE — G0151 HHCP-SERV OF PT,EA 15 MIN: HCPCS

## 2021-06-02 PROCEDURE — 0651 HSPC ROUTINE HOME CARE

## 2021-06-03 ENCOUNTER — HOME CARE VISIT (OUTPATIENT)
Dept: SCHEDULING | Facility: HOME HEALTH | Age: 86
End: 2021-06-03
Payer: MEDICARE

## 2021-06-03 PROCEDURE — 0651 HSPC ROUTINE HOME CARE

## 2021-06-03 PROCEDURE — G0156 HHCP-SVS OF AIDE,EA 15 MIN: HCPCS

## 2021-06-03 NOTE — CASE COMMUNICATION
Pt's dtr texted  to say she appreciated 's availability but that pt talks to [employer] son who prays with her nightly and that  visits are no longer necessary.  texted back to dtr expressing understanding and affirming 's ongoing care/availability.

## 2021-06-04 ENCOUNTER — HOME CARE VISIT (OUTPATIENT)
Dept: SCHEDULING | Facility: HOME HEALTH | Age: 86
End: 2021-06-04
Payer: MEDICARE

## 2021-06-04 VITALS
RESPIRATION RATE: 18 BRPM | SYSTOLIC BLOOD PRESSURE: 135 MMHG | HEART RATE: 80 BPM | DIASTOLIC BLOOD PRESSURE: 75 MMHG | TEMPERATURE: 98 F

## 2021-06-04 PROCEDURE — G0299 HHS/HOSPICE OF RN EA 15 MIN: HCPCS

## 2021-06-04 PROCEDURE — HOSPICE MEDICATION HC HH HOSPICE MEDICATION

## 2021-06-04 PROCEDURE — 0651 HSPC ROUTINE HOME CARE

## 2021-06-05 PROCEDURE — 0651 HSPC ROUTINE HOME CARE

## 2021-06-06 PROCEDURE — 0651 HSPC ROUTINE HOME CARE

## 2021-06-07 PROCEDURE — 0651 HSPC ROUTINE HOME CARE

## 2021-06-08 ENCOUNTER — HOME CARE VISIT (OUTPATIENT)
Dept: SCHEDULING | Facility: HOME HEALTH | Age: 86
End: 2021-06-08
Payer: MEDICARE

## 2021-06-08 PROCEDURE — 0651 HSPC ROUTINE HOME CARE

## 2021-06-08 PROCEDURE — G0156 HHCP-SVS OF AIDE,EA 15 MIN: HCPCS

## 2021-06-09 ENCOUNTER — HOME CARE VISIT (OUTPATIENT)
Dept: SCHEDULING | Facility: HOME HEALTH | Age: 86
End: 2021-06-09
Payer: MEDICARE

## 2021-06-09 VITALS
DIASTOLIC BLOOD PRESSURE: 60 MMHG | HEART RATE: 90 BPM | OXYGEN SATURATION: 97 % | TEMPERATURE: 98.1 F | RESPIRATION RATE: 18 BRPM | SYSTOLIC BLOOD PRESSURE: 110 MMHG

## 2021-06-09 PROCEDURE — HOSPICE MEDICATION HC HH HOSPICE MEDICATION

## 2021-06-09 PROCEDURE — 0651 HSPC ROUTINE HOME CARE

## 2021-06-09 PROCEDURE — G0299 HHS/HOSPICE OF RN EA 15 MIN: HCPCS

## 2021-06-10 ENCOUNTER — HOME CARE VISIT (OUTPATIENT)
Dept: SCHEDULING | Facility: HOME HEALTH | Age: 86
End: 2021-06-10
Payer: MEDICARE

## 2021-06-10 PROCEDURE — 0651 HSPC ROUTINE HOME CARE

## 2021-06-10 PROCEDURE — G0156 HHCP-SVS OF AIDE,EA 15 MIN: HCPCS

## 2021-06-11 PROCEDURE — 0651 HSPC ROUTINE HOME CARE

## 2021-06-12 PROCEDURE — 0651 HSPC ROUTINE HOME CARE

## 2021-06-13 PROCEDURE — 0651 HSPC ROUTINE HOME CARE

## 2021-06-14 PROCEDURE — 0651 HSPC ROUTINE HOME CARE

## 2021-06-15 ENCOUNTER — HOME CARE VISIT (OUTPATIENT)
Dept: SCHEDULING | Facility: HOME HEALTH | Age: 86
End: 2021-06-15
Payer: MEDICARE

## 2021-06-15 PROCEDURE — G0156 HHCP-SVS OF AIDE,EA 15 MIN: HCPCS

## 2021-06-15 PROCEDURE — 0651 HSPC ROUTINE HOME CARE

## 2021-06-16 PROCEDURE — 0651 HSPC ROUTINE HOME CARE

## 2021-06-17 ENCOUNTER — HOME CARE VISIT (OUTPATIENT)
Dept: SCHEDULING | Facility: HOME HEALTH | Age: 86
End: 2021-06-17
Payer: MEDICARE

## 2021-06-17 PROCEDURE — 0651 HSPC ROUTINE HOME CARE

## 2021-06-17 PROCEDURE — G0156 HHCP-SVS OF AIDE,EA 15 MIN: HCPCS

## 2021-06-18 ENCOUNTER — HOME CARE VISIT (OUTPATIENT)
Dept: SCHEDULING | Facility: HOME HEALTH | Age: 86
End: 2021-06-18
Payer: MEDICARE

## 2021-06-18 VITALS
HEART RATE: 60 BPM | SYSTOLIC BLOOD PRESSURE: 132 MMHG | DIASTOLIC BLOOD PRESSURE: 80 MMHG | RESPIRATION RATE: 16 BRPM | TEMPERATURE: 98 F

## 2021-06-18 PROCEDURE — G0299 HHS/HOSPICE OF RN EA 15 MIN: HCPCS

## 2021-06-18 PROCEDURE — 0651 HSPC ROUTINE HOME CARE

## 2021-06-18 PROCEDURE — HOSPICE MEDICATION HC HH HOSPICE MEDICATION

## 2021-06-19 PROCEDURE — 0651 HSPC ROUTINE HOME CARE

## 2021-06-20 PROCEDURE — 0651 HSPC ROUTINE HOME CARE

## 2021-06-21 PROCEDURE — 0651 HSPC ROUTINE HOME CARE

## 2021-06-22 ENCOUNTER — HOME CARE VISIT (OUTPATIENT)
Dept: SCHEDULING | Facility: HOME HEALTH | Age: 86
End: 2021-06-22
Payer: MEDICARE

## 2021-06-22 PROCEDURE — 0651 HSPC ROUTINE HOME CARE

## 2021-06-22 PROCEDURE — G0156 HHCP-SVS OF AIDE,EA 15 MIN: HCPCS

## 2021-06-23 PROCEDURE — 0651 HSPC ROUTINE HOME CARE

## 2021-06-24 ENCOUNTER — HOME CARE VISIT (OUTPATIENT)
Dept: SCHEDULING | Facility: HOME HEALTH | Age: 86
End: 2021-06-24
Payer: MEDICARE

## 2021-06-24 PROCEDURE — 0651 HSPC ROUTINE HOME CARE

## 2021-06-24 PROCEDURE — G0156 HHCP-SVS OF AIDE,EA 15 MIN: HCPCS

## 2021-06-25 ENCOUNTER — HOME CARE VISIT (OUTPATIENT)
Dept: SCHEDULING | Facility: HOME HEALTH | Age: 86
End: 2021-06-25
Payer: MEDICARE

## 2021-06-25 VITALS
RESPIRATION RATE: 16 BRPM | HEART RATE: 64 BPM | TEMPERATURE: 97.4 F | SYSTOLIC BLOOD PRESSURE: 120 MMHG | DIASTOLIC BLOOD PRESSURE: 80 MMHG

## 2021-06-25 PROCEDURE — G0299 HHS/HOSPICE OF RN EA 15 MIN: HCPCS

## 2021-06-25 PROCEDURE — HOSPICE MEDICATION HC HH HOSPICE MEDICATION

## 2021-06-25 PROCEDURE — 0651 HSPC ROUTINE HOME CARE

## 2021-06-26 PROCEDURE — 0651 HSPC ROUTINE HOME CARE

## 2021-06-27 PROCEDURE — 0651 HSPC ROUTINE HOME CARE

## 2021-06-28 PROCEDURE — 0651 HSPC ROUTINE HOME CARE

## 2021-06-29 ENCOUNTER — HOME CARE VISIT (OUTPATIENT)
Dept: HOSPICE | Facility: HOSPICE | Age: 86
End: 2021-06-29
Payer: MEDICARE

## 2021-06-29 ENCOUNTER — HOME CARE VISIT (OUTPATIENT)
Dept: SCHEDULING | Facility: HOME HEALTH | Age: 86
End: 2021-06-29
Payer: MEDICARE

## 2021-06-29 PROCEDURE — 0651 HSPC ROUTINE HOME CARE

## 2021-06-29 PROCEDURE — G0156 HHCP-SVS OF AIDE,EA 15 MIN: HCPCS

## 2021-06-30 ENCOUNTER — HOME CARE VISIT (OUTPATIENT)
Dept: HOSPICE | Facility: HOSPICE | Age: 86
End: 2021-06-30
Payer: MEDICARE

## 2021-06-30 PROCEDURE — HOSPICE MEDICATION HC HH HOSPICE MEDICATION

## 2021-06-30 PROCEDURE — 0651 HSPC ROUTINE HOME CARE

## 2021-07-01 ENCOUNTER — HOME CARE VISIT (OUTPATIENT)
Dept: SCHEDULING | Facility: HOME HEALTH | Age: 86
End: 2021-07-01
Payer: MEDICARE

## 2021-07-01 ENCOUNTER — HOME CARE VISIT (OUTPATIENT)
Dept: HOSPICE | Facility: HOSPICE | Age: 86
End: 2021-07-01
Payer: MEDICARE

## 2021-07-01 VITALS
OXYGEN SATURATION: 99 % | SYSTOLIC BLOOD PRESSURE: 140 MMHG | DIASTOLIC BLOOD PRESSURE: 68 MMHG | HEART RATE: 75 BPM | RESPIRATION RATE: 16 BRPM | TEMPERATURE: 97.6 F

## 2021-07-01 PROCEDURE — G0155 HHCP-SVS OF CSW,EA 15 MIN: HCPCS

## 2021-07-01 PROCEDURE — G0299 HHS/HOSPICE OF RN EA 15 MIN: HCPCS

## 2021-07-01 PROCEDURE — 0651 HSPC ROUTINE HOME CARE

## 2021-07-01 PROCEDURE — G0156 HHCP-SVS OF AIDE,EA 15 MIN: HCPCS

## 2021-07-02 PROCEDURE — 0651 HSPC ROUTINE HOME CARE

## 2021-07-03 PROCEDURE — 0651 HSPC ROUTINE HOME CARE

## 2021-07-04 PROCEDURE — 0651 HSPC ROUTINE HOME CARE

## 2021-07-05 PROCEDURE — 0651 HSPC ROUTINE HOME CARE

## 2021-07-06 ENCOUNTER — HOME CARE VISIT (OUTPATIENT)
Dept: SCHEDULING | Facility: HOME HEALTH | Age: 86
End: 2021-07-06
Payer: MEDICARE

## 2021-07-06 PROCEDURE — 0651 HSPC ROUTINE HOME CARE

## 2021-07-07 ENCOUNTER — HOME CARE VISIT (OUTPATIENT)
Dept: SCHEDULING | Facility: HOME HEALTH | Age: 86
End: 2021-07-07
Payer: MEDICARE

## 2021-07-07 PROCEDURE — 0651 HSPC ROUTINE HOME CARE

## 2021-07-07 PROCEDURE — G0156 HHCP-SVS OF AIDE,EA 15 MIN: HCPCS

## 2021-07-08 ENCOUNTER — HOME CARE VISIT (OUTPATIENT)
Dept: SCHEDULING | Facility: HOME HEALTH | Age: 86
End: 2021-07-08
Payer: MEDICARE

## 2021-07-08 ENCOUNTER — HOME CARE VISIT (OUTPATIENT)
Dept: HOSPICE | Facility: HOSPICE | Age: 86
End: 2021-07-08
Payer: MEDICARE

## 2021-07-08 VITALS
HEART RATE: 86 BPM | RESPIRATION RATE: 20 BRPM | TEMPERATURE: 98.1 F | SYSTOLIC BLOOD PRESSURE: 133 MMHG | DIASTOLIC BLOOD PRESSURE: 73 MMHG

## 2021-07-08 PROCEDURE — HOSPICE MEDICATION HC HH HOSPICE MEDICATION

## 2021-07-08 PROCEDURE — 0651 HSPC ROUTINE HOME CARE

## 2021-07-08 PROCEDURE — G0299 HHS/HOSPICE OF RN EA 15 MIN: HCPCS

## 2021-07-09 PROCEDURE — 0651 HSPC ROUTINE HOME CARE

## 2021-07-10 PROCEDURE — 0651 HSPC ROUTINE HOME CARE

## 2021-07-11 ENCOUNTER — HOME CARE VISIT (OUTPATIENT)
Dept: SCHEDULING | Facility: HOME HEALTH | Age: 86
End: 2021-07-11
Payer: MEDICARE

## 2021-07-11 PROCEDURE — 0651 HSPC ROUTINE HOME CARE

## 2021-07-11 PROCEDURE — G0156 HHCP-SVS OF AIDE,EA 15 MIN: HCPCS

## 2021-07-12 PROCEDURE — 0651 HSPC ROUTINE HOME CARE

## 2021-07-13 PROCEDURE — 0651 HSPC ROUTINE HOME CARE

## 2021-07-14 PROCEDURE — 0651 HSPC ROUTINE HOME CARE

## 2021-07-15 ENCOUNTER — HOME CARE VISIT (OUTPATIENT)
Dept: SCHEDULING | Facility: HOME HEALTH | Age: 86
End: 2021-07-15
Payer: MEDICARE

## 2021-07-15 VITALS
TEMPERATURE: 98 F | RESPIRATION RATE: 16 BRPM | SYSTOLIC BLOOD PRESSURE: 120 MMHG | OXYGEN SATURATION: 97 % | DIASTOLIC BLOOD PRESSURE: 70 MMHG | HEART RATE: 78 BPM

## 2021-07-15 PROCEDURE — 0651 HSPC ROUTINE HOME CARE

## 2021-07-15 PROCEDURE — G0299 HHS/HOSPICE OF RN EA 15 MIN: HCPCS

## 2021-07-15 PROCEDURE — HOSPICE MEDICATION HC HH HOSPICE MEDICATION

## 2021-07-15 PROCEDURE — G0156 HHCP-SVS OF AIDE,EA 15 MIN: HCPCS

## 2021-07-15 NOTE — HOSPICE
Pt/ CG screened for COVID-19 Negative  RN assessment completed. Vitals  WNL's  Pt. was sitting  in  w/c in front of the TV. Pt was alert. Skin, warm, dry and intact. Sacral area will remain fragile just because she sits alot. Pt. is sleeping upstairs  on the couch. CG reports being in her apartment seemed to upset her. Pt. is continent of B/B. Wears pull-ups. Last BM 7/15/21. No issues with urination. Appetite/Fluid intake is good. Pt. eats at least 80% per CG. No coughing noted when she drinks liquids. Medications reviewed: Diltiazem  Supplies needed: None  DME needed: None  Pt. is  Right arm is better with the Neurontin dose has been titrated up to 300mg TID. Right arm is not as sensitive but she is still not able to do much with it. MAC score 18 cm. Leela Montes continues to do leg exercises that PT gave her. Reports her mom not doing as well transferring. Discussed with Leela Montes that pt. felt stiff when we tried to move her. so it maybe arthritis pain. Told her to use Tylenol as ordered. Leela Montes asked if maybe a transfer belt would help, I told her she could try it. Pt may not qualify when her next recert is due we would just have to wait and see. We will need to try decreasing the HHA  to 1 wk soon. No falls reported since last HV. Safety precautions are being followed. Pt. has a seat belt in her W/C.

## 2021-07-16 PROCEDURE — 0651 HSPC ROUTINE HOME CARE

## 2021-07-17 PROCEDURE — 0651 HSPC ROUTINE HOME CARE

## 2021-07-18 PROCEDURE — 0651 HSPC ROUTINE HOME CARE

## 2021-07-19 PROCEDURE — 0651 HSPC ROUTINE HOME CARE

## 2021-07-20 ENCOUNTER — HOME CARE VISIT (OUTPATIENT)
Dept: SCHEDULING | Facility: HOME HEALTH | Age: 86
End: 2021-07-20
Payer: MEDICARE

## 2021-07-20 PROCEDURE — G0156 HHCP-SVS OF AIDE,EA 15 MIN: HCPCS

## 2021-07-20 PROCEDURE — 0651 HSPC ROUTINE HOME CARE

## 2021-07-21 PROCEDURE — 0651 HSPC ROUTINE HOME CARE

## 2021-07-22 ENCOUNTER — HOME CARE VISIT (OUTPATIENT)
Dept: SCHEDULING | Facility: HOME HEALTH | Age: 86
End: 2021-07-22
Payer: MEDICARE

## 2021-07-22 VITALS
SYSTOLIC BLOOD PRESSURE: 114 MMHG | HEART RATE: 76 BPM | TEMPERATURE: 97.2 F | RESPIRATION RATE: 18 BRPM | DIASTOLIC BLOOD PRESSURE: 73 MMHG | OXYGEN SATURATION: 97 %

## 2021-07-22 PROCEDURE — HOSPICE MEDICATION HC HH HOSPICE MEDICATION

## 2021-07-22 PROCEDURE — G0156 HHCP-SVS OF AIDE,EA 15 MIN: HCPCS

## 2021-07-22 PROCEDURE — G0299 HHS/HOSPICE OF RN EA 15 MIN: HCPCS

## 2021-07-22 PROCEDURE — 0651 HSPC ROUTINE HOME CARE

## 2021-07-22 NOTE — HOSPICE
Pt/ CG screened for COVID-19 Negative RN assessment completed. Vitals WNL's Pt. was sitting in w/c in front of the TV. Pt was alert. Skin, warm, dry and intact. Sacral area will remain fragile just because she sits alot. Pt. is sleeping upstairs on the couch. CG reports pt is showered down in her apartment but otherwise stays upstairs. Pt. is continent of B/B. Wears pull-ups. Last BM 7/21/21. No issues with urination. Appetite/Fluid intake is good. Pt. eats at least 80% per CG. No coughing noted when she drinks liquids. Medications reviewed: Refilled Levothyroxine, Omeprazole, Citalopram, Supplies needed: Chuxs, pull-ups, wipes. DME needed: None Pt. is Right arm is better with the Neurontin 300mg TID. Right arm is not as sensitive but she is still not able to do much with it. MAC score 18 cm. Alma Pendleton continues to do leg exercises that PT gave her. Reports her mom not doing as well transferring but pt. still leans. Discussed with Alma Pendleton that pt. felt stiff when we tried to move her.maybe arthritis pain. Told her to use Tylenol as ordered. Alma Pendleton asked if maybe a transfer belt would help, I told her she could try it. Pt may not qualify when her next recert is due we would just have to wait and see. We will need to try decreasing the HHA to 1 wk soon. I had planned to start working on this but pt's daughter was supposed to be helpng her mom. She has now told her something has come up. No falls reported since last HV. Safety precautions are being followed. Pt. has a seat belt in her W/C.

## 2021-07-23 PROCEDURE — 0651 HSPC ROUTINE HOME CARE

## 2021-07-24 PROCEDURE — 0651 HSPC ROUTINE HOME CARE

## 2021-07-25 PROCEDURE — 0651 HSPC ROUTINE HOME CARE

## 2021-07-26 PROCEDURE — 0651 HSPC ROUTINE HOME CARE

## 2021-07-27 ENCOUNTER — HOME CARE VISIT (OUTPATIENT)
Dept: SCHEDULING | Facility: HOME HEALTH | Age: 86
End: 2021-07-27
Payer: MEDICARE

## 2021-07-27 PROCEDURE — 0651 HSPC ROUTINE HOME CARE

## 2021-07-27 PROCEDURE — G0156 HHCP-SVS OF AIDE,EA 15 MIN: HCPCS

## 2021-07-28 PROCEDURE — 0651 HSPC ROUTINE HOME CARE

## 2021-07-29 ENCOUNTER — HOME CARE VISIT (OUTPATIENT)
Dept: SCHEDULING | Facility: HOME HEALTH | Age: 86
End: 2021-07-29
Payer: MEDICARE

## 2021-07-29 ENCOUNTER — HOME CARE VISIT (OUTPATIENT)
Dept: HOSPICE | Facility: HOSPICE | Age: 86
End: 2021-07-29
Payer: MEDICARE

## 2021-07-29 VITALS
OXYGEN SATURATION: 96 % | SYSTOLIC BLOOD PRESSURE: 132 MMHG | HEART RATE: 71 BPM | DIASTOLIC BLOOD PRESSURE: 81 MMHG | RESPIRATION RATE: 16 BRPM | TEMPERATURE: 98.5 F

## 2021-07-29 PROCEDURE — HOSPICE MEDICATION HC HH HOSPICE MEDICATION

## 2021-07-29 PROCEDURE — G0156 HHCP-SVS OF AIDE,EA 15 MIN: HCPCS

## 2021-07-29 PROCEDURE — 0651 HSPC ROUTINE HOME CARE

## 2021-07-29 PROCEDURE — G0299 HHS/HOSPICE OF RN EA 15 MIN: HCPCS

## 2021-07-29 NOTE — HOSPICE
Change of visit day and time. Dtr has errands to run and would prefer SW to visit next week. SW will visit next week instead of today.

## 2021-07-30 PROCEDURE — 0651 HSPC ROUTINE HOME CARE

## 2021-07-30 NOTE — HOSPICE
Pt/ CG screened for COVID-19 Negative RN assessment completed. Vitals WNL's Pt. was sitting in w/c in front of the TV. Pt was alert. Skin, warm, dry and intact. Sacral area will remain fragile just because she sits alot. Pt. is sleeping upstairs on the couch. CG reports pt is showered down in her apartment but otherwise stays upstairs. Pt. is continent of B/B. Wears pull-ups. Last BM 7/28/21. No issues with urination. Appetite/Fluid intake is good. Pt. eats at least 80% per CG. No coughing noted when she drinks liquids. Medications reviewed: Refilled Neurontin, Tylenol, Diltiazem. Supplies needed: None. DME needed: None Pt. is Right arm is better with the Neurontin 300mg TID. Right arm is not as sensitive but she is still trying to use it to  food. MAC score 18 cm. Luigi Catherine continues to do leg exercises that PT gave her. Reports her mom not doing as well transferring but pt. still leans. Discussed with Luigi Catherine that pt. felt stiff when we tried to move her.maybe arthritis pain. Told her to use Tylenol as ordered. Luigi Dears asked if maybe a transfer belt would help, I told her she could try it. Pt may not qualify when her next recert is due we would just have to wait and see. We will need to try decreasing the HHA to 1 wk soon. I had planned to start working on this but pt's daughter was supposed to be helpng her mom. She has now told her something has come up. No falls reported since last HV. Safety precautions are being followed. Pt. has a seat belt in her W/C.

## 2021-07-31 PROCEDURE — 0651 HSPC ROUTINE HOME CARE

## 2021-08-01 PROCEDURE — 0651 HSPC ROUTINE HOME CARE

## 2021-08-02 ENCOUNTER — HOME CARE VISIT (OUTPATIENT)
Dept: SCHEDULING | Facility: HOME HEALTH | Age: 86
End: 2021-08-02
Payer: MEDICARE

## 2021-08-02 PROCEDURE — G0299 HHS/HOSPICE OF RN EA 15 MIN: HCPCS

## 2021-08-02 PROCEDURE — 0651 HSPC ROUTINE HOME CARE

## 2021-08-03 ENCOUNTER — HOME CARE VISIT (OUTPATIENT)
Dept: SCHEDULING | Facility: HOME HEALTH | Age: 86
End: 2021-08-03
Payer: MEDICARE

## 2021-08-03 PROCEDURE — G0156 HHCP-SVS OF AIDE,EA 15 MIN: HCPCS

## 2021-08-03 PROCEDURE — 0651 HSPC ROUTINE HOME CARE

## 2021-08-04 PROCEDURE — 0651 HSPC ROUTINE HOME CARE

## 2021-08-05 ENCOUNTER — HOME CARE VISIT (OUTPATIENT)
Dept: HOSPICE | Facility: HOSPICE | Age: 86
End: 2021-08-05
Payer: MEDICARE

## 2021-08-05 ENCOUNTER — HOME CARE VISIT (OUTPATIENT)
Dept: SCHEDULING | Facility: HOME HEALTH | Age: 86
End: 2021-08-05
Payer: MEDICARE

## 2021-08-05 VITALS
TEMPERATURE: 98 F | OXYGEN SATURATION: 97 % | DIASTOLIC BLOOD PRESSURE: 81 MMHG | RESPIRATION RATE: 18 BRPM | HEART RATE: 85 BPM | SYSTOLIC BLOOD PRESSURE: 122 MMHG

## 2021-08-05 PROCEDURE — G0156 HHCP-SVS OF AIDE,EA 15 MIN: HCPCS

## 2021-08-05 PROCEDURE — G0155 HHCP-SVS OF CSW,EA 15 MIN: HCPCS

## 2021-08-05 PROCEDURE — G0299 HHS/HOSPICE OF RN EA 15 MIN: HCPCS

## 2021-08-05 PROCEDURE — 0651 HSPC ROUTINE HOME CARE

## 2021-08-05 PROCEDURE — HOSPICE MEDICATION HC HH HOSPICE MEDICATION

## 2021-08-06 PROCEDURE — 0651 HSPC ROUTINE HOME CARE

## 2021-08-06 NOTE — HOSPICE
Pt/ CG screened for COVID-19 Negative RN assessment completed. Vitals WNL's Pt. was sitting in w/c in front of the TV. Pt was alert. Skin, warm, dry and intact. Sacral area will remains fragile just because she sits alot. Pt. is sleeping upstairs on the couch. CG reports pt is showered down in her apartment but otherwise stays upstairs. Pt. is continent of B/B. Wears pull-ups. Last BM 8/4/21. No issues with urination. Appetite/Fluid intake is good. Pt. eats at least 80% per CG. No coughing noted when she drinks liquids. Medications reviewed: Refilled Levothyroxine, Omeprazole, Citalopram. Supplies needed: Wipes and Liners and chuxs. DME needed: None. Pt.'s Right arm is better with the Neurontin 300mg TID. Right arm is not as sensitive but she is still trying to use it to  food. MAC score 18 cm. Alma Pendleton continues to do leg exercises that PT gave her. Reports her mom not doing as well transferring but pt. still leans. Pt. had a fall on 8/2/21 and is sore and bruised on right side of Face. HV was made by [facility] nurse to assess pt. Discussed with Alma Pendleton that pt. felt stiff when we tried to move her.maybe arthritis pain. Told her to use Tylenol as ordered. Alma Pendleton asked if maybe a transfer belt would help, I told her she could try it. Pt may not qualify when her next recert is due we would just have to wait and see. We will need to try decreasing the HHA to 1 wk soon due to fall I would like to wait. Safety precautions are being followed. Pt. has a seat belt in her W/C. CG has started working to see if she can get VA benefits for her mom but not sure if this will go through.

## 2021-08-07 PROCEDURE — 0651 HSPC ROUTINE HOME CARE

## 2021-08-08 PROCEDURE — 0651 HSPC ROUTINE HOME CARE

## 2021-08-08 NOTE — HOSPICE
Patient is sitting up in her wheelchair watching TV. Arturo Cardoso is present. DORA and RN joint visit. Lc Webb had a fall this week and is bruised a lot. She fortunately did not break anything but did scare everyone. She is showing us where she was injured. She is alert and interactive today. Always pleasant. She has been doing fairly well otherwise with no real change in needs. Arturo aCrdoso had to have her crippled dog put down this week and her spouse's cancer has reocurred. Apararently he does not have insurance so it is an added stress. Arturo Cardoso appears to be tired and stressed but reports coping adequately. DORA offered emotional support. She has, however, heard from the [address] about benefits for her mom. She will be recieving $1200 a month to hire caregivers. This is good as she may graduate from [facility] services as she has been fairly stable. DORA offered availability.   Arturo Cardoso denies any needs currently

## 2021-08-09 PROCEDURE — 0651 HSPC ROUTINE HOME CARE

## 2021-08-10 ENCOUNTER — HOME CARE VISIT (OUTPATIENT)
Dept: SCHEDULING | Facility: HOME HEALTH | Age: 86
End: 2021-08-10
Payer: MEDICARE

## 2021-08-10 PROCEDURE — 0651 HSPC ROUTINE HOME CARE

## 2021-08-10 PROCEDURE — G0156 HHCP-SVS OF AIDE,EA 15 MIN: HCPCS

## 2021-08-11 PROCEDURE — 0651 HSPC ROUTINE HOME CARE

## 2021-08-12 ENCOUNTER — HOME CARE VISIT (OUTPATIENT)
Dept: SCHEDULING | Facility: HOME HEALTH | Age: 86
End: 2021-08-12
Payer: MEDICARE

## 2021-08-12 VITALS
HEART RATE: 76 BPM | DIASTOLIC BLOOD PRESSURE: 70 MMHG | SYSTOLIC BLOOD PRESSURE: 110 MMHG | RESPIRATION RATE: 16 BRPM | OXYGEN SATURATION: 97 % | TEMPERATURE: 98.3 F

## 2021-08-12 PROCEDURE — G0299 HHS/HOSPICE OF RN EA 15 MIN: HCPCS

## 2021-08-12 PROCEDURE — G0156 HHCP-SVS OF AIDE,EA 15 MIN: HCPCS

## 2021-08-12 PROCEDURE — 0651 HSPC ROUTINE HOME CARE

## 2021-08-13 PROCEDURE — HOSPICE MEDICATION HC HH HOSPICE MEDICATION

## 2021-08-13 PROCEDURE — 0651 HSPC ROUTINE HOME CARE

## 2021-08-14 ENCOUNTER — HOME CARE VISIT (OUTPATIENT)
Dept: SCHEDULING | Facility: HOME HEALTH | Age: 86
End: 2021-08-14
Payer: MEDICARE

## 2021-08-14 VITALS
TEMPERATURE: 98.3 F | SYSTOLIC BLOOD PRESSURE: 160 MMHG | RESPIRATION RATE: 16 BRPM | DIASTOLIC BLOOD PRESSURE: 110 MMHG | HEART RATE: 112 BPM

## 2021-08-14 PROCEDURE — G0299 HHS/HOSPICE OF RN EA 15 MIN: HCPCS

## 2021-08-14 PROCEDURE — 0651 HSPC ROUTINE HOME CARE

## 2021-08-15 ENCOUNTER — HOME CARE VISIT (OUTPATIENT)
Dept: HOSPICE | Facility: HOSPICE | Age: 86
End: 2021-08-15
Payer: MEDICARE

## 2021-08-15 PROCEDURE — 0651 HSPC ROUTINE HOME CARE

## 2021-08-15 NOTE — HOSPICE
Visit for CG Bong Mehrdad reported a fall that happened yesturday when patient was pushing her WC walking and fell on her right shoulder. Has bruised right face from previous fall per CG. 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 No gross misalignment noted. Patient non verbal from past stroke but follows commands. Discussed possible need for x-ray as there is no way to rule out a fracture without-CG would like to use pain meds in home and sling she has to see how her mom responds over the next couple of days. Has tylenol and motrin that she says works well for her mother and gabapentin. Educated on safety precautions and Cgs agree to watch her for any ambulation. Patient appears relaxed when lying down and resting. FLACC score 1.  BP elevated. HR irregular. Taking all meds as prescribed. FNP Isra notified. CG instructed on use of ice and heat and that we can order an x-ray any time over the next couple days if no improvement.

## 2021-08-16 PROCEDURE — 0651 HSPC ROUTINE HOME CARE

## 2021-08-17 ENCOUNTER — HOME CARE VISIT (OUTPATIENT)
Dept: HOSPICE | Facility: HOSPICE | Age: 86
End: 2021-08-17
Payer: MEDICARE

## 2021-08-17 PROCEDURE — 0651 HSPC ROUTINE HOME CARE

## 2021-08-18 PROCEDURE — 0651 HSPC ROUTINE HOME CARE

## 2021-08-19 ENCOUNTER — HOME CARE VISIT (OUTPATIENT)
Dept: SCHEDULING | Facility: HOME HEALTH | Age: 86
End: 2021-08-19
Payer: MEDICARE

## 2021-08-19 PROCEDURE — G0156 HHCP-SVS OF AIDE,EA 15 MIN: HCPCS

## 2021-08-19 PROCEDURE — 0651 HSPC ROUTINE HOME CARE

## 2021-08-19 PROCEDURE — HOSPICE MEDICATION HC HH HOSPICE MEDICATION

## 2021-08-19 PROCEDURE — G0299 HHS/HOSPICE OF RN EA 15 MIN: HCPCS

## 2021-08-19 NOTE — HOSPICE
Pt/ CG screened for COVID-19 Negative RN assessment completed. Vitals WNL's Pt. was sitting on the couch in front of the TV  Pt. had a fall on Sat afternoon. On call nurse did a vist to assess pt. Per Leela Montes her brother did not buckle pt. in the W/C. He was out of the room 5 min and she got up then fell on right side. Pt. is jono to lift arm she does have a large bruise  on the  lateral side of arm which is tender. CG giving pt Tylenol and Gabapentin. She has a sling to use as needed. Today pt. was alert. Skin, warm, dry and intact. Sacral area intact but remains fragile because she sits alot. Pt. is still sleeping upstairs on the couch. CG reports pt is showered down in her apartment but otherwise stays upstairs. Pt. is continent of B/B. Wears pull-ups. Last BM 8/18/21. Per Cg they had alot of company in the home when fall happened. No issues with urination Appetite/Fluid intake is good. Pt. eats at least 80% per CG. No coughing noted when she drinks liquids. Medications reviewed: Refilled Cardiziem. Supplies needed: Wipes and Liners and chuxs. DME needed: None. MAC score 18 cm. Leelaannie Montes continues to do leg exercises that PT gave her. Reports her mom not doing as well transferring but pt. still leans. CG is aware pt. may not qualify when her next recert is due. We will just have to wait and see. We will need to try decreasing the HHA to 1 wk soon due to fall I would like to wait. Safety precautions are being followed. Pt. has a seat belt in her W/C. CG has started working to see if she can get VA benefits for her mom but not sure if this will go through. Pt. seems very sad today. CG reports pt. is mad that she fell again.

## 2021-08-20 PROCEDURE — 0651 HSPC ROUTINE HOME CARE

## 2021-08-21 PROCEDURE — 0651 HSPC ROUTINE HOME CARE

## 2021-08-22 PROCEDURE — 0651 HSPC ROUTINE HOME CARE

## 2021-08-23 PROCEDURE — 0651 HSPC ROUTINE HOME CARE

## 2021-08-24 ENCOUNTER — HOME CARE VISIT (OUTPATIENT)
Dept: SCHEDULING | Facility: HOME HEALTH | Age: 86
End: 2021-08-24
Payer: MEDICARE

## 2021-08-24 PROCEDURE — 0651 HSPC ROUTINE HOME CARE

## 2021-08-24 PROCEDURE — G0156 HHCP-SVS OF AIDE,EA 15 MIN: HCPCS

## 2021-08-25 PROCEDURE — 0651 HSPC ROUTINE HOME CARE

## 2021-08-26 ENCOUNTER — HOME CARE VISIT (OUTPATIENT)
Dept: SCHEDULING | Facility: HOME HEALTH | Age: 86
End: 2021-08-26
Payer: MEDICARE

## 2021-08-26 VITALS
OXYGEN SATURATION: 97 % | HEART RATE: 71 BPM | SYSTOLIC BLOOD PRESSURE: 110 MMHG | DIASTOLIC BLOOD PRESSURE: 60 MMHG | TEMPERATURE: 98.8 F | RESPIRATION RATE: 18 BRPM

## 2021-08-26 PROCEDURE — G0299 HHS/HOSPICE OF RN EA 15 MIN: HCPCS

## 2021-08-26 PROCEDURE — HOSPICE MEDICATION HC HH HOSPICE MEDICATION

## 2021-08-26 PROCEDURE — 0651 HSPC ROUTINE HOME CARE

## 2021-08-26 NOTE — HOSPICE
Pt/ CG screened for COVID-19 Negative. RN assessment completed. Vitals WNL's Pt. was sitting in w/c in front of the TV. Pt. is able to lift arm bruise has faded but arm does remain tender. CG giving pt Tylenol and Gabapentin. Today pt. was alert. Skin, warm, dry and intact. Sacral area intact but remains fragile because she sits alot. Pt. is still sleeping upstairs on the couch. CG reports pt is showered down in her apartment but otherwise stays upstairs. Pt. is continent of B/B. Wears pull-ups. Last BM 8/25/21. CG did report pt. is having a little constipation, encouraged more fluids and adding prunes. Senna dose is 2 PO Bid right now. No issues with urination Appetite/Fluid intake is good. Pt. eats at least 80% per CG. No coughing noted when she drinks liquids. Medications reviewed: Refilled Celexa,Tylenol, Ibuprofen,Levothyroxine,Cardizem,Omeprazole, Senna, Gabapentin. Supplies needed: Foam cleanser, [place], chuxs,green swabs and lotion. DME needed: None. MAC score 18 cm. Rip Melgar continues to do leg exercises that PT gave her. Reports her mom not doing as ok transferring but pt. still leans and she really cannot help much. CG is aware pt. may not qualify when her next recert is due. We will just have to wait and see. Safety precautions are being followed. Pt. has a seat belt in her W/C. CG has started working to see if she can get VA benefits were approved. Pt. continue to be very sad. No falls reported this week.

## 2021-08-27 ENCOUNTER — HOME CARE VISIT (OUTPATIENT)
Dept: SCHEDULING | Facility: HOME HEALTH | Age: 86
End: 2021-08-27
Payer: MEDICARE

## 2021-08-27 PROCEDURE — G0156 HHCP-SVS OF AIDE,EA 15 MIN: HCPCS

## 2021-08-27 PROCEDURE — 0651 HSPC ROUTINE HOME CARE

## 2021-08-28 PROCEDURE — 0651 HSPC ROUTINE HOME CARE

## 2021-08-29 PROCEDURE — 0651 HSPC ROUTINE HOME CARE

## 2021-08-30 PROCEDURE — 0651 HSPC ROUTINE HOME CARE

## 2021-08-31 ENCOUNTER — HOME CARE VISIT (OUTPATIENT)
Dept: SCHEDULING | Facility: HOME HEALTH | Age: 86
End: 2021-08-31
Payer: MEDICARE

## 2021-08-31 PROCEDURE — G0156 HHCP-SVS OF AIDE,EA 15 MIN: HCPCS

## 2021-08-31 PROCEDURE — 0651 HSPC ROUTINE HOME CARE

## 2021-09-01 PROCEDURE — 0651 HSPC ROUTINE HOME CARE

## 2021-09-02 ENCOUNTER — HOME CARE VISIT (OUTPATIENT)
Dept: SCHEDULING | Facility: HOME HEALTH | Age: 86
End: 2021-09-02
Payer: MEDICARE

## 2021-09-02 VITALS
OXYGEN SATURATION: 96 % | HEART RATE: 85 BPM | TEMPERATURE: 98.5 F | DIASTOLIC BLOOD PRESSURE: 80 MMHG | SYSTOLIC BLOOD PRESSURE: 120 MMHG | RESPIRATION RATE: 18 BRPM

## 2021-09-02 PROCEDURE — G0299 HHS/HOSPICE OF RN EA 15 MIN: HCPCS

## 2021-09-02 PROCEDURE — 0651 HSPC ROUTINE HOME CARE

## 2021-09-02 PROCEDURE — HOSPICE MEDICATION HC HH HOSPICE MEDICATION

## 2021-09-02 NOTE — HOSPICE
Pt/ CG screened for COVID-19 Negative. Recertification visit  done today by Radha Tim. RN assessment completed. Vitals WNL's Pt. was sitting in w/c in front of the TV. Pt. is able to lift arm with help as the R  arm remains tender. Pt. obviously in pain when it is moved. Radha Tim suggested we try Norco 5/325 mg. 1/2 tab PO q 4 hrs. pain pain. Ativan 1 mg. Dose 1/2 tab q 6 hrs. prn anxiety, agitation. Radha Patch did tell daughter these may help Yue'a mood as well. Cg was giving Ibuprofen/ Tylenol and Gabapentin. Today pt. was alert. Skin, warm, dry and intact. Sacral area intact but remains fragile because she sits alot. Pt. is still sleeping upstairs on the couch. CG reports pt is showered down in her apartment but otherwise stays upstairs. Pt. is continent of B/B. Wears pull-ups. Last BM 8/25/21. CG did report pt. Bowels are moving  much better , encouraged more fluids and adding prunes. Senna dose is 2 PO Bid right now. No issues with urination Appetite/Fluid intake is good. Pt. eats at least 80% per CG. No coughing noted when she drinks liquids. Medications reviewed: Diltiazem ordered. Dilt Supplies needed, chuxs, pull-ups. DME needed: None. MAC score 18 cm. Grabiel Gregg has not  been doing  exercises that PT gave her. Reports her mom not doing as ok transferring but pt. still leans and she really cannot help much. . Safety precautions are being followed. Pt. has a seat belt in her W/C. Muna Ovalle Pt. continue to be very sad. No falls reported this week.

## 2021-09-03 PROCEDURE — 0651 HSPC ROUTINE HOME CARE

## 2021-09-04 PROCEDURE — 0651 HSPC ROUTINE HOME CARE

## 2021-09-05 PROCEDURE — 0651 HSPC ROUTINE HOME CARE

## 2021-09-06 PROCEDURE — 0651 HSPC ROUTINE HOME CARE

## 2021-09-07 ENCOUNTER — HOME CARE VISIT (OUTPATIENT)
Dept: SCHEDULING | Facility: HOME HEALTH | Age: 86
End: 2021-09-07
Payer: MEDICARE

## 2021-09-07 PROCEDURE — 0651 HSPC ROUTINE HOME CARE

## 2021-09-07 PROCEDURE — G0156 HHCP-SVS OF AIDE,EA 15 MIN: HCPCS

## 2021-09-08 PROCEDURE — 0651 HSPC ROUTINE HOME CARE

## 2021-09-09 ENCOUNTER — HOME CARE VISIT (OUTPATIENT)
Dept: SCHEDULING | Facility: HOME HEALTH | Age: 86
End: 2021-09-09
Payer: MEDICARE

## 2021-09-09 ENCOUNTER — HOME CARE VISIT (OUTPATIENT)
Dept: HOSPICE | Facility: HOSPICE | Age: 86
End: 2021-09-09
Payer: MEDICARE

## 2021-09-09 VITALS
SYSTOLIC BLOOD PRESSURE: 140 MMHG | TEMPERATURE: 98.4 F | RESPIRATION RATE: 18 BRPM | HEART RATE: 88 BPM | OXYGEN SATURATION: 93 % | DIASTOLIC BLOOD PRESSURE: 86 MMHG

## 2021-09-09 PROCEDURE — G0155 HHCP-SVS OF CSW,EA 15 MIN: HCPCS

## 2021-09-09 PROCEDURE — 0651 HSPC ROUTINE HOME CARE

## 2021-09-09 PROCEDURE — G0299 HHS/HOSPICE OF RN EA 15 MIN: HCPCS

## 2021-09-09 NOTE — HOSPICE
Pt/ CG screened for COVID-19 Negative. Vitals WNL's Pt. was sitting in w/c in front of the TV. Pt. is able to lift arm with help as the R arm remains tender. Norco 5/325 mg. 1/2 tab PO q 4 hrs. pain pain. Per Bijal, she is only giving pain med in AM, I told her to give it at least 2 to 3 times/day. Ativan 1 mg. Dose 1/2 tab q 6 hrs. prn anxiety, agitation. Pt. was smiling today, so the pain med has helped. Today pt. was alert. Skin, warm, dry and intact. Sacral area intact but remains fragile because she sits alot. Pt. is still sleeping upstairs on the couch. CG reports pt is showered down in her apartment but otherwise stays upstairs. Pt. is continent of B/B. Wears pull-ups. Last BM 9/7/21. CG did report pt. Bowels are moving much better. encouraged more fluids and adding prunes. Senna dose is 2 PO Bid right now. No issues with urination Appetite/Fluid intake is good. Pt. eats at least 80% per CG. No coughing noted when she drinks liquids. Medications reviewed: Diltiazem ordered. Supplies needed, None. DME needed: None. MAC score 17.5 cm. Bijal has not been doing exercises that PT gave her. Reports her mom is doing better with transfers CG is using transfer belt. Safety precautions are being followed. Pt. has a seat belt in her W/C. No falls reported this week.

## 2021-09-10 PROCEDURE — 0651 HSPC ROUTINE HOME CARE

## 2021-09-11 PROCEDURE — 0651 HSPC ROUTINE HOME CARE

## 2021-09-12 PROCEDURE — 0651 HSPC ROUTINE HOME CARE

## 2021-09-12 NOTE — HOSPICE
Yue is sitting up on the reclier watching TV. She is alert and animated. She speaks to you but can not articulate words. She is pleasant. Adell Lesches demonstrated patient's improved strength since her last fall. She transferred to her to the wheelchair for lunch. Lakeshia's  is recieving treatments for his cancer. He has good days and some bad she says. They are thinking of moving to [place] to be near their dtr so she can help with care more. They do not have any solid plans but have been talking about it. Kishor Reynoldsiqra reports coping adequately but admits she has been stretched. SW provided active listening and emotional support. Praise is provided for care provided.

## 2021-09-13 PROCEDURE — 0651 HSPC ROUTINE HOME CARE

## 2021-09-14 ENCOUNTER — HOME CARE VISIT (OUTPATIENT)
Dept: HOSPICE | Facility: HOSPICE | Age: 86
End: 2021-09-14
Payer: MEDICARE

## 2021-09-14 PROCEDURE — 0651 HSPC ROUTINE HOME CARE

## 2021-09-15 PROCEDURE — 0651 HSPC ROUTINE HOME CARE

## 2021-09-16 ENCOUNTER — HOME CARE VISIT (OUTPATIENT)
Dept: SCHEDULING | Facility: HOME HEALTH | Age: 86
End: 2021-09-16
Payer: MEDICARE

## 2021-09-16 VITALS
HEART RATE: 90 BPM | RESPIRATION RATE: 20 BRPM | TEMPERATURE: 98.1 F | DIASTOLIC BLOOD PRESSURE: 70 MMHG | OXYGEN SATURATION: 97 % | SYSTOLIC BLOOD PRESSURE: 102 MMHG

## 2021-09-16 PROCEDURE — HOSPICE MEDICATION HC HH HOSPICE MEDICATION

## 2021-09-16 PROCEDURE — 0651 HSPC ROUTINE HOME CARE

## 2021-09-16 PROCEDURE — G0156 HHCP-SVS OF AIDE,EA 15 MIN: HCPCS

## 2021-09-16 PROCEDURE — G0299 HHS/HOSPICE OF RN EA 15 MIN: HCPCS

## 2021-09-16 NOTE — HOSPICE
Pt/ CG screened for COVID-19 Negative. Vitals WNL's Pt. was sitting in w/c in front of the TV. Pt. is able to lift arm with help as the R arm remains tender. Norco 5/325 mg. 1/2 tab PO q 4 hrs. pain pain. Pt. is getting pain med least 2  times/day. Ativan 1 mg. Dose 1/2 tab q 6 hrs. prn anxiety, agitation. Pt. was not smiling as much today, Today pt. was alert. Skin, warm, dry and intact. Skin integrity intact. Pt. is still sleeping upstairs on the couch. CG reports pt is showered down in her apartment but otherwise stays upstairs. Pt. is continent of B/B. Wears pull-ups. Last BM 9/15/21. CG did report pt. Bowels are moving slowly. encouraged more fluids and adding prunes. Senna dose is 2 PO Bid right now. No issues with urination Appetite/Fluid intake is good. Pt. eats at least 80% per CG. No coughing noted when she drinks liquids. Medications reviewed with Lakeshia. Refills ordered Gabapentin, Synthroid, Omeprazole, Diltiazem, Celexa. Supplies needed: Chuxs, Pull-ups. . DME needed: None. MAC score 17 cm. [facility] has not been doing exercises that PT gave her. Reports her mom is doing better with transfers CG is using transfer belt. Safety precautions are being followed. Pt. has a seat belt in her W/C. No falls reported this week.

## 2021-09-17 PROCEDURE — 0651 HSPC ROUTINE HOME CARE

## 2021-09-18 PROCEDURE — 0651 HSPC ROUTINE HOME CARE

## 2021-09-19 PROCEDURE — 0651 HSPC ROUTINE HOME CARE

## 2021-09-20 PROCEDURE — 0651 HSPC ROUTINE HOME CARE

## 2021-09-21 ENCOUNTER — HOME CARE VISIT (OUTPATIENT)
Dept: SCHEDULING | Facility: HOME HEALTH | Age: 86
End: 2021-09-21
Payer: MEDICARE

## 2021-09-21 PROCEDURE — G0156 HHCP-SVS OF AIDE,EA 15 MIN: HCPCS

## 2021-09-21 PROCEDURE — 0651 HSPC ROUTINE HOME CARE

## 2021-09-22 PROCEDURE — 0651 HSPC ROUTINE HOME CARE

## 2021-09-23 ENCOUNTER — HOME CARE VISIT (OUTPATIENT)
Dept: SCHEDULING | Facility: HOME HEALTH | Age: 86
End: 2021-09-23
Payer: MEDICARE

## 2021-09-23 VITALS
TEMPERATURE: 98.3 F | HEART RATE: 69 BPM | DIASTOLIC BLOOD PRESSURE: 76 MMHG | SYSTOLIC BLOOD PRESSURE: 133 MMHG | RESPIRATION RATE: 16 BRPM

## 2021-09-23 PROCEDURE — 0651 HSPC ROUTINE HOME CARE

## 2021-09-23 PROCEDURE — G0299 HHS/HOSPICE OF RN EA 15 MIN: HCPCS

## 2021-09-23 NOTE — HOSPICE
Upon RN arrival and during RN visit today Patient is  sitting up in wheelchair in living room. .  Daughter is present with her/him today she states Yue is not having any new issues. Pt is alert and has incomprehensible speech. Pt is calm and cooperative with RN assessment. Pt pain is reported by Elda Gramajo to right arm when moved 6/10. Pt is using wheelchair for mobility aide. No falls reported. Appetite good eating 3 meals at this time, is not able to feed self  without assistance. Some sacral redness reported. Caregiver reports no constipation. Discussed use of stool softeners. Voiding adequate amounts. Pt is not using oxygen. In agreement with current POC. Understanding verbalized. to call hospice with any new needs, changes or concerns. Medications reviewed:  Yes no needs  Supplies reviewed: Yes none needed  DME reviewed: no current needs.

## 2021-09-24 PROCEDURE — 0651 HSPC ROUTINE HOME CARE

## 2021-09-25 PROCEDURE — 0651 HSPC ROUTINE HOME CARE

## 2021-09-26 PROCEDURE — 0651 HSPC ROUTINE HOME CARE

## 2021-09-27 PROCEDURE — 0651 HSPC ROUTINE HOME CARE

## 2021-09-28 ENCOUNTER — HOME CARE VISIT (OUTPATIENT)
Dept: SCHEDULING | Facility: HOME HEALTH | Age: 86
End: 2021-09-28
Payer: MEDICARE

## 2021-09-28 PROCEDURE — 0651 HSPC ROUTINE HOME CARE

## 2021-09-28 PROCEDURE — G0156 HHCP-SVS OF AIDE,EA 15 MIN: HCPCS

## 2021-09-29 PROCEDURE — 0651 HSPC ROUTINE HOME CARE

## 2021-09-30 ENCOUNTER — HOME CARE VISIT (OUTPATIENT)
Dept: SCHEDULING | Facility: HOME HEALTH | Age: 86
End: 2021-09-30
Payer: MEDICARE

## 2021-09-30 VITALS
OXYGEN SATURATION: 98 % | DIASTOLIC BLOOD PRESSURE: 81 MMHG | SYSTOLIC BLOOD PRESSURE: 123 MMHG | HEART RATE: 76 BPM | RESPIRATION RATE: 16 BRPM | TEMPERATURE: 99.1 F

## 2021-09-30 PROCEDURE — 0651 HSPC ROUTINE HOME CARE

## 2021-09-30 PROCEDURE — G0299 HHS/HOSPICE OF RN EA 15 MIN: HCPCS

## 2021-09-30 PROCEDURE — HOSPICE MEDICATION HC HH HOSPICE MEDICATION

## 2021-10-01 PROCEDURE — 0651 HSPC ROUTINE HOME CARE

## 2021-10-02 PROCEDURE — 0651 HSPC ROUTINE HOME CARE

## 2021-10-03 PROCEDURE — 0651 HSPC ROUTINE HOME CARE

## 2021-10-04 PROCEDURE — 0651 HSPC ROUTINE HOME CARE

## 2021-10-05 PROCEDURE — 0651 HSPC ROUTINE HOME CARE

## 2021-10-06 PROCEDURE — 0651 HSPC ROUTINE HOME CARE

## 2021-10-07 ENCOUNTER — HOME CARE VISIT (OUTPATIENT)
Dept: HOSPICE | Facility: HOSPICE | Age: 86
End: 2021-10-07
Payer: MEDICARE

## 2021-10-07 ENCOUNTER — HOME CARE VISIT (OUTPATIENT)
Dept: SCHEDULING | Facility: HOME HEALTH | Age: 86
End: 2021-10-07
Payer: MEDICARE

## 2021-10-07 VITALS
HEART RATE: 84 BPM | OXYGEN SATURATION: 96 % | DIASTOLIC BLOOD PRESSURE: 60 MMHG | SYSTOLIC BLOOD PRESSURE: 122 MMHG | TEMPERATURE: 98.1 F | RESPIRATION RATE: 76 BRPM

## 2021-10-07 PROCEDURE — G0155 HHCP-SVS OF CSW,EA 15 MIN: HCPCS

## 2021-10-07 PROCEDURE — 0651 HSPC ROUTINE HOME CARE

## 2021-10-07 PROCEDURE — G0299 HHS/HOSPICE OF RN EA 15 MIN: HCPCS

## 2021-10-07 NOTE — HOSPICE
Patient is sitting on her sofa watching TV upon arrival.  Alicja Alvares has been busy working in the home. Peyton Zee is alert and interactive though she cannot speak clearly. She has an almost chant she does instead of speaking. She is cheerful today. Her RN is present. Alicja Alvares reports that they had a \"rough night last night\". She said her mom was awake most of the night. Miranda Karimi has completed his second round of chemo and he attemts to work. They are putting off trying to move before the first of the year. Alicja Alvares says she has too much going on right now to push herself with moving. She reports coping well despite her fatigue. She denies any new needs.  SW provided active listening and emotional support

## 2021-10-07 NOTE — HOSPICE
Pt/ CG screened for COVID-19 Negative. Vitals WNL's Pt. was sitting in w/c in front of the TV. Pt. is able to lift arm with help as the R arm remains tender. Norco 5/325 mg. 1/2 tab PO q 4 hrs. pain . Ativan 1 mg. Dose 1/2 tab q 6 hrs. prn anxiety, agitation. Encouraged Lakeshia to give 1/2 Parker. Today pt. was alert. Skin warm, dry. Daughter reports that small 2x2 pressure ulcer on sacrum has opened up again. Wound is clean, no drainage. Daughter has been using Honey Gel. and I ordered Optifoam. I also ordered a new W/C cushion. Pt. is still sleeping upstairs on the couch. CG reports pt is showered down in her apartment but otherwise stays upstairs. Pt. is continent of B/B. Wears pull-ups. CG did report pt. Bowels are moving better using more fluids and adding prunes. and Miralax, Senna dose is 2 PO Bid right now. No issues with urination. Appetite/Fluid intake is good. Pt. eats at least 80% per CG. No coughing noted when she drinks liquids. Medications reviewed with Lakeshia. No med refills needed. Supplies needed: None. DME needed:  MAC score 17.5 cm. Romero Richey reports she has  been doing exercises that PT gave her. Reports her mom is doing better with transfers CG is using transfer belt. Safety precautions are being followed. Pt. has a seat belt in her W/C. No falls reported this week.

## 2021-10-08 PROCEDURE — 0651 HSPC ROUTINE HOME CARE

## 2021-10-09 ENCOUNTER — HOME CARE VISIT (OUTPATIENT)
Dept: SCHEDULING | Facility: HOME HEALTH | Age: 86
End: 2021-10-09
Payer: MEDICARE

## 2021-10-09 PROCEDURE — G0156 HHCP-SVS OF AIDE,EA 15 MIN: HCPCS

## 2021-10-09 PROCEDURE — 0651 HSPC ROUTINE HOME CARE

## 2021-10-10 PROCEDURE — 0651 HSPC ROUTINE HOME CARE

## 2021-10-11 PROCEDURE — 0651 HSPC ROUTINE HOME CARE

## 2021-10-12 ENCOUNTER — HOME CARE VISIT (OUTPATIENT)
Dept: SCHEDULING | Facility: HOME HEALTH | Age: 86
End: 2021-10-12
Payer: MEDICARE

## 2021-10-12 PROCEDURE — G0156 HHCP-SVS OF AIDE,EA 15 MIN: HCPCS

## 2021-10-12 PROCEDURE — 0651 HSPC ROUTINE HOME CARE

## 2021-10-13 PROCEDURE — 0651 HSPC ROUTINE HOME CARE

## 2021-10-14 ENCOUNTER — HOME CARE VISIT (OUTPATIENT)
Dept: SCHEDULING | Facility: HOME HEALTH | Age: 86
End: 2021-10-14
Payer: MEDICARE

## 2021-10-14 VITALS
SYSTOLIC BLOOD PRESSURE: 114 MMHG | RESPIRATION RATE: 18 BRPM | DIASTOLIC BLOOD PRESSURE: 74 MMHG | HEART RATE: 84 BPM | TEMPERATURE: 98.7 F | OXYGEN SATURATION: 95 %

## 2021-10-14 PROCEDURE — 0651 HSPC ROUTINE HOME CARE

## 2021-10-14 PROCEDURE — G0299 HHS/HOSPICE OF RN EA 15 MIN: HCPCS

## 2021-10-14 NOTE — HOSPICE
Pt/ CG screened for COVID-19 Negative. Vitals WNL's Pt. was sitting in w/c in front of the window looking outside. Pt. is able to lift arm with help as the R arm remains tender. Norco 5/325 mg. 1/2 tab PO q 4 hrs. pain  but it makes her so sleepy and constipates her. Ativan 1 mg. Dose 1/2 tab q 6 hrs. prn anxiety, agitation. Today pt.was alert. Skin warm, dry. Daughter reports that small 2x2 pressure ulcer on sacrum has opened up again. Wound is clean, no drainage. Daughter has been using Honey Gel. and I ordered Optifoam. I also ordered a new W/C cushion. Pt. is still sleeping upstairs on the couch. CG reports pt is showered down in her apartment but otherwise stays upstairs. Pt. is continent of B/B. Wears pull-ups. CG did report pt. Bowels are moving better using more fluids and adding prunes. and Miralax, Senna dose is 2 PO Bid right now. No issues with urination. Appetite/Fluid intake is good. Pt. eats at least 80% per CG. No coughing noted when she drinks liquids. Medications reviewed with Lakeshia. No med refills needed. Supplies needed: None. DME needed: MAC score 17.5 cm. Dina Yu reports she has been doing exercises that PT gave her. Reports her mom is doing better with transfers CG is using transfer belt. Safety precautions are being followed. Pt. has a seat belt in her W/C. No falls reported this week.

## 2021-10-15 PROCEDURE — 0651 HSPC ROUTINE HOME CARE

## 2021-10-16 PROCEDURE — 0651 HSPC ROUTINE HOME CARE

## 2021-10-17 PROCEDURE — 0651 HSPC ROUTINE HOME CARE

## 2021-10-18 PROCEDURE — 0651 HSPC ROUTINE HOME CARE

## 2021-10-18 PROCEDURE — HOSPICE MEDICATION HC HH HOSPICE MEDICATION

## 2021-10-19 ENCOUNTER — HOME CARE VISIT (OUTPATIENT)
Dept: SCHEDULING | Facility: HOME HEALTH | Age: 86
End: 2021-10-19
Payer: MEDICARE

## 2021-10-19 PROCEDURE — 0651 HSPC ROUTINE HOME CARE

## 2021-10-19 PROCEDURE — G0156 HHCP-SVS OF AIDE,EA 15 MIN: HCPCS

## 2021-10-20 PROCEDURE — 0651 HSPC ROUTINE HOME CARE

## 2021-10-21 ENCOUNTER — HOME CARE VISIT (OUTPATIENT)
Dept: HOSPICE | Facility: HOSPICE | Age: 86
End: 2021-10-21
Payer: MEDICARE

## 2021-10-21 PROCEDURE — G0299 HHS/HOSPICE OF RN EA 15 MIN: HCPCS

## 2021-10-21 PROCEDURE — 0651 HSPC ROUTINE HOME CARE

## 2021-10-22 PROCEDURE — 0651 HSPC ROUTINE HOME CARE

## 2021-10-23 PROCEDURE — 0651 HSPC ROUTINE HOME CARE

## 2021-10-24 PROCEDURE — 0651 HSPC ROUTINE HOME CARE

## 2021-10-25 PROCEDURE — 0651 HSPC ROUTINE HOME CARE

## 2021-10-26 ENCOUNTER — HOME CARE VISIT (OUTPATIENT)
Dept: SCHEDULING | Facility: HOME HEALTH | Age: 86
End: 2021-10-26
Payer: MEDICARE

## 2021-10-26 VITALS
SYSTOLIC BLOOD PRESSURE: 130 MMHG | RESPIRATION RATE: 17 BRPM | DIASTOLIC BLOOD PRESSURE: 78 MMHG | HEART RATE: 80 BPM | TEMPERATURE: 98.6 F

## 2021-10-26 PROCEDURE — 0651 HSPC ROUTINE HOME CARE

## 2021-10-26 PROCEDURE — G0156 HHCP-SVS OF AIDE,EA 15 MIN: HCPCS

## 2021-10-26 NOTE — HOSPICE
Patient is a 80year old female with a primary diagnosis of sequelae of cerebral infarcation. KPS/PPS 30. Patient resides at home with daughter, Breanna Fung. She is non-ambulatory and chairbound throughout the day r/t hx CVA. Patient with r sided hemiplegia s/p CVA. Upon RN arrival, patient resting on couch. Daughter states patient continues to sleep frequently throughout the day. Pt is dependent upon all ADLs. Appetite remains unchanged. Skin is warm and dry. Patient has been receiving wound care to stage 2 pressure sore on bottom. This is healing and non-draining per daughter. See [facility] and care plan for orders. New w/c cushion in place and has been effective. Patient BMs have been regular with increase in fluids and elimination aids. No s/s dysphagia reported. Patient still has intermittent pain to R arm s/p CVA. She takes 1/2 Norco (5-325mg) every 4 hours as needed for pain; however, this causes increased drowsiness and constipation per daughter. Pt in comfortable at present. No s/s respiratory distress. PAINAD 1/10. No edema noted. No chest pain reported. No s/s UTI reported. Medications reconciled. No medications needed at present per daughter, Breanna Fung. No supplies needed at present per daughter, Breanna Fung. Fall/safety precautions continue to be followed by PCG; no recent falls reported. Pt screened negative for COVID-19. RN instructed [facility] with any changes, concerns, or falls; verbalized understanding.

## 2021-10-27 ENCOUNTER — HOME CARE VISIT (OUTPATIENT)
Dept: SCHEDULING | Facility: HOME HEALTH | Age: 86
End: 2021-10-27
Payer: MEDICARE

## 2021-10-27 VITALS
TEMPERATURE: 98.9 F | DIASTOLIC BLOOD PRESSURE: 80 MMHG | RESPIRATION RATE: 18 BRPM | SYSTOLIC BLOOD PRESSURE: 124 MMHG | OXYGEN SATURATION: 95 % | HEART RATE: 84 BPM

## 2021-10-27 PROCEDURE — 0651 HSPC ROUTINE HOME CARE

## 2021-10-27 PROCEDURE — G0299 HHS/HOSPICE OF RN EA 15 MIN: HCPCS

## 2021-10-27 PROCEDURE — HOSPICE MEDICATION HC HH HOSPICE MEDICATION

## 2021-10-28 PROCEDURE — 0651 HSPC ROUTINE HOME CARE

## 2021-10-28 NOTE — HOSPICE
Pt/ CG screened for COVID-19 Negative. Vitals WNL's Pt. was sitting on the couch. Daughter had called to let me know pt. was congested, low grade temp and she needed something for the cough at HS. Pt. is not able to  with right hand or move arm on her own. Norco 5/325 mg. 1/2 tab PO q 4 hrs. pain but it makes her so sleepy and constipates her. Ativan 1 mg. Dose 1/2 tab q 6 hrs. prn anxiety, agitation. Today pt.was alert. Skin warm, dry. Daughter reports that  pressure ulcer on sacrum is healing and is 1x1 cm in size. Drsg. had already been changed. Pt. is still sleeping upstairs on the couch. CG reports pt is showered down in her apartment but otherwise stays upstairs. Pt. is continent of B/B. Wears pull-ups. CG did report pt. Bowels are moving better using more fluids and adding prunes. and Miralax, Senna dose is 2 PO Bid right now. No issues with urination. Appetite/Fluid intake is good. Pt. eats at least 80% per CG. No coughing noted when she drinks liquids. Medications reviewed with Lakeshia. Per Marquez Ramesh order received for Tessalon Perles 100mg. PO TID for cough. Supplies needed: None. DME needed: MAC score 17.5 cm. Breanna Fung reports she has been doing exercises that PT gave her. Reports her mom is doing better with transfers CG is using transfer belt. Safety precautions are being followed. Pt. has a seat belt in her W/C. No falls reported this week.

## 2021-10-29 PROCEDURE — 0651 HSPC ROUTINE HOME CARE

## 2021-10-30 PROCEDURE — 0651 HSPC ROUTINE HOME CARE

## 2021-10-31 PROCEDURE — 0651 HSPC ROUTINE HOME CARE

## 2021-11-01 PROCEDURE — 0651 HSPC ROUTINE HOME CARE

## 2021-11-02 ENCOUNTER — HOME CARE VISIT (OUTPATIENT)
Dept: SCHEDULING | Facility: HOME HEALTH | Age: 86
End: 2021-11-02
Payer: MEDICARE

## 2021-11-02 PROCEDURE — 0651 HSPC ROUTINE HOME CARE

## 2021-11-02 PROCEDURE — G0156 HHCP-SVS OF AIDE,EA 15 MIN: HCPCS

## 2021-11-03 PROCEDURE — 0651 HSPC ROUTINE HOME CARE

## 2021-11-04 ENCOUNTER — HOME CARE VISIT (OUTPATIENT)
Dept: SCHEDULING | Facility: HOME HEALTH | Age: 86
End: 2021-11-04
Payer: MEDICARE

## 2021-11-04 VITALS
OXYGEN SATURATION: 96 % | TEMPERATURE: 98.1 F | DIASTOLIC BLOOD PRESSURE: 81 MMHG | RESPIRATION RATE: 18 BRPM | SYSTOLIC BLOOD PRESSURE: 135 MMHG | HEART RATE: 100 BPM

## 2021-11-04 PROCEDURE — G0299 HHS/HOSPICE OF RN EA 15 MIN: HCPCS

## 2021-11-04 PROCEDURE — 0651 HSPC ROUTINE HOME CARE

## 2021-11-04 PROCEDURE — HOSPICE MEDICATION HC HH HOSPICE MEDICATION

## 2021-11-04 NOTE — HOSPICE
Pt/ CG screened for COVID-19 Negative. Vitals WNL's Pt. was sitting on the couch. Pt. continues to have a very productive sounding cough. Cough is more irritating at HS. Pt. is taking Mucinex which is helping to loosen mucous. Tessalon Perles are not helping at HS. Suggested she switch to Robitussin DM for nightime. Keep HOB elevated at 45 degrees. Pt. is not able to  with right hand or move arm on her own. Norco 5/325 mg. 1/2 tab PO q 4 hrs. pain but it makes her so sleepy and constipates her. Ativan 1 mg. Dose 1/2 tab q 6 hrs. prn anxiety, agitation. Today pt.was alert. Skin warm, dry. Pt. is still sleeping upstairs on the couch. CG reports pt is showered down in her apartment but otherwise stays upstairs. Pt. is continent of B/B. Wears pull-ups. CG did report pt. Bowels are moving better using more fluids and adding prunes. Senna dose is 2 PO Bid right now. No issues with urination. Appetite/Fluid intake is good. Pt. eats at least 80% per CG. No coughing noted when she drinks liquids. Medications reviewed with Lakeshia. RF ordered. Supplies needed: None. DME needed: MAC score 17.5 cm. Dina Maress reports she has been doing exercises that PT gave her. Reports her mom is doing better with transfers CG is using transfer belt. Safety precautions are being followed. Pt. has a seat belt in her W/C. No falls reported this week.

## 2021-11-05 PROCEDURE — 0651 HSPC ROUTINE HOME CARE

## 2021-11-06 PROCEDURE — 0651 HSPC ROUTINE HOME CARE

## 2021-11-07 PROCEDURE — 0651 HSPC ROUTINE HOME CARE

## 2021-11-08 PROCEDURE — 0651 HSPC ROUTINE HOME CARE

## 2021-11-09 ENCOUNTER — HOME CARE VISIT (OUTPATIENT)
Dept: SCHEDULING | Facility: HOME HEALTH | Age: 86
End: 2021-11-09
Payer: MEDICARE

## 2021-11-09 PROCEDURE — 0651 HSPC ROUTINE HOME CARE

## 2021-11-09 PROCEDURE — G0156 HHCP-SVS OF AIDE,EA 15 MIN: HCPCS

## 2021-11-10 PROCEDURE — 0651 HSPC ROUTINE HOME CARE

## 2021-11-11 ENCOUNTER — HOME CARE VISIT (OUTPATIENT)
Dept: HOSPICE | Facility: HOSPICE | Age: 86
End: 2021-11-11
Payer: MEDICARE

## 2021-11-11 VITALS
HEART RATE: 93 BPM | OXYGEN SATURATION: 99 % | TEMPERATURE: 98.4 F | DIASTOLIC BLOOD PRESSURE: 90 MMHG | RESPIRATION RATE: 18 BRPM | SYSTOLIC BLOOD PRESSURE: 139 MMHG

## 2021-11-11 PROCEDURE — G0299 HHS/HOSPICE OF RN EA 15 MIN: HCPCS

## 2021-11-11 PROCEDURE — 0651 HSPC ROUTINE HOME CARE

## 2021-11-11 PROCEDURE — HOSPICE MEDICATION HC HH HOSPICE MEDICATION

## 2021-11-11 NOTE — HOSPICE
Patient sitting in wheelchair by window with a heater running nearby. Daughter is present at visit. C/O fever two days ago. Daughter is asking if a blood test is possible to rule out virus. I will pass this information onto LINDA Jones. No s/s of distress or pain. Supplies to be ordered: chux pads and medium underwear. 9 days of pills left. Instructed daughter to call hospice again if patient spikes a fever.

## 2021-11-12 PROCEDURE — 0651 HSPC ROUTINE HOME CARE

## 2021-11-13 PROCEDURE — 0651 HSPC ROUTINE HOME CARE

## 2021-11-14 PROCEDURE — 0651 HSPC ROUTINE HOME CARE

## 2021-11-15 PROCEDURE — 0651 HSPC ROUTINE HOME CARE

## 2021-11-16 ENCOUNTER — HOME CARE VISIT (OUTPATIENT)
Dept: SCHEDULING | Facility: HOME HEALTH | Age: 86
End: 2021-11-16
Payer: MEDICARE

## 2021-11-16 PROCEDURE — 0651 HSPC ROUTINE HOME CARE

## 2021-11-16 PROCEDURE — G0156 HHCP-SVS OF AIDE,EA 15 MIN: HCPCS

## 2021-11-17 PROCEDURE — 0651 HSPC ROUTINE HOME CARE

## 2021-11-18 ENCOUNTER — HOME CARE VISIT (OUTPATIENT)
Dept: HOSPICE | Facility: HOSPICE | Age: 86
End: 2021-11-18
Payer: MEDICARE

## 2021-11-18 VITALS
TEMPERATURE: 99.6 F | OXYGEN SATURATION: 96 % | RESPIRATION RATE: 18 BRPM | HEART RATE: 88 BPM | SYSTOLIC BLOOD PRESSURE: 120 MMHG | DIASTOLIC BLOOD PRESSURE: 70 MMHG

## 2021-11-18 PROCEDURE — G0299 HHS/HOSPICE OF RN EA 15 MIN: HCPCS

## 2021-11-18 PROCEDURE — 0651 HSPC ROUTINE HOME CARE

## 2021-11-18 PROCEDURE — HOSPICE MEDICATION HC HH HOSPICE MEDICATION

## 2021-11-18 PROCEDURE — G0155 HHCP-SVS OF CSW,EA 15 MIN: HCPCS

## 2021-11-18 NOTE — HOSPICE
Daughter and patient are sitting in kitchen by window during our visit. Daughter reports improvement with cough after antibiotic started. No s/s of distress or pain. Medications ordered: Cardizem and Omeprazole. No supplies needed.

## 2021-11-19 PROCEDURE — 0651 HSPC ROUTINE HOME CARE

## 2021-11-19 NOTE — HOSPICE
Rody Meléndez was just finishing up with the RN. Lizette Goldstein has her sitting in the kitchen window watching the activity outside. She is pleasant and interactive. Lizette Goldstein provides optimal support and care for her mom. Rody Meléndez' sister visited this last week and Lizette Goldstein said she was helpful and, Lakeshia's brother has been gone for two months and has now returned home. Rody Meléndez is reportedly very delighted to have him home again. The Dtr has not secured hired caregiverss with the [employer] and [address] [place] [address] Highway. Lizette Goldstein now feels uncomfortable leaving her mom with a stranger. They still plan on moving after the firt of the year. SW provided praise for care provided and emotional support. No new needs are reported. They are expecting multiple family members for the holiday.

## 2021-11-20 PROCEDURE — 0651 HSPC ROUTINE HOME CARE

## 2021-11-21 PROCEDURE — 0651 HSPC ROUTINE HOME CARE

## 2021-11-22 PROCEDURE — 0651 HSPC ROUTINE HOME CARE

## 2021-11-23 ENCOUNTER — HOME CARE VISIT (OUTPATIENT)
Dept: SCHEDULING | Facility: HOME HEALTH | Age: 86
End: 2021-11-23
Payer: MEDICARE

## 2021-11-23 PROCEDURE — 0651 HSPC ROUTINE HOME CARE

## 2021-11-23 PROCEDURE — G0156 HHCP-SVS OF AIDE,EA 15 MIN: HCPCS

## 2021-11-24 ENCOUNTER — HOME CARE VISIT (OUTPATIENT)
Dept: SCHEDULING | Facility: HOME HEALTH | Age: 86
End: 2021-11-24
Payer: MEDICARE

## 2021-11-24 VITALS
HEART RATE: 75 BPM | SYSTOLIC BLOOD PRESSURE: 120 MMHG | OXYGEN SATURATION: 98 % | RESPIRATION RATE: 16 BRPM | DIASTOLIC BLOOD PRESSURE: 68 MMHG | TEMPERATURE: 98.3 F

## 2021-11-24 PROCEDURE — 0651 HSPC ROUTINE HOME CARE

## 2021-11-24 PROCEDURE — G0299 HHS/HOSPICE OF RN EA 15 MIN: HCPCS

## 2021-11-24 PROCEDURE — HOSPICE MEDICATION HC HH HOSPICE MEDICATION

## 2021-11-24 NOTE — HOSPICE
Pt/ CG screened for COVID-19 Negative. Vitals WNL's Pt. was sitting  in w/c in front of the window. Pt. is feeling much better. Per April Graff pt. finished antibiotic therapy yesterday. Henryjessica Money Keep  Pt. is not able to  with right hand or move arm on her own. Norco 5/325 mg. 1/2 tab PO q 4 hrs. pain but it makes her so sleepy and constipates her. Ativan 1 mg. Dose 1/2 tab q 6 hrs. prn anxiety, agitation. Today pt.was alert. Skin warm, dry. Pt. is still sleeping upstairs on the couch. CG reports pt is showered down in her apartment but otherwise stays upstairs. Pt. is continent of B/B. Wears pull-ups. CG did report pt. Bowels are moving better using more fluids and adding prunes. Senna dose is 2 PO Bid right now. No issues with urination. Appetite/Fluid intake is good. Pt. eats at least 80% per CG. No coughing noted when she drinks liquids. Medications reviewed with Lakeshia. RF ordered. Supplies needed: None. DME needed: MAC score 17 cm. April Graff reports she has been doing exercises that PT gave her. Reports her mom is doing better with transfers CG is using transfer belt. Safety precautions are being followed. Pt. has a seat belt in her W/C. No falls reported this week.

## 2021-11-25 PROCEDURE — 0651 HSPC ROUTINE HOME CARE

## 2021-11-26 PROCEDURE — 0651 HSPC ROUTINE HOME CARE

## 2021-11-27 PROCEDURE — 0651 HSPC ROUTINE HOME CARE

## 2021-11-28 PROCEDURE — 0651 HSPC ROUTINE HOME CARE

## 2021-11-29 PROCEDURE — 0651 HSPC ROUTINE HOME CARE

## 2021-11-30 ENCOUNTER — HOME CARE VISIT (OUTPATIENT)
Dept: SCHEDULING | Facility: HOME HEALTH | Age: 86
End: 2021-11-30
Payer: MEDICARE

## 2021-11-30 PROCEDURE — G0156 HHCP-SVS OF AIDE,EA 15 MIN: HCPCS

## 2021-11-30 PROCEDURE — 0651 HSPC ROUTINE HOME CARE

## 2021-12-01 PROCEDURE — 0651 HSPC ROUTINE HOME CARE

## 2021-12-02 ENCOUNTER — HOME CARE VISIT (OUTPATIENT)
Dept: SCHEDULING | Facility: HOME HEALTH | Age: 86
End: 2021-12-02
Payer: MEDICARE

## 2021-12-02 VITALS
SYSTOLIC BLOOD PRESSURE: 139 MMHG | HEART RATE: 78 BPM | DIASTOLIC BLOOD PRESSURE: 82 MMHG | OXYGEN SATURATION: 97 % | TEMPERATURE: 98.2 F | RESPIRATION RATE: 18 BRPM

## 2021-12-02 PROCEDURE — G0299 HHS/HOSPICE OF RN EA 15 MIN: HCPCS

## 2021-12-02 PROCEDURE — HOSPICE MEDICATION HC HH HOSPICE MEDICATION

## 2021-12-02 PROCEDURE — 0651 HSPC ROUTINE HOME CARE

## 2021-12-02 NOTE — HOSPICE
Pt/ CG screened for COVID-19 Negative. Vitals WNL's Pt. was sitting in w/c in front of the window. Pt. is feeling much better. Per Tanja Walters pt. finished antibiotic therapy yesterday. Manjula Cortez Keep Pt. is not able to  with right hand or move arm on her own. Norco 5/325 mg. 1/2 tab PO q 4 hrs. pain but it makes her so sleepy and constipates her. Ativan 1 mg. Dose 1/2 tab q 6 hrs. prn anxiety, agitation. Today pt.was alert. Skin warm, dry. Pt. is still sleeping upstairs on the couch. CG reports pt is showered down in her apartment but otherwise stays upstairs. Pt. is continent of B/B. Wears pull-ups. CG did report pt. Bowels are moving better using more fluids and adding prunes. Senna dose is 2 PO Bid right now. No issues with urination. Appetite/Fluid intake is good. Pt. eats at least 80% per CG. No coughing noted when she drinks liquids. Medications reviewed with Lakeshia. RF ordered. Supplies needed: None. DME needed: MAC score 17 cm. Tanja Walters reports she has been doing exercises that PT gave her. Reports her mom is doing better with transfers CG is using transfer belt. Safety precautions are being followed. Pt. has a seat belt in her W/C. No falls reported this week.

## 2021-12-03 PROCEDURE — 0651 HSPC ROUTINE HOME CARE

## 2021-12-04 PROCEDURE — 0651 HSPC ROUTINE HOME CARE

## 2021-12-05 PROCEDURE — 0651 HSPC ROUTINE HOME CARE

## 2021-12-06 PROCEDURE — 0651 HSPC ROUTINE HOME CARE

## 2021-12-07 ENCOUNTER — HOME CARE VISIT (OUTPATIENT)
Dept: SCHEDULING | Facility: HOME HEALTH | Age: 86
End: 2021-12-07
Payer: MEDICARE

## 2021-12-07 PROCEDURE — 0651 HSPC ROUTINE HOME CARE

## 2021-12-07 PROCEDURE — G0156 HHCP-SVS OF AIDE,EA 15 MIN: HCPCS

## 2021-12-08 PROCEDURE — 0651 HSPC ROUTINE HOME CARE

## 2021-12-09 ENCOUNTER — HOME CARE VISIT (OUTPATIENT)
Dept: SCHEDULING | Facility: HOME HEALTH | Age: 86
End: 2021-12-09
Payer: MEDICARE

## 2021-12-09 PROCEDURE — G0299 HHS/HOSPICE OF RN EA 15 MIN: HCPCS

## 2021-12-09 PROCEDURE — HOSPICE MEDICATION HC HH HOSPICE MEDICATION

## 2021-12-09 PROCEDURE — 0651 HSPC ROUTINE HOME CARE

## 2021-12-09 NOTE — HOSPICE
Pt/ CG screened for COVID-19 Negative. Vitals WNL's Pt. was sitting in w/c in front of the window. Pt. is feeling much better. Pt. is not able to  with right hand or move arm on her own. Norco 5/325 mg. 1/2 tab PO q 4 hrs. pain but it makes her so sleepy and constipates her. Ativan 1 mg. Dose 1/2 tab q 6 hrs. prn anxiety, agitation. CG reports pt. has been crying alot. Her son had been to visit and left. He comes every 2 mths. Per Cedric Rudolph she does this everytime he comes and ask if Celcxa could be increased. I emailed Gudelia Kuhn, ANDREA , she ordered. Celexa increased to 40 mg.  daily. Today pt.was alert. Skin warm, dry. Pt. is still sleeping upstairs on the couch. CG reports pt is showered down in her apartment but otherwise stays upstairs. Pt. is continent of B/B. Wears pull-ups. CG did report pt. Bowels are moving better using more fluids and adding prunes. Senna dose is 2 PO Bid right now. No issues with urination. Appetite/Fluid intake is good. Pt. eats at least 80% per CG. No coughing noted when she drinks liquids. Medications reviewed with Lakeshia. RF ordered. Supplies needed: None. DME needed: MAC score 17 cm. Cedric Rudolph reports she has been doing exercises that PT gave her. Reports her mom is doing better with transfers CG is using transfer belt. Safety precautions are being followed. Pt. has a seat belt in her W/C. No falls reported this week.

## 2021-12-10 PROCEDURE — 0651 HSPC ROUTINE HOME CARE

## 2021-12-11 PROCEDURE — 0651 HSPC ROUTINE HOME CARE

## 2021-12-12 PROCEDURE — 0651 HSPC ROUTINE HOME CARE

## 2021-12-13 PROCEDURE — 0651 HSPC ROUTINE HOME CARE

## 2021-12-14 ENCOUNTER — HOME CARE VISIT (OUTPATIENT)
Dept: SCHEDULING | Facility: HOME HEALTH | Age: 86
End: 2021-12-14
Payer: MEDICARE

## 2021-12-14 PROCEDURE — 0651 HSPC ROUTINE HOME CARE

## 2021-12-14 PROCEDURE — G0156 HHCP-SVS OF AIDE,EA 15 MIN: HCPCS

## 2021-12-15 PROCEDURE — 0651 HSPC ROUTINE HOME CARE

## 2021-12-16 ENCOUNTER — HOME CARE VISIT (OUTPATIENT)
Dept: SCHEDULING | Facility: HOME HEALTH | Age: 86
End: 2021-12-16
Payer: MEDICARE

## 2021-12-16 VITALS
DIASTOLIC BLOOD PRESSURE: 80 MMHG | SYSTOLIC BLOOD PRESSURE: 126 MMHG | HEART RATE: 92 BPM | TEMPERATURE: 98.2 F | RESPIRATION RATE: 18 BRPM | OXYGEN SATURATION: 96 %

## 2021-12-16 PROCEDURE — HOSPICE MEDICATION HC HH HOSPICE MEDICATION

## 2021-12-16 PROCEDURE — G0299 HHS/HOSPICE OF RN EA 15 MIN: HCPCS

## 2021-12-16 PROCEDURE — 0651 HSPC ROUTINE HOME CARE

## 2021-12-16 NOTE — HOSPICE
Pt/ CG screened for COVID-19 Negative. Vitals WNL's Pt. was sitting in w/c in front of the TV today. Pt. is mad per Daughter. They have had alot of  family visiting. Pt. is not able to  with right hand or move arm on her own. Norco 5/325 mg. 1/2 tab PO q 4 hrs. pain but it makes her so sleepy and constipates her. Ativan 1 mg. Dose 1/2 tab q 6 hrs. prn anxiety, agitation. CG reports pt. has been crying alot. Celexa increased to 40 mg. daily. Today pt.was alert. Skin warm, dry. Pt. is still sleeping upstairs on the couch. CG reports pt is showered down in her apartment but otherwise stays upstairs. Pt. is continent of B/B. Wears pull-ups. CG did report pt. Bowels are moving better using more fluids and adding prunes. Senna dose is 2 PO Bid right now. No issues with urination. Appetite/Fluid intake is good. Pt. eats at least 80% per CG. No coughing noted when she drinks liquids. Medications reviewed with Lakeshia. RF ordered. Supplies needed: None. DME needed: MAC score 17 cm. Angelina Paulino reports she has been doing exercises that PT gave her. Reports her mom is doing better with transfers CG is using transfer belt. Safety precautions are being followed. Pt. has a seat belt in her W/C. No falls reported this week. Cg reports pt. is now not able to stand at all.

## 2021-12-17 PROCEDURE — 0651 HSPC ROUTINE HOME CARE

## 2021-12-18 PROCEDURE — 0651 HSPC ROUTINE HOME CARE

## 2021-12-19 PROCEDURE — 0651 HSPC ROUTINE HOME CARE

## 2021-12-20 PROCEDURE — 0651 HSPC ROUTINE HOME CARE

## 2021-12-21 ENCOUNTER — HOME CARE VISIT (OUTPATIENT)
Dept: SCHEDULING | Facility: HOME HEALTH | Age: 86
End: 2021-12-21
Payer: MEDICARE

## 2021-12-21 PROCEDURE — G0156 HHCP-SVS OF AIDE,EA 15 MIN: HCPCS

## 2021-12-21 PROCEDURE — 0651 HSPC ROUTINE HOME CARE

## 2021-12-22 PROCEDURE — 0651 HSPC ROUTINE HOME CARE

## 2021-12-23 ENCOUNTER — HOME CARE VISIT (OUTPATIENT)
Dept: HOSPICE | Facility: HOSPICE | Age: 86
End: 2021-12-23
Payer: MEDICARE

## 2021-12-23 ENCOUNTER — HOME CARE VISIT (OUTPATIENT)
Dept: SCHEDULING | Facility: HOME HEALTH | Age: 86
End: 2021-12-23
Payer: MEDICARE

## 2021-12-23 VITALS
DIASTOLIC BLOOD PRESSURE: 76 MMHG | RESPIRATION RATE: 21 BRPM | TEMPERATURE: 98.2 F | SYSTOLIC BLOOD PRESSURE: 119 MMHG | HEART RATE: 65 BPM | OXYGEN SATURATION: 97 %

## 2021-12-23 PROCEDURE — G0155 HHCP-SVS OF CSW,EA 15 MIN: HCPCS

## 2021-12-23 PROCEDURE — 0651 HSPC ROUTINE HOME CARE

## 2021-12-23 PROCEDURE — G0299 HHS/HOSPICE OF RN EA 15 MIN: HCPCS

## 2021-12-24 PROCEDURE — 0651 HSPC ROUTINE HOME CARE

## 2021-12-24 NOTE — HOSPICE
Pt/ CG screened for COVID-19 Negative. Vitals WNL's Pt. was sitting in w/c in front of the window when I arrived today. They have had alot of family visiting. Pt. is not able to  with right hand or move arm on her own. Norco 5/325 mg. 1/2 tab PO q 4 hrs. pain but it makes her so sleepy and constipates her. I did encourage use of  Ativan 1 mg. Dose 1/2 tab q 6 hrs. prn anxiety, agitation. Especially with all the family being around pt gets anxious. Celexa increased to 40 mg. daily per Irma Simon it has helped but pt. was upset today and just cried. . Skin warm, dry. Pt. is still sleeping upstairs on the couch. CG reports pt is showered down in her apartment but otherwise stays upstairs. Pt. is continent of B/B. Wears pull-ups. CG did report pt. Bowels are moving better using more fluids and adding prunes. Senna dose is 2 PO Bid right now. No issues with urination. Appetite/Fluid intake is good. Pt. eats at least 80% per CG. No coughing noted when she drinks liquids. Medications reviewed with Lakeshia. RFs ordered. Supplies needed: Chuxs, lotion and Foam soap. DME needed: None. Pt. face is sunken, Muscle wasting MAC score 17 cm. Irma Simon reports she has been doing exercises that PT gave her. CG is using transfer belt. Safety precautions are being followed. Pt. has a seat belt in her W/C. No falls reported this week. Cg reports pt. is now not able to stand at all.

## 2021-12-25 PROCEDURE — 0651 HSPC ROUTINE HOME CARE

## 2021-12-26 PROCEDURE — 0651 HSPC ROUTINE HOME CARE

## 2021-12-27 PROCEDURE — 0651 HSPC ROUTINE HOME CARE

## 2021-12-28 ENCOUNTER — HOME CARE VISIT (OUTPATIENT)
Dept: SCHEDULING | Facility: HOME HEALTH | Age: 86
End: 2021-12-28
Payer: MEDICARE

## 2021-12-28 PROCEDURE — 0651 HSPC ROUTINE HOME CARE

## 2021-12-28 PROCEDURE — G0156 HHCP-SVS OF AIDE,EA 15 MIN: HCPCS

## 2021-12-29 ENCOUNTER — HOME CARE VISIT (OUTPATIENT)
Dept: SCHEDULING | Facility: HOME HEALTH | Age: 86
End: 2021-12-29
Payer: MEDICARE

## 2021-12-29 VITALS
TEMPERATURE: 97.2 F | RESPIRATION RATE: 16 BRPM | DIASTOLIC BLOOD PRESSURE: 68 MMHG | HEART RATE: 72 BPM | SYSTOLIC BLOOD PRESSURE: 130 MMHG

## 2021-12-29 PROCEDURE — G0299 HHS/HOSPICE OF RN EA 15 MIN: HCPCS

## 2021-12-29 PROCEDURE — 0651 HSPC ROUTINE HOME CARE

## 2021-12-29 PROCEDURE — HOSPICE MEDICATION HC HH HOSPICE MEDICATION

## 2021-12-30 PROCEDURE — 0651 HSPC ROUTINE HOME CARE

## 2021-12-30 NOTE — HOSPICE
Routine Visit. Pt Sitting in wheelchair looking out of the window awake. Pt is chair-bound. pt has right side paralysis. Pt's dtr denies any pt falls. Instructed pts cg on falls preventions. Pts cg verbalized understanding to teaching. Pts  dtr  is   present. Pt is alert but unable to speak words. pt tries to talk but only sounds come out. Pt has no Skin breakdown. Pt has no Sob today. Pt has no edema. Pt is having regular Bms. Pts Appetite is  fair. Pts cg denies any pt Nausea / vomiting. Pt is urinating well several times daily. Pt has no s&s of pain today. Sn reviewed pts meds. Refilled meds: celexa, omeprazole, diltiazem & levothyroxine. Ordered supplies: shampoo cap, foam body wash, chux & wipes. SN updated mar & reconciled meds. sn updated care plan. Instructed pts cg to call Ty[facility] Alt with any questions or concerns. Pts cg verbalized understanding and agreement.

## 2021-12-31 PROCEDURE — 0651 HSPC ROUTINE HOME CARE

## 2022-01-01 PROCEDURE — 0651 HSPC ROUTINE HOME CARE

## 2022-01-02 PROCEDURE — 0651 HSPC ROUTINE HOME CARE

## 2022-01-03 PROCEDURE — 0651 HSPC ROUTINE HOME CARE

## 2022-01-04 ENCOUNTER — HOME CARE VISIT (OUTPATIENT)
Dept: SCHEDULING | Facility: HOME HEALTH | Age: 87
End: 2022-01-04
Payer: MEDICARE

## 2022-01-04 PROCEDURE — 0651 HSPC ROUTINE HOME CARE

## 2022-01-04 PROCEDURE — G0156 HHCP-SVS OF AIDE,EA 15 MIN: HCPCS

## 2022-01-05 PROCEDURE — 0651 HSPC ROUTINE HOME CARE

## 2022-01-06 ENCOUNTER — HOME CARE VISIT (OUTPATIENT)
Dept: SCHEDULING | Facility: HOME HEALTH | Age: 87
End: 2022-01-06
Payer: MEDICARE

## 2022-01-06 VITALS
DIASTOLIC BLOOD PRESSURE: 88 MMHG | SYSTOLIC BLOOD PRESSURE: 156 MMHG | HEART RATE: 89 BPM | RESPIRATION RATE: 16 BRPM | TEMPERATURE: 97.9 F

## 2022-01-06 PROCEDURE — 0651 HSPC ROUTINE HOME CARE

## 2022-01-06 PROCEDURE — G0299 HHS/HOSPICE OF RN EA 15 MIN: HCPCS

## 2022-01-06 NOTE — HOSPICE
Greeted at door by Bijal. Upon entering residence observed patient sitting upright on couch in living area. Alert to person. Communicated by babbling, no meaningful speech. Right side hemiplegia noted. Dependant for all ADLs, mobility, and transfers. Requires assistance with feeding. Incontinent of bowel and bladder. Skin intact. Assessment completed, see ROS sections. Education provided, see POC. Bijal reports fall last PM, see fall tracking. Denies any other acute changes since last SN visit. Requests supplies: M pullups, underpads, and wipes; ordered via [employer]. Denies any DME or medication needs. No other issues to address. Instructed Lakeshia to contact office with any acute changes or needs. Verbalizes understanding without further questions or concerns and in agreement with POC.

## 2022-01-07 PROCEDURE — 0651 HSPC ROUTINE HOME CARE

## 2022-01-08 PROCEDURE — 0651 HSPC ROUTINE HOME CARE

## 2022-01-09 PROCEDURE — 0651 HSPC ROUTINE HOME CARE

## 2022-01-10 PROCEDURE — 0651 HSPC ROUTINE HOME CARE

## 2022-01-11 ENCOUNTER — HOME CARE VISIT (OUTPATIENT)
Dept: SCHEDULING | Facility: HOME HEALTH | Age: 87
End: 2022-01-11
Payer: MEDICARE

## 2022-01-11 PROCEDURE — 0651 HSPC ROUTINE HOME CARE

## 2022-01-11 PROCEDURE — G0156 HHCP-SVS OF AIDE,EA 15 MIN: HCPCS

## 2022-01-12 PROCEDURE — 0651 HSPC ROUTINE HOME CARE

## 2022-01-13 ENCOUNTER — HOME CARE VISIT (OUTPATIENT)
Dept: HOSPICE | Facility: HOSPICE | Age: 87
End: 2022-01-13
Payer: MEDICARE

## 2022-01-13 ENCOUNTER — HOME CARE VISIT (OUTPATIENT)
Dept: SCHEDULING | Facility: HOME HEALTH | Age: 87
End: 2022-01-13
Payer: MEDICARE

## 2022-01-13 VITALS
HEART RATE: 82 BPM | SYSTOLIC BLOOD PRESSURE: 130 MMHG | OXYGEN SATURATION: 99 % | TEMPERATURE: 98.5 F | DIASTOLIC BLOOD PRESSURE: 72 MMHG | RESPIRATION RATE: 20 BRPM

## 2022-01-13 PROCEDURE — G0155 HHCP-SVS OF CSW,EA 15 MIN: HCPCS

## 2022-01-13 PROCEDURE — 0651 HSPC ROUTINE HOME CARE

## 2022-01-13 PROCEDURE — G0299 HHS/HOSPICE OF RN EA 15 MIN: HCPCS

## 2022-01-14 ENCOUNTER — HOME CARE VISIT (OUTPATIENT)
Dept: HOSPICE | Facility: HOSPICE | Age: 87
End: 2022-01-14
Payer: MEDICARE

## 2022-01-14 PROCEDURE — 0651 HSPC ROUTINE HOME CARE

## 2022-01-14 NOTE — HOSPICE
Yue is sitting up on their sofa. She is watching TV. DORA and RN joint visit. She is pleasant and interactive. Bret Sears provides optimal care for her mom. She reports that they had a good holiday with family visiting. Her spouse is reportedly about the same as before the holiday . Bret Sears denies any additional needs and reports coping adequately. DORA ofered availability and provided praise for care provided.

## 2022-01-15 PROCEDURE — 0651 HSPC ROUTINE HOME CARE

## 2022-01-16 PROCEDURE — 0651 HSPC ROUTINE HOME CARE

## 2022-01-17 ENCOUNTER — HOME CARE VISIT (OUTPATIENT)
Dept: HOSPICE | Facility: HOSPICE | Age: 87
End: 2022-01-17
Payer: MEDICARE

## 2022-01-17 PROCEDURE — 0651 HSPC ROUTINE HOME CARE

## 2022-01-18 ENCOUNTER — HOME CARE VISIT (OUTPATIENT)
Dept: HOSPICE | Facility: HOSPICE | Age: 87
End: 2022-01-18
Payer: MEDICARE

## 2022-01-18 PROCEDURE — HOSPICE MEDICATION HC HH HOSPICE MEDICATION

## 2022-01-18 PROCEDURE — 0651 HSPC ROUTINE HOME CARE

## 2022-01-19 PROCEDURE — 0651 HSPC ROUTINE HOME CARE

## 2022-01-20 ENCOUNTER — HOME CARE VISIT (OUTPATIENT)
Dept: HOSPICE | Facility: HOSPICE | Age: 87
End: 2022-01-20
Payer: MEDICARE

## 2022-01-20 PROCEDURE — 0651 HSPC ROUTINE HOME CARE

## 2022-01-21 PROCEDURE — 0651 HSPC ROUTINE HOME CARE

## 2022-01-22 PROCEDURE — 0651 HSPC ROUTINE HOME CARE

## 2022-01-23 PROCEDURE — 0651 HSPC ROUTINE HOME CARE

## 2022-01-24 PROCEDURE — 0651 HSPC ROUTINE HOME CARE

## 2022-01-25 ENCOUNTER — HOME CARE VISIT (OUTPATIENT)
Dept: SCHEDULING | Facility: HOME HEALTH | Age: 87
End: 2022-01-25
Payer: MEDICARE

## 2022-01-25 PROCEDURE — 0651 HSPC ROUTINE HOME CARE

## 2022-01-25 PROCEDURE — G0156 HHCP-SVS OF AIDE,EA 15 MIN: HCPCS

## 2022-01-26 PROCEDURE — 0651 HSPC ROUTINE HOME CARE

## 2022-01-27 ENCOUNTER — HOME CARE VISIT (OUTPATIENT)
Dept: SCHEDULING | Facility: HOME HEALTH | Age: 87
End: 2022-01-27
Payer: MEDICARE

## 2022-01-27 VITALS
OXYGEN SATURATION: 99 % | DIASTOLIC BLOOD PRESSURE: 80 MMHG | RESPIRATION RATE: 21 BRPM | SYSTOLIC BLOOD PRESSURE: 128 MMHG | HEART RATE: 75 BPM | TEMPERATURE: 98.4 F

## 2022-01-27 PROCEDURE — HOSPICE MEDICATION HC HH HOSPICE MEDICATION

## 2022-01-27 PROCEDURE — 0651 HSPC ROUTINE HOME CARE

## 2022-01-27 PROCEDURE — G0299 HHS/HOSPICE OF RN EA 15 MIN: HCPCS

## 2022-01-27 NOTE — HOSPICE
Pt/ CG screened for COVID-19 Negative. SEE EV Vitals WNL's Pt. was sitting on the couch watching TV when I arrived today. Pt. is not able to  with right hand or move arm on her own. Norco 5/325 mg. 1/2 tab PO q 4 hrs. pain but it makes her so sleepy and constipates her. I did encourage use of Ativan 1 mg. Dose 1/2 tab q 6 hrs. prn anxiety, agitation. James Garcia gets anxious at times. Pt. was actually smiling some today. Skin warm, dry. Pt. is still sleeping upstairs on the couch. CG reports pt is showered down in her apartment but otherwise stays upstairs. Pt. is continent of B/B. Wears pull-ups. CG did report pt. Bowels are moving better using more fluids and adding prunes. Senna dose is 2 tabs PO Bid. Haile Bowden reported today that her mom was straining trying to have BM. I reviewed the Senna dose again as daughter is not giving as much as ordered. Pt. burst a blood vessel in her right eye straining. No issues with urination. Appetite/Fluid intake is good. Pt. eats at least 80% per CG. No coughing noted when she drinks liquids. Medications reviewed with Lakeshia. RFs  ordered. Supplies needed: Chuxs /wipes. DME needed: None. Pt. face is sunken, Muscle wasting MAC score 18 cm. Haile Bowden reports she tries to do the exercises that PT gave her mother. CG is using transfer belt. Safety precautions are being followed. Pt. has a seat belt in her W/C. No falls reported this week. Cg reports pt. is now not able to stand at all so transferring has become very difficult.

## 2022-01-28 PROCEDURE — 0651 HSPC ROUTINE HOME CARE

## 2022-01-29 PROCEDURE — 0651 HSPC ROUTINE HOME CARE

## 2022-01-30 PROCEDURE — 0651 HSPC ROUTINE HOME CARE

## 2022-01-31 PROCEDURE — 0651 HSPC ROUTINE HOME CARE

## 2022-02-01 ENCOUNTER — HOME CARE VISIT (OUTPATIENT)
Dept: SCHEDULING | Facility: HOME HEALTH | Age: 87
End: 2022-02-01
Payer: MEDICARE

## 2022-02-01 PROCEDURE — 0651 HSPC ROUTINE HOME CARE

## 2022-02-01 PROCEDURE — G0156 HHCP-SVS OF AIDE,EA 15 MIN: HCPCS

## 2022-02-02 PROCEDURE — 0651 HSPC ROUTINE HOME CARE

## 2022-02-03 ENCOUNTER — HOME CARE VISIT (OUTPATIENT)
Dept: SCHEDULING | Facility: HOME HEALTH | Age: 87
End: 2022-02-03
Payer: MEDICARE

## 2022-02-03 VITALS
SYSTOLIC BLOOD PRESSURE: 132 MMHG | DIASTOLIC BLOOD PRESSURE: 80 MMHG | OXYGEN SATURATION: 100 % | RESPIRATION RATE: 21 BRPM | TEMPERATURE: 97.5 F | HEART RATE: 70 BPM

## 2022-02-03 PROCEDURE — 0651 HSPC ROUTINE HOME CARE

## 2022-02-03 PROCEDURE — G0155 HHCP-SVS OF CSW,EA 15 MIN: HCPCS

## 2022-02-03 PROCEDURE — G0299 HHS/HOSPICE OF RN EA 15 MIN: HCPCS

## 2022-02-04 PROCEDURE — 0651 HSPC ROUTINE HOME CARE

## 2022-02-04 NOTE — HOSPICE
Pt/ CG screened for COVID-19 Negative. SEE ROS. Vitals WNL's Pt. was sitting in W/C looking out the window. Pt. is not able to  with right hand or move arm on her own. Norco 5/325 mg. 1/2 tab PO q 4 hrs. pain but it makes her so sleepy and constipates her. I did encourage use of Ativan 1 mg. Dose 1/2 tab q 6 hrs. prn anxiety, agitation. Aixa Juárez gets anxious at times, she worries and is teary. Pt. did smile at me when I arrived. Skin warm, dry. Pt. is still sleeping upstairs on the couch. CG reports pt is showered down in her apartment but otherwise stays upstairs. Pt. is continent of B/B. Wears pull-ups. CG did report pt. Bowels are moving better using more fluids and adding prunes. Senna dose is 2 tabs PO Bid. .No issues with urination. Appetite/Fluid intake is good. Pt. eats at least 80% per CG. No coughing noted when she drinks liquids. Medications reviewed with Lakeshia. RFs None needed. Supplies needed: none DME needed: None. Pt. face is sunken, Muscle wasting MAC score 18 cm. Mau Huerta reports she tries to do the exercises that PT gave her mother. CG is using transfer belt. Safety precautions are being followed. Pt. has a seat belt in her W/C. No falls reported this week. Cg reports pt. is now not able to stand at all so transferring has become very difficult.

## 2022-02-04 NOTE — HOSPICE
Lupillo Perkins is sitting in her wheel chair in front of her favorite window. Bret Sears is  present. Lupillo Perkins looks good today and is animated. She is very expressive today. Bret Sears says her brother sent her pictures of the snow up [place] he has been driving through and that he doesn't realize she worries about him. Bret Sears says she is aware of how fragile her spouse is with his cancer. Bret Sears was tearful at times. She thinks she is ready to have the in home respite sitters. DORA reviewed that benefit and she says she will contact DORA with times. Bret Sears can't leave her mother to attend her husbands MD appointments. DORA provided active listening and emotional support.

## 2022-02-05 PROCEDURE — 0651 HSPC ROUTINE HOME CARE

## 2022-02-06 PROCEDURE — 0651 HSPC ROUTINE HOME CARE

## 2022-02-07 PROCEDURE — 0651 HSPC ROUTINE HOME CARE

## 2022-02-08 ENCOUNTER — HOME CARE VISIT (OUTPATIENT)
Dept: SCHEDULING | Facility: HOME HEALTH | Age: 87
End: 2022-02-08
Payer: MEDICARE

## 2022-02-08 PROCEDURE — 0651 HSPC ROUTINE HOME CARE

## 2022-02-08 PROCEDURE — G0156 HHCP-SVS OF AIDE,EA 15 MIN: HCPCS

## 2022-02-09 PROCEDURE — 0651 HSPC ROUTINE HOME CARE

## 2022-02-10 ENCOUNTER — HOME CARE VISIT (OUTPATIENT)
Dept: SCHEDULING | Facility: HOME HEALTH | Age: 87
End: 2022-02-10
Payer: MEDICARE

## 2022-02-10 VITALS
TEMPERATURE: 98.5 F | DIASTOLIC BLOOD PRESSURE: 80 MMHG | SYSTOLIC BLOOD PRESSURE: 130 MMHG | HEART RATE: 83 BPM | RESPIRATION RATE: 18 BRPM | OXYGEN SATURATION: 98 %

## 2022-02-10 PROCEDURE — HOSPICE MEDICATION HC HH HOSPICE MEDICATION

## 2022-02-10 PROCEDURE — G0299 HHS/HOSPICE OF RN EA 15 MIN: HCPCS

## 2022-02-10 PROCEDURE — 0651 HSPC ROUTINE HOME CARE

## 2022-02-10 NOTE — HOSPICE
Pt/ CG screened for COVID-19 Negative. SEE ROS. Vitals WNL's Pt. was sitting on the couch folding kleenx. Pt. is not able to  with right hand or move arm on her own. Norco 5/325 mg. 1/2 tab PO q 4 hrs. pain but it makes her so sleepy and constipates her. I did encourage use of Ativan 1 mg. Dose 1/2 tab q 6 hrs. prn anxiety, agitation. Miles Coma gets anxious at times, she worries alot. Pt. did smile at me when I arrived. Skin warm, dry. Pt. is still sleeping upstairs on the couch. CG reports pt is showered down in her apartment but otherwise stays upstairs. Pt. is continent of B/B. Wears pull-ups. CG did report pt. Bowels are moving better using more fluids and adding prunes. Senna dose is 2 tabs PO Bid. No issues with urination. Appetite/Fluid intake is good. Pt. eats at least 80% per CG. No coughing noted when she drinks liquids. Medications reviewed with Lakeshia. RFs None needed. Supplies needed: none DME needed: None. Pt. face is sunken, Muscle wasting MAC score 18 cm. Rafal Faith reports she tries to do the exercises that PT gave her mother. CG is using transfer belt. Safety precautions are being followed. Pt. has a seat belt in her W/C. No falls reported this week. Cg reports pt. is now not able to stand at all so transferring has become very difficult. CG aware how to reach [facility] for needs or concerns.

## 2022-02-11 PROCEDURE — 0651 HSPC ROUTINE HOME CARE

## 2022-02-12 PROCEDURE — 0651 HSPC ROUTINE HOME CARE

## 2022-02-13 PROCEDURE — 0651 HSPC ROUTINE HOME CARE

## 2022-02-14 PROCEDURE — 0651 HSPC ROUTINE HOME CARE

## 2022-02-15 ENCOUNTER — HOME CARE VISIT (OUTPATIENT)
Dept: SCHEDULING | Facility: HOME HEALTH | Age: 87
End: 2022-02-15
Payer: MEDICARE

## 2022-02-15 PROCEDURE — G0156 HHCP-SVS OF AIDE,EA 15 MIN: HCPCS

## 2022-02-15 PROCEDURE — 0651 HSPC ROUTINE HOME CARE

## 2022-02-16 PROCEDURE — 0651 HSPC ROUTINE HOME CARE

## 2022-02-17 ENCOUNTER — HOME CARE VISIT (OUTPATIENT)
Dept: SCHEDULING | Facility: HOME HEALTH | Age: 87
End: 2022-02-17
Payer: MEDICARE

## 2022-02-17 VITALS
DIASTOLIC BLOOD PRESSURE: 80 MMHG | RESPIRATION RATE: 21 BRPM | OXYGEN SATURATION: 97 % | HEART RATE: 88 BPM | SYSTOLIC BLOOD PRESSURE: 120 MMHG | TEMPERATURE: 98.2 F

## 2022-02-17 PROCEDURE — 0651 HSPC ROUTINE HOME CARE

## 2022-02-17 PROCEDURE — G0299 HHS/HOSPICE OF RN EA 15 MIN: HCPCS

## 2022-02-17 NOTE — HOSPICE
Pt/ CG screened for COVID-19 Negative. SEE ROS. Vitals WNL's Pt. was sitting on the couch. I did watch pt. transfer to w/c today with Daughter's help. Sera Patel has trouble transferring from W/C back to couch. Pt. is not able to  with right hand or move arm on her own. Norco 5/325 mg. 1/2 tab PO q 4 hrs. pain but it makes her so sleepy and constipates her. I did encourage use of Ativan 1 mg. Dose 1/2 tab q 6 hrs. prn anxiety, agitation. Sera Patel gets anxious at times, she worries alot. Skin warm, dry, and intact. Pt. is still sleeping upstairs on the couch. CG reports pt is showered down in her apartment but otherwise stays upstairs. Pt. is continent of B/B. Wears pull-ups. CG did report pt. Bowels are moving better using more fluids and adding prunes. Senna dose is 2 tabs PO Bid. No issues with urination. Appetite/Fluid intake is good. Pt. eats at least 80% per CG. No coughing noted when she drinks liquids. Medications reviewed with Lakeshia. RFs None needed. Supplies needed: none DME needed: None. Pt. face is sunken, Muscle wasting MAC score 18 cm. Bert Walls reports she tries to do the exercises that PT gave her mother. CG is using transfer belt. Safety precautions are being followed. Pt. has a seat belt in her W/C. No falls reported this week. Cg reports pt. is now not able to stand at all so transferring has become very difficult. CG aware how to reach [facility] for needs or concerns.

## 2022-02-18 PROCEDURE — 0651 HSPC ROUTINE HOME CARE

## 2022-02-19 PROCEDURE — 0651 HSPC ROUTINE HOME CARE

## 2022-02-20 PROCEDURE — 0651 HSPC ROUTINE HOME CARE

## 2022-02-21 PROCEDURE — 0651 HSPC ROUTINE HOME CARE

## 2022-02-22 ENCOUNTER — HOME CARE VISIT (OUTPATIENT)
Dept: SCHEDULING | Facility: HOME HEALTH | Age: 87
End: 2022-02-22
Payer: MEDICARE

## 2022-02-22 PROCEDURE — G0156 HHCP-SVS OF AIDE,EA 15 MIN: HCPCS

## 2022-02-22 PROCEDURE — 0651 HSPC ROUTINE HOME CARE

## 2022-02-23 PROCEDURE — 0651 HSPC ROUTINE HOME CARE

## 2022-02-24 ENCOUNTER — HOME CARE VISIT (OUTPATIENT)
Dept: SCHEDULING | Facility: HOME HEALTH | Age: 87
End: 2022-02-24
Payer: MEDICARE

## 2022-02-24 VITALS
TEMPERATURE: 98.3 F | HEART RATE: 75 BPM | OXYGEN SATURATION: 97 % | DIASTOLIC BLOOD PRESSURE: 84 MMHG | RESPIRATION RATE: 21 BRPM | SYSTOLIC BLOOD PRESSURE: 122 MMHG

## 2022-02-24 PROCEDURE — HOSPICE MEDICATION HC HH HOSPICE MEDICATION

## 2022-02-24 PROCEDURE — G0299 HHS/HOSPICE OF RN EA 15 MIN: HCPCS

## 2022-02-24 PROCEDURE — 0651 HSPC ROUTINE HOME CARE

## 2022-02-24 NOTE — HOSPICE
Pt/ CG screened for COVID-19 Negative. SEE EV Vitaljhon WARNER's Pt. was sitting in her w/c when I arrived for the . Pt. is not able to  with right hand or move arm on her own. Norco 5/325 mg. 1/2 tab PO q 4 hrs. pain but it makes her so sleepy and constipates her. I did encourage use of Ativan 1 mg. Dose 1/2 tab q 6 hrs. prn anxiety, agitation. Lupillo Perkins gets anxious at times, she worries alot. Skin warm, dry, and intact. Pt. is still sleeping upstairs on the couch. CG reports pt is showered down in her apartment but otherwise stays upstairs. Pt. is continent of B/B. Wears pull-ups. CG did report pt. Bowels are moving better using more fluids and adding prunes. Senna dose is 2 tabs PO Bid. No issues with urination. Appetite/Fluid intake is good. Pt. eats at least 80% per CG. No coughing noted when she drinks liquids. Medications reviewed with Lakeshia. RFs ordered. Supplies ordered. DME needed: None. Pt. face is sunken a little, Muscle wasting MAC score 16.5 cm. Bret Sears reports she tries to do the exercises that PT gave her mother. Cg reports pt. gets very upset with crowds of people in the home, or the dogs barking alot. CG is using transfer belt. Safety precautions are being followed. Pt. has a seat belt in her W/C. No falls reported this week. Cg reports pt. is now not able to stand at all so transferring has become very difficult. CG aware how to reach [facility] for needs or concerns.

## 2022-02-25 ENCOUNTER — HOME CARE VISIT (OUTPATIENT)
Dept: HOSPICE | Facility: HOSPICE | Age: 87
End: 2022-02-25
Payer: MEDICARE

## 2022-02-25 PROCEDURE — 0651 HSPC ROUTINE HOME CARE

## 2022-02-26 PROCEDURE — 0651 HSPC ROUTINE HOME CARE

## 2022-02-27 PROCEDURE — 0651 HSPC ROUTINE HOME CARE

## 2022-02-28 PROCEDURE — 0651 HSPC ROUTINE HOME CARE

## 2022-03-01 ENCOUNTER — HOME CARE VISIT (OUTPATIENT)
Dept: SCHEDULING | Facility: HOME HEALTH | Age: 87
End: 2022-03-01
Payer: MEDICARE

## 2022-03-01 PROCEDURE — G0156 HHCP-SVS OF AIDE,EA 15 MIN: HCPCS

## 2022-03-01 PROCEDURE — 0651 HSPC ROUTINE HOME CARE

## 2022-03-02 PROCEDURE — 0651 HSPC ROUTINE HOME CARE

## 2022-03-03 ENCOUNTER — HOME CARE VISIT (OUTPATIENT)
Dept: SCHEDULING | Facility: HOME HEALTH | Age: 87
End: 2022-03-03
Payer: MEDICARE

## 2022-03-03 VITALS
SYSTOLIC BLOOD PRESSURE: 130 MMHG | DIASTOLIC BLOOD PRESSURE: 72 MMHG | RESPIRATION RATE: 21 BRPM | HEART RATE: 85 BPM | TEMPERATURE: 98.7 F

## 2022-03-03 PROCEDURE — 0651 HSPC ROUTINE HOME CARE

## 2022-03-03 PROCEDURE — G0299 HHS/HOSPICE OF RN EA 15 MIN: HCPCS

## 2022-03-03 PROCEDURE — HOSPICE MEDICATION HC HH HOSPICE MEDICATION

## 2022-03-03 NOTE — HOSPICE
Pt/ CG screened for COVID-19 Negative. SEE EV Vitaljhon WARNER's Pt. was sitting on the couch when I arrived for the . Pt. is not able to  with right hand or move arm on her own. Norco 5/325 mg. 1/2 tab PO q 4 hrs. pain but it makes her sleepy and constipates her. I did encourage use of Ativan 1 mg. Dose 1/2 tab q 6 hrs. prn anxiety, agitation. Arin Olguin gets anxious at times, she worries alot. Skin warm, dry, and intact. Pt. is still sleeping upstairs on the couch. CG reports pt is showered down in her apartment but otherwise stays upstairs. Pt. is continent of B/B. Wears pull-ups. CG did report pt. Bowels are moving better using more fluids and adding prunes. Senna dose is 2 tabs PO Bid. No issues with urination. Appetite/Fluid intake is good. Pt. eats at least 80% per CG. No coughing noted when she drinks liquids. Medications reviewed with Lakeshia. RFs ordered. Supplies needed: None. DME needed: None. Pt. face is sunken a little, Muscle wasting MAC score 17 cm. Kervin Underwood reports she continues to encourage her mom to move the right arm. Cg reports pt. gets very upset with crowds of people in the home, or the dogs barking alot. CG is using transfer belt. Safety precautions are being followed. Pt. has a seat belt in her W/C. No falls reported this week. Cg reports pt. is now not able to stand at all so transferring has become very difficult. CG aware how to reach [facility] for needs or concerns.

## 2022-03-04 PROCEDURE — 0651 HSPC ROUTINE HOME CARE

## 2022-03-05 PROCEDURE — 0651 HSPC ROUTINE HOME CARE

## 2022-03-06 PROCEDURE — 0651 HSPC ROUTINE HOME CARE

## 2022-03-07 PROCEDURE — 0651 HSPC ROUTINE HOME CARE

## 2022-03-08 ENCOUNTER — HOME CARE VISIT (OUTPATIENT)
Dept: SCHEDULING | Facility: HOME HEALTH | Age: 87
End: 2022-03-08
Payer: MEDICARE

## 2022-03-08 PROCEDURE — 0651 HSPC ROUTINE HOME CARE

## 2022-03-08 PROCEDURE — G0156 HHCP-SVS OF AIDE,EA 15 MIN: HCPCS

## 2022-03-09 PROCEDURE — 0651 HSPC ROUTINE HOME CARE

## 2022-03-10 ENCOUNTER — HOME CARE VISIT (OUTPATIENT)
Dept: SCHEDULING | Facility: HOME HEALTH | Age: 87
End: 2022-03-10
Payer: MEDICARE

## 2022-03-10 VITALS
TEMPERATURE: 98.9 F | SYSTOLIC BLOOD PRESSURE: 142 MMHG | DIASTOLIC BLOOD PRESSURE: 80 MMHG | RESPIRATION RATE: 21 BRPM | OXYGEN SATURATION: 94 % | HEART RATE: 88 BPM

## 2022-03-10 PROCEDURE — 0651 HSPC ROUTINE HOME CARE

## 2022-03-10 PROCEDURE — G0299 HHS/HOSPICE OF RN EA 15 MIN: HCPCS

## 2022-03-10 NOTE — HOSPICE
Pt/ CG screened for COVID-19 Negative. RN assessment. See EV Vitals WNL's. Pt. was sitting by the window in her w/c. Pt. is not able to  with right hand or move arm on her own. Norco 5/325 mg. 1/2 tab PO q 4 hrs. pain but it makes her sleepy and constipates her. I did encourage use of Ativan 1 mg. Dose 1/2 tab q 6 hrs. prn anxiety, agitation. Jasmin Ward gets anxious at times, she worries alot. Today Jasmin Ward was emotional as her son had been visiting and left this morning. Skin warm, dry, and intact. Pt. is still sleeping upstairs on the couch. Encouraged CG to obtain a  side rail if she will not let me order hospital bed. CG reports pt is showered down in her apartment but otherwise stays upstairs. Pt. is continent of B/B. Wears pull-ups. CG did report pt. bowels are moving better using more fluids and adding prunes. Senna dose is 2 tabs PO Bid. No issues with urination. Appetite/Fluid intake is good. Pt. eats at least 80% per CG. No coughing noted when she drinks liquids. Medications reviewed with Lakeshia. RFs ordered. Supplies ordered. DME needed: None. Pt. face is sunken a little, Muscle wasting MAC score 17 cm. Clista Reasons reports she continues to encourage her mom to move the right arm. CG is using transfer belt. Safety precautions are being followed. Pt. has a seat belt in her W/C. No falls reported this week. Cg reports pt. is now not able to stand at all so transferring has become very difficult. Discussed possible Respite stay for pt.at the end of June. CG in agreement with POC. CG aware how to reach [facility] for needs or concerns.

## 2022-03-11 PROCEDURE — 0651 HSPC ROUTINE HOME CARE

## 2022-03-12 PROCEDURE — 0651 HSPC ROUTINE HOME CARE

## 2022-03-13 PROCEDURE — 0651 HSPC ROUTINE HOME CARE

## 2022-03-14 PROCEDURE — 0651 HSPC ROUTINE HOME CARE

## 2022-03-15 ENCOUNTER — HOME CARE VISIT (OUTPATIENT)
Dept: SCHEDULING | Facility: HOME HEALTH | Age: 87
End: 2022-03-15
Payer: MEDICARE

## 2022-03-15 PROCEDURE — G0156 HHCP-SVS OF AIDE,EA 15 MIN: HCPCS

## 2022-03-15 PROCEDURE — 0651 HSPC ROUTINE HOME CARE

## 2022-03-16 PROCEDURE — 0651 HSPC ROUTINE HOME CARE

## 2022-03-17 ENCOUNTER — HOME CARE VISIT (OUTPATIENT)
Dept: HOSPICE | Facility: HOSPICE | Age: 87
End: 2022-03-17
Payer: MEDICARE

## 2022-03-17 ENCOUNTER — HOME CARE VISIT (OUTPATIENT)
Dept: SCHEDULING | Facility: HOME HEALTH | Age: 87
End: 2022-03-17
Payer: MEDICARE

## 2022-03-17 VITALS
OXYGEN SATURATION: 98 % | RESPIRATION RATE: 20 BRPM | TEMPERATURE: 98.9 F | SYSTOLIC BLOOD PRESSURE: 142 MMHG | HEART RATE: 83 BPM | DIASTOLIC BLOOD PRESSURE: 80 MMHG

## 2022-03-17 PROCEDURE — G0299 HHS/HOSPICE OF RN EA 15 MIN: HCPCS

## 2022-03-17 PROCEDURE — 0651 HSPC ROUTINE HOME CARE

## 2022-03-17 PROCEDURE — G0155 HHCP-SVS OF CSW,EA 15 MIN: HCPCS

## 2022-03-17 NOTE — HOSPICE
Pt/ CG screened for COVID-19 Negative. RN assessment. See EV Vitals WNL's. Pt sitting on couch when I arrived for HV. Pt. is not able to  with right hand or move arm on her own. Norco 5/325 mg. 1/2 tab PO q 4 hrs. pain but it makes her sleepy and constipates her. I did encourage use of Ativan 1 mg. Dose 1/2 tab q 6 hrs. prn anxiety, agitation. Evgeny Veras gets anxious at times, she worries alot. Skin warm, dry, and intact. Pt. is still sleeping upstairs on the couch. . Encouraged CG to obtain a side rail if she will not let me order hospital bed. CG reports pt is showered down in her apartment but otherwise stays upstairs. Pt. is continent of B/B. Wears pull-ups. CG did report pt. bowels are moving better using more fluids and adding prunes. Senna dose is 2 tabs PO Bid. No issues with urination. Appetite/Fluid intake is good. Pt. eats at least 80% per CG. No coughing noted when she drinks liquids. Medications reviewed with Lakeshia. RFs ordered. Supplies not needed. DME needed: None. Pt. face is sunken a little, Muscle wasting MAC score 17 cm. Gus Kellogg reports she continues to encourage her mom to move the right arm. CG is using transfer belt. Safety precautions are being followed. Pt. has a seat belt in her W/C. No falls reported this week. Cg reports pt. is now not able to stand at all so transferring has become very difficult. Discussed possible Respite stay for pt.at the end of June. MSW present for HV today. CG in agreement with POC. CG aware how to reach [facility] for needs or concerns.

## 2022-03-18 PROBLEM — K40.90 RIGHT INGUINAL HERNIA: Status: ACTIVE | Noted: 2017-08-10

## 2022-03-18 PROBLEM — I48.91 ATRIAL FIBRILLATION WITH RVR (HCC): Status: ACTIVE | Noted: 2020-12-31

## 2022-03-18 PROBLEM — A41.9 SEPSIS (HCC): Status: ACTIVE | Noted: 2021-03-11

## 2022-03-18 PROBLEM — Z86.73 HISTORY OF CVA (CEREBROVASCULAR ACCIDENT): Status: ACTIVE | Noted: 2017-07-20

## 2022-03-18 PROBLEM — I10 HTN (HYPERTENSION), BENIGN: Status: ACTIVE | Noted: 2017-07-20

## 2022-03-18 PROBLEM — R73.01 IFG (IMPAIRED FASTING GLUCOSE): Status: ACTIVE | Noted: 2017-07-20

## 2022-03-18 PROBLEM — J18.9 HCAP (HEALTHCARE-ASSOCIATED PNEUMONIA): Status: ACTIVE | Noted: 2021-03-11

## 2022-03-18 PROBLEM — I48.20 CHRONIC ATRIAL FIBRILLATION (HCC): Status: ACTIVE | Noted: 2017-07-20

## 2022-03-18 PROCEDURE — HOSPICE MEDICATION HC HH HOSPICE MEDICATION

## 2022-03-18 PROCEDURE — 0651 HSPC ROUTINE HOME CARE

## 2022-03-19 PROBLEM — M25.552 LEFT HIP PAIN: Status: ACTIVE | Noted: 2021-03-11

## 2022-03-19 PROBLEM — Z51.5 HOSPICE CARE PATIENT: Status: ACTIVE | Noted: 2021-03-24

## 2022-03-19 PROBLEM — Z98.890 S/P RIGHT INGUINAL HERNIA REPAIR: Status: ACTIVE | Noted: 2018-10-25

## 2022-03-19 PROBLEM — Z87.19 S/P RIGHT INGUINAL HERNIA REPAIR: Status: ACTIVE | Noted: 2018-10-25

## 2022-03-19 PROBLEM — I50.21 SYSTOLIC CHF, ACUTE (HCC): Status: ACTIVE | Noted: 2021-01-02

## 2022-03-19 PROBLEM — M15.9 PRIMARY OSTEOARTHRITIS INVOLVING MULTIPLE JOINTS: Status: ACTIVE | Noted: 2017-07-21

## 2022-03-19 PROBLEM — K21.00 GASTROESOPHAGEAL REFLUX DISEASE WITH ESOPHAGITIS: Status: ACTIVE | Noted: 2017-07-20

## 2022-03-19 PROBLEM — M81.0 AGE RELATED OSTEOPOROSIS: Status: ACTIVE | Noted: 2017-07-20

## 2022-03-19 PROBLEM — G93.41 METABOLIC ENCEPHALOPATHY: Status: ACTIVE | Noted: 2021-03-11

## 2022-03-19 PROBLEM — I21.4 NSTEMI (NON-ST ELEVATED MYOCARDIAL INFARCTION) (HCC): Status: ACTIVE | Noted: 2020-12-31

## 2022-03-19 PROBLEM — E03.9 HYPOTHYROIDISM, ADULT: Status: ACTIVE | Noted: 2017-07-20

## 2022-03-19 PROBLEM — S32.591A CLOSED FRACTURE OF RAMUS OF RIGHT PUBIS (HCC): Status: ACTIVE | Noted: 2021-03-16

## 2022-03-19 PROCEDURE — 0651 HSPC ROUTINE HOME CARE

## 2022-03-20 PROBLEM — M51.36 DDD (DEGENERATIVE DISC DISEASE), LUMBAR: Status: ACTIVE | Noted: 2017-07-20

## 2022-03-20 PROBLEM — H40.9 GLAUCOMA: Status: ACTIVE | Noted: 2017-07-20

## 2022-03-20 PROBLEM — E78.2 HYPERLIPIDEMIA, MIXED: Status: ACTIVE | Noted: 2017-07-20

## 2022-03-20 PROBLEM — I63.9 ACUTE ISCHEMIC STROKE (HCC): Status: ACTIVE | Noted: 2020-12-31

## 2022-03-20 PROCEDURE — 0651 HSPC ROUTINE HOME CARE

## 2022-03-20 NOTE — HOSPICE
DORA and RN visit joint. Dayron Rosales and Falun are preparing for a small trip to  their granddaughter. Yue is pleasant and talkative. DORA assited with transfer to the care. Dayron Rosales is having to help her sick  and her mom. She reports coping well. She has good support from her immediate family. Dayron Rosales denies any needs presently. DORA offered availability and emotional support.

## 2022-03-21 PROCEDURE — 0651 HSPC ROUTINE HOME CARE

## 2022-03-22 ENCOUNTER — HOME CARE VISIT (OUTPATIENT)
Dept: SCHEDULING | Facility: HOME HEALTH | Age: 87
End: 2022-03-22
Payer: MEDICARE

## 2022-03-22 PROCEDURE — G0156 HHCP-SVS OF AIDE,EA 15 MIN: HCPCS

## 2022-03-22 PROCEDURE — 0651 HSPC ROUTINE HOME CARE

## 2022-03-23 PROCEDURE — 0651 HSPC ROUTINE HOME CARE

## 2022-03-24 ENCOUNTER — HOME CARE VISIT (OUTPATIENT)
Dept: SCHEDULING | Facility: HOME HEALTH | Age: 87
End: 2022-03-24
Payer: MEDICARE

## 2022-03-24 VITALS
OXYGEN SATURATION: 96 % | SYSTOLIC BLOOD PRESSURE: 121 MMHG | TEMPERATURE: 98.5 F | RESPIRATION RATE: 20 BRPM | HEART RATE: 72 BPM | DIASTOLIC BLOOD PRESSURE: 74 MMHG

## 2022-03-24 PROCEDURE — 0651 HSPC ROUTINE HOME CARE

## 2022-03-24 PROCEDURE — G0299 HHS/HOSPICE OF RN EA 15 MIN: HCPCS

## 2022-03-24 PROCEDURE — G0156 HHCP-SVS OF AIDE,EA 15 MIN: HCPCS

## 2022-03-24 PROCEDURE — HOSPICE MEDICATION HC HH HOSPICE MEDICATION

## 2022-03-24 NOTE — HOSPICE
Pt/ CG screened for COVID-19 Negative. RN assessment. See EV Vitals WNL's. Pt sitting on couch when I arrived for . Pt. is not able to  with right hand or move arm on her own. Norco 5/325 mg. 1/2 tab PO q 4 hrs. pain but it makes her sleepy and constipates her. I did encourage use of Ativan 1 mg. Dose 1/2 tab q 6 hrs. prn anxiety, agitation. Aixa Juárez gets anxious at times, she worries alot. Skin warm, dry, and intact. Pt. is still sleeping upstairs on the couch but in a few weeks the plan is to order a hospital bed for pt. Mau Huerta reports family is coming to help take down dining room furniture. CG reports pt is showered down in her apartment but otherwise stays upstairs. Pt. is continent of B/B. Wears pull-ups. CG did report pt. bowels are moving better using more fluids and adding prunes. Senna dose is increased 3 tabs PO Bid. No issues with urination. Appetite/Fluid intake is good. Pt. eats at least 80% per CG. No coughing noted when she drinks liquids. Medications reviewed with Lakeshia. RFs ordered. Supplies not needed. DME needed: None. Pt. face is sunken a little, Muscle wasting MAC score 17 cm. Mau Huerta reports she continues to encourage her mom to move the right arm. CG is using transfer belt. Safety precautions are being followed. Pt. has a seat belt in her W/C. No falls reported this week. Cg reports pt. is now not able to stand at all so transferring has become very difficult. Discussed possible Respite stay for pt.at the end of 6/27/22 - 7/2/22. CG in agreement with POC. CG aware how to reach [facility] for needs or concerns.

## 2022-03-25 PROCEDURE — 0651 HSPC ROUTINE HOME CARE

## 2022-03-26 PROCEDURE — 0651 HSPC ROUTINE HOME CARE

## 2022-03-27 PROCEDURE — 0651 HSPC ROUTINE HOME CARE

## 2022-03-28 PROCEDURE — 0651 HSPC ROUTINE HOME CARE

## 2022-03-29 ENCOUNTER — HOME CARE VISIT (OUTPATIENT)
Dept: SCHEDULING | Facility: HOME HEALTH | Age: 87
End: 2022-03-29
Payer: MEDICARE

## 2022-03-29 PROCEDURE — 0651 HSPC ROUTINE HOME CARE

## 2022-03-29 PROCEDURE — G0156 HHCP-SVS OF AIDE,EA 15 MIN: HCPCS

## 2022-03-30 PROCEDURE — 0651 HSPC ROUTINE HOME CARE

## 2022-03-31 ENCOUNTER — HOME CARE VISIT (OUTPATIENT)
Dept: SCHEDULING | Facility: HOME HEALTH | Age: 87
End: 2022-03-31
Payer: MEDICARE

## 2022-03-31 ENCOUNTER — HOME CARE VISIT (OUTPATIENT)
Dept: HOSPICE | Facility: HOSPICE | Age: 87
End: 2022-03-31
Payer: MEDICARE

## 2022-03-31 VITALS
DIASTOLIC BLOOD PRESSURE: 73 MMHG | OXYGEN SATURATION: 95 % | HEART RATE: 80 BPM | TEMPERATURE: 98.2 F | RESPIRATION RATE: 21 BRPM | SYSTOLIC BLOOD PRESSURE: 124 MMHG

## 2022-03-31 PROCEDURE — 0651 HSPC ROUTINE HOME CARE

## 2022-03-31 PROCEDURE — G0299 HHS/HOSPICE OF RN EA 15 MIN: HCPCS

## 2022-03-31 NOTE — HOSPICE
Pt/ CG screened for COVID-19 Negative. RN assessment. See FAMILIA. Vitals WNL's. Pt sitting in w/c when I arrived for . Pt. is not able to  with right hand or move arm on her own. Norco 5/325 mg. 1/2 tab PO q 4 hrs. pain but it makes her sleepy and constipates her. I did encourage use of Ativan 1 mg. Dose 1/2 tab q 6 hrs. prn anxiety, agitation. Marcell Sen gets anxious at times, she worries alot. Skin warm, dry, and intact. Pt. is still sleeping upstairs on the couch but in a few weeks the plan is to order a hospital bed for pt. Rebecca Aguero reports family is coming to help take down dining room furniture. CG reports pt is showered down in her apartment but otherwise stays upstairs. Pt. is continent of B/B. Wears pull-ups. CG did report pt. bowels are moving better using more fluids and adding prunes. Senna dose is increased 3 tabs PO Bid. No issues with urination. Appetite/Fluid intake is good. Pt. eats at least 80% per CG. No coughing noted when she drinks liquids. Medications reviewed with Lakeshia. No RF needed. Supplies not needed. DME needed: None. Pt. face is sunken a little, Muscle wasting MAC score 17 cm. Rebecca Aguero reports she continues to encourage her mom to move the right arm. CG is using transfer belt. Safety precautions are being followed. Pt. has a seat belt in her W/C. No falls reported this week. Cg reports pt. is now not able to stand at all so transferring has become very difficult. Discussed possible Respite stay for pt.at the end of 6/27/22 - 7/2/22. CG in agreement with POC. CG aware how to reach [facility] for needs or concerns.

## 2022-04-01 PROCEDURE — 0651 HSPC ROUTINE HOME CARE

## 2022-04-02 PROCEDURE — 0651 HSPC ROUTINE HOME CARE

## 2022-04-03 PROCEDURE — 0651 HSPC ROUTINE HOME CARE

## 2022-04-04 PROCEDURE — 0651 HSPC ROUTINE HOME CARE

## 2022-04-05 ENCOUNTER — HOME CARE VISIT (OUTPATIENT)
Dept: HOSPICE | Facility: HOSPICE | Age: 87
End: 2022-04-05
Payer: MEDICARE

## 2022-04-05 PROCEDURE — 0651 HSPC ROUTINE HOME CARE

## 2022-04-06 PROCEDURE — 0651 HSPC ROUTINE HOME CARE

## 2022-04-07 ENCOUNTER — HOME CARE VISIT (OUTPATIENT)
Dept: SCHEDULING | Facility: HOME HEALTH | Age: 87
End: 2022-04-07
Payer: MEDICARE

## 2022-04-07 ENCOUNTER — HOME CARE VISIT (OUTPATIENT)
Dept: HOSPICE | Facility: HOSPICE | Age: 87
End: 2022-04-07
Payer: MEDICARE

## 2022-04-07 VITALS
RESPIRATION RATE: 20 BRPM | TEMPERATURE: 98.6 F | DIASTOLIC BLOOD PRESSURE: 77 MMHG | HEART RATE: 78 BPM | OXYGEN SATURATION: 99 % | SYSTOLIC BLOOD PRESSURE: 126 MMHG

## 2022-04-07 PROCEDURE — HOSPICE MEDICATION HC HH HOSPICE MEDICATION

## 2022-04-07 PROCEDURE — 0651 HSPC ROUTINE HOME CARE

## 2022-04-07 PROCEDURE — G0299 HHS/HOSPICE OF RN EA 15 MIN: HCPCS

## 2022-04-07 PROCEDURE — G0155 HHCP-SVS OF CSW,EA 15 MIN: HCPCS

## 2022-04-07 PROCEDURE — G0156 HHCP-SVS OF AIDE,EA 15 MIN: HCPCS

## 2022-04-07 NOTE — HOSPICE
Pt/ CG screened for COVID-19 Negative. RN assessment. See ROS. Vitals WNL's. Pt sitting on couch when MSW and  I arrived for HV. Pt. is not able to  with right hand or move arm on her own. Norco 5/325 mg. 1/2 tab PO q 4 hrs. pain but it makes her sleepy and constipates her. I did encourage use of Ativan 1 mg. Dose 1/2 tab q 6 hrs. prn anxiety, agitation. Trevon Basurto gets anxious at times, she worries alot. Skin warm, dry, and intact. Pt. is still sleeping on the couch but in a few weeks the plan is to order a hospital bed for pt. Elliot Rao reports family is coming to help take down dining room furniture. CG reports pt is showered down in her apartment but otherwise stays upstairs. Pt. is continent of B/B. Wears pull-ups. CG did report pt. bowels are moving better using more fluids and adding prunes. Senna dose is increased 3 tabs PO Bid. No issues with urination. Appetite/Fluid intake is good. Pt. eats at least 80% per CG. No coughing noted when she drinks liquids. Medications reviewed with Lakeshia. No RF needed. Supplies not needed. DME needed: None. Pt. face is sunken a little, Muscle wasting MAC score 17 cm. Elliot Rao reports she continues to encourage her mom to move the right arm. CG is using transfer belt. Safety precautions are being followed. Pt. has a seat belt in her W/C. No falls reported this week. Cg reports pt. is now not able to stand at all so transferring has become very difficult. Discussed possible Respite stay for pt.at the end of 6/27/22 - 7/2/22. MSW present for HV today. CG in agreement with POC. CG aware how to reach [facility] for needs or concerns.

## 2022-04-08 PROCEDURE — 0651 HSPC ROUTINE HOME CARE

## 2022-04-08 NOTE — HOSPICE
Faisal is sitting up on the sofa. Reagan Panchal is helping her  and coordinating things on the phone most of the visit. Her RN is present as well. Rodney Mills is expressive today. She gets animated so you can understand some of what she is trying to say. Lakeshia's  is struggling in his disease course. Multiple family members have been visiting him. Reagan Ansley is tired but the children are trying to assist with some things. Reagan Ansley denies any new needs for her mom. RN and SW offered emotional support and availability. She may call for a hospital bed in the next few weeks.

## 2022-04-09 PROCEDURE — 0651 HSPC ROUTINE HOME CARE

## 2022-04-10 PROCEDURE — 0651 HSPC ROUTINE HOME CARE

## 2022-04-11 PROCEDURE — 0651 HSPC ROUTINE HOME CARE

## 2022-04-12 ENCOUNTER — HOME CARE VISIT (OUTPATIENT)
Dept: SCHEDULING | Facility: HOME HEALTH | Age: 87
End: 2022-04-12
Payer: MEDICARE

## 2022-04-12 PROCEDURE — 0651 HSPC ROUTINE HOME CARE

## 2022-04-12 PROCEDURE — G0156 HHCP-SVS OF AIDE,EA 15 MIN: HCPCS

## 2022-04-13 PROCEDURE — 0651 HSPC ROUTINE HOME CARE

## 2022-04-14 ENCOUNTER — HOME CARE VISIT (OUTPATIENT)
Dept: SCHEDULING | Facility: HOME HEALTH | Age: 87
End: 2022-04-14
Payer: MEDICARE

## 2022-04-14 VITALS
TEMPERATURE: 98.2 F | DIASTOLIC BLOOD PRESSURE: 80 MMHG | RESPIRATION RATE: 20 BRPM | HEART RATE: 84 BPM | OXYGEN SATURATION: 99 % | SYSTOLIC BLOOD PRESSURE: 134 MMHG

## 2022-04-14 PROCEDURE — G0299 HHS/HOSPICE OF RN EA 15 MIN: HCPCS

## 2022-04-14 PROCEDURE — 0651 HSPC ROUTINE HOME CARE

## 2022-04-14 PROCEDURE — G0156 HHCP-SVS OF AIDE,EA 15 MIN: HCPCS

## 2022-04-14 NOTE — HOSPICE
Pt. sitting in her  w/c by the window when I arrvied for HV. RN assessment compketed. See EV Vitals WNL's. Pt. is not able to  with right hand or move arm on her own. Norco 5/325 mg. 1/2 tab PO q 4 hrs. pain but it makes her sleepy and constipates her. I did encourage use of Ativan 1 mg. Dose 1/2 tab q 6 hrs. prn anxiety, agitation. Myriam Hunter gets anxious at times, she worries alot. Skin warm, dry, and intact. Pt. is still sleeping on the couch, but in a few weeks the plan is to order a hospital bed for pt. Romero Schultz reports family is coming to help take down dining room furniture. CG reports pt is showered down in her apartment but otherwise stays upstairs. Pt. is continent of B/B. Wears pull-ups. CG did report pt. bowels are moving better using more fluids and adding prunes. Senna dose is increased 3 tabs PO Bid. No issues with urination. Appetite/Fluid intake is good. Pt. eats at least 80% per CG. No coughing noted when she drinks liquids. Medications reviewed with Lakeshia. No RF needed. Supplies not needed. DME needed: None. Pt. face is sunken a little, Muscle wasting MAC score 17 cm. Romero Schultz reports she continues to encourage her mom to move the right arm. CG is using transfer belt. Safety precautions are being followed. Pt. has a seat belt in her W/C. No falls reported this week. Cg reports pt. is now not able to stand at all so transferring has become very difficult. Plan are for  Respite stay for pt.at the end of 6/27/22 - 7/2/22. CG in agreement with POC. CG aware how to reach [facility] for needs or concerns.

## 2022-04-15 PROCEDURE — 0651 HSPC ROUTINE HOME CARE

## 2022-04-16 PROCEDURE — 0651 HSPC ROUTINE HOME CARE

## 2022-04-17 PROCEDURE — 0651 HSPC ROUTINE HOME CARE

## 2022-04-18 PROCEDURE — 0651 HSPC ROUTINE HOME CARE

## 2022-04-19 ENCOUNTER — HOME CARE VISIT (OUTPATIENT)
Dept: SCHEDULING | Facility: HOME HEALTH | Age: 87
End: 2022-04-19
Payer: MEDICARE

## 2022-04-19 PROCEDURE — 0651 HSPC ROUTINE HOME CARE

## 2022-04-19 PROCEDURE — G0156 HHCP-SVS OF AIDE,EA 15 MIN: HCPCS

## 2022-04-20 PROCEDURE — 0651 HSPC ROUTINE HOME CARE

## 2022-04-21 ENCOUNTER — HOME CARE VISIT (OUTPATIENT)
Dept: SCHEDULING | Facility: HOME HEALTH | Age: 87
End: 2022-04-21
Payer: MEDICARE

## 2022-04-21 VITALS
HEART RATE: 80 BPM | RESPIRATION RATE: 21 BRPM | OXYGEN SATURATION: 95 % | DIASTOLIC BLOOD PRESSURE: 84 MMHG | SYSTOLIC BLOOD PRESSURE: 137 MMHG | TEMPERATURE: 97.7 F

## 2022-04-21 PROCEDURE — G0299 HHS/HOSPICE OF RN EA 15 MIN: HCPCS

## 2022-04-21 PROCEDURE — HOSPICE MEDICATION HC HH HOSPICE MEDICATION

## 2022-04-21 PROCEDURE — G0156 HHCP-SVS OF AIDE,EA 15 MIN: HCPCS

## 2022-04-21 PROCEDURE — 0651 HSPC ROUTINE HOME CARE

## 2022-04-21 NOTE — HOSPICE
Pt. sitting in her w/c by the window when I arrvied for HV. RN assessment compketed. See EV Vitals WNL's. Pt. is not able to  with right hand or move arm on her own. It is becomes harder and harder for pt. to just move at all. Norco 5/325 mg. 1/2 tab PO q 4 hrs. pain but it makes her sleepy and constipates her. I did encourage use of Ativan 1 mg. Dose 1/2 tab q 6 hrs. prn anxiety, agitation. Prabhjot Levin gets anxious at times, she worries alot. Skin warm, dry, and intact. Pt. is still sleeping on the couch, but in a few weeks the plan is to order a hospital bed for pt. Grabiel Gregg reports family is coming to help take down dining room furniture. CG reports pt is showered down in her apartment but otherwise stays upstairs. Pt. is continent of B/B. Wears pull-ups. CG did report pt. bowels are moving better using more fluids and adding prunes. Senna dose is increased 3 tabs PO Bid. No issues with urination. Appetite/Fluid intake is good. Pt. eats at least 80% per CG. No coughing noted when she drinks liquids. Medications reviewed with Lakeshia. RF ordered. . Supplies ordered. . DME needed: None. Pt. face is sunken a little. Grabiel Gregg reports she continues to encourage her mom to move the right arm. CG is using transfer belt. Safety precautions are being followed. Pt. has a seat belt in her W/C. No falls reported this week. Cg reports pt. is now not able to stand at all so transferring has become very difficult. CG in agreement with POC. CG aware how to reach [facility] for needs or concerns.

## 2022-04-22 PROCEDURE — 0651 HSPC ROUTINE HOME CARE

## 2022-04-23 PROCEDURE — 0651 HSPC ROUTINE HOME CARE

## 2022-04-24 PROCEDURE — 0651 HSPC ROUTINE HOME CARE

## 2022-04-25 ENCOUNTER — HOME CARE VISIT (OUTPATIENT)
Dept: SCHEDULING | Facility: HOME HEALTH | Age: 87
End: 2022-04-25
Payer: MEDICARE

## 2022-04-25 PROCEDURE — G0156 HHCP-SVS OF AIDE,EA 15 MIN: HCPCS

## 2022-04-25 PROCEDURE — 0651 HSPC ROUTINE HOME CARE

## 2022-04-26 PROCEDURE — 0651 HSPC ROUTINE HOME CARE

## 2022-04-27 PROCEDURE — 0651 HSPC ROUTINE HOME CARE

## 2022-04-28 ENCOUNTER — HOME CARE VISIT (OUTPATIENT)
Dept: SCHEDULING | Facility: HOME HEALTH | Age: 87
End: 2022-04-28
Payer: MEDICARE

## 2022-04-28 PROCEDURE — 0651 HSPC ROUTINE HOME CARE

## 2022-04-28 PROCEDURE — G0156 HHCP-SVS OF AIDE,EA 15 MIN: HCPCS

## 2022-04-29 ENCOUNTER — HOME CARE VISIT (OUTPATIENT)
Dept: SCHEDULING | Facility: HOME HEALTH | Age: 87
End: 2022-04-29
Payer: MEDICARE

## 2022-04-29 VITALS — DIASTOLIC BLOOD PRESSURE: 80 MMHG | SYSTOLIC BLOOD PRESSURE: 120 MMHG | RESPIRATION RATE: 20 BRPM | HEART RATE: 88 BPM

## 2022-04-29 PROCEDURE — G0299 HHS/HOSPICE OF RN EA 15 MIN: HCPCS

## 2022-04-29 PROCEDURE — 0651 HSPC ROUTINE HOME CARE

## 2022-04-29 NOTE — HOSPICE
Pt. sitting in her w/c by the window when I arrvied for HV. RN assessment completed. See ROS. Vitals WNL's. Pt. is not able to  with right hand or move arm on her own. It is becomes harder and harder for pt. to just move at all. Norco 5/325 mg. 1/2 tab PO q 4 hrs. pain but it makes her sleepy and constipates her. She averages 2 doses day. I did encourage use of Ativan 1 mg. Dose 1/2 tab q 6 hrs. prn anxiety, agitation. Mukund Patricia gets anxious at times, she worries alot. Skin warm, dry,and intact. Pt. is still sleeping on the couch, but in a few weeks the plan is to order a hospital bed for pt. Cori Sandoval reports family is coming to help take down dining room furniture. CG reports pt is showered down in her apartment but otherwise stays upstairs. Pt. is continent of B/B. Wears pull-ups. CG did report pt. bowels are moving better using more fluids and adding prunes. Senna dose is increased 3 tabs PO Bid. No issues with urination. Appetite/Fluid intake is good. Pt. eats at least 80% per CG. No coughing noted when she drinks liquids. Medications reviewed with Lakeshia. No RF ordered. No Supplies ordered. DME needed: None. Pt. face is sunken a little. Cori Sandoval reports she continues to encourage her mom to move the right arm. CG is using transfer belt. Safety precautions are being followed. Pt. has a seat belt in her W/C. No falls reported this week. Cg reports pt. is now not able to stand at all so transferring has become very difficult. CG in agreement with POC. CG aware how to reach [facility] for needs or concerns.

## 2022-04-30 PROCEDURE — 0651 HSPC ROUTINE HOME CARE

## 2022-05-01 PROCEDURE — 0651 HSPC ROUTINE HOME CARE

## 2022-05-02 ENCOUNTER — HOME CARE VISIT (OUTPATIENT)
Dept: SCHEDULING | Facility: HOME HEALTH | Age: 87
End: 2022-05-02
Payer: MEDICARE

## 2022-05-02 PROCEDURE — G0156 HHCP-SVS OF AIDE,EA 15 MIN: HCPCS

## 2022-05-02 PROCEDURE — 0651 HSPC ROUTINE HOME CARE

## 2022-05-03 PROCEDURE — 0651 HSPC ROUTINE HOME CARE

## 2022-05-04 PROCEDURE — 0651 HSPC ROUTINE HOME CARE

## 2022-05-05 ENCOUNTER — HOME CARE VISIT (OUTPATIENT)
Dept: SCHEDULING | Facility: HOME HEALTH | Age: 87
End: 2022-05-05
Payer: MEDICARE

## 2022-05-05 ENCOUNTER — HOME CARE VISIT (OUTPATIENT)
Dept: HOSPICE | Facility: HOSPICE | Age: 87
End: 2022-05-05
Payer: MEDICARE

## 2022-05-05 VITALS
SYSTOLIC BLOOD PRESSURE: 118 MMHG | RESPIRATION RATE: 18 BRPM | DIASTOLIC BLOOD PRESSURE: 64 MMHG | OXYGEN SATURATION: 99 % | HEART RATE: 62 BPM | TEMPERATURE: 97.4 F

## 2022-05-05 PROCEDURE — G0299 HHS/HOSPICE OF RN EA 15 MIN: HCPCS

## 2022-05-05 PROCEDURE — G0156 HHCP-SVS OF AIDE,EA 15 MIN: HCPCS

## 2022-05-05 PROCEDURE — 0651 HSPC ROUTINE HOME CARE

## 2022-05-05 PROCEDURE — G0155 HHCP-SVS OF CSW,EA 15 MIN: HCPCS

## 2022-05-05 NOTE — HOSPICE
Pt. sitting in her w/c by the window when I arrvied for HV. RN assessment completed. See EV Vitals WNL's. Pt. is not able to  with right hand or move arm on her own. It is becomes harder and harder for pt. to just move at all. Norco 5/325 mg. 1/2 tab PO q 4 hrs. pain but it makes her sleepy and constipates her. She averages 2 doses day. I did encourage use of Ativan 1 mg. Dose 1/2 tab q 6 hrs. prn anxiety, agitation. Pasco Schlatter gets anxious at times, she worries alot. Skin warm, dry,and intact. Pt. is still sleeping on the couch, but hopefully in a few weeks the plan is to order a hospital bed for pt. Mariza Seo reports family is coming to help take down dining room furniture. CG reports pt is showered down in her apartment but otherwise stays upstairs. Pt. is continent of B/B. Wears pull-ups. CG did report pt. bowels are moving better using more fluids and adding prunes. Senna dose is increased 3 tabs PO Bid. No issues with urination. Appetite/Fluid intake is good. Pt. eats at least 80% per CG. No coughing noted when she drinks liquids. Medications reviewed with Lakeshia. Refills ordered. . No Supplies ordered. DME needed: None. Pt. face is sunken a little. Mariza Seo reports she continues to encourage her mom to move the right arm. CG is using transfer belt. Safety precautions are being followed. Pt. has a seat belt in her W/C. No falls reported this week. Cg reports pt. is now not able to stand at all so transferring has become very difficult. MSW present for HV today. I let CG know her Respite stay is scheduled. CG in agreement with POC. CG aware how to reach [address] for needs or concerns.

## 2022-05-06 PROCEDURE — 0651 HSPC ROUTINE HOME CARE

## 2022-05-06 PROCEDURE — HOSPICE MEDICATION HC HH HOSPICE MEDICATION

## 2022-05-07 PROCEDURE — 0651 HSPC ROUTINE HOME CARE

## 2022-05-07 NOTE — HOSPICE
Jung Deutsch is sitting up in her wheelchair looking outside her window as usual.  She is talkative today and fairly cheerful. She has gotten used to her [facility] team now. Jerome Dukes is busy in the home. She is grieving as her spouse is being admitted to [facility] tomorrow with his terminal cancer. They have had multiple visitors. His two daughters and several other relatives. Jung Deutsch seems to be doing fairly well presently. DORA sat with her an dprovided active listening. Jerome Dukes is worried about the admission tomorrow.   SW and RN provided emotional support and offered availability

## 2022-05-08 PROCEDURE — 0651 HSPC ROUTINE HOME CARE

## 2022-05-09 ENCOUNTER — HOME CARE VISIT (OUTPATIENT)
Dept: SCHEDULING | Facility: HOME HEALTH | Age: 87
End: 2022-05-09
Payer: MEDICARE

## 2022-05-09 PROCEDURE — 0651 HSPC ROUTINE HOME CARE

## 2022-05-09 PROCEDURE — G0156 HHCP-SVS OF AIDE,EA 15 MIN: HCPCS

## 2022-05-10 PROCEDURE — 0651 HSPC ROUTINE HOME CARE

## 2022-05-11 PROCEDURE — 0651 HSPC ROUTINE HOME CARE

## 2022-05-12 ENCOUNTER — HOME CARE VISIT (OUTPATIENT)
Dept: SCHEDULING | Facility: HOME HEALTH | Age: 87
End: 2022-05-12
Payer: MEDICARE

## 2022-05-12 PROCEDURE — 0651 HSPC ROUTINE HOME CARE

## 2022-05-12 PROCEDURE — G0156 HHCP-SVS OF AIDE,EA 15 MIN: HCPCS

## 2022-05-13 ENCOUNTER — HOME CARE VISIT (OUTPATIENT)
Dept: SCHEDULING | Facility: HOME HEALTH | Age: 87
End: 2022-05-13
Payer: MEDICARE

## 2022-05-13 VITALS
TEMPERATURE: 97.9 F | RESPIRATION RATE: 22 BRPM | HEART RATE: 76 BPM | SYSTOLIC BLOOD PRESSURE: 132 MMHG | DIASTOLIC BLOOD PRESSURE: 74 MMHG

## 2022-05-13 PROCEDURE — G0299 HHS/HOSPICE OF RN EA 15 MIN: HCPCS

## 2022-05-13 PROCEDURE — 0651 HSPC ROUTINE HOME CARE

## 2022-05-13 NOTE — HOSPICE
Arrived to find Ms. Joseph sitting up in wheelchair at window. Moved to the couch by daughter, Romero Schultz for assessment. Alert. Chatters to communicate. Nonsense to this RN, but daughter knows what pt is trying to communicate. Skin W/D/I. Heart rhythm irreg. grade 1/6 NADIRA. Abd. soft nontender with normoactive bowel sounds. last BM this AM. Resp even and nonlabored, breath sounds clear; diminished bibasilar. No needs expressed by daughter, Romero Schultz. Verbalized knowledge to call [address] with any needs or concerns.  See ROS, VS WNL

## 2022-05-14 PROCEDURE — 0651 HSPC ROUTINE HOME CARE

## 2022-05-15 PROCEDURE — 0651 HSPC ROUTINE HOME CARE

## 2022-05-16 ENCOUNTER — HOSPICE ADMISSION (OUTPATIENT)
Dept: HOSPICE | Facility: HOSPICE | Age: 87
End: 2022-05-16
Payer: MEDICARE

## 2022-05-16 ENCOUNTER — HOME CARE VISIT (OUTPATIENT)
Dept: SCHEDULING | Facility: HOME HEALTH | Age: 87
End: 2022-05-16
Payer: MEDICARE

## 2022-05-16 PROCEDURE — 0651 HSPC ROUTINE HOME CARE

## 2022-05-16 PROCEDURE — G0156 HHCP-SVS OF AIDE,EA 15 MIN: HCPCS

## 2022-05-17 PROCEDURE — 0651 HSPC ROUTINE HOME CARE

## 2022-05-18 PROCEDURE — 0651 HSPC ROUTINE HOME CARE

## 2022-05-19 ENCOUNTER — HOME CARE VISIT (OUTPATIENT)
Dept: SCHEDULING | Facility: HOME HEALTH | Age: 87
End: 2022-05-19
Payer: MEDICARE

## 2022-05-19 VITALS
SYSTOLIC BLOOD PRESSURE: 140 MMHG | TEMPERATURE: 98.6 F | HEART RATE: 96 BPM | DIASTOLIC BLOOD PRESSURE: 79 MMHG | RESPIRATION RATE: 12 BRPM

## 2022-05-19 PROCEDURE — HOSPICE MEDICATION HC HH HOSPICE MEDICATION

## 2022-05-19 PROCEDURE — G0156 HHCP-SVS OF AIDE,EA 15 MIN: HCPCS

## 2022-05-19 PROCEDURE — 0651 HSPC ROUTINE HOME CARE

## 2022-05-19 PROCEDURE — G0299 HHS/HOSPICE OF RN EA 15 MIN: HCPCS

## 2022-05-19 NOTE — HOSPICE
Met at door by daughter Elliot Rao. Patient sitting on couch. Noticeable bagginess to clothing and sunkeness to cheeks. Elliot Rao states the patient has a healthy appetite so may be chronic. Santos Agustin speaking basic Beninese with Elliot Rao but responds to nurse with repeated syllable. Able to transfer with max assist to wheelchair to use commode. Nikky comfortable on couch and Elliot Rao well caring for mother's needs. Elliot Rao does state that Santos Agustin seems aware and somber at changes in Colten's health, despite medication to help with depression. Family enquired about how long nurse can stay. Gave general information about synergy time allocated and to speak with Boubacar Cuello in social work. Medications and supplies ordered. No DME needs noted. Family reminded to call [facility] with any needs concerns or changes.

## 2022-05-20 PROCEDURE — 0651 HSPC ROUTINE HOME CARE

## 2022-05-21 PROCEDURE — 0651 HSPC ROUTINE HOME CARE

## 2022-05-22 PROCEDURE — 0651 HSPC ROUTINE HOME CARE

## 2022-05-23 PROCEDURE — 0651 HSPC ROUTINE HOME CARE

## 2022-05-24 ENCOUNTER — HOME CARE VISIT (OUTPATIENT)
Dept: SCHEDULING | Facility: HOME HEALTH | Age: 87
End: 2022-05-24
Payer: MEDICARE

## 2022-05-24 PROCEDURE — G0156 HHCP-SVS OF AIDE,EA 15 MIN: HCPCS

## 2022-05-24 PROCEDURE — 0651 HSPC ROUTINE HOME CARE

## 2022-05-25 ENCOUNTER — HOME CARE VISIT (OUTPATIENT)
Dept: SCHEDULING | Facility: HOME HEALTH | Age: 87
End: 2022-05-25
Payer: MEDICARE

## 2022-05-25 VITALS
OXYGEN SATURATION: 97 % | TEMPERATURE: 97.1 F | HEART RATE: 97 BPM | SYSTOLIC BLOOD PRESSURE: 147 MMHG | DIASTOLIC BLOOD PRESSURE: 80 MMHG

## 2022-05-25 PROCEDURE — G0299 HHS/HOSPICE OF RN EA 15 MIN: HCPCS

## 2022-05-25 PROCEDURE — 0651 HSPC ROUTINE HOME CARE

## 2022-05-25 NOTE — HOSPICE
SN Comprehensive visit completed. Pt. sitting in her w/c at the window Alert. speech garbled. Nonsense to this RN, but daughter knows what pt is trying to communicate. Skin W/D/I. Heart rhythm irreg. HR 97  Abd. soft nontender with normoactive bowel sounds. last BM this 22. Resp even and nonlabored, breath sounds clear; diminished in bases. Romero Schultz grateful for assistnce to have pt. come in for Urgent Respite so daughter can attend spouses  in [place]. Financial form  completed and emailed to Jackson Okeefe RN Supervisor. Transport to  at 10 AM tomorrow. I reviewed meds with Romero Schultz that need to accompany pt. Verbalized knowledge to call [facility] with any needs or concerns. See GUNJAN BARBOSA WNHEVER.  Called Report to [facility]

## 2022-05-26 ENCOUNTER — HOME CARE VISIT (OUTPATIENT)
Dept: HOSPICE | Facility: HOSPICE | Age: 87
End: 2022-05-26
Payer: MEDICARE

## 2022-05-26 ENCOUNTER — HOSPITAL ENCOUNTER (INPATIENT)
Age: 87
LOS: 3 days | Discharge: HOSPICE | End: 2022-05-29
Attending: INTERNAL MEDICINE | Admitting: INTERNAL MEDICINE

## 2022-05-26 PROBLEM — I69.30 SEQUELAE, POST-STROKE: Status: ACTIVE | Noted: 2022-05-26

## 2022-05-26 PROCEDURE — 0651 HSPC ROUTINE HOME CARE

## 2022-05-26 PROCEDURE — 0655 HSPC INPATIENT RESPITE

## 2022-05-26 PROCEDURE — 74011250637 HC RX REV CODE- 250/637: Performed by: NURSE PRACTITIONER

## 2022-05-26 RX ORDER — ACETAMINOPHEN 325 MG/1
500 TABLET ORAL
Status: DISCONTINUED | OUTPATIENT
Start: 2022-05-26 | End: 2022-05-29 | Stop reason: HOSPADM

## 2022-05-26 RX ORDER — DORZOLAMIDE HYDROCHLORIDE AND TIMOLOL MALEATE 20; 5 MG/ML; MG/ML
1 SOLUTION/ DROPS OPHTHALMIC 2 TIMES DAILY
COMMUNITY

## 2022-05-26 RX ORDER — SENNOSIDES 8.6 MG/1
3 TABLET ORAL 2 TIMES DAILY
Status: DISCONTINUED | OUTPATIENT
Start: 2022-05-26 | End: 2022-05-29 | Stop reason: HOSPADM

## 2022-05-26 RX ORDER — GABAPENTIN 300 MG/1
300 CAPSULE ORAL 3 TIMES DAILY
Status: DISCONTINUED | OUTPATIENT
Start: 2022-05-26 | End: 2022-05-29 | Stop reason: HOSPADM

## 2022-05-26 RX ORDER — HYDROCODONE BITARTRATE AND ACETAMINOPHEN 5; 325 MG/1; MG/1
0.5 TABLET ORAL
Status: DISCONTINUED | OUTPATIENT
Start: 2022-05-26 | End: 2022-05-29 | Stop reason: HOSPADM

## 2022-05-26 RX ORDER — DORZOLAMIDE HYDROCHLORIDE AND TIMOLOL MALEATE 20; 5 MG/ML; MG/ML
1 SOLUTION/ DROPS OPHTHALMIC 2 TIMES DAILY
Status: DISCONTINUED | OUTPATIENT
Start: 2022-05-26 | End: 2022-05-27

## 2022-05-26 RX ORDER — LEVOTHYROXINE SODIUM 75 UG/1
75 TABLET ORAL
Status: DISCONTINUED | OUTPATIENT
Start: 2022-05-27 | End: 2022-05-27

## 2022-05-26 RX ORDER — IBUPROFEN 200 MG
400 TABLET ORAL
Status: DISCONTINUED | OUTPATIENT
Start: 2022-05-26 | End: 2022-05-29 | Stop reason: HOSPADM

## 2022-05-26 RX ORDER — ACETAMINOPHEN 325 MG/1
650 TABLET ORAL
Status: DISCONTINUED | OUTPATIENT
Start: 2022-05-26 | End: 2022-05-29 | Stop reason: HOSPADM

## 2022-05-26 RX ORDER — PANTOPRAZOLE SODIUM 40 MG/1
40 TABLET, DELAYED RELEASE ORAL
Status: DISCONTINUED | OUTPATIENT
Start: 2022-05-27 | End: 2022-05-29 | Stop reason: HOSPADM

## 2022-05-26 RX ORDER — CITALOPRAM 20 MG/1
40 TABLET, FILM COATED ORAL DAILY
Status: DISCONTINUED | OUTPATIENT
Start: 2022-05-27 | End: 2022-05-29 | Stop reason: HOSPADM

## 2022-05-26 RX ORDER — DILTIAZEM HYDROCHLORIDE 120 MG/1
120 CAPSULE, COATED, EXTENDED RELEASE ORAL DAILY
Status: DISCONTINUED | OUTPATIENT
Start: 2022-05-27 | End: 2022-05-29 | Stop reason: HOSPADM

## 2022-05-26 RX ORDER — BENZONATATE 100 MG/1
100 CAPSULE ORAL
Status: DISCONTINUED | OUTPATIENT
Start: 2022-05-26 | End: 2022-05-29 | Stop reason: HOSPADM

## 2022-05-26 RX ORDER — HYOSCYAMINE SULFATE 0.12 MG/1
0.12 TABLET SUBLINGUAL
Status: DISCONTINUED | OUTPATIENT
Start: 2022-05-26 | End: 2022-05-29 | Stop reason: HOSPADM

## 2022-05-26 RX ORDER — LORAZEPAM 0.5 MG/1
0.5 TABLET ORAL
Status: DISCONTINUED | OUTPATIENT
Start: 2022-05-26 | End: 2022-05-29 | Stop reason: HOSPADM

## 2022-05-26 RX ADMIN — ACETAMINOPHEN 650 MG: 325 TABLET ORAL at 21:11

## 2022-05-26 RX ADMIN — GABAPENTIN 300 MG: 300 CAPSULE ORAL at 21:12

## 2022-05-26 RX ADMIN — HYDROCODONE BITARTRATE AND ACETAMINOPHEN 0.5 TABLET: 5; 325 TABLET ORAL at 22:13

## 2022-05-26 RX ADMIN — SENNOSIDES 25.8 MG: 8.6 TABLET, FILM COATED ORAL at 18:11

## 2022-05-26 RX ADMIN — GABAPENTIN 300 MG: 300 CAPSULE ORAL at 16:16

## 2022-05-26 NOTE — PROGRESS NOTES
1108 Pt arrived by EMS for Respite stay through  at 1pm. Pt is on [address] program and her caregiver is her daughter Laverna Jeans  passed away and she is out of town for his . Pt has hospice dx of CVA and has right sided weakness. Pt is mostly bed/chair bound but daughter gets her to commode with assistance. Pt was transferred from stretcher to bed with maximum assistance. Pt is non verbal but does mumble incomprehensible sounds. Visual chart is used for needs. Pt is able to eat and drink but not independently , she needs to be fed. Pt wears a pull up. HR regular, breath sounds clear, positive bowel sounds. 1215 CNA at bedside assisting pt with lunch. No needs at this time. 1500 Pt is sitting in chair, mumbling and trying to get out of chair. Pt assisted with two staff assist to [facility] where she sat for ten minutes but did not void or have a BM. Pt assisted back to chair. Tab alert in place and chair reclined. TV is on. Continues to mumble incomprehensible words. Chair was moved to be in view at nursing desk. Safety measures in place with tab alert in place and door open for continued monitoring. 1616 Scheduled meds of Gabapentin 300 mg given with applesauce, pt took without difficulty. Pt in qiana chair watching TV. Pt in view of nursing desk. Tab alert in place. 435 [facility] Pt's son Zelda Glez called to check on pt. Son states he will be home on  when pt is D/Cd home. He also asked that we tell pt that he called and was asking for her. 1700 Pt assisted back to bed by CNA. All safety measures in place. TV is on. Pt is resting with FLACC 0/10. Door remains open for continued monitoring. 181 Scheduled Senna given per orders, pt reluctant to take meds and have arm band scanned. Pt unable to make her needs known verbally, much mumbling and incomprehensible sounds. Pt in bed watching TV. Safety measures in place. Door remains open for continued monitoring.      Report given to Gigi Harper Cindy Hood RN.

## 2022-05-26 NOTE — PROGRESS NOTES
1900 Received bedside report from  Yfnmumtaz Borjas, visual identification made. Patient admitted on 5/26 for respite. Patient lying in bed sleeping. No signs of distress or discomfort noted. FLACC 0/10. She is on room air, incontinent of bowel and bladder. Plan of care reviewed with CNA. Safety measures in place including bed in low and locked position. Tab alarms in place. Door open for continuous monitoring. [address] [place] [address] plan is for patient to remain until end of respite stay. 2030 Patient observed sitting up at side of bed. Patient with urine soaked pull-up and pants. Patient assisted to [facility], then assisted back to bed. Patient positioned in bed with pillows for comfort. FLACC 0.     2210 Patient observed on rounds moaning, sitting up in and kicking legs. FLACC 4/10. Norco po given for pain. 2300 Patient lying in bed sleeping. No signs of agitation noted. FLACC 0/10. PRN medication effective. 0400 Patient lying in bed sleeping. No signs of distress or discomfort noted. FLACC 0/10. Safety measures in place. Door open for continuous monitoring.     6881 Patient resting quietly in bed. FLACC 0. No symptom management needs at this time.      Report given to on-coming nurse

## 2022-05-26 NOTE — PROGRESS NOTES
Problem: Potential for Alteration in Skin Integrity  Goal: Monitor skin for areas of alteration in skin integrity  Description: Patient/family/caregiver will demonstrate ability to care for patient's skin, monitor for areas of breakdown, and demonstrate methods to prevent breakdown during hospice care. Note:   Patient Vernadine Klinefelter will remain free from alterations in skin integrity AEB absence of impaired skin during assessment each shift during inpatient hospice stay. Problem: Alteration in Mobility  Goal: Remain as independent as possible and remain safe in environment  Description: Patient will remain as independent as possible and remain safe in their environment. Note: Pt will use staff assistance when getting from bed to chair and to [facility]. Gripper socks, tab alert, call bell. Door open for monitoring from staff. Problem: Anxiety/Agitation  Goal: Verbalize or staff assess the ability to manage anxiety  Description: The patient/family/caregiver will verbalize and demonstrate ability to manage the patient's anxiety throughout hospice care. Note:   Patient Vernadine Klinefelter will demonstrate appropriate motor behavior AEB less than 2 episodes of fidgety, picking or pulling at clothes on devices, yelling out, getting out of bed, etc. each shift during inpatient hospice stay. Problem: Communication Deficit  Goal: Effectively communicate symptoms, needs, and concerns  Description: Patient/family/caregiver will effectively communicate symptoms, needs and concerns. Note: Patient Vernadine Klinefelter has potential a communication deficit r/t decreased cognition, vision, hearing, or language barrier aeb inability to communicate verbally or non-verbally. Pt will be kept comfortable despite ability to convey implied needs while admitted at the [address] [place] [address]. Communication pictorial at pt bedside.      Problem: Discharge Planning  Goal: *Participates in discharge planning  Note: Patient Vernadine Klinefelter Pt, and pt's family/ care giver will receive support from community resources through the support of SW such as SNF placements, SW calling facilities for the family, updating the family/care giver on facilities, and sending out referrals for pt and family/ care giver.

## 2022-05-26 NOTE — PROGRESS NOTES
DORA visited with Art Hassan upon her arrival.  Her dtr is traveling to [place] to her 's . Transport is set for 1;pm  to return home as her son will be arriving from out of town to provide for her care. Jenny Shi will not return home for a few more days.

## 2022-05-27 PROCEDURE — G0299 HHS/HOSPICE OF RN EA 15 MIN: HCPCS

## 2022-05-27 PROCEDURE — 0655 HSPC INPATIENT RESPITE

## 2022-05-27 PROCEDURE — 74011250637 HC RX REV CODE- 250/637: Performed by: NURSE PRACTITIONER

## 2022-05-27 RX ORDER — LEVOTHYROXINE SODIUM 75 UG/1
75 TABLET ORAL
Status: DISCONTINUED | OUTPATIENT
Start: 2022-05-28 | End: 2022-05-29 | Stop reason: HOSPADM

## 2022-05-27 RX ADMIN — GABAPENTIN 300 MG: 300 CAPSULE ORAL at 22:02

## 2022-05-27 RX ADMIN — PANTOPRAZOLE SODIUM 40 MG: 40 TABLET, DELAYED RELEASE ORAL at 10:53

## 2022-05-27 RX ADMIN — Medication 120 MG: at 10:53

## 2022-05-27 RX ADMIN — HYDROCODONE BITARTRATE AND ACETAMINOPHEN 0.5 TABLET: 5; 325 TABLET ORAL at 22:02

## 2022-05-27 RX ADMIN — GABAPENTIN 300 MG: 300 CAPSULE ORAL at 10:53

## 2022-05-27 RX ADMIN — RIVAROXABAN 15 MG: 15 TABLET, FILM COATED ORAL at 10:56

## 2022-05-27 RX ADMIN — LORAZEPAM 0.5 MG: 0.5 TABLET ORAL at 13:58

## 2022-05-27 RX ADMIN — CITALOPRAM HYDROBROMIDE 40 MG: 20 TABLET ORAL at 10:52

## 2022-05-27 RX ADMIN — LEVOTHYROXINE SODIUM 75 MCG: 75 TABLET ORAL at 10:55

## 2022-05-27 RX ADMIN — LORAZEPAM 0.5 MG: 0.5 TABLET ORAL at 22:02

## 2022-05-27 RX ADMIN — GABAPENTIN 300 MG: 300 CAPSULE ORAL at 16:24

## 2022-05-27 NOTE — PROGRESS NOTES
Problem: Falls - Risk of  Goal: *Absence of Falls  Description: Patient Heath Mercer will remain free from falls AEB no injuries r/t falls each shift during inpatient hospice stay. 5/27/2022 0738 by Anton Sanchez RN  Outcome: Progressing Towards Goal  Note: Fall Risk Interventions:  Mobility Interventions: Bed/chair exit alarm    Mentation Interventions: Adequate sleep, hydration, pain control,Bed/chair exit alarm,Door open when patient unattended,Reorient patient,Room close to nurse's station,Update white board    Medication Interventions: Bed/chair exit alarm    Elimination Interventions: Bed/chair exit alarm,Call light in reach           5/27/2022 0736 by Anton Sanchez RN  Note: Fall Risk Interventions:  Mobility Interventions: Bed/chair exit alarm    Mentation Interventions: Adequate sleep, hydration, pain control,Bed/chair exit alarm,Door open when patient unattended,Reorient patient,Room close to nurse's station,Update white board    Medication Interventions: Bed/chair exit alarm    Elimination Interventions: Bed/chair exit alarm,Call light in reach              Problem: Patient Education: Go to Patient Education Activity  Goal: Patient/Family Education  Description: Patient Heath Mercer and or family/ caregiver will receive education on Fall prevention AEB verbalizing recall of using of the call lights system, fall prevention devices, and asking for help during admission process and ongoing as needed during inpatient hospice stay. Outcome: Resolved/Met     Problem: Potential for Alteration in Skin Integrity  Goal: Monitor skin for areas of alteration in skin integrity  Description: Patient/family/caregiver will demonstrate ability to care for patient's skin, monitor for areas of breakdown, and demonstrate methods to prevent breakdown during hospice care.   Outcome: Progressing Towards Goal     Problem: Alteration in Mobility  Goal: Remain as independent as possible and remain safe in environment  Description: Patient will remain as independent as possible and remain safe in their environment. Outcome: Progressing Towards Goal     Problem: Discharge Planning  Goal: *Participates in discharge planning  Description: Patient Markus Chan Pt, and pt's family/ care giver will receive support from community resources through the support of SW such as SNF placements, SW calling facilities for the family, updating the family/care giver on facilities, and sending out referrals for pt and family/ care giver.      Outcome: Progressing Towards Goal

## 2022-05-27 NOTE — DISCHARGE SUMMARY
Discharge Summary     Patient: Arturo Oro MRN: [id number]  SSN: [id number]    YOB: 1931  Age: 80 y.o.   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]    Discharge Diagnoses:   Problem List as of 5/27/2022 Date Reviewed: 5/27/2022          Codes Class Noted - Resolved    * (Principal) Sequelae, post-stroke ICD-10-CM: I69.30  ICD-9-CM: 438.9  5/26/2022 - Present        Hospice care patient ICD-10-CM: Z51.5  ICD-9-CM: V66.7  3/24/2021 - Present        Closed fracture of ramus of right pubis ([facility] ICD-10-CM: V19.861L  ICD-9-CM: 808.2  3/16/2021 - Present        Metabolic encephalopathy [address]: G93.41  ICD-9-CM: 348.31  3/11/2021 - Present        Left hip pain ICD-10-CM: M25.552  ICD-9-CM: 719.45  3/11/2021 - Present        Sepsis ([facility] ICD-10-CM: A41.9  ICD-9-CM: 038.9, 995.91  3/11/2021 - Present        HCAP (healthcare-associated pneumonia) ICD-10-CM: J18.9  ICD-9-CM: 706  3/11/2021 - Present        Systolic CHF, acute ([facility] ICD-10-CM: I50.21  ICD-9-CM: 428.21, 428.0  1/2/2021 - Present        Acute ischemic stroke ([facility] ICD-10-CM: I63.9  ICD-9-CM: 434.91  12/31/2020 - Present        Primary osteoarthritis involving multiple joints (Chronic) ICD-10-CM: M15.9  ICD-9-CM: 715.98  7/21/2017 - Present        Chronic atrial fibrillation ([facility] (Chronic) ICD-10-CM: I48.20  ICD-9-CM: 427.31  7/20/2017 - Present        HTN (hypertension), benign (Chronic) ICD-10-CM: I10  ICD-9-CM: 401.1  7/20/2017 - Present        Hyperlipidemia, mixed (Chronic) ICD-10-CM: E78.2  ICD-9-CM: 272.2  7/20/2017 - Present        Gastroesophageal reflux disease with esophagitis (Chronic) ICD-10-CM: K21.00  ICD-9-CM: 530.11  7/20/2017 - Present        Hypothyroidism, adult (Chronic) ICD-10-CM: E03.9  ICD-9-CM: 244.9  7/20/2017 - Present        History of CVA (cerebrovascular accident) (Chronic) ICD-10-CM: T57.62  ICD-9-CM: V12.54  7/20/2017 - Present    Overview Signed 7/20/2017 11:58 AM by Lulú SHERWOOD DO     2014             DDD (degenerative disc disease), lumbar ICD-10-CM: M51.36  ICD-9-CM: 722.52  7/20/2017 - Present        Glaucoma ICD-10-CM: H40.9  ICD-9-CM: 365.9  7/20/2017 - Present               Discharge Condition: LEA Meza Course: Discharge from: Respite stay at [facility] to home with [facility]. Discharge 5/29/22 at 1300 via ambulance. Status at time of discharge is routine. Consults: None    Significant Diagnostic Studies: None    Disposition: home with [facility]    Discharge Medications:   Current Discharge Medication List      CONTINUE these medications which have NOT CHANGED    Details   dorzolamide-timoloL (COSOPT) 22.3-6.8 mg/mL ophthalmic solution Administer 1 Drop to both eyes two (2) times a day. senna (Senna) 8.6 mg tablet Take 3 Tablets by mouth two (2) times a day. Indications: constipation      citalopram (CELEXA) 40 mg tablet Take 40 mg by mouth daily. Indications: anxiousness associated with depression, major depressive disorder      dilTIAZem ER (CARDIZEM CD) 120 mg capsule Take 120 mg by mouth daily. Per MD Capsule maybe opened and put in food ,but not crushed,  Indications: high blood pressure, ventricular rate control in atrial fibrillation      gabapentin (NEURONTIN) 300 mg capsule Take 300 mg by mouth three (3) times daily. Previous order given  by Dr. Ana Gloria on 5/26/21 to titrate dose to 300mg TID. 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 Indications: gastroesophageal reflux disease      rivaroxaban (XARELTO) 15 mg tab tablet Take 15 mg by mouth daily. Indications: treatment to prevent blood clots in chronic atrial fibrillation      levothyroxine (SYNTHROID) 75 mcg tablet Take 75 mcg by mouth Daily (before breakfast).  Indications: a condition with low thyroid hormone levels      bimatoprost (Lumigan) 0.01 % ophthalmic drops Administer 1 Drop to both eyes nightly. Indications: wide-angle glaucoma      LORazepam (ATIVAN) 1 mg tablet Take 0.5 mg by mouth every six (6) hours as needed for Anxiety (Agitation or nausea). benzonatate (TESSALON) 100 mg capsule Take 100 mg by mouth three (3) times daily as needed for Cough. Indications: cough      HYDROcodone-acetaminophen (NORCO) 5-325 mg per tablet Take 0.5 Tablets by mouth every four (4) hours as needed for Pain. zinc oxide-white petrolatum (VUSION) topical cream Apply 1 g to affected area as needed for Skin Irritation. honey 100 % gel Apply 1 Inch to affected area as needed for Other (Wound or skin tears. ). Currently pt, has no open areas being treated but has Honey Gel in the home,      acetaminophen (TYLENOL) 325 mg tablet Administer Tylenol 650 mg ( two 325 mg tabs) every HS for pain. Dose can be repeated X 1 after 4 hours if pt not experiencing relief / comfort. Can be crushed for ease of swallowing. Indications: pain      acetaminophen (TYLENOL) 650 mg suppository Insert 650 mg into rectum every six (6) hours as needed for Fever or Pain. COMFORT KIT  Indications: mild pain or fever       prochlorperazine (Compazine) 10 mg tablet Take 10 mg by mouth every six (6) hours as needed for Nausea or Vomiting. COMFORT KIT   Indications: nausea and vomiting      hyoscyamine SL (LEVSIN/SL) 0.125 mg SL tablet 0.125 mg by SubLINGual route every four (4) hours as needed for Secretions. COMFORT KIT   Indications: excessive saliva production    Comments: comfort kit      ibuprofen (MOTRIN) 200 mg tablet Take 400 mg=2 tabs  three times daily as needed for mild pain or fever               Activity: Activity as tolerated with assistance  Diet: Regular diet  Wound Care: None needed  Oxygen: Room air  Equipment: Equipment as previously ordered in the home.     Follow-up Appointments   Procedures    FOLLOW UP VISIT Appointment in: Other (Specify) Follow-up with home hospice per agency protocol. Follow-up with home hospice per agency protocol. Standing Status:   Standing     Number of Occurrences:   1     Order Specific Question:   Appointment in     Answer:    Other (Specify)       Signed By: Stacy Manning NP     May 27, 2022

## 2022-05-27 NOTE — PROGRESS NOTES
8900 Bedside Report taken from [place], [place]., Pt identified. Pt in bed with eyes closed; displaying no signs or symptoms of pain, dyspnea, agitation,seizures, nausea, or vomiting. FLACC 0. Bed locked and low, side rails up, tabs/bed alarm in place for pt. safety. Call light with in reach, and door opened for continued monitoring. Care plan reviewed with Cna.     0808 Pt admitted for respite stay 5/26 due to family emergency. Pt hospice diagnosis is cva. Pt discharge plan is for pt to return home 5/29. Pt currently sleeping. Pain 0/10, flacc. 1106 Pt attempting to get up out of bed when I smelled bowel movement. Pt had a large loose bm. Pt 2 person assist to bsc and then to shower. Pt hair washed. Pt up in chair. Po medications given and banana. 1224 Patient up in chair watching tv, Doesn't appear to be in any pain. 1400 pt very agitated and combative when we tried to check her brief. Pt doesnt  appear to be in any pain. She is sputtering incomprehensible words. 1430 Pt appears to be calmer, right now.

## 2022-05-27 NOTE — PROGRESS NOTES
0645 Rounded on patient, bed low and locked, pt is sleeping comfortably. Received report from Mary Mares RN     0730 Rounded on patient still sleeping comfortably. Safety measures in place bed low, side rail up x2, safety tab on. Door left opened for continuous monitoring. 1000 Patient awake, went in to give meds, and pt had a bowel movement, that was all over her and the pad. This nurse and Nicholas Sheth gave patient a shower. Clean gown and non slip socks put on. Patient drank and ate some fruit. Pt is sitting comfortably in the chair watching tv. Safety measures in place. Door left open for continuous monitoring. 1200   Helped pt with feeding, pt ate all her lunch. 1358 patient very agitated when trying to change her. Patient was given PRN meds. Door left open for continuous monitoring     1500  Patient sitting comfortably on chair watching TV. Door left open for continuous monitoring. 1627 po meds given. Pain 0/10    1800 Rounding, pt asleep in chair. Door opened for continuous monitoring. 1820 Pt had BM,  was changed, cleaned and put new sweater on her. Pt in chair watching tv. Door opened for continuous monitoring.      1844 Gave report to on coming nurse DRAKE, 2450 [facility]

## 2022-05-27 NOTE — PROGRESS NOTES
Problem: Falls - Risk of  Goal: *Absence of Falls  Description: Document Monica Ndiaye Fall Risk and appropriate interventions in the flowsheet. Outcome: Progressing Towards Goal  Note: Fall Risk Interventions: Luz Paris will remain free from falls AEB no injuries r/t falls each shift during inpatient hospice stay. Mobility Interventions: Bed/chair exit alarm    Mentation Interventions: Adequate sleep, hydration, pain control,Bed/chair exit alarm,Door open when patient unattended,Reorient patient,Room close to nurse's station,Update white board    Medication Interventions: Bed/chair exit alarm    Elimination Interventions: Bed/chair exit alarm,Call light in reach              Problem: Potential for Alteration in Skin Integrity  Goal: Monitor skin for areas of alteration in skin integrity  Description: Patient/family/caregiver will demonstrate ability to care for patient's skin, monitor for areas of breakdown, and demonstrate methods to prevent breakdown during hospice care. Intervention: Luz Paris will remain free from acquired pressure injuries AEB zero acquired pressure injuries during assessment of skin each shift during inpatient hospice stay. Outcome: Progressing Towards Goal     Problem: Anxiety/Agitation  Goal: Verbalize or staff assess the ability to manage anxiety  Description: The patient/family/caregiver will verbalize and demonstrate ability to manage the patient's anxiety throughout hospice care. Outcome: Progressing Towards Goal   Intervention: Luz Paris will demonstrate appropriate motor behavior AEB less than 2 episodes of fidgety, picking or pulling at clothes on devices, yelling out, getting out of bed, etc. each shift during inpatient hospice stay.

## 2022-05-28 VITALS
DIASTOLIC BLOOD PRESSURE: 86 MMHG | TEMPERATURE: 97.2 F | RESPIRATION RATE: 12 BRPM | SYSTOLIC BLOOD PRESSURE: 159 MMHG | HEART RATE: 83 BPM

## 2022-05-28 PROCEDURE — 74011250637 HC RX REV CODE- 250/637: Performed by: INTERNAL MEDICINE

## 2022-05-28 PROCEDURE — 0655 HSPC INPATIENT RESPITE

## 2022-05-28 PROCEDURE — 74011250637 HC RX REV CODE- 250/637: Performed by: NURSE PRACTITIONER

## 2022-05-28 RX ORDER — LOPERAMIDE HYDROCHLORIDE 2 MG/1
4 CAPSULE ORAL AS NEEDED
Status: DISCONTINUED | OUTPATIENT
Start: 2022-05-28 | End: 2022-05-29 | Stop reason: HOSPADM

## 2022-05-28 RX ADMIN — RIVAROXABAN 15 MG: 15 TABLET, FILM COATED ORAL at 08:54

## 2022-05-28 RX ADMIN — PANTOPRAZOLE SODIUM 40 MG: 40 TABLET, DELAYED RELEASE ORAL at 08:54

## 2022-05-28 RX ADMIN — Medication 120 MG: at 08:54

## 2022-05-28 RX ADMIN — LEVOTHYROXINE SODIUM 75 MCG: 75 TABLET ORAL at 06:41

## 2022-05-28 RX ADMIN — LORAZEPAM 0.5 MG: 0.5 TABLET ORAL at 21:00

## 2022-05-28 RX ADMIN — GABAPENTIN 300 MG: 300 CAPSULE ORAL at 08:54

## 2022-05-28 RX ADMIN — CITALOPRAM HYDROBROMIDE 40 MG: 20 TABLET ORAL at 08:54

## 2022-05-28 RX ADMIN — GABAPENTIN 300 MG: 300 CAPSULE ORAL at 16:07

## 2022-05-28 RX ADMIN — HYDROCODONE BITARTRATE AND ACETAMINOPHEN 0.5 TABLET: 5; 325 TABLET ORAL at 21:00

## 2022-05-28 RX ADMIN — GABAPENTIN 300 MG: 300 CAPSULE ORAL at 21:00

## 2022-05-28 NOTE — PROGRESS NOTES
1843 Report received from Faisal Steele RN, patient identified by name and . Patient admitted on  for respite care with diagnosis to be discharged on  1pm. Patient is sitting up in in qiana chair, she is mumbling and is difficult to understand. Patient is throwing her leg out of the chair and attempting to pull herself up in the chair. Patient repositioned and checked brief to ensure patient is clean and dry. She is not in need of a brief change at this time. She appears to be comfortable and in no distress noted. Respirations are even and unlabored. FLAAC 0/10. Reviewed plan of care with CNA for this shift with understanding voiced. Discharge Plan is to remain at [facility] until her discharge home on 22. Reassessment of proper LOC with IDG team will be completed on an ongoing basis. Safety measures in place including, door open for continuous monitoring, bed in low locked position, tab alert in place, call light within reach, and side rails up x2. Will continue to monitor for changes, safety, and needs.  Patient given her HS medications along with Ativan 0.5mg PO and Norco 5-325 0.5 tablet PO per prn order for pain and agitation, medications are crushed and placed in yogurt. Patient is restless, her brow is furrowed, and is resisting care. Patient placed in bed and repositioned for comfort and safety. Tab alarms placed. Safety measures in place including, door open for continuous monitoring, bed in low locked position, tab alert in place, call light within reach, and side rails up x2. Will continue to monitor for changes, safety, and needs. FLACC 4/10.    0203 Patient lying in bed with eyes closed respirations even and unlabored. No signs of pain or agitations observed. Safety measures in place including, door open for continuous monitoring, bed in low locked position, tab alert in place, call light within reach, and side rails up x2. Will continue to monitor for changes, safety, and needs.   FLACC 0/10.    0531 Pt observed on rounds, she is resting quietly with eyes closed and respirations even and unlabored. No facial grimacing, moaning, or signs of agitation observed. All symptoms managed at this time. No acute changes noted. FLACC 0/10. Safety measures remain in place, will continue to monitor for changes, safety, and comfort.     Report given to oncoming RN

## 2022-05-28 NOTE — PROGRESS NOTES
4093 Bedside Report taken from [facility]. Pt identified. Pt in bed with eyes closed; displaying no signs or symptoms of pain, dyspnea, agitation,seizures, nausea, or vomiting. FLACC 0. Bed locked and low, side rails up, tabs/bed alarm in place for pt. safety. Call light with in reach, and door opened for continued monitoring. Care plan reviewed with Cna.     0803 Pt admitted for respite stay 5/26 due to family emergency. Pt hospice diagnosis is cva. Pt discharge plan is for pt to return home 5/29. Pt currently sleeping. Pain 0/10, flacc    0900 Pt woken up to take her po medications which she did and she quickly went back to sleep. 1100 Pt observed and appears to be sleeping/resting, no facial grimacing, no moaning, easy relaxed respirations. No symptoms to manage at this time. Flacc 0/10    1400 Pt brief changed, cloth put on, hair brushed, and assisted x2 to chair. Feed pt and she drank water and apple juice. 1608 Pt given po medications , she is watching tv and looking out the window.  Flacc 0/10

## 2022-05-29 PROCEDURE — 0655 HSPC INPATIENT RESPITE

## 2022-05-29 PROCEDURE — 74011250637 HC RX REV CODE- 250/637: Performed by: INTERNAL MEDICINE

## 2022-05-29 PROCEDURE — 0651 HSPC ROUTINE HOME CARE

## 2022-05-29 PROCEDURE — 74011250637 HC RX REV CODE- 250/637: Performed by: NURSE PRACTITIONER

## 2022-05-29 RX ADMIN — RIVAROXABAN 15 MG: 15 TABLET, FILM COATED ORAL at 08:34

## 2022-05-29 RX ADMIN — LEVOTHYROXINE SODIUM 75 MCG: 75 TABLET ORAL at 05:47

## 2022-05-29 RX ADMIN — GABAPENTIN 300 MG: 300 CAPSULE ORAL at 08:32

## 2022-05-29 RX ADMIN — CITALOPRAM HYDROBROMIDE 40 MG: 20 TABLET ORAL at 08:32

## 2022-05-29 RX ADMIN — Medication 120 MG: at 08:33

## 2022-05-29 RX ADMIN — PANTOPRAZOLE SODIUM 40 MG: 40 TABLET, DELAYED RELEASE ORAL at 08:32

## 2022-05-29 NOTE — PROGRESS NOTES
1845 Report received from James Roca RN, patient identified by name and . Patient admitted on  for respite care with diagnosis of sequelae stroke to be discharged on  1pm. Patient is sitting up in in qiana chair, she is mumbling and is difficult to understand. Patient repositioned and checked brief to ensure patient is clean and dry. She appears to be comfortable and in no distress noted. Respirations are even and unlabored. FLAAC 0/10. Reviewed plan of care with CNA for this shift with understanding voiced. Discharge Plan is to remain at [facility] until her discharge home on 22. Reassessment of proper LOC with IDG team will be completed on an ongoing basis. Safety measures in place including, door open for continuous monitoring, bed in low locked position, tab alert in place, call light within reach, and side rails up x2. Will continue to monitor for changes, safety, and needs. 2100 Patient lying in bed receiving her bath for the day. She appears to be having increasing period of agitation/restlessness with her body posture is tense. Patient given Norco 1/2 tablet of 5-325mg PO prn pain and Ativan 0.5mg PO prn anxiety/agitation. Patient repositioned for comfort and skin integrity without complaint. Safety measures remain in place, will continue to monitor for needs and comfort. 2122 Patient is lying in bed with eyes closed and is open mouth breathing. She sounds as if she is snoring but does not show any signs of distress. No signs of pain or agitation being exhibited at this time. Safety measures remain in place, will continue to monitor for needs and comfort. 9163 Patient lying in bed on her back with eyes closed and respirations even and unlabored. Patient has periods of snoring that can be heard at the nurses station. She shows no signs of pain or discomfort at this time. Safety measures remain in place, will continue to monitor for needs and comfort.     0404 Pt observed on rounds, she is resting quietly with eyes closed and respirations even and unlabored. No facial grimacing, moaning, or signs of agitation observed. All symptoms managed at this time. No acute changes noted. FLACC 0/10. Safety measures remain in place, will continue to monitor for changes, safety, and comfort. 2774 Patient given her scheduled Synthroid without difficulty. Patient awakens easily to tactile stimuli and returns to sleep once care has been completed. Patient does not appear to be in any distress at this time. Safety measures in place including, door open for continuous monitoring, bed in low locked position, tab alert in place, call light within reach, and side rails up x2. Will continue to monitor for changes, safety, and needs. FLACC 0/10.     Report given to oncoming RN

## 2022-05-29 NOTE — PROGRESS NOTES
0700 Report received from Florencio Camarena RN. Patient identified by name and . Patient admitted on  for Respite care with diagnosis of Sequelae stroke and will be discharged today at 11pm.  Patient is lying in bed, eyes open and stretching with her arms. She begins to [facility] and is very difficult to understand  She appears to be in no distress. Respirations are even and unlabored. FLACC score is 0/10. Reassessment of proper LOC with IDG team is on an ongoing basis. Safety measures in place:  Side rails up x 2, bed in low and locked position, call lite within reach of patient, tab alert on and door open for continuous monitoring. Reviewed plan of care with CNA.      [id number] All morning scheduled meds crushed and given in black cherry yogurt. Pt took the meds without any problems and really enjoys the yogurt. CNA given patient a good bed bath. No other needs at this time. Safety measures in tact. 0900  Patient sitting in a hospitals chair. Safety measures in tact. 1120 Patient transported home via [facility]. Called Daughter. Daughter aware of discharge and discharge instructions. Medication and all personal belongings sent home with [facility] Paramedics.

## 2022-05-30 ENCOUNTER — HOME CARE VISIT (OUTPATIENT)
Dept: SCHEDULING | Facility: HOME HEALTH | Age: 87
End: 2022-05-30
Payer: MEDICARE

## 2022-05-30 PROCEDURE — G0156 HHCP-SVS OF AIDE,EA 15 MIN: HCPCS

## 2022-05-30 PROCEDURE — 0655 HSPC INPATIENT RESPITE

## 2022-05-30 PROCEDURE — 0651 HSPC ROUTINE HOME CARE

## 2022-05-31 PROCEDURE — 0651 HSPC ROUTINE HOME CARE

## 2022-06-01 PROCEDURE — 0651 HSPC ROUTINE HOME CARE

## 2022-06-02 ENCOUNTER — HOME CARE VISIT (OUTPATIENT)
Dept: HOSPICE | Facility: HOSPICE | Age: 87
End: 2022-06-02
Payer: MEDICARE

## 2022-06-02 ENCOUNTER — HOME CARE VISIT (OUTPATIENT)
Dept: SCHEDULING | Facility: HOME HEALTH | Age: 87
End: 2022-06-02
Payer: MEDICARE

## 2022-06-02 VITALS
SYSTOLIC BLOOD PRESSURE: 139 MMHG | HEART RATE: 100 BPM | DIASTOLIC BLOOD PRESSURE: 69 MMHG | OXYGEN SATURATION: 94 % | TEMPERATURE: 97.8 F | RESPIRATION RATE: 18 BRPM

## 2022-06-02 PROCEDURE — 0651 HSPC ROUTINE HOME CARE

## 2022-06-02 PROCEDURE — G0156 HHCP-SVS OF AIDE,EA 15 MIN: HCPCS

## 2022-06-02 PROCEDURE — G0155 HHCP-SVS OF CSW,EA 15 MIN: HCPCS

## 2022-06-02 PROCEDURE — G0299 HHS/HOSPICE OF RN EA 15 MIN: HCPCS

## 2022-06-02 ASSESSMENT — ENCOUNTER SYMPTOMS
BOWEL INCONTINENCE: 1
STOOL DESCRIPTION: SOFT FORMED

## 2022-06-02 NOTE — HOSPICE
RN assessment completed. Pt. was still getting a shower when MSW and I  arrived. Zohra Shah discussed with Cg how things went at [facility] during Respite. Vts assessed. WNLs; SEE ROS. Pt. calm, pleasant but frustrated as you could tell she was trying to tell us something. Azam Serrano was in good spirits. Medications reviewed with CG. No RF needed. No supplies needed. No DME needed. Appetite/ Fluid intake fair. Per Azam Maggie she gives pt. 2 Norco daily for pain. Daughter is able to understand pt. as aphasia. Pt. sits in den or in front of the window. No falls reported. CG still not ready for hospital bed until she can take down dining room furniture. Per CG skin integrity remains intact. CG is aware how to reach [facility] 24/7. CG in agreement with POC.

## 2022-06-03 PROCEDURE — 0651 HSPC ROUTINE HOME CARE

## 2022-06-04 PROCEDURE — 0651 HSPC ROUTINE HOME CARE

## 2022-06-05 PROCEDURE — 0651 HSPC ROUTINE HOME CARE

## 2022-06-05 ASSESSMENT — ENCOUNTER SYMPTOMS: HEMOPTYSIS: 0

## 2022-06-06 ENCOUNTER — HOME CARE VISIT (OUTPATIENT)
Dept: SCHEDULING | Facility: HOME HEALTH | Age: 87
End: 2022-06-06
Payer: MEDICARE

## 2022-06-06 PROCEDURE — 0651 HSPC ROUTINE HOME CARE

## 2022-06-06 NOTE — HOSPICE
Oswaldo Somers is glad to be home. She is expressive and animated. Mary Anne Hitchcock is grievign appropriately. SW offered emotionql support and availabilty. Mary Anne Hitchcock returned late sunday from [place]. She shoed pictures and told stories of Adan Christian. She reports coping ok and denies any new needs.   ABILIO provided active listening and offered availability

## 2022-06-07 PROCEDURE — 0651 HSPC ROUTINE HOME CARE

## 2022-06-08 PROCEDURE — 0651 HSPC ROUTINE HOME CARE

## 2022-06-09 ENCOUNTER — HOME CARE VISIT (OUTPATIENT)
Dept: SCHEDULING | Facility: HOME HEALTH | Age: 87
End: 2022-06-09
Payer: MEDICARE

## 2022-06-09 VITALS
HEART RATE: 76 BPM | TEMPERATURE: 97.7 F | OXYGEN SATURATION: 98 % | RESPIRATION RATE: 18 BRPM | SYSTOLIC BLOOD PRESSURE: 148 MMHG | DIASTOLIC BLOOD PRESSURE: 84 MMHG

## 2022-06-09 PROCEDURE — G0156 HHCP-SVS OF AIDE,EA 15 MIN: HCPCS

## 2022-06-09 PROCEDURE — 0651 HSPC ROUTINE HOME CARE

## 2022-06-09 PROCEDURE — G0299 HHS/HOSPICE OF RN EA 15 MIN: HCPCS

## 2022-06-09 PROCEDURE — 2500000001 HSPC NON INJECTABLE MED

## 2022-06-09 ASSESSMENT — ENCOUNTER SYMPTOMS
BOWEL INCONTINENCE: 1
STOOL DESCRIPTION: SOFT FORMED
TROUBLE SWALLOWING: 1

## 2022-06-09 NOTE — HOSPICE
SN Comprehensive visit completed. Pt. sitting in her w/c at the window Alert. speech is garbled due to aphasia but daughter knows what pt is trying to communicate. See ROS. Pt. does understand what you are saying, Skin warm, dry and intact. Heart rhythm irreg. HR 76 Irregular  B/P 148/84 ( pt heard us talking about going to [facility] for Respite.)  Pt. is conscious of bladder and bowels. Last BM this 6/7/22. Resp even and nonlabored, breath sounds clear; diminished in bases. Appetite/ Fluid intake ia good per Shwetha , but she has to cut food into small pieces and feed pt. Pt. is dependent for transfers due to Right side hemiparesis. Arturo score is 13 which means pt. is at moderate risk for falls. Cg uses FACES to determine when pt. in pain. Milagros Scott  knows to call [facility] with any needs or concerns. 24/7.

## 2022-06-10 PROCEDURE — 0651 HSPC ROUTINE HOME CARE

## 2022-06-11 PROCEDURE — 0651 HSPC ROUTINE HOME CARE

## 2022-06-12 PROCEDURE — 0651 HSPC ROUTINE HOME CARE

## 2022-06-13 ENCOUNTER — HOME CARE VISIT (OUTPATIENT)
Dept: SCHEDULING | Facility: HOME HEALTH | Age: 87
End: 2022-06-13
Payer: MEDICARE

## 2022-06-13 PROCEDURE — 0651 HSPC ROUTINE HOME CARE

## 2022-06-13 PROCEDURE — G0156 HHCP-SVS OF AIDE,EA 15 MIN: HCPCS

## 2022-06-14 PROCEDURE — 0651 HSPC ROUTINE HOME CARE

## 2022-06-15 PROCEDURE — 0651 HSPC ROUTINE HOME CARE

## 2022-06-16 ENCOUNTER — HOME CARE VISIT (OUTPATIENT)
Dept: SCHEDULING | Facility: HOME HEALTH | Age: 87
End: 2022-06-16
Payer: MEDICARE

## 2022-06-16 VITALS
OXYGEN SATURATION: 98 % | DIASTOLIC BLOOD PRESSURE: 80 MMHG | HEART RATE: 88 BPM | RESPIRATION RATE: 21 BRPM | TEMPERATURE: 98.6 F | SYSTOLIC BLOOD PRESSURE: 135 MMHG

## 2022-06-16 PROCEDURE — 0651 HSPC ROUTINE HOME CARE

## 2022-06-16 PROCEDURE — G0156 HHCP-SVS OF AIDE,EA 15 MIN: HCPCS

## 2022-06-16 PROCEDURE — G0299 HHS/HOSPICE OF RN EA 15 MIN: HCPCS

## 2022-06-16 PROCEDURE — 2500000001 HSPC NON INJECTABLE MED

## 2022-06-16 ASSESSMENT — ENCOUNTER SYMPTOMS
STOOL DESCRIPTION: SOFT FORMED
BOWEL INCONTINENCE: 1
TROUBLE SWALLOWING: 1

## 2022-06-16 NOTE — HOSPICE
SN Comprehensive visit completed. Pt. sitting on the couch when I arrived today. Her son is visiting from [facility]. Tyesha Butcher is  alert/ speech is garbled due to aphasia but son and daughter are able to understand and communicate with her. See ROS. Vitals WNL's. Pt. does understand what you are saying, Skin warm, dry and intact. Heart rhythm irreg. HR 88 Irregular B/P 135/80 Pt. is conscious of bladder and bowels. Last BM this 6/16/22. Resp even and nonlabored, breath sounds clear; diminished in bases. Appetite/ Fluid intake is very good per Ewa , but she has to cut food into small pieces and feed pt. Pt. is dependent for transfers due to Right side hemiparesis. Arturo score is 13 which means pt. is at moderate risk for falls. Cg uses FACES to determine when pt. in Pain level  past 2 days 8/10. Ewa is giving 2 Norco a day and this helps some. Pt's feet have been bothering her the past few days. Ewa knows to call [facility] with any needs or concerns. 24/7.

## 2022-06-17 PROCEDURE — 0651 HSPC ROUTINE HOME CARE

## 2022-06-18 ENCOUNTER — HOME CARE VISIT (OUTPATIENT)
Dept: SCHEDULING | Facility: HOME HEALTH | Age: 87
End: 2022-06-18
Payer: MEDICARE

## 2022-06-18 PROCEDURE — G0299 HHS/HOSPICE OF RN EA 15 MIN: HCPCS

## 2022-06-18 PROCEDURE — 0651 HSPC ROUTINE HOME CARE

## 2022-06-19 ENCOUNTER — HOME CARE VISIT (OUTPATIENT)
Dept: SCHEDULING | Facility: HOME HEALTH | Age: 87
End: 2022-06-19
Payer: MEDICARE

## 2022-06-19 VITALS
TEMPERATURE: 97.5 F | HEART RATE: 70 BPM | SYSTOLIC BLOOD PRESSURE: 108 MMHG | RESPIRATION RATE: 18 BRPM | DIASTOLIC BLOOD PRESSURE: 68 MMHG

## 2022-06-19 PROCEDURE — 0651 HSPC ROUTINE HOME CARE

## 2022-06-19 ASSESSMENT — ENCOUNTER SYMPTOMS
PAIN LOCATION - PAIN QUALITY: ACHY
HEMOPTYSIS: 0
HEMOPTYSIS: 0

## 2022-06-20 ENCOUNTER — HOME CARE VISIT (OUTPATIENT)
Dept: HOSPICE | Facility: HOSPICE | Age: 87
End: 2022-06-20
Payer: MEDICARE

## 2022-06-20 ENCOUNTER — HOME CARE VISIT (OUTPATIENT)
Dept: SCHEDULING | Facility: HOME HEALTH | Age: 87
End: 2022-06-20
Payer: MEDICARE

## 2022-06-20 PROCEDURE — 0651 HSPC ROUTINE HOME CARE

## 2022-06-20 NOTE — HOSPICE
PRN Visit. pts dtr called to request someone to come out and look at pts right elbow due to new swelling and severe pain. sn visited & checked pts arm. It appears pt injured her arm somehow. pt grimaced at the slightest touch to her right arm. pts dtr is giving pt norco prn for s&s of distress. pt appears calm as long as her right arm is not moved. Sn Called KATAL[facility] wyatt. NP ordered: possible er evaluation but family refused. np ordered ice to elbow, use norco & elevate right arm. Instructed pts cg on orders. Pts cg agreed & verbalized understanding to teaching. Pt Sitting in wheelchair awake. Pt is chair-bound. Pt's family denies any pt falls. Instructed pts cg on falls preventions. Pts cg verbalized understanding to teaching. Pts dtr & son  are  present. Pt is alert but aphasic. Pt has no Skin breakdown. Pt has no Sob today. Pt has no edema. Pt is having regular Bms daily. Pts Appetite is good. Pts cg denies any pt Nausea / vomiting. Pt is urinating well several times daily. Refilled meds: none needed. norco arrived via fedx today. Ordered supplies: none needed  SN updated mar & reconciled meds. sn updated care plan. Instructed pts cg to call [facility] with any questions or concerns. Pts cg verbalized understanding and agreement. cm notified and will follow up next week.

## 2022-06-21 PROCEDURE — 0651 HSPC ROUTINE HOME CARE

## 2022-06-22 PROCEDURE — 0651 HSPC ROUTINE HOME CARE

## 2022-06-23 ENCOUNTER — HOME CARE VISIT (OUTPATIENT)
Dept: SCHEDULING | Facility: HOME HEALTH | Age: 87
End: 2022-06-23
Payer: MEDICARE

## 2022-06-23 ENCOUNTER — HOME CARE VISIT (OUTPATIENT)
Dept: HOSPICE | Facility: HOSPICE | Age: 87
End: 2022-06-23
Payer: MEDICARE

## 2022-06-23 VITALS
SYSTOLIC BLOOD PRESSURE: 132 MMHG | TEMPERATURE: 96.8 F | RESPIRATION RATE: 21 BRPM | HEART RATE: 87 BPM | DIASTOLIC BLOOD PRESSURE: 80 MMHG | OXYGEN SATURATION: 97 %

## 2022-06-23 PROCEDURE — G0156 HHCP-SVS OF AIDE,EA 15 MIN: HCPCS

## 2022-06-23 PROCEDURE — 0651 HSPC ROUTINE HOME CARE

## 2022-06-23 PROCEDURE — G0299 HHS/HOSPICE OF RN EA 15 MIN: HCPCS

## 2022-06-23 PROCEDURE — 2500000001 HSPC NON INJECTABLE MED

## 2022-06-23 PROCEDURE — G0155 HHCP-SVS OF CSW,EA 15 MIN: HCPCS

## 2022-06-23 ASSESSMENT — ENCOUNTER SYMPTOMS
HEMOPTYSIS: 0
BOWEL INCONTINENCE: 1
STOOL DESCRIPTION: FORMED

## 2022-06-23 NOTE — HOSPICE
SN Comprehensive visit completed. Pt. sitting in her w/c in front of the window. Lenora Mood is alert/ speech is garbled due to aphasia but daughter are able to understand and communicate with her. See ROS. Pt. does understand what you are saying, Skin warm, dry and intact. Heart rhythm irreg. HR 87 Irregular B/P 132/80 Pt. is conscious of bladder and bowels. Last BM this 6/23/22. Resp even and nonlabored, breath sounds clear; diminished in bases. Appetite/ Fluid intake is very good per Ewa, but she has to cut food into small pieces and feed pt. Pt. is dependent for transfers due to Right side hemiparesis. Arturo score is 13 which means pt. is at moderate risk for falls. Cg uses FACES to determine when pt. in Pain level past 2 days 8/10. Ewa is giving 2 Norco a day and this helps some. Pt's feet have been bothering her the past few days. Per Ewa on 6/19/22 Alisa decided to get her self out of her w/c her right arm end up behind her and she was sitting on her hand. Right forearm and elbow remain swollen. Pt. will not leave in a sling, or on a pillow. Pt. had a few days of constipation. Ewa is not sure what all her brother fed pt. So she may not have given enough fruit. Pt. a ruptured blood vessels  in right eye from straining. Per Ewa pt. is now back to normal. Ewa knows to call [facility] with any needs or concerns. 24/7.

## 2022-06-24 PROCEDURE — 0651 HSPC ROUTINE HOME CARE

## 2022-06-24 ASSESSMENT — ENCOUNTER SYMPTOMS: HEMOPTYSIS: 0

## 2022-06-24 NOTE — HOSPICE
Julio César Perez is getting her bath with the CNA now She is expressive and pleasant. Bart Marquez is busy in the home and also trying to manage her spouses affairs since his death. Julio César Perez is scheduled for respite this coming MOnday through Friday. She is scheduled with [facility] for 11:00am  Monday and return to the residence on Friday with a  time of 1:00pm from [facility]. Julio César Perez has injured her elbow and guards it. Otherwise she has been doing well. Bart Marquez is coping adequately. She is amazing with her strength and determination. Praise provided for care given and Emotional support offered.

## 2022-06-25 PROCEDURE — 0651 HSPC ROUTINE HOME CARE

## 2022-06-26 PROCEDURE — 0651 HSPC ROUTINE HOME CARE

## 2022-06-27 ENCOUNTER — HOME CARE VISIT (OUTPATIENT)
Dept: HOSPICE | Facility: HOSPICE | Age: 87
End: 2022-06-27
Payer: MEDICARE

## 2022-06-27 PROBLEM — I69.30 SEQUELA, POST-STROKE: Status: ACTIVE | Noted: 2022-05-26

## 2022-06-27 PROCEDURE — 0655 HSPC INPATIENT RESPITE

## 2022-06-28 PROCEDURE — 0655 HSPC INPATIENT RESPITE

## 2022-06-29 PROCEDURE — 0655 HSPC INPATIENT RESPITE

## 2022-06-30 ENCOUNTER — HOME CARE VISIT (OUTPATIENT)
Dept: HOSPICE | Facility: HOSPICE | Age: 87
End: 2022-06-30
Payer: MEDICARE

## 2022-06-30 PROCEDURE — 0655 HSPC INPATIENT RESPITE

## 2022-07-01 ENCOUNTER — HOME CARE VISIT (OUTPATIENT)
Dept: SCHEDULING | Facility: HOME HEALTH | Age: 87
End: 2022-07-01
Payer: MEDICARE

## 2022-07-01 VITALS
TEMPERATURE: 97.8 F | SYSTOLIC BLOOD PRESSURE: 131 MMHG | RESPIRATION RATE: 18 BRPM | HEART RATE: 85 BPM | DIASTOLIC BLOOD PRESSURE: 81 MMHG

## 2022-07-01 PROCEDURE — 0651 HSPC ROUTINE HOME CARE

## 2022-07-01 PROCEDURE — G0299 HHS/HOSPICE OF RN EA 15 MIN: HCPCS

## 2022-07-01 ASSESSMENT — ENCOUNTER SYMPTOMS
STOOL DESCRIPTION: DIARRHEA
DIARRHEA: 1

## 2022-07-01 NOTE — HOSPICE
SN Comprehensive visit completed. Reassessment done following Respite stay. Son is staying with pt .until Bert Nascimento gets home. He came out to the car to get me to come take her to the bath room. I was on the phone and told him ,I would be there as soon as I finished my conversation with [place]. I went in the home, he put her on the toilet, she had diarrhea. As soon as I walked in, he walked out of bathroom. I asked for wipes, he had to go find them and some clean pants. I did get him to hold pt. up while I cleaned her up, and then I redressed her. I discussed meds with Teresa Beck. Staci Mary is alert/ speech is garbled due to aphasia but daughter are able to understand and communicate with her. See ROS. Pt. does understand what you are saying, Skin warm, dry and intact. Heart rhythm irreg. HR 85 Irregular B/P 132/81 Pt. is conscious of bladder and bowels. Last BM this 6/1/22. Resp even and nonlabored, breath sounds clear; diminished in bases. Appetite/ Fluid intake is very good , but food has to be cut into small pieces. Pt must be fed. Pt. is dependent for transfers due to Right side hemiparesis. Arturo score is 13 which means pt. is at moderate risk for falls. Cg uses FACES to determine when pt. in Pain level 5/10. Right forearm and elbow remain swollen and painful. It is my understanding that pt. has had diarrhea the entire time she was in Respite care. I am not sure whethershe had too much Senna or the food was not what she was used to eating  I decided to try Joni Ahn to discuss what was going on with pt. She is driving back from [place]. Bert Nascimento knows to call [facility] with any needs or concerns. 24/7.

## 2022-07-02 PROCEDURE — 0651 HSPC ROUTINE HOME CARE

## 2022-07-03 PROCEDURE — 0651 HSPC ROUTINE HOME CARE

## 2022-07-04 PROCEDURE — 0651 HSPC ROUTINE HOME CARE

## 2022-07-05 ENCOUNTER — HOME CARE VISIT (OUTPATIENT)
Dept: SCHEDULING | Facility: HOME HEALTH | Age: 87
End: 2022-07-05
Payer: MEDICARE

## 2022-07-05 PROCEDURE — 0651 HSPC ROUTINE HOME CARE

## 2022-07-05 PROCEDURE — G0156 HHCP-SVS OF AIDE,EA 15 MIN: HCPCS

## 2022-07-06 PROCEDURE — 0651 HSPC ROUTINE HOME CARE

## 2022-07-07 ENCOUNTER — HOME CARE VISIT (OUTPATIENT)
Dept: HOSPICE | Facility: HOSPICE | Age: 87
End: 2022-07-07
Payer: MEDICARE

## 2022-07-07 ENCOUNTER — HOME CARE VISIT (OUTPATIENT)
Dept: SCHEDULING | Facility: HOME HEALTH | Age: 87
End: 2022-07-07
Payer: MEDICARE

## 2022-07-07 VITALS
SYSTOLIC BLOOD PRESSURE: 114 MMHG | OXYGEN SATURATION: 97 % | TEMPERATURE: 98.7 F | RESPIRATION RATE: 20 BRPM | DIASTOLIC BLOOD PRESSURE: 65 MMHG | HEART RATE: 76 BPM

## 2022-07-07 PROCEDURE — 0651 HSPC ROUTINE HOME CARE

## 2022-07-07 PROCEDURE — G0155 HHCP-SVS OF CSW,EA 15 MIN: HCPCS

## 2022-07-07 PROCEDURE — G0156 HHCP-SVS OF AIDE,EA 15 MIN: HCPCS

## 2022-07-07 PROCEDURE — G0299 HHS/HOSPICE OF RN EA 15 MIN: HCPCS

## 2022-07-07 ASSESSMENT — ENCOUNTER SYMPTOMS
STOOL DESCRIPTION: FORMED
TROUBLE SWALLOWING: 1

## 2022-07-07 NOTE — HOSPICE
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 She is entering into her 9th Benefit period. She lives with her daughter Frank Mane who is her primary caregiver. On admission Alisa was able to move her legs and assist with transfers. She is now a max assist of 1 and if pt.  is tired full assist for transfers. She does lean to the right if she is tired or doesn't feel well. At admission her PPS was 50% she is now 40%. 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 She was R hand dominant, but can still use left hand  to feed herself finger foods. Patient has had repeated falls during her hospice care resulting in injuries to her R shoulder and elbow due to her attempts to ambulate on her own. Buster Naidu has been sleeping on the couch, her skin is intact but fragile. Due to the layout of the home pt. had to be taken by w/c outside down to lower level for her bath. Frank Mane and I discussed that it was time for the hospital bed for baths. It will be set up in [facility]'s bedroom. I called [address] and ordered ordered Hospital bed with OBT in time for LALITO to do her bath today. Frank Mane is able to communicate with Alisa very well, Buster Naidu is able to make her needs known with signals and some words. Vocally she appropriately garbled. Patient has some short term memory loss but not alot, she listens intently to conversations and understands. Alisa was eating 90 % of an adult sized plate, now eats about 85% of a child's size plate. She drinks 1 Ensure daily as a supplement. Her MUAC was  18cm  at admission, is now 17.5cm  She does show other signs of signs of weight loss like sunken cheek s,bitemporal wasting, loose fittin g clothing, prominent clavicles, bony prominences.   Medications must be crushed and put in food for ease of swallowing. Her diet consists of Regular foods cut up in tiny pieces, along with lots of fruits, yogurt. Occasional problems with constipation managed with Senna. Pain is well managed with Norco 5/325 3x day and Gabapentin. Occasional need for PRN Ativan for anxiety. Medications reconciled with provider, care plan discussed within IDG and approved by patient and caregiver. Family in agreement with symptom management and focus on comfort. Education provided to family regarding changes in patient condition, and disease progression.  and MSW involved and providing good support. Plan for next benefit period is medication management, adjusting as needed for changes in swallowing ability. Monitor effectiveness of pain medication, anxiety medication.  SN 1x week,HHA 2 x week

## 2022-07-08 PROCEDURE — 0651 HSPC ROUTINE HOME CARE

## 2022-07-09 PROCEDURE — 0651 HSPC ROUTINE HOME CARE

## 2022-07-09 ASSESSMENT — ENCOUNTER SYMPTOMS: HEMOPTYSIS: 0

## 2022-07-09 NOTE — HOSPICE
Andreas Brown is sitting up in her wheelchair by the window. She is in good spirits and very glad to be home. Kiersten Chaney is continueing to work on her spouses estate. They appear and report coping well considering the circumstances. Jeff laughed some today and seemed genuinely glad to see her team. SW provided emotopnal support and active listening. no new needs indicated.

## 2022-07-10 PROCEDURE — 0651 HSPC ROUTINE HOME CARE

## 2022-07-11 ENCOUNTER — HOME CARE VISIT (OUTPATIENT)
Dept: SCHEDULING | Facility: HOME HEALTH | Age: 87
End: 2022-07-11
Payer: MEDICARE

## 2022-07-11 PROCEDURE — G0156 HHCP-SVS OF AIDE,EA 15 MIN: HCPCS

## 2022-07-11 PROCEDURE — 0651 HSPC ROUTINE HOME CARE

## 2022-07-12 PROCEDURE — 0651 HSPC ROUTINE HOME CARE

## 2022-07-13 PROCEDURE — 0651 HSPC ROUTINE HOME CARE

## 2022-07-14 ENCOUNTER — HOME CARE VISIT (OUTPATIENT)
Dept: SCHEDULING | Facility: HOME HEALTH | Age: 87
End: 2022-07-14
Payer: MEDICARE

## 2022-07-14 VITALS
RESPIRATION RATE: 20 BRPM | SYSTOLIC BLOOD PRESSURE: 129 MMHG | OXYGEN SATURATION: 97 % | DIASTOLIC BLOOD PRESSURE: 81 MMHG | HEART RATE: 76 BPM | TEMPERATURE: 98.8 F

## 2022-07-14 PROCEDURE — G0299 HHS/HOSPICE OF RN EA 15 MIN: HCPCS

## 2022-07-14 PROCEDURE — 2500000001 HSPC NON INJECTABLE MED

## 2022-07-14 PROCEDURE — 0651 HSPC ROUTINE HOME CARE

## 2022-07-14 PROCEDURE — G0156 HHCP-SVS OF AIDE,EA 15 MIN: HCPCS

## 2022-07-14 ASSESSMENT — ENCOUNTER SYMPTOMS: STOOL DESCRIPTION: SOFT FORMED

## 2022-07-14 NOTE — HOSPICE
Skilled Nursing Comprehensive visit ocmpleted. She lives with her daughter Ed Ferrari who is her primary caregiver. She is now a max assist of 1 and if pt. is tired full assist for transfers. She does lean to the right if she is tired or doesn't feel well. PPS 40%. 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 She can feed her self finger foods with her left hand only. Clarissa Hussein is now sleeping in hospital bed and gets a bed bath instead of being transported downstairs for shower. Ed Ferrari is able to communicate with Alisa very well, Clarissa Hussein is able to make her needs known with signals and some words. Patient has some short term memory loss, she listens intently to conversations and understands. Alisa was eating 90 % of an adult sized plate, now eats about 85% of a child's size plate. She drinks 1 Ensure daily as a supplement. Her was [facility]  17.5cm She does show other signs of signs of weight loss like sunken cheek s,bitemporal wasting, loose fitting clothing, prominent clavicles, bony prominences. Medications must be crushed and put in food for ease of swallowing. Her diet consists of Regular foods cut up in tiny pieces, along with lots of fruits, yogurt. Occasional problems with constipation managed with Senna. Pain is well managed with Norco 5/325 3x day and Gabapentin. Occasional need for PRN Ativan for anxiety. Medications rec completed  and RF ordered. Supplies needed None DME needed: None. Ed Ferrari know how to reach [facility] for any questions or concerns 24/7. Ed Ferrari in agreement with symptom management and focus on comfort measures. Education provided to family regarding changes in patient condition, and disease progression. . SN 1x week,HHA 2 x week

## 2022-07-15 PROCEDURE — 0651 HSPC ROUTINE HOME CARE

## 2022-07-16 PROCEDURE — 0651 HSPC ROUTINE HOME CARE

## 2022-07-17 PROCEDURE — 0651 HSPC ROUTINE HOME CARE

## 2022-07-18 ENCOUNTER — HOME CARE VISIT (OUTPATIENT)
Dept: SCHEDULING | Facility: HOME HEALTH | Age: 87
End: 2022-07-18
Payer: MEDICARE

## 2022-07-18 PROCEDURE — 0651 HSPC ROUTINE HOME CARE

## 2022-07-18 PROCEDURE — G0156 HHCP-SVS OF AIDE,EA 15 MIN: HCPCS

## 2022-07-19 PROCEDURE — 0651 HSPC ROUTINE HOME CARE

## 2022-07-20 PROCEDURE — 0651 HSPC ROUTINE HOME CARE

## 2022-07-21 ENCOUNTER — HOME CARE VISIT (OUTPATIENT)
Dept: SCHEDULING | Facility: HOME HEALTH | Age: 87
End: 2022-07-21
Payer: MEDICARE

## 2022-07-21 VITALS
RESPIRATION RATE: 18 BRPM | HEART RATE: 68 BPM | OXYGEN SATURATION: 97 % | DIASTOLIC BLOOD PRESSURE: 72 MMHG | TEMPERATURE: 98.1 F | SYSTOLIC BLOOD PRESSURE: 129 MMHG

## 2022-07-21 PROCEDURE — G0156 HHCP-SVS OF AIDE,EA 15 MIN: HCPCS

## 2022-07-21 PROCEDURE — 0651 HSPC ROUTINE HOME CARE

## 2022-07-21 PROCEDURE — G0299 HHS/HOSPICE OF RN EA 15 MIN: HCPCS

## 2022-07-21 ASSESSMENT — ENCOUNTER SYMPTOMS
DIARRHEA: 1
STOOL DESCRIPTION: LOOSE

## 2022-07-22 PROCEDURE — 0651 HSPC ROUTINE HOME CARE

## 2022-07-23 PROCEDURE — 0651 HSPC ROUTINE HOME CARE

## 2022-07-24 PROCEDURE — 0651 HSPC ROUTINE HOME CARE

## 2022-07-25 ENCOUNTER — HOME CARE VISIT (OUTPATIENT)
Dept: SCHEDULING | Facility: HOME HEALTH | Age: 87
End: 2022-07-25
Payer: MEDICARE

## 2022-07-25 PROCEDURE — G0156 HHCP-SVS OF AIDE,EA 15 MIN: HCPCS

## 2022-07-25 PROCEDURE — 0651 HSPC ROUTINE HOME CARE

## 2022-07-26 PROCEDURE — 0651 HSPC ROUTINE HOME CARE

## 2022-07-27 PROCEDURE — 0651 HSPC ROUTINE HOME CARE

## 2022-07-28 ENCOUNTER — HOME CARE VISIT (OUTPATIENT)
Dept: SCHEDULING | Facility: HOME HEALTH | Age: 87
End: 2022-07-28
Payer: MEDICARE

## 2022-07-28 VITALS
HEART RATE: 70 BPM | DIASTOLIC BLOOD PRESSURE: 71 MMHG | TEMPERATURE: 98.2 F | SYSTOLIC BLOOD PRESSURE: 117 MMHG | OXYGEN SATURATION: 98 % | RESPIRATION RATE: 16 BRPM

## 2022-07-28 PROCEDURE — G0299 HHS/HOSPICE OF RN EA 15 MIN: HCPCS

## 2022-07-28 PROCEDURE — 0651 HSPC ROUTINE HOME CARE

## 2022-07-28 PROCEDURE — 2500000001 HSPC NON INJECTABLE MED

## 2022-07-28 PROCEDURE — G0156 HHCP-SVS OF AIDE,EA 15 MIN: HCPCS

## 2022-07-28 ASSESSMENT — ENCOUNTER SYMPTOMS: TROUBLE SWALLOWING: 1

## 2022-07-28 NOTE — HOSPICE
Skilled Nursing Comprehensive visit completed. She lives with her daughter Cezar Corbin who is her primary caregiver. She is now a max assist of 1 and if pt. is tired full assist for transfers. She does lean to the right if she is tired or doesn't feel well. PPS 30%. 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 She can feed her self finger foods with her left hand only. Vts : WNLS SEE ROS. Elaina Lea is adjusting to sleeping in the hospital bed and gets a bed bath  Cezar Corbin is able to communicate with Alisa very well, Elaina Lea is able to make her needs known with signals and some words. Patient has some short term memory loss, she listens intently to conversations and understands. Alisa was eating 90 % of an adult sized plate, now eats about 85% of a child's size plate. She drinks 1 Ensure daily as a supplement. Her was MUAC 18.0cm She does show other signs of signs of weight loss like sunken cheek s,bitemporal wasting, loose fitting clothing, prominent clavicles, bony prominences. Medications must be crushed and put in food for ease of swallowing. Her diet consists of Regular foods cut up in tiny pieces, along with lots of fruits, yogurt. Occasional problems with constipation managed with Senna. .Pain is well managed with Norco 5/325 3x day and Gabapentin. Instructed CG to try Ativan at HS. Medications rec completed: RFordered. Supplies needed None DME needed: None. Cezar Corbin know how to reach [facility] for any questions or concerns 24/7. Education provided to family regarding changes in patient condition, and disease progression. Mimi Rivera

## 2022-07-29 PROCEDURE — 0651 HSPC ROUTINE HOME CARE

## 2022-07-30 PROCEDURE — 0651 HSPC ROUTINE HOME CARE

## 2022-07-31 PROCEDURE — 0651 HSPC ROUTINE HOME CARE

## 2022-08-01 ENCOUNTER — HOME CARE VISIT (OUTPATIENT)
Dept: SCHEDULING | Facility: HOME HEALTH | Age: 87
End: 2022-08-01
Payer: MEDICARE

## 2022-08-01 PROCEDURE — G0156 HHCP-SVS OF AIDE,EA 15 MIN: HCPCS

## 2022-08-01 PROCEDURE — 0651 HSPC ROUTINE HOME CARE

## 2022-08-02 PROCEDURE — 0651 HSPC ROUTINE HOME CARE

## 2022-08-03 PROCEDURE — 0651 HSPC ROUTINE HOME CARE

## 2022-08-04 ENCOUNTER — HOME CARE VISIT (OUTPATIENT)
Dept: SCHEDULING | Facility: HOME HEALTH | Age: 87
End: 2022-08-04
Payer: MEDICARE

## 2022-08-04 ENCOUNTER — HOME CARE VISIT (OUTPATIENT)
Dept: HOSPICE | Facility: HOSPICE | Age: 87
End: 2022-08-04
Payer: MEDICARE

## 2022-08-04 VITALS
OXYGEN SATURATION: 94 % | RESPIRATION RATE: 18 BRPM | HEART RATE: 106 BPM | TEMPERATURE: 98.4 F | DIASTOLIC BLOOD PRESSURE: 81 MMHG | SYSTOLIC BLOOD PRESSURE: 126 MMHG

## 2022-08-04 PROCEDURE — G0156 HHCP-SVS OF AIDE,EA 15 MIN: HCPCS

## 2022-08-04 PROCEDURE — G0299 HHS/HOSPICE OF RN EA 15 MIN: HCPCS

## 2022-08-04 PROCEDURE — 0651 HSPC ROUTINE HOME CARE

## 2022-08-04 PROCEDURE — G0155 HHCP-SVS OF CSW,EA 15 MIN: HCPCS

## 2022-08-04 ASSESSMENT — ENCOUNTER SYMPTOMS: STOOL DESCRIPTION: SOFT FORMED

## 2022-08-04 NOTE — HOSPICE
Skilled Nursing Comprehensive visit completed. She lives with her daughter Frank Mane who is her primary caregiver. Pt. was upset today, she had spoken to son Amor Eli. She is now a max assist of 1 and if pt. is tired full assist for transfers. She does lean to the right if she is tired or doesn't feel well. PPS 30%. 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 She can feed her self finger foods with her left hand only. Vts : Pulse was elevated at 106 , pt. was upset when we arrived. All other vitals WNL's . SEE ROS. Buster Naidu likes sleeping in the hospital bed  She also gets a bed bath now. Frank Mane is able to communicate with Alisa very well, Buster Naidu is able to make her needs known with signals and some words. Patient has some short term memory loss, she listens intently to conversations and understands. Alisa was eating 90 % of an adult sized plate, now eats about 85% of a child's size plate. She drinks 1 Ensure daily as a supplement. Her was MUAC 18.0cm She does show other signs of signs of weight loss like sunken cheek s,bitemporal wasting, loose fitting clothing, prominent clavicles, bony prominences. Medications must be crushed and put in food for ease of swallowing. Her diet consists of Regular foods cut up in tiny pieces, along with lots of fruits, yogurt. Occasional problems with constipation managed with Senna. .Pain is well managed with Norco 5/325 3x day and Gabapentin. Instructed CG to try Ativan at HS. Medications rec completed: No RF needed. Supplies needed None DME needed: None. Frank Mane know how to reach [facility] for any questions or concerns 24/7. Education provided to family regarding changes in patient condition, and disease progression. Jenny Waters

## 2022-08-05 PROCEDURE — 0651 HSPC ROUTINE HOME CARE

## 2022-08-06 PROCEDURE — 0651 HSPC ROUTINE HOME CARE

## 2022-08-06 ASSESSMENT — ENCOUNTER SYMPTOMS: HEMOPTYSIS: 0

## 2022-08-06 NOTE — HOSPICE
Alisa is sitting in the window watching birds. She is quiet today mostly. Bert Nascimento said her son called and upset her. She tends to worry about him as he is a long distance . Bert Nascimento reports coping well considering all she has been through these past few months. She takes care of her mom and granddaughter in addition to grieving her spouses recent death. She is amazing. RN and ABILIO joint visit. SW provided emotional emotional support and active listening. Bert Nascimento denies any new needs.  SW offered availability

## 2022-08-07 PROCEDURE — 0651 HSPC ROUTINE HOME CARE

## 2022-08-08 ENCOUNTER — HOME CARE VISIT (OUTPATIENT)
Dept: SCHEDULING | Facility: HOME HEALTH | Age: 87
End: 2022-08-08
Payer: MEDICARE

## 2022-08-08 PROCEDURE — 0651 HSPC ROUTINE HOME CARE

## 2022-08-08 PROCEDURE — G0156 HHCP-SVS OF AIDE,EA 15 MIN: HCPCS

## 2022-08-09 PROCEDURE — 0651 HSPC ROUTINE HOME CARE

## 2022-08-10 PROCEDURE — 0651 HSPC ROUTINE HOME CARE

## 2022-08-11 ENCOUNTER — HOME CARE VISIT (OUTPATIENT)
Dept: SCHEDULING | Facility: HOME HEALTH | Age: 87
End: 2022-08-11
Payer: MEDICARE

## 2022-08-11 VITALS
SYSTOLIC BLOOD PRESSURE: 136 MMHG | DIASTOLIC BLOOD PRESSURE: 78 MMHG | TEMPERATURE: 97.2 F | HEART RATE: 98 BPM | RESPIRATION RATE: 22 BRPM

## 2022-08-11 PROCEDURE — 0651 HSPC ROUTINE HOME CARE

## 2022-08-11 PROCEDURE — G0299 HHS/HOSPICE OF RN EA 15 MIN: HCPCS

## 2022-08-11 PROCEDURE — G0156 HHCP-SVS OF AIDE,EA 15 MIN: HCPCS

## 2022-08-11 ASSESSMENT — ENCOUNTER SYMPTOMS: STOOL DESCRIPTION: SOFT

## 2022-08-12 PROCEDURE — 0651 HSPC ROUTINE HOME CARE

## 2022-08-12 NOTE — HOSPICE
Greeted at door by Germania Verdin. Ms. Lennox Buddy found in w/c w/ feet proped on bay window ledge, looking out the window. Alert. Garbled speech, although Lisset Clark can understand much of what her mom says. See ROS. Left sided neglect. BM every other day. No refills needed. Supplies: pullups, chux, wipes ordered. Radial/DP pulses equal bilat. Lisset Clark expresses her mom is getting weaker. It's more difficult to get right leg straight. Inquired as to if she had tried Ativan @ HS as suggested by ARSENIO, Ernie Guerra @ last visit. Lisset Clark stated, no. She needs her mom to wake up at night or else \"it's a soppy mess in the morning\". Also stated she needs her mom to be able stand. Discussed prrogressive weakness, what to expect as her mom declines. Verbalized understanding. No refills needed. Supplies: briefs, chux, wipes ordered. Shwetha verbalized understanding to call [facility] 24/7 for any needs/concerns.

## 2022-08-13 PROCEDURE — 0651 HSPC ROUTINE HOME CARE

## 2022-08-14 PROCEDURE — 0651 HSPC ROUTINE HOME CARE

## 2022-08-15 ENCOUNTER — HOME CARE VISIT (OUTPATIENT)
Dept: SCHEDULING | Facility: HOME HEALTH | Age: 87
End: 2022-08-15
Payer: MEDICARE

## 2022-08-15 ENCOUNTER — HOME CARE VISIT (OUTPATIENT)
Dept: HOSPICE | Facility: HOSPICE | Age: 87
End: 2022-08-15
Payer: MEDICARE

## 2022-08-15 PROCEDURE — 0651 HSPC ROUTINE HOME CARE

## 2022-08-15 PROCEDURE — G0156 HHCP-SVS OF AIDE,EA 15 MIN: HCPCS

## 2022-08-16 PROCEDURE — 0651 HSPC ROUTINE HOME CARE

## 2022-08-17 PROCEDURE — 0651 HSPC ROUTINE HOME CARE

## 2022-08-18 ENCOUNTER — HOME CARE VISIT (OUTPATIENT)
Dept: SCHEDULING | Facility: HOME HEALTH | Age: 87
End: 2022-08-18
Payer: MEDICARE

## 2022-08-18 VITALS
HEART RATE: 76 BPM | OXYGEN SATURATION: 98 % | SYSTOLIC BLOOD PRESSURE: 128 MMHG | TEMPERATURE: 98.4 F | DIASTOLIC BLOOD PRESSURE: 81 MMHG | RESPIRATION RATE: 21 BRPM

## 2022-08-18 PROCEDURE — G0299 HHS/HOSPICE OF RN EA 15 MIN: HCPCS

## 2022-08-18 PROCEDURE — 2500000001 HSPC NON INJECTABLE MED

## 2022-08-18 PROCEDURE — G0156 HHCP-SVS OF AIDE,EA 15 MIN: HCPCS

## 2022-08-18 PROCEDURE — 0651 HSPC ROUTINE HOME CARE

## 2022-08-18 ASSESSMENT — ENCOUNTER SYMPTOMS: STOOL DESCRIPTION: FORMED

## 2022-08-18 NOTE — HOSPICE
Skilled Nursing Comprehensive visit completed. She lives with her daughter Juliana Essex who is her primary caregiver. Pt. was upset today, she had spoken to son Thom Davis. She is now a max assist of 1 and if pt. is tired full assist for transfers. She does lean to the right if she is tired or doesn't feel well. PPS 30%. 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 She can feed her self finger foods with her left hand only. Vts : Pulse was elevated at  , pt. was upset when we arrived. All other vitals WNL's . SEE ROS. Suhail Fonatna likes sleeping in the hospital bed She also gets a bed bath now. Juliana Essex is able to communicate with Alisa very well, Suhail Fontana is able to make her needs known with signals and some words. Patient has some short term memory loss, she listens intently to conversations and understands. Alisa was eating 90 % of an adult sized plate, now eats about 85% of a child's size plate. She drinks 1 Ensure daily as a supplement. Her was MUAC 18.0cm She does show other signs of signs of weight loss like sunken cheek s,bitemporal wasting, loose fitting clothing, prominent clavicles, bony prominences. Medications must be crushed and put in food for ease of swallowing. Her diet consists of Regular foods cut up in tiny pieces, along with lots of fruits, yogurt. Occasional problems with constipation managed with Senna. .Pain is well managed with Norco 5/325 3x day and Gabapentin. Instructed CG to try Ativan at HS. Medications rec completed: No RF needed. Supplies needed None DME needed: None. Juliana Essex know how to reach [facility] for any questions or concerns 24/7. Education provided to family regarding changes in patient condition, and disease progression. Eric Blanco

## 2022-08-19 PROCEDURE — 0651 HSPC ROUTINE HOME CARE

## 2022-08-20 PROCEDURE — 0651 HSPC ROUTINE HOME CARE

## 2022-08-21 PROCEDURE — 0651 HSPC ROUTINE HOME CARE

## 2022-08-22 ENCOUNTER — HOME CARE VISIT (OUTPATIENT)
Dept: SCHEDULING | Facility: HOME HEALTH | Age: 87
End: 2022-08-22
Payer: MEDICARE

## 2022-08-22 PROCEDURE — 0651 HSPC ROUTINE HOME CARE

## 2022-08-22 PROCEDURE — G0156 HHCP-SVS OF AIDE,EA 15 MIN: HCPCS

## 2022-08-23 PROCEDURE — 0651 HSPC ROUTINE HOME CARE

## 2022-08-24 PROCEDURE — 0651 HSPC ROUTINE HOME CARE

## 2022-08-25 ENCOUNTER — HOME CARE VISIT (OUTPATIENT)
Dept: SCHEDULING | Facility: HOME HEALTH | Age: 87
End: 2022-08-25
Payer: MEDICARE

## 2022-08-25 ENCOUNTER — HOME CARE VISIT (OUTPATIENT)
Dept: HOSPICE | Facility: HOSPICE | Age: 87
End: 2022-08-25
Payer: MEDICARE

## 2022-08-25 VITALS
TEMPERATURE: 98.5 F | RESPIRATION RATE: 20 BRPM | HEART RATE: 107 BPM | OXYGEN SATURATION: 99 % | DIASTOLIC BLOOD PRESSURE: 89 MMHG | SYSTOLIC BLOOD PRESSURE: 166 MMHG

## 2022-08-25 PROCEDURE — G0299 HHS/HOSPICE OF RN EA 15 MIN: HCPCS

## 2022-08-25 PROCEDURE — 0651 HSPC ROUTINE HOME CARE

## 2022-08-25 PROCEDURE — G0156 HHCP-SVS OF AIDE,EA 15 MIN: HCPCS

## 2022-08-25 PROCEDURE — 2500000001 HSPC NON INJECTABLE MED

## 2022-08-25 PROCEDURE — G0155 HHCP-SVS OF CSW,EA 15 MIN: HCPCS

## 2022-08-25 ASSESSMENT — ENCOUNTER SYMPTOMS
TROUBLE SWALLOWING: 1
STOOL DESCRIPTION: SOFT FORMED

## 2022-08-25 NOTE — HOSPICE
Skilled Nursing Comprehensive visit completed. She lives with her daughter Kristal Musa who is her primary caregiver. Pt. was upset and angry and it seem she is mad at Kristal Musa. I had never seen her this way. Pt. pushed my hand away then when MSW arrived she pushed her away. She is now a max assist of 1 and if pt. is tired full assist for transfers. She does lean to the right if she is tired or doesn't feel well. PPS 30%. 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 She can feed her self finger foods with her left hand only. Vts : Pulse was elevated 107, B/P 166/ 89. All other vitals WNL's . SEE ROS. Blade Gooden likes sleeping in the hospital bed She also gets a bed bath now. Kristal Musa is able to communicate with Alisa very well, Blade Gooden is able to make her needs known with signals and some words. Patient has some short term memory loss, she listens intently to conversations and understands. Alisa was eating 90 % of an adult sized plate, now eats about 85% of a child's size plate. She drinks 1 Ensure daily as a supplement. Her was MUAC 18.0cm She does show other signs of signs of weight loss like sunken cheek s,bitemporal wasting, loose fitting clothing, prominent clavicles, bony prominences. Medications must be crushed and put in food for ease of swallowing. Her diet consists of Regular foods cut up in tiny pieces, along with lots of fruits, yogurt. Occasional problems with constipation managed with Senna. .Pain is well managed with Norco 5/325 3x day and Gabapentin. Instructed CG to try Ativan at HS. Reviewed with Kristal Musa to use the meds more if her mom is upset. Medications rec completed: No RF Hydrocodone Supplies needed ordered. DME needed: None. Kristal Musa know how to reach [facility] for any questions or concerns 24/7. Education provided to family regarding changes in patient condition, and disease progression. Daisy Goyal

## 2022-08-26 PROCEDURE — 0651 HSPC ROUTINE HOME CARE

## 2022-08-27 PROCEDURE — 0651 HSPC ROUTINE HOME CARE

## 2022-08-28 PROCEDURE — 0651 HSPC ROUTINE HOME CARE

## 2022-08-29 ENCOUNTER — HOME CARE VISIT (OUTPATIENT)
Dept: SCHEDULING | Facility: HOME HEALTH | Age: 87
End: 2022-08-29
Payer: MEDICARE

## 2022-08-29 PROCEDURE — 0651 HSPC ROUTINE HOME CARE

## 2022-08-29 PROCEDURE — G0156 HHCP-SVS OF AIDE,EA 15 MIN: HCPCS

## 2022-08-29 ASSESSMENT — ENCOUNTER SYMPTOMS: HEMOPTYSIS: 0

## 2022-08-29 NOTE — HOSPICE
Blade Gooden is sitting in front of her dining room window as usual.  She is agitated this morning, apparently at [facility]. Ewa had to tend to some family business and Blade Gooden is reacting. ABILIO has witnessed patient get upset with her dtr before. She has a temper and is slow to calm down. Ewa reports feeling \"exhausted\" but she is reluctant to secure more in home sitter care. She has secured a neighbor to help two days a week but reports much her needs are in the night time hours. She says Blade Gooden is voiding several times nightly. They will not use respite again as Ewa promised her mother she would not have her go there again. Ewa has the responsibility of caring for her mom and granddaughter and dealing with her spouses estate things. Ewa says she may look into hiring more care to help.   ABILIO offered emotional support and supportive  Principal Discharge DX:	Dog bite of left lower leg, initial encounter

## 2022-08-30 PROCEDURE — 0651 HSPC ROUTINE HOME CARE

## 2022-08-31 PROCEDURE — 0651 HSPC ROUTINE HOME CARE

## 2022-09-01 ENCOUNTER — HOME CARE VISIT (OUTPATIENT)
Dept: SCHEDULING | Facility: HOME HEALTH | Age: 87
End: 2022-09-01
Payer: MEDICARE

## 2022-09-01 VITALS
SYSTOLIC BLOOD PRESSURE: 128 MMHG | OXYGEN SATURATION: 99 % | RESPIRATION RATE: 21 BRPM | TEMPERATURE: 98.4 F | HEART RATE: 85 BPM | DIASTOLIC BLOOD PRESSURE: 80 MMHG

## 2022-09-01 PROCEDURE — 2500000001 HSPC NON INJECTABLE MED

## 2022-09-01 PROCEDURE — G0156 HHCP-SVS OF AIDE,EA 15 MIN: HCPCS

## 2022-09-01 PROCEDURE — 0651 HSPC ROUTINE HOME CARE

## 2022-09-01 PROCEDURE — G0299 HHS/HOSPICE OF RN EA 15 MIN: HCPCS

## 2022-09-01 ASSESSMENT — ENCOUNTER SYMPTOMS: STOOL DESCRIPTION: SOFT FORMED

## 2022-09-01 NOTE — HOSPICE
Recertification visit completed. Face to Face visit was completed by Gabino Garcia NP. Rut Parra lives with her daughter Gerry Alexander who is her primary caregiver. She is now a max assist of 1  and if pt. is tired full assist for transfers. She does lean to the right if she is tired or doesn't feel well. PPS 30%. 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 She can feed her self finger foods with her left hand only. Vts : Pulse was elevated 85 and irregular, B/P 138/80. All other vitals WNL's . SEE ROS. Rut Parra likes sleeping in the hospital bed She also gets a bed bath now. Gerry Alexander is able to communicate with Alisa very well, Rut Parra is able to make her needs known with signals and some words. Patient has some short term memory loss, she listens intently to conversations and understands. Alisa was eating 90 % of an adult sized plate, now eats about 85% of a child's size plate. She drinks 1 Ensure daily as a supplement. Her was MUAC 18.0cm She does show other signs of weight loss like sunken cheek s,bitemporal wasting, loose fitting clothing, prominent clavicles, bony prominences. Medications must be crushed and put in food for ease of swallowing. Her diet consists of Regular foods cut up in tiny pieces, along with lots of fruits, yogurt. Occasional problems with constipation managed with Senna. I had suggested she try a brief on pt. at night to keep from having to get Alisa up. She attempted to last night and pt. started hitting and kicking because she did not want brief put on her. Pain is well managed with Norco 5/325 3x day and Gabapentin. Aisha Ramírez reviewed pain and anti-anxiety meds with Gerry Alexander, encouraged her to use the meds more if her mom is upset or at HS. Medications rec completed:  Supplies ordered: None. DME needed: None. CG has a hired CG to come in 3 x wk. 1/2 days to give her a break. Gerry Alexander know how to reach [facility] for any questions or concerns 24/7. Education provided to family regarding changes in patient condition, and disease progression.

## 2022-09-02 PROCEDURE — 0651 HSPC ROUTINE HOME CARE

## 2022-09-03 PROCEDURE — 0651 HSPC ROUTINE HOME CARE

## 2022-09-04 PROCEDURE — 0651 HSPC ROUTINE HOME CARE

## 2022-09-05 ENCOUNTER — HOME CARE VISIT (OUTPATIENT)
Dept: SCHEDULING | Facility: HOME HEALTH | Age: 87
End: 2022-09-05
Payer: MEDICARE

## 2022-09-05 PROCEDURE — 0651 HSPC ROUTINE HOME CARE

## 2022-09-06 PROCEDURE — 0651 HSPC ROUTINE HOME CARE

## 2022-09-07 PROCEDURE — 0651 HSPC ROUTINE HOME CARE

## 2022-09-08 ENCOUNTER — HOME CARE VISIT (OUTPATIENT)
Dept: SCHEDULING | Facility: HOME HEALTH | Age: 87
End: 2022-09-08
Payer: MEDICARE

## 2022-09-08 VITALS
SYSTOLIC BLOOD PRESSURE: 110 MMHG | RESPIRATION RATE: 23 BRPM | HEART RATE: 68 BPM | OXYGEN SATURATION: 100 % | TEMPERATURE: 98 F | DIASTOLIC BLOOD PRESSURE: 55 MMHG

## 2022-09-08 PROCEDURE — G0299 HHS/HOSPICE OF RN EA 15 MIN: HCPCS

## 2022-09-08 PROCEDURE — 0651 HSPC ROUTINE HOME CARE

## 2022-09-08 PROCEDURE — G0156 HHCP-SVS OF AIDE,EA 15 MIN: HCPCS

## 2022-09-08 ASSESSMENT — ENCOUNTER SYMPTOMS: STOOL DESCRIPTION: FIRM

## 2022-09-08 NOTE — HOSPICE
Skilled Nursing Comprehensive visit completed. She lives with her daughter Km Coned who is her primary caregiver. I explained to pt. that she either had to wear briefs at night or we may have to put in a catheter. She is going frequently at night. Kenzie Mask when she stried to put a diaper on her. Km Conde has a CG that comes several times a week to sit with pt. Mejia Carrillo will hold her urine to keep from having the sitter change her. Shwetha and I discussed purewick catheter would be an option, but Km Conde would have to purchase. Pt. nodded that she understood what I was saying. She is now a max assist of 1 and if pt. is tired full assist for transfers. She does lean to the right if she is tired or doesn't feel well. PPS 30%. 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 She can feed her self finger foods with her left hand only. Vts: Pulse was elevated 75, B/P 110/ 55. All other vitals WNL's . SEE ROS. Emil Quijano likes sleeping in the hospital bed She also gets a bed bath now. Km Conde is able to communicate with Alisa very well, Emil Quijano is able to make her needs known with signals and some words. Patient has some short term memory loss, she listens intently to conversations and understands. Alisa was eating 90 % of an adult sized plate, now eats about 85% of a child's size plate. She drinks 1 Ensure daily as a supplement. Her was MUAC 17.0cm She does show other signs of signs of weight loss like sunken cheeks,bitemporal wasting, loose fitting clothing, prominent clavicles, bony prominences. Medications must be crushed and put in food for ease of swallowing. Her diet consists of Regular foods cut up in tiny pieces, along with lots of fruits, yogurt. Occasional problems with constipation managed with Senna. .Pain is well managed with Norco 5/325 3x day and Gabapentin. Instructed CG to try Ativan at HS.  Reviewed with Km Conde to use the meds more if her mom is upset. Medications rec completed: No RF Hydrocodone Supplies needed ordered. DME needed: None. Frank Alhaji know how to reach [address] [facility] for any questions or concerns 24/7. Education provided to family regarding changes in patient condition, and disease progression. Jenny Waters

## 2022-09-09 PROCEDURE — 0651 HSPC ROUTINE HOME CARE

## 2022-09-10 PROCEDURE — 0651 HSPC ROUTINE HOME CARE

## 2022-09-11 PROCEDURE — 0651 HSPC ROUTINE HOME CARE

## 2022-09-12 ENCOUNTER — HOME CARE VISIT (OUTPATIENT)
Dept: SCHEDULING | Facility: HOME HEALTH | Age: 87
End: 2022-09-12
Payer: MEDICARE

## 2022-09-12 PROCEDURE — G0156 HHCP-SVS OF AIDE,EA 15 MIN: HCPCS

## 2022-09-12 PROCEDURE — 0651 HSPC ROUTINE HOME CARE

## 2022-09-13 PROCEDURE — 0651 HSPC ROUTINE HOME CARE

## 2022-09-14 PROCEDURE — 0651 HSPC ROUTINE HOME CARE

## 2022-09-15 ENCOUNTER — HOME CARE VISIT (OUTPATIENT)
Dept: SCHEDULING | Facility: HOME HEALTH | Age: 87
End: 2022-09-15
Payer: MEDICARE

## 2022-09-15 VITALS
SYSTOLIC BLOOD PRESSURE: 141 MMHG | HEART RATE: 88 BPM | DIASTOLIC BLOOD PRESSURE: 85 MMHG | OXYGEN SATURATION: 100 % | TEMPERATURE: 96.8 F | RESPIRATION RATE: 20 BRPM

## 2022-09-15 PROCEDURE — G0156 HHCP-SVS OF AIDE,EA 15 MIN: HCPCS

## 2022-09-15 PROCEDURE — 2500000001 HSPC NON INJECTABLE MED

## 2022-09-15 PROCEDURE — 0651 HSPC ROUTINE HOME CARE

## 2022-09-15 PROCEDURE — G0299 HHS/HOSPICE OF RN EA 15 MIN: HCPCS

## 2022-09-15 ASSESSMENT — ENCOUNTER SYMPTOMS
CONSTIPATION: 1
BOWEL INCONTINENCE: 1
DIARRHEA: 1
STOOL DESCRIPTION: MUSHY

## 2022-09-15 NOTE — HOSPICE
Skilled Nursing Comprehensive visit completed. She lives with her daughter Mauricio Dixon who is her primary caregiver. She is urinating frequently at night. Mauricio Dixon has a CG that comes several times a week to sit with pt. Dipika Deric will hold her urine to keep from having the sitter change her. Shwetha and I discussed purewick catheter would be an option, but Mauricio Dixon would have to purchase. She is now a max assist of 1 and if pt. is tired full assist for transfers. She does lean to the right if she is tired or doesn't feel well. PPS 30%. 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 She can feed her self finger foods with her left hand only. Vts: Pulse was elevated 88 , B/P 141/ 85. All other vitals WNL's . SEE ROS. Alisa likes sleeping in the hospital bed. She also gets a bed bath now. Mauricio Dixon is able to communicate with Alisa very well, Cornelius Suarez is able to make her needs known with signals and some words. Patient has some short term memory loss, she listens intently to conversations and understands. Alisa was eating 90 % of an adult sized plate, now eats about 85% of a child's size plate. She drinks 1 Ensure daily as a supplement. Her was MUAC 17.0cm She does show other signs of signs of weight loss like sunken cheeks,bitemporal wasting, loose fitting clothing, prominent clavicles, bony prominences. Medications must be crushed and put in food for ease of swallowing. Her diet consists of Regular foods cut up in tiny pieces, along with lots of fruits, yogurt. Occasional problems with constipation managed with Senna. .Pain is well managed with Norco 5/325 3x day and Gabapentin. Instructed CG to try Ativan at HS. Reviewed with Mauricio Dixon to use the meds more if her mom is upset. Medications rec completed:RF ordered. Supplies needed: ordered  DME needed: None. Mauricio Dixon know how to reach [facility] for any questions or concerns 24/7.  Education provided to family regarding changes in patient condition, and disease progression.

## 2022-09-16 ENCOUNTER — HOME CARE VISIT (OUTPATIENT)
Dept: HOSPICE | Facility: HOSPICE | Age: 87
End: 2022-09-16
Payer: MEDICARE

## 2022-09-16 PROCEDURE — 0651 HSPC ROUTINE HOME CARE

## 2022-09-16 PROCEDURE — 2500000001 HSPC NON INJECTABLE MED

## 2022-09-17 PROCEDURE — 0651 HSPC ROUTINE HOME CARE

## 2022-09-18 PROCEDURE — 0651 HSPC ROUTINE HOME CARE

## 2022-09-19 ENCOUNTER — HOME CARE VISIT (OUTPATIENT)
Dept: SCHEDULING | Facility: HOME HEALTH | Age: 87
End: 2022-09-19
Payer: MEDICARE

## 2022-09-19 PROCEDURE — G0156 HHCP-SVS OF AIDE,EA 15 MIN: HCPCS

## 2022-09-19 PROCEDURE — 0651 HSPC ROUTINE HOME CARE

## 2022-09-20 PROCEDURE — 0651 HSPC ROUTINE HOME CARE

## 2022-09-21 PROCEDURE — 0651 HSPC ROUTINE HOME CARE

## 2022-09-22 ENCOUNTER — HOME CARE VISIT (OUTPATIENT)
Dept: SCHEDULING | Facility: HOME HEALTH | Age: 87
End: 2022-09-22
Payer: MEDICARE

## 2022-09-22 ENCOUNTER — HOME CARE VISIT (OUTPATIENT)
Dept: HOSPICE | Facility: HOSPICE | Age: 87
End: 2022-09-22
Payer: MEDICARE

## 2022-09-22 VITALS — HEART RATE: 14 BPM | DIASTOLIC BLOOD PRESSURE: 72 MMHG | SYSTOLIC BLOOD PRESSURE: 112 MMHG | TEMPERATURE: 98.2 F

## 2022-09-22 PROCEDURE — 0651 HSPC ROUTINE HOME CARE

## 2022-09-22 PROCEDURE — G0155 HHCP-SVS OF CSW,EA 15 MIN: HCPCS

## 2022-09-22 PROCEDURE — G0156 HHCP-SVS OF AIDE,EA 15 MIN: HCPCS

## 2022-09-22 PROCEDURE — G0299 HHS/HOSPICE OF RN EA 15 MIN: HCPCS

## 2022-09-22 PROCEDURE — 2500000001 HSPC NON INJECTABLE MED

## 2022-09-22 ASSESSMENT — ENCOUNTER SYMPTOMS
STOOL DESCRIPTION: SMALL
HEMOPTYSIS: 0

## 2022-09-22 NOTE — HOSPICE
Matilde Silva is a patient with a Hospice diagnosis of sequelae of CVA. César Lloyd was seen today in her home with her daughter. When I arrived, Soy Linder was in the bathroom. Her daughter had assisted her to the toilet. After using the toilet, her daughter lifted her back to the wheelchair and we did her visit with her sitting in the wheelchair. Soy Linder is a pleasant and well-groomed woman of 89 years. She has expressive aphasia but seems to understand fully what is said to her and tries to communicate by using gibberish and nodding her head and smiling frequently. We discussed the followin. Soy Linder is that's waking up several times during the night and disturbing both her and her daughter sleep. She is requesting to go to the bathroom. Though she does not go as often during the day since treatment for UTI she is still going several times at night. This awakening may partly be because she is getting very wet laying in the bed and the daughter has been unable to keep her from getting soaked through her pad and her brief. We discussed several options including a pure wick machine, using longer diaper liners, and buying some washable pads for the bed. Daughter will report if signs and symptoms of UTI reoccur as antibiotic is going to be complete in two days. 2. Discuss medication regimen Emerita Alex daughter is hesitant to over sedate her mother and wants to be careful to prevent falls during the day. She does not currently give gabapentin at night does but does give Norco. The Norco used to control her sleep pattern and would allow her to sleep through the night. It no longer seems to be doing sedating her enough to sleep through the night. We talked about tolerance to medication and the fact that she had been taking Norco for a while and that the sleep side effect would lessen with time.  She is currently not giving the gabapentin at night, so I encouraged her to give that along with the [address] and see if that helps with sleep problem. 3. Assessed superficial pressure ulcer in sacral area. Area appears to be improving with good granulation and zinc paste being applied 2x a day by Km Conde. Will continue SN visit 1x week, HHA 2x a week and  and SW support.     Reconciled medication and ordered supplies, chux, diaper liners

## 2022-09-23 PROCEDURE — 0651 HSPC ROUTINE HOME CARE

## 2022-09-24 PROCEDURE — 0651 HSPC ROUTINE HOME CARE

## 2022-09-24 ASSESSMENT — ENCOUNTER SYMPTOMS: HEMOPTYSIS: 0

## 2022-09-24 NOTE — HOSPICE
Tyler Borden expresses fatigue as Alisa is incontinent at night and Tyler Borden has been not sleeping well as a result. In addition, she is having difficulties with allergies. SW suggested a few items like a pure wick to help with incontinence and some larger bed pads that can be washed. She says she willl look into that. She otherwise is coping adequately but does acknowledge the hardship of grieving her spouse and having to care for her mom and granddaughter. SW offered emotional support and praise for care provided. Danielle Roa was in fair spirits today and smiled about the dogs. She continues to be well cared for by her dtr. .  A request for respite during Thanksgiving has been submitted.

## 2022-09-25 PROCEDURE — 0651 HSPC ROUTINE HOME CARE

## 2022-09-26 ENCOUNTER — HOME CARE VISIT (OUTPATIENT)
Dept: SCHEDULING | Facility: HOME HEALTH | Age: 87
End: 2022-09-26
Payer: MEDICARE

## 2022-09-26 PROCEDURE — 0651 HSPC ROUTINE HOME CARE

## 2022-09-26 PROCEDURE — G0156 HHCP-SVS OF AIDE,EA 15 MIN: HCPCS

## 2022-09-27 PROCEDURE — 0651 HSPC ROUTINE HOME CARE

## 2022-09-28 PROCEDURE — 0651 HSPC ROUTINE HOME CARE

## 2022-09-29 ENCOUNTER — HOME CARE VISIT (OUTPATIENT)
Dept: SCHEDULING | Facility: HOME HEALTH | Age: 87
End: 2022-09-29
Payer: MEDICARE

## 2022-09-29 VITALS
DIASTOLIC BLOOD PRESSURE: 67 MMHG | SYSTOLIC BLOOD PRESSURE: 117 MMHG | HEART RATE: 78 BPM | RESPIRATION RATE: 21 BRPM | TEMPERATURE: 98.3 F | OXYGEN SATURATION: 98 %

## 2022-09-29 PROCEDURE — 0651 HSPC ROUTINE HOME CARE

## 2022-09-29 PROCEDURE — G0156 HHCP-SVS OF AIDE,EA 15 MIN: HCPCS

## 2022-09-29 PROCEDURE — G0299 HHS/HOSPICE OF RN EA 15 MIN: HCPCS

## 2022-09-29 ASSESSMENT — ENCOUNTER SYMPTOMS
BOWEL INCONTINENCE: 1
STOOL DESCRIPTION: SOFT FORMED

## 2022-09-29 NOTE — HOSPICE
Skilled Nursing Comprehensive visit completed. She lives with her daughter Gurpreet Flowers who is her primary caregiver. She is urinating frequently at night. Gurpreet Flowers had ordered bigger pads for the bed and is using inserts in the pull-ups. Gurpreet Flowers reports that this has helped. Gurpreet Flowers has a CG that comes several times a week to sit with pt. Shwetha and I discussed purewick catheter would be an option, but Gurpreet Flowers has not ordered yet. She is now a full assist for transfers. She does lean to the right if she is tired or doesn't feel well. PPS 30%. 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 She can feed her self finger foods with her left hand only. Vts: Pulse was elevated 78 , B/P 117/ 67. All other vitals WNL's . SEE ROS. Alisa likes sleeping in the hospital bed. She also gets a bed bath now. Gurpreet Flowers is able to communicate with Alisa very well, Rosario Obrien is able to make her needs known with signals and some words. Patient has some short term memory loss, she listens intently to conversations and understands. Alisa was eating 90 % of an adult sized plate, now eats about 85% of a child's size plate. She drinks 1 Ensure daily as a supplement. Her was left MUAC 18.0cm. She does show other signs of signs of weight loss like sunken cheeks,bitemporal wasting, loose fitting clothing, prominent clavicles, bony prominences. Medications must be crushed and put in food for ease of swallowing. Her diet consists of Regular foods cut up in tiny pieces, along with lots of fruits, yogurt. Occasional problems with constipation managed with Senna. .Pain is well managed with Norco 5/325 3x day and Gabapentin. Instructed CG to try Ativan at HS. Reviewed with Gurpreet Flowers to use the meds more if her mom is upset. Medications rec completed:RF None Supplies needed: None DME needed: None. Gurpreet Flowers know how to reach [address] for any questions or concerns 24/7.  Education provided to family regarding changes in patient condition, and disease progression.

## 2022-09-30 PROCEDURE — 0651 HSPC ROUTINE HOME CARE

## 2022-10-01 PROCEDURE — 0651 HSPC ROUTINE HOME CARE

## 2022-10-02 PROCEDURE — 0651 HSPC ROUTINE HOME CARE

## 2022-10-03 ENCOUNTER — HOME CARE VISIT (OUTPATIENT)
Dept: SCHEDULING | Facility: HOME HEALTH | Age: 87
End: 2022-10-03
Payer: MEDICARE

## 2022-10-03 PROCEDURE — 0651 HSPC ROUTINE HOME CARE

## 2022-10-03 PROCEDURE — G0156 HHCP-SVS OF AIDE,EA 15 MIN: HCPCS

## 2022-10-04 PROCEDURE — 0651 HSPC ROUTINE HOME CARE

## 2022-10-05 PROCEDURE — 0651 HSPC ROUTINE HOME CARE

## 2022-10-06 ENCOUNTER — HOME CARE VISIT (OUTPATIENT)
Dept: HOSPICE | Facility: HOSPICE | Age: 87
End: 2022-10-06
Payer: MEDICARE

## 2022-10-06 ENCOUNTER — HOME CARE VISIT (OUTPATIENT)
Dept: SCHEDULING | Facility: HOME HEALTH | Age: 87
End: 2022-10-06
Payer: MEDICARE

## 2022-10-06 VITALS
RESPIRATION RATE: 21 BRPM | HEART RATE: 94 BPM | OXYGEN SATURATION: 99 % | TEMPERATURE: 97.8 F | DIASTOLIC BLOOD PRESSURE: 79 MMHG | SYSTOLIC BLOOD PRESSURE: 128 MMHG

## 2022-10-06 PROCEDURE — G0156 HHCP-SVS OF AIDE,EA 15 MIN: HCPCS

## 2022-10-06 PROCEDURE — G0155 HHCP-SVS OF CSW,EA 15 MIN: HCPCS

## 2022-10-06 PROCEDURE — 0651 HSPC ROUTINE HOME CARE

## 2022-10-06 PROCEDURE — G0299 HHS/HOSPICE OF RN EA 15 MIN: HCPCS

## 2022-10-06 ASSESSMENT — ENCOUNTER SYMPTOMS
BOWEL INCONTINENCE: 1
STOOL DESCRIPTION: SOFT FORMED

## 2022-10-06 NOTE — HOSPICE
Skilled Nursing Comprehensive visit completed. She lives with her daughter Alfred Lao who is her primary caregiver. She is urinating frequently at night. Cg inserts in the pull-ups. Alfred Lao reports that this has helped. Alfred Lao has a CG that comes several times a week to sit with pt. Shwetha and I discussed purewick catheter would be an option, but Alfred Lao has not ordered yet. She is now a full assist for transfers sa she is not able to bear wt on right leg. .She does lean to the right if she is tired or doesn't feel well. PPS 30%. 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 She can feed her self finger foods with her left hand only. Vts: Pulse was elevated 94 , B/P 128/79. All other vitals WNL's . SEE ROS. Alisa likes sleeping in the hospital bed. She also gets a bed bath now. Alfred Lao is able to communicate with Alisa very well, Ulises Pearson is able to make her needs known with signals and some words. Patient has some short term memory loss, she listens intently to conversations and understands. Alisa was eating 90 % of an adult sized plate, now eats about 85% of a child's size plate. She drinks 1 Ensure daily as a supplement. Her was left MUAC 18.0cm. She does show other signs of signs of weight loss like sunken cheeks,bitemporal wasting, loose fitting clothing, prominent clavicles, bony prominences. Medications must be crushed and put in food for ease of swallowing. Her diet consists of Regular foods cut up in tiny pieces, along with lots of fruits, yogurt. Occasional problems with constipation managed with Senna. .Pain is well managed with Norco 5/325 3x day and Gabapentin. Instructed CG to try Ativan at HS. Reviewed with Alfred Lao to use the meds more if her mom is upset. Medications rec completed:RF None Supplies needed: ordered DME needed: None. Alfred Lao know how to reach [facility] for any questions or concerns 24/7.  Education provided to family regarding changes in patient condition, and disease progression.

## 2022-10-07 PROCEDURE — 0651 HSPC ROUTINE HOME CARE

## 2022-10-08 PROCEDURE — 0651 HSPC ROUTINE HOME CARE

## 2022-10-09 PROCEDURE — 0651 HSPC ROUTINE HOME CARE

## 2022-10-09 ASSESSMENT — ENCOUNTER SYMPTOMS: HEMOPTYSIS: 0

## 2022-10-10 PROCEDURE — 0651 HSPC ROUTINE HOME CARE

## 2022-10-11 PROCEDURE — 0651 HSPC ROUTINE HOME CARE

## 2022-10-12 PROCEDURE — 0651 HSPC ROUTINE HOME CARE

## 2022-10-13 ENCOUNTER — HOME CARE VISIT (OUTPATIENT)
Dept: SCHEDULING | Facility: HOME HEALTH | Age: 87
End: 2022-10-13
Payer: MEDICARE

## 2022-10-13 VITALS
RESPIRATION RATE: 21 BRPM | TEMPERATURE: 97.6 F | OXYGEN SATURATION: 97 % | SYSTOLIC BLOOD PRESSURE: 140 MMHG | HEART RATE: 82 BPM | DIASTOLIC BLOOD PRESSURE: 86 MMHG

## 2022-10-13 PROCEDURE — 0651 HSPC ROUTINE HOME CARE

## 2022-10-13 PROCEDURE — G0299 HHS/HOSPICE OF RN EA 15 MIN: HCPCS

## 2022-10-13 ASSESSMENT — ENCOUNTER SYMPTOMS
BOWEL INCONTINENCE: 1
STOOL DESCRIPTION: SOFT FORMED

## 2022-10-13 NOTE — HOSPICE
Skilled Nursing Comprehensive visit completed. Pt. was emotional and very verbal today with garbled speech. She lives with her daughter Ilda Mcdonough who is her primary caregiver. She is urinating frequently at night. Cg uses inserts in the pull-ups. Ilda Mcdonough reports that this has helped. Ilda Mcdonough has a CG that comes several times a week to sit with pt. Ilda Mcdonough has not ordered purewick as of yet. She is now a full assist for transfers as Benita Zhu is not able to bear wt on either leg. She does lean to the right if she is tired or doesn't feel well. PPS 30%. 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 She can feed her self finger foods with her left hand only. Vts: Pulse 82 , B/P 140/86. All other vitals WNL's . SEE ROS. Alisa likes sleeping in the hospital bed. Benita Zhu gets a bed bath now. Ilda Mcdonough is able to communicate with Alisa very well, Benita Zhu is able to make her needs known with signals and some words. Patient has some short term memory loss, she listens intently to conversations and understands. Alisa was eating 90 % of an adult sized plate, now eats about 85% of a child's size plate. She drinks 1 Ensure daily as a supplement. Her was left MUAC 18.0cm. She does show other signs of signs of weight loss like sunken cheeks,bitemporal wasting, loose fitting clothing, prominent clavicles, bony prominences. Medications must be crushed and put in food for ease of swallowing. Her diet consists of Regular foods cut up in tiny pieces, along with lots of fruits, yogurt. Occasional problems with constipation managed with Senna. .Pain is well managed with Norco 5/325 3x day and Gabapentin. Instructed CG to try Ativan at HS. Reviewed with Ilda Mcdonough to use the meds more if her mom is upset. Medications rec completed:RF None Supplies needed: ordered DME needed: None. Ilda Mcdonough know how to reach [facility] for any questions or concerns 24/7.  Education provided to family regarding changes in patient condition, and disease progression.

## 2022-10-14 ENCOUNTER — HOME CARE VISIT (OUTPATIENT)
Dept: SCHEDULING | Facility: HOME HEALTH | Age: 87
End: 2022-10-14
Payer: MEDICARE

## 2022-10-14 PROCEDURE — G0156 HHCP-SVS OF AIDE,EA 15 MIN: HCPCS

## 2022-10-14 PROCEDURE — 0651 HSPC ROUTINE HOME CARE

## 2022-10-14 NOTE — PROGRESS NOTES
Problem: Falls - Risk of  Goal: *Absence of Falls  Description: Patient Deanne Kramer will remain free from falls AEB no injuries r/t falls each shift during inpatient hospice stay. Outcome: Progressing Towards Goal  Note: Fall Risk Interventions:  Mobility Interventions: Bed/chair exit alarm    Mentation Interventions: Adequate sleep, hydration, pain control,Bed/chair exit alarm,Door open when patient unattended,Reorient patient,Room close to nurse's station,Update white board    Medication Interventions: Bed/chair exit alarm    Elimination Interventions: Bed/chair exit alarm,Call light in reach              Problem: Potential for Alteration in Skin Integrity  Goal: Monitor skin for areas of alteration in skin integrity  Description: Patient/family/caregiver will demonstrate ability to care for patient's skin, monitor for areas of breakdown, and demonstrate methods to prevent breakdown during hospice care. Outcome: Progressing Towards Goal     Problem: Alteration in Mobility  Goal: Remain as independent as possible and remain safe in environment  Description: Patient will remain as independent as possible and remain safe in their environment.   Outcome: Progressing Towards Goal Other, specify...

## 2022-10-15 PROCEDURE — 0651 HSPC ROUTINE HOME CARE

## 2022-10-16 PROCEDURE — 0651 HSPC ROUTINE HOME CARE

## 2022-10-17 ENCOUNTER — HOME CARE VISIT (OUTPATIENT)
Dept: SCHEDULING | Facility: HOME HEALTH | Age: 87
End: 2022-10-17
Payer: MEDICARE

## 2022-10-17 PROCEDURE — 0651 HSPC ROUTINE HOME CARE

## 2022-10-17 PROCEDURE — G0156 HHCP-SVS OF AIDE,EA 15 MIN: HCPCS

## 2022-10-18 PROCEDURE — 2500000001 HSPC NON INJECTABLE MED

## 2022-10-18 PROCEDURE — 0651 HSPC ROUTINE HOME CARE

## 2022-10-19 PROCEDURE — 0651 HSPC ROUTINE HOME CARE

## 2022-10-20 ENCOUNTER — HOME CARE VISIT (OUTPATIENT)
Dept: SCHEDULING | Facility: HOME HEALTH | Age: 87
End: 2022-10-20
Payer: MEDICARE

## 2022-10-20 VITALS
RESPIRATION RATE: 21 BRPM | HEART RATE: 80 BPM | DIASTOLIC BLOOD PRESSURE: 73 MMHG | OXYGEN SATURATION: 99 % | TEMPERATURE: 97.8 F | SYSTOLIC BLOOD PRESSURE: 116 MMHG

## 2022-10-20 PROCEDURE — G0156 HHCP-SVS OF AIDE,EA 15 MIN: HCPCS

## 2022-10-20 PROCEDURE — 2500000001 HSPC NON INJECTABLE MED

## 2022-10-20 PROCEDURE — 0651 HSPC ROUTINE HOME CARE

## 2022-10-20 PROCEDURE — G0299 HHS/HOSPICE OF RN EA 15 MIN: HCPCS

## 2022-10-20 ASSESSMENT — ENCOUNTER SYMPTOMS
TROUBLE SWALLOWING: 1
STOOL DESCRIPTION: SOFT FORMED
BOWEL INCONTINENCE: 1

## 2022-10-20 NOTE — HOSPICE
Skilled Nursing Comprehensive visit completed. She lives with her daughter Collin Shi who is her primary caregiver. Monique Luke is feeling much better since starting the Keflex for UTI per Collin Fangnelsy. Pt. was thrashing in the bed because she was hurting so bad. She still  urinating at night at least 3 times. Cg uses inserts in the pull-ups. Collin Shi reports that this has helped. Collin Shi has a CG that comes several times a week to sit with pt. Collin Shi has not ordered purewick as of yet. She is now a full assist for transfers as Monique Luke is not able to bear wt on either leg. She does lean to the right if she is tired or doesn't feel well. PPS 30%. 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 She can feed her self finger foods with her left hand only. Vts: Pulse 80 , B/P 116/73. All other vitals WNL's . SEE ROS. Alisa likes sleeping in the hospital bed. Monique Luke gets a bed bath now. Collin Shi is able to communicate with Alisa very well, Monique Luke is able to make her needs known with signals and some words. Patient has some short term memory loss, she listens intently to conversations and understands. Alisa was eating 90 % of an adult sized plate, now eats about 85% of a child's size plate. She drinks 1 Ensure daily as a supplement. Her was left MUAC 18.0cm. She does show other signs of signs of weight loss like sunken cheeks,bitemporal wasting, loose fitting clothing, prominent clavicles, bony prominences. Medications must be crushed and put in food for ease of swallowing. Her diet consists of Regular foods cut up in tiny pieces, along with lots of fruits, yogurt. Occasional problems with constipation managed with Senna. .Pain is well managed with Norco 5/325 3x day and Gabapentin. Instructed CG to try Ativan at HS. Reviewed with Collin Shi to use the meds more if her mom is upset. Medications rec completed:RF None Supplies needed: ordered DME needed: None.  Collin Shi know how to reach [facility] for any questions or concerns 24/7. Education provided to family regarding changes in patient condition, and disease progression.

## 2022-10-21 PROCEDURE — 0651 HSPC ROUTINE HOME CARE

## 2022-10-22 PROCEDURE — 0651 HSPC ROUTINE HOME CARE

## 2022-10-23 PROCEDURE — 0651 HSPC ROUTINE HOME CARE

## 2022-10-24 PROCEDURE — 0651 HSPC ROUTINE HOME CARE

## 2022-10-25 ENCOUNTER — HOME CARE VISIT (OUTPATIENT)
Dept: SCHEDULING | Facility: HOME HEALTH | Age: 87
End: 2022-10-25
Payer: MEDICARE

## 2022-10-25 ENCOUNTER — HOME CARE VISIT (OUTPATIENT)
Dept: HOSPICE | Facility: HOSPICE | Age: 87
End: 2022-10-25
Payer: MEDICARE

## 2022-10-25 PROCEDURE — 0651 HSPC ROUTINE HOME CARE

## 2022-10-26 PROCEDURE — 0651 HSPC ROUTINE HOME CARE

## 2022-10-27 ENCOUNTER — HOME CARE VISIT (OUTPATIENT)
Dept: SCHEDULING | Facility: HOME HEALTH | Age: 87
End: 2022-10-27
Payer: MEDICARE

## 2022-10-27 VITALS
TEMPERATURE: 96.7 F | RESPIRATION RATE: 21 BRPM | DIASTOLIC BLOOD PRESSURE: 60 MMHG | HEART RATE: 72 BPM | SYSTOLIC BLOOD PRESSURE: 100 MMHG

## 2022-10-27 PROCEDURE — 0651 HSPC ROUTINE HOME CARE

## 2022-10-27 PROCEDURE — G0299 HHS/HOSPICE OF RN EA 15 MIN: HCPCS

## 2022-10-27 PROCEDURE — G0156 HHCP-SVS OF AIDE,EA 15 MIN: HCPCS

## 2022-10-27 ASSESSMENT — ENCOUNTER SYMPTOMS
TROUBLE SWALLOWING: 1
BOWEL INCONTINENCE: 1
STOOL DESCRIPTION: SOFT FORMED

## 2022-10-27 NOTE — HOSPICE
Recertification visit completed. Milad Tracy lives with her daughter Diomedes Serrano who is her primary caregiver. Milad Tracy is feeling much better since completeing Keflex for UTI. Pt. just turned 89 on 10/23/22. . Cg uses inserts in the pull-ups. Diomedes Serrano reports that this has helped with how many times she gets up at night. Diomedes Serrano has a CG that comes several times a week to sit with pt. Diomedes Serrano has not ordered purewick as of yet. She is now a full assist for transfers as Milad Tracy is not able to bear wt on either leg. She does lean to the right if she is tired or doesn't feel well. PPS 30%. 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 She can feed her self finger foods with her left hand only. Vts: Pulse 76 , B/P 100/6. All other vitals WNL's . SEE ROS. Alisa likes sleeping in the hospital bed. Milad Tracy gets a bed bath now. Diomedes Serrano is able to communicate with Alisa very well, Milad Tracy is able to make her needs known with signals and some words. Patient has some short term memory loss, she listens intently to conversations and understands. Alisa was eating 90 % of an adult sized plate, now eats about 85% of a child's size plate. She drinks 1 Ensure daily as a supplement. Her was left MUAC 18.0cm. She does show other signs of signs of weight loss like sunken cheeks,bitemporal wasting, loose fitting clothing, prominent clavicles, bony prominences. Medications must be crushed and put in food for ease of swallowing. Her diet consists of Regular foods cut up in tiny pieces, along with lots of fruits, yogurt. Occasional problems with constipation managed with Senna. Pain is well managed with Norco 5/325 3x day and Gabapentin. Instructed CG to try Ativan at HS. Reviewed with Diomedes Serrano to use the meds more if her mom is upset. Medications rec completed:RF None Supplies needed: None DME needed: None. Diomedes Serrano know how to reach [facility] for any questions or concerns 24/7.  Education provided to family regarding changes in patient condition, and disease progression.

## 2022-10-28 PROCEDURE — 0651 HSPC ROUTINE HOME CARE

## 2022-10-29 PROCEDURE — 0651 HSPC ROUTINE HOME CARE

## 2022-10-30 PROCEDURE — 0651 HSPC ROUTINE HOME CARE

## 2022-10-31 ENCOUNTER — HOME CARE VISIT (OUTPATIENT)
Dept: SCHEDULING | Facility: HOME HEALTH | Age: 87
End: 2022-10-31
Payer: MEDICARE

## 2022-10-31 PROCEDURE — 0651 HSPC ROUTINE HOME CARE

## 2022-10-31 PROCEDURE — G0156 HHCP-SVS OF AIDE,EA 15 MIN: HCPCS

## 2022-11-01 PROCEDURE — 0651 HSPC ROUTINE HOME CARE

## 2022-11-02 PROCEDURE — 0651 HSPC ROUTINE HOME CARE

## 2022-11-03 ENCOUNTER — HOME CARE VISIT (OUTPATIENT)
Dept: SCHEDULING | Facility: HOME HEALTH | Age: 87
End: 2022-11-03
Payer: MEDICARE

## 2022-11-03 VITALS
DIASTOLIC BLOOD PRESSURE: 77 MMHG | RESPIRATION RATE: 16 BRPM | SYSTOLIC BLOOD PRESSURE: 130 MMHG | TEMPERATURE: 98.5 F | HEART RATE: 68 BPM

## 2022-11-03 PROCEDURE — G0156 HHCP-SVS OF AIDE,EA 15 MIN: HCPCS

## 2022-11-03 PROCEDURE — 0651 HSPC ROUTINE HOME CARE

## 2022-11-03 PROCEDURE — G0299 HHS/HOSPICE OF RN EA 15 MIN: HCPCS

## 2022-11-03 ASSESSMENT — ENCOUNTER SYMPTOMS
BOWEL INCONTINENCE: 1
STOOL DESCRIPTION: SOFT FORMED

## 2022-11-03 NOTE — HOSPICE
Skilled Nursing Comprehensive visit completed. Sima Enriquez lives with her daughter Chad Vega who is her primary caregiver. Sima Enriquez is feeling much better since starting the Keflex for UTI per Chad Vega. . Cg uses inserts in the pull-ups. Chad Vega reports that this has helped but pt. still wants to get up several times. Chad Vega has a CG that comes several times a week to sit with pt. Chad Vega has not ordered purewick as of yet. She is now a full assist for transfers as Sima Enriquez is not able to bear wt on either leg. She does lean to the right if she is tired or doesn't feel well. PPS 30%. 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 She can feed her self finger foods with her left hand only. Vts: Pulse 68 , B/P 130/77. All other vitals WNL's . SEE ROS. Sima Enriquez does likes sleeping in the hospital bed. Sima Enriquez gets a bed bath now. Chad Vega is able to communicate with Alisa very well, Sima Enriquez is able to make her needs known with signals and some words. Patient has some short term memory loss, she listens intently to conversations and understands. Alisa was eating 90 % of an adult sized plate, now eats about 85% of a child's size plate. She drinks 1 Ensure daily as a supplement. Her was left MUAC 16.5.cm. She does show other signs of signs of weight loss like sunken cheeks,bitemporal wasting, loose fitting clothing, prominent clavicles, bony prominences. Medications must be crushed and put in food for ease of swallowing. Her diet consists of Regular foods cut up in tiny pieces, along with lots of fruits, yogurt. Occasional problems with constipation managed with Senna. Pain is well managed with Norco 5/325 3x day and Gabapentin. Instructed CG to try Ativan at HS. Reviewed with Chad Vega to use the meds more if her mom is upset. Medications rec completed:RF None Supplies needed: ordered DME needed: None. Chad Vega know how to reach [facility] for any questions or concerns 24/7.  Education provided to family regarding changes in patient condition, and disease progression.

## 2022-11-04 PROCEDURE — 0651 HSPC ROUTINE HOME CARE

## 2022-11-05 PROCEDURE — 0651 HSPC ROUTINE HOME CARE

## 2022-11-06 PROCEDURE — 0651 HSPC ROUTINE HOME CARE

## 2022-11-07 ENCOUNTER — HOME CARE VISIT (OUTPATIENT)
Dept: SCHEDULING | Facility: HOME HEALTH | Age: 87
End: 2022-11-07
Payer: MEDICARE

## 2022-11-07 PROCEDURE — G0156 HHCP-SVS OF AIDE,EA 15 MIN: HCPCS

## 2022-11-07 PROCEDURE — 0651 HSPC ROUTINE HOME CARE

## 2022-11-08 PROCEDURE — 0651 HSPC ROUTINE HOME CARE

## 2022-11-09 PROCEDURE — 0651 HSPC ROUTINE HOME CARE

## 2022-11-10 ENCOUNTER — HOME CARE VISIT (OUTPATIENT)
Dept: SCHEDULING | Facility: HOME HEALTH | Age: 87
End: 2022-11-10
Payer: MEDICARE

## 2022-11-10 ENCOUNTER — HOME CARE VISIT (OUTPATIENT)
Dept: HOSPICE | Facility: HOSPICE | Age: 87
End: 2022-11-10
Payer: MEDICARE

## 2022-11-10 VITALS
RESPIRATION RATE: 18 BRPM | SYSTOLIC BLOOD PRESSURE: 135 MMHG | TEMPERATURE: 97.8 F | HEART RATE: 72 BPM | OXYGEN SATURATION: 97 % | DIASTOLIC BLOOD PRESSURE: 69 MMHG

## 2022-11-10 PROCEDURE — G0155 HHCP-SVS OF CSW,EA 15 MIN: HCPCS

## 2022-11-10 PROCEDURE — G0299 HHS/HOSPICE OF RN EA 15 MIN: HCPCS

## 2022-11-10 PROCEDURE — G0156 HHCP-SVS OF AIDE,EA 15 MIN: HCPCS

## 2022-11-10 ASSESSMENT — ENCOUNTER SYMPTOMS
STOOL DESCRIPTION: SOFT FORMED
BOWEL INCONTINENCE: 1

## 2022-11-11 NOTE — HOSPICE
Skilled Nursing Comprehensive visit completed. Ariadna Rivas the sitter was with Neha Gracia today during my HV. Khari Machado MSW was present as well. Neha Gracia lives with her daughter Jalyn Fay who is her primary caregiver. Cg uses inserts in the pull-ups. Jalyn Fay reports that this has helped but pt. still wants to get up several times. Jalyn Fay has a CG that comes several times a week to sit with pt. Jalyn Fay has not ordered purewick as of yet. She is now a full assist for transfers as Neha Gracia is not able to bear wt on either leg. She does lean to the right if she is tired or doesn't feel well. PPS 30%. 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 She can feed her self finger foods with her left hand only. Vts: Pulse 72 , B/P 135/69. All other vitals WNL's . SEE ROS. Neha Gracia does likes sleeping in the hospital bed. Neha Gracia gets a bed bath now. Jalyn Fay is able to communicate with Neha Gracia very well. Neha Gracia is able to make her needs known with signals and some words. Patient has some short term memory loss, she listens intently to conversations and understands. Alisa was eating 90 % of an adult sized plate, now eats about 85% of a child's size plate. She drinks 1 Ensure daily as a supplement. Her was left MUAC 17.0.cm. She does show other signs of signs of weight loss like sunken cheeks,bitemporal wasting, loose fitting clothing, prominent clavicles, bony prominences. Medications must be crushed and put in food for ease of swallowing. Her diet consists of Regular foods cut up in tiny pieces, along with lots of fruits, yogurt. Occasional problems with constipation managed with Senna. Pain is well managed with Norco 5/325 3x day and Gabapentin. Instructed CG to try Ativan at HS. Reviewed with Jalyn Fay to use the meds more if her mom is upset. Pt. will be coming to [facility] [place] for Respite stay on 11/23/22 to 11/26/22. Medications rec completed:RF ordered . Supplies needed: ordered DME needed: None. Colten Almonte know how to reach [facility] for any questions or concerns 24/7. Education provided to family regarding changes in patient condition, and disease progression.

## 2022-11-14 ENCOUNTER — HOME CARE VISIT (OUTPATIENT)
Dept: SCHEDULING | Facility: HOME HEALTH | Age: 87
End: 2022-11-14
Payer: MEDICARE

## 2022-11-14 PROCEDURE — G0156 HHCP-SVS OF AIDE,EA 15 MIN: HCPCS

## 2022-11-14 ASSESSMENT — ENCOUNTER SYMPTOMS: HEMOPTYSIS: 0

## 2022-11-14 NOTE — HOSPICE
Benita Zhu is sitting up on the sofa this am.  Her sitter is with her today. Ilda Mcdonough came in later from having to see to her granddaughter and school. Ilda Mcdonough has aquired a new puppy. She now has three small dogs in the home. She and her dtr raise certain breeds of puppies. Benita Zhu really responds positively to her sitter. They seem to have bonded well. Ilda Mcdonough continues to manage her caregiving and grandother dutties in addition to the home. She is amazing. Benita Zhu is coing to [facility] for respite during Thanksgiving. Benita Zhu does not like leaving the home for care but during the holidays it is difficult to find available assistance. ABILIO will follow up with Jeff at [facility]. Ilda Mcdonough denies any new needs and reports coping adequately.  ABILIO provided emotional support and offered availability

## 2022-11-17 ENCOUNTER — HOME CARE VISIT (OUTPATIENT)
Dept: SCHEDULING | Facility: HOME HEALTH | Age: 87
End: 2022-11-17
Payer: MEDICARE

## 2022-11-17 VITALS
RESPIRATION RATE: 19 BRPM | HEART RATE: 75 BPM | TEMPERATURE: 98.7 F | SYSTOLIC BLOOD PRESSURE: 130 MMHG | DIASTOLIC BLOOD PRESSURE: 81 MMHG | OXYGEN SATURATION: 99 %

## 2022-11-17 PROCEDURE — G0156 HHCP-SVS OF AIDE,EA 15 MIN: HCPCS

## 2022-11-17 PROCEDURE — G0299 HHS/HOSPICE OF RN EA 15 MIN: HCPCS

## 2022-11-17 ASSESSMENT — ENCOUNTER SYMPTOMS
STOOL DESCRIPTION: SOFT FORMED
BOWEL INCONTINENCE: 1

## 2022-11-17 NOTE — HOSPICE
Skilled Nursing Comprehensive visit completed. Vitaliy Wong present for [facility] today. Fay Nicole lives with her daughter Vitaliy Wong who is her primary caregiver. Cg uses inserts in the pull-ups. Vitaliy Wong reports that this has helped but pt. still wants to get up several times. Vitaliy Wong has a CG that comes several times a week to sit with pt. Vitaliy Wong has not ordered purewick as of yet. She is now a full assist for transfers as Fay Nicole is not able to bear wt on either leg. She does lean to the right if she is tired or doesn't feel well. PPS 30%. 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 She can feed her self finger foods with her left hand only. Vts: Pulse 75 , B/P 130/81. All other vitals WNL's . SEE ROS. Fay Nicole does likes sleeping in the hospital bed. Fay Nicole gets a bed bath now. Vitaliy Wong is able to communicate with Fay Nicole very well. Fay Nicole is able to make her needs known with signals and some words. Patient has some short term memory loss, she listens intently to conversations and understands. Alisa was eating 90 % of an adult sized plate, now eats about 85% of a child's size plate. She drinks 1 Ensure daily as a supplement. Her was left MUAC 17.0.cm. She does show other signs of signs of weight loss like sunken cheeks,bitemporal wasting, loose fitting clothing, prominent clavicles, bony prominences. Medications must be crushed and put in food for ease of swallowing. Her diet consists of Regular foods cut up in tiny pieces, along with lots of fruits, yogurt. Occasional problems with constipation managed with Senna. Pain is well managed with Norco 5/325 3x day and Gabapentin. Instructed CG to try Ativan at HS. Reviewed with Vitaliy Wong to use the meds more if her mom is upset. I will be making my visit on the 22nd. Pt. will be coming to [facility] for Respite stay on 11/23/22 to 11/26/22. Medications rec completed:RF ordered . Supplies needed: ordered DME needed: None.  Vitaliy Wong know how to reach [facility] for any questions or concerns 24/7. Education provided to family regarding changes in condition, and disease progression. patient

## 2022-11-21 ENCOUNTER — HOME CARE VISIT (OUTPATIENT)
Dept: SCHEDULING | Facility: HOME HEALTH | Age: 87
End: 2022-11-21
Payer: MEDICARE

## 2022-11-21 PROCEDURE — G0156 HHCP-SVS OF AIDE,EA 15 MIN: HCPCS

## 2022-11-22 ENCOUNTER — HOME CARE VISIT (OUTPATIENT)
Dept: SCHEDULING | Facility: HOME HEALTH | Age: 87
End: 2022-11-22
Payer: MEDICARE

## 2022-11-22 VITALS
DIASTOLIC BLOOD PRESSURE: 82 MMHG | HEART RATE: 72 BPM | RESPIRATION RATE: 18 BRPM | TEMPERATURE: 99 F | SYSTOLIC BLOOD PRESSURE: 123 MMHG | OXYGEN SATURATION: 99 %

## 2022-11-22 PROCEDURE — G0299 HHS/HOSPICE OF RN EA 15 MIN: HCPCS

## 2022-11-22 ASSESSMENT — ENCOUNTER SYMPTOMS
BOWEL INCONTINENCE: 1
STOOL DESCRIPTION: SOFT FORMED
HEMOPTYSIS: 0

## 2022-11-23 PROBLEM — I69.30 SEQUELAE, POST-STROKE: Status: ACTIVE | Noted: 2022-11-23

## 2022-11-23 NOTE — HOSPICE
Skilled Nursing Comprehensive visit completed. Oz Llamas present for [facility] today for a brief amt of time. She had to take her daughter to the airport. zO Llamas told me upon arrival pt. was constipated and she given Senna. Pt. started cramping and hurting so Imelda Lugo took pt to bathroom. Pt. was impacted. Order received to check pt. for impaction and remove. I was able to remove a large amt. of soft formed stool from the rectal vault. Pt. could just not push it out. Imelda Lugo, sitter came to stay with pt. Brittany Goldman lives with her daughter Oz Llamas who is her primary caregiver. Cg uses inserts in the pull-ups. Oz Llamas reports that this has helped but pt. still wants to get up several times at night. Oz Llamas has a CG that comes several times a week to sit with pt. . She is now a full assist for transfers as Brittany Goldman is able to bear wt. on left leg briefly. .She does lean to the right if she is tired or doesn't feel well. PPS 30%. 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 She can feed her self finger foods with her left hand only. Vts: T 99.0 Pulse 72 , B/P 123/82 All other vitals WNL's. SEE ROS. Brittany Goldman does likes sleeping in the hospital bed. Brittany Goldman gets a bed bath now. Oz Llamas is able to communicate with Brittany Goldman very well. Brittany Goldman is able to make her needs known with signals and some words. Patient has some short term memory loss, she listens intently to conversations and understands. Alisa was eating 90 % of an adult sized plate, now eats about 85% of a child's size plate. She drinks 1 Ensure daily as a supplement. Her was left MUAC 17.0.cm. She does show other signs of signs of weight loss like sunken cheeks,bitemporal wasting, loose fitting clothing, prominent clavicles, bony prominences. Medications must be crushed and put in food for ease of swallowing. Her diet consists of Regular foods cut up in tiny pieces, along with lots of fruits, yogurt. Occasional problems with constipation managed with Senna. Pain is well managed with Norco 5/325 3x day and Gabapentin. Instructed CG to try Ativan at HS. Reviewed with Dianna Brooke to use the meds more if her mom is upset. I completed financial form with Dianna Brooke, called report to [facility] since pt. is going  for Respite 11/23/22 -11/26/22  Pt. is scheduled to be picked up by transport between 9:30 - 10 am tomorrow. I have instructed CG to send all medications in their original botttles, DNR and med list.Medications rec completed: No RF needed. Supplies needed: None  DME needed: None. Dianna Brooke know how to reach [facility] for any questions or concerns 24/7.  Education provided to family regarding changes in condition, and disease progression patient

## 2022-11-24 ENCOUNTER — HOME CARE VISIT (OUTPATIENT)
Dept: HOSPICE | Facility: HOSPICE | Age: 87
End: 2022-11-24
Payer: MEDICARE

## 2022-11-26 ENCOUNTER — HOME CARE VISIT (OUTPATIENT)
Dept: SCHEDULING | Facility: HOME HEALTH | Age: 87
End: 2022-11-26
Payer: MEDICARE

## 2022-11-28 ENCOUNTER — HOME CARE VISIT (OUTPATIENT)
Dept: SCHEDULING | Facility: HOME HEALTH | Age: 87
End: 2022-11-28
Payer: MEDICARE

## 2022-11-28 PROCEDURE — G0156 HHCP-SVS OF AIDE,EA 15 MIN: HCPCS

## 2022-12-01 ENCOUNTER — HOME CARE VISIT (OUTPATIENT)
Dept: SCHEDULING | Facility: HOME HEALTH | Age: 87
End: 2022-12-01
Payer: MEDICARE

## 2022-12-01 VITALS
TEMPERATURE: 98.8 F | SYSTOLIC BLOOD PRESSURE: 152 MMHG | HEART RATE: 80 BPM | DIASTOLIC BLOOD PRESSURE: 91 MMHG | OXYGEN SATURATION: 99 % | RESPIRATION RATE: 21 BRPM

## 2022-12-01 PROCEDURE — G0299 HHS/HOSPICE OF RN EA 15 MIN: HCPCS

## 2022-12-01 PROCEDURE — G0156 HHCP-SVS OF AIDE,EA 15 MIN: HCPCS

## 2022-12-01 ASSESSMENT — ENCOUNTER SYMPTOMS
BOWEL INCONTINENCE: 1
STOOL DESCRIPTION: LOOSE

## 2022-12-01 NOTE — HOSPICE
Skilled Nursing Comprehensive visit completed. Shwetha present for [address] [place] [address]. No Whelan is in a good mood today. No Whelan lives with her daughter Donna West who is her primary caregiver. Cg uses inserts in the pull-ups. Donna West reports that this has helped but pt. still wants to get up several times at night. Donna West has a CG that comes several times a week to sit with pt. but currently sitter  is in the hosp. She is now a full assist for transfers as No Whelan is able to bear wt. on left leg briefly. She does lean to the right if she is tired or doesn't feel well. PPS 30%. 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 She can feed her self finger foods with her left hand only. Vts: T 98.8 Pulse 80 , B/P 152/91 All other vitals WNL's. SEE ROS. No Whelan does likes sleeping in the hospital bed. No Whelan gets a bed bath now. Donna West is able to communicate with No Whelan very well. No Whelan is able to make her needs known with signals and some words. Patient has some short term memory loss, she listens intently to conversations and understands. Alisa was eating 90 % of an adult sized plate, now eats about 85% of a child's size plate. She drinks 1 Ensure daily as a supplement. Her was left MUAC 17.0.cm. She does show other signs of signs of weight loss like sunken cheeks,bitemporal wasting, loose fitting clothing, prominent clavicles, bony prominences. Medications must be crushed and put in food for ease of swallowing. Her diet consists of Regular foods cut up in tiny pieces, along with lots of fruits, yogurt. Occasional problems with constipation managed with Senna. Pain is well managed with Norco 5/325 3x day and Gabapentin. Instructed CG to try Ativan at HS. Reviewed with Donna West to use the meds more if her mom is upset. .Medications rec completed:  RF ordered/. Supplies needed: None DME needed: None. Donna West knows how to reach [facility] for any questions or concerns 24/7.  Education provided to family regarding changes in condition, and disease progression patient

## 2022-12-05 ENCOUNTER — HOME CARE VISIT (OUTPATIENT)
Dept: SCHEDULING | Facility: HOME HEALTH | Age: 87
End: 2022-12-05
Payer: MEDICARE

## 2022-12-05 PROCEDURE — G0156 HHCP-SVS OF AIDE,EA 15 MIN: HCPCS

## 2022-12-08 ENCOUNTER — HOME CARE VISIT (OUTPATIENT)
Dept: SCHEDULING | Facility: HOME HEALTH | Age: 87
End: 2022-12-08
Payer: MEDICARE

## 2022-12-08 VITALS
SYSTOLIC BLOOD PRESSURE: 131 MMHG | RESPIRATION RATE: 21 BRPM | TEMPERATURE: 98.6 F | OXYGEN SATURATION: 99 % | HEART RATE: 76 BPM | DIASTOLIC BLOOD PRESSURE: 78 MMHG

## 2022-12-08 PROCEDURE — G0156 HHCP-SVS OF AIDE,EA 15 MIN: HCPCS

## 2022-12-08 PROCEDURE — G0299 HHS/HOSPICE OF RN EA 15 MIN: HCPCS

## 2022-12-08 ASSESSMENT — ENCOUNTER SYMPTOMS
BOWEL INCONTINENCE: 1
STOOL DESCRIPTION: SOFT FORMED

## 2022-12-08 NOTE — HOSPICE
Skilled Nursing Comprehensive visit completed. Shwetha present for [facility]. Alisa seem down today. Brittany Goldman lives with her daughter Oz Llamas who is her primary caregiver. Cg uses inserts in the pull-ups. Oz Llamas reports that this has helped but pt. still wants to get up several times at night. Oz Llamas has a CG that comes several times a week to sit with pt. but currently sitter is in the hosp. She is now a full assist for transfers as Brittany Goldman is able to bear wt. on left leg briefly. She does lean to the right if she is tired or doesn't feel well. PPS 30%. 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 She can feed her self finger foods with her left hand only. Vts: T 98.8 Pulse 80 , B/P 131/78. All other vitals WNL's. SEE ROS. Brittany Goldman does likes sleeping in the hospital bed. Brittany Goldman gets a bed bath now. Oz Llamas is able to communicate with Brittany Goldman very well. Brittany Goldman is able to make her needs known with signals and some words. Patient has some short term memory loss, she listens intently to conversations and understands. Alisa was eating 90 % of an adult sized plate, now eats about 85% of a child's size plate. She drinks 1 Ensure daily as a supplement. Her was left MUAC 17.0.cm. She does show other signs of signs of weight loss like sunken cheeks,bitemporal wasting, loose fitting clothing, prominent clavicles, bony prominences. Medications must be crushed and put in food for ease of swallowing. Her diet consists of Regular foods cut up in tiny pieces, along with lots of fruits, yogurt. Occasional problems with constipation managed with Senna. Pain is well managed with Norco 5/325 2x day and Gabapentin. Instructed CG to try Ativan at HS. Reviewed with Oz Llamas to use the meds more if her mom is upset. .Medications rec completed: RF ordered/. Supplies needed: None DME needed: None. Oz Llamas knows how to reach [facility] for any questions or concerns 24/7.  Education provided to family regarding changes in condition, and disease progression patient

## 2022-12-12 ENCOUNTER — HOME CARE VISIT (OUTPATIENT)
Dept: SCHEDULING | Facility: HOME HEALTH | Age: 87
End: 2022-12-12
Payer: MEDICARE

## 2022-12-12 PROCEDURE — G0156 HHCP-SVS OF AIDE,EA 15 MIN: HCPCS

## 2022-12-15 ENCOUNTER — HOME CARE VISIT (OUTPATIENT)
Dept: SCHEDULING | Facility: HOME HEALTH | Age: 87
End: 2022-12-15
Payer: MEDICARE

## 2022-12-15 ENCOUNTER — HOME CARE VISIT (OUTPATIENT)
Dept: HOSPICE | Facility: HOSPICE | Age: 87
End: 2022-12-15
Payer: MEDICARE

## 2022-12-15 VITALS
DIASTOLIC BLOOD PRESSURE: 60 MMHG | OXYGEN SATURATION: 95 % | HEART RATE: 95 BPM | SYSTOLIC BLOOD PRESSURE: 116 MMHG | TEMPERATURE: 99.6 F | RESPIRATION RATE: 16 BRPM

## 2022-12-15 PROCEDURE — G0299 HHS/HOSPICE OF RN EA 15 MIN: HCPCS

## 2022-12-15 PROCEDURE — G0155 HHCP-SVS OF CSW,EA 15 MIN: HCPCS

## 2022-12-15 PROCEDURE — G0156 HHCP-SVS OF AIDE,EA 15 MIN: HCPCS

## 2022-12-15 ASSESSMENT — ENCOUNTER SYMPTOMS
BOWEL INCONTINENCE: 1
STOOL DESCRIPTION: SOFT FORMED
TROUBLE SWALLOWING: 1

## 2022-12-15 NOTE — HOSPICE
Skilled Nursing Comprehensive visit completed. Shwetha present for [facility]. Mamadou Molina seemed like she wasn't feeling well today. Mamadou Molina lives with her daughter Tori He who is her primary caregiver. Cg uses inserts in the pull-ups. Tori He reports that this has helped but pt. still wants to get up several times at night. Tori He has a CG that comes several times a week to sit with pt. but currently sitter is in the hosp. She is now a full assist for transfers as Mamadou Molina is able to bear wt. on left leg briefly. She does lean to the right if she is tired or doesn't feel well. PPS 30%. 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 She can feed her self finger foods with her left hand only. Vts: T 99.6 Pulse 95 , B/P 116/60. I did send Dede Rocha NP a email to see if she wanted me to do anything. Daughter said her urine smelled a little but then changed her mind. All other vitals WNL's. SEE ROS. Mamadou Molina does likes sleeping in the hospital bed. Mamadou Molina gets a bed bath now. Tori He is able to communicate with Mamadou Molina very well. Mamadou Molina is able to make her needs known with signals and some words. Patient has some short term memory loss, she listens intently to conversations and understands. Alisa was eating 90 % of an adult sized plate, now eats about 85% of a child's size plate. She drinks 1 Ensure daily as a supplement. Her was left MUAC 17.0.cm. She does show other signs of signs of weight loss like sunken cheeks,bitemporal wasting, loose fitting clothing, prominent clavicles, bony prominences. Medications must be crushed and put in food for ease of swallowing. Her diet consists of Regular foods cut up in tiny pieces, along with lots of fruits, yogurt. Occasional problems with constipation managed with Senna. Pain is well managed with Norco 5/325 2x day and Gabapentin. Instructed CG to try Ativan at HS. Reviewed with Tori He to use the meds more if her mom is upset. .Medications rec completed: RF should be there today. Delayed per Lily Skelton. Supplies needed: should be there today. DME needed: None. Vitlaiy Wong knows how to reach [facility] for any questions or concerns 24/7.  Education provided to family regarding changes in condition, and disease progression patient

## 2022-12-17 ASSESSMENT — ENCOUNTER SYMPTOMS: HEMOPTYSIS: 0

## 2022-12-17 NOTE — HOSPICE
Roly Mcarthur does not appear to be feeling well today. She interacts minimally and appears tired. RN and SW visit. mAanda Calderón is talkative and cheerful. She says they are doing fairly well and she denies any new needs. She was given contact information on caregivers as theirs is still recovering from her hospitalization. SW offered emotional support and availability. She has her brother home this week. Chivo Mireles is relieved when he is home as she seems to worry about him.  SW offered available

## 2022-12-19 ENCOUNTER — HOME CARE VISIT (OUTPATIENT)
Dept: SCHEDULING | Facility: HOME HEALTH | Age: 87
End: 2022-12-19
Payer: MEDICARE

## 2022-12-19 PROCEDURE — G0156 HHCP-SVS OF AIDE,EA 15 MIN: HCPCS

## 2022-12-21 ENCOUNTER — HOME CARE VISIT (OUTPATIENT)
Dept: HOSPICE | Facility: HOSPICE | Age: 87
End: 2022-12-21
Payer: MEDICARE

## 2022-12-21 ENCOUNTER — HOME CARE VISIT (OUTPATIENT)
Dept: SCHEDULING | Facility: HOME HEALTH | Age: 87
End: 2022-12-21
Payer: MEDICARE

## 2022-12-21 VITALS
SYSTOLIC BLOOD PRESSURE: 139 MMHG | RESPIRATION RATE: 21 BRPM | TEMPERATURE: 99.2 F | OXYGEN SATURATION: 99 % | HEART RATE: 94 BPM | DIASTOLIC BLOOD PRESSURE: 87 MMHG

## 2022-12-21 PROCEDURE — G0299 HHS/HOSPICE OF RN EA 15 MIN: HCPCS

## 2022-12-21 ASSESSMENT — ENCOUNTER SYMPTOMS
CHEST TIGHTNESS: 1
TROUBLE SWALLOWING: 1
BOWEL INCONTINENCE: 1
STOOL DESCRIPTION: SOFT FORMED

## 2022-12-21 NOTE — HOSPICE
Skilled Nursing Comprehensive visit completed. Shwetha present for [facility]. Thom Hernandez seemed like she wasn't feeling well today. Malick Kothari reports she has been giving pt. cranberry juice, azo. Her urine smells and pt. has been hurting when she urinates. Thom Hernandez lives with her daughter Malick Kothari who is her primary caregiver. Cg uses inserts in the pull-ups. Malick Kothari reports that this has helped but pt. still wants to get up several times at night. Malick Kothari has a CG that comes several times a week to sit with pt. but currently sitter is in the hosp. She is now a full assist for transfers as Thom Hernandez is able to bear wt. on left leg briefly. She does lean to the right if she is tired or doesn't feel well. PPS 30%. 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 She can feed her self finger foods with her left hand only. Vts: T 99.2 Pulse 94 , B/P 139/87, LS clear. SEE ROS. I did call Karina Ann NP from the home to let her know pt. was still not feeling well. She is running a slight low grade fever. Order received for Cephalexin 500mg. 1 PO BID x 7 days. Called into [facility] in [place]. Thom Hernandez does likes sleeping in the hospital bed. Thom Hernandez gets a bed bath now. Malick Kothari is able to communicate with Thom Hernandez very well. Thom Hernandez is able to make her needs known with signals and some words. Patient has some short term memory loss, she listens intently to conversations and understands. Alisa was eating 90 % of an adult sized plate, now eats about 85% of a child's size plate. She drinks 1 Ensure daily as a supplement. Her was left MUAC 17.0.cm. She does show other signs of signs of weight loss like sunken cheeks,bitemporal wasting, loose fitting clothing, prominent clavicles, bony prominences. Medications must be crushed and put in food for ease of swallowing. Her diet consists of Regular foods cut up in tiny pieces, along with lots of fruits, yogurt. Occasional problems with constipation managed with Senna. Pain is well managed with Norco 5/325 2x day and Gabapentin. Instructed CG to try Ativan at HS. Reviewed with Savanna Rojas to use the meds more if her mom is upset. .Medications rec completed: No RF needed. Supplies needed: ordered. DME needed: None. Savanna Rojas knows how to reach [facility] for any questions or concerns 24/7. Education provided to family regarding changes in condition, and disease progression patient  Impending inclement weather Preparedness Plan    I made HV and spoke with Christiano Mercado regarding inclement weather over the next several days. Emergency plan was reviewed from patients admission booklet. The following questions/topics were reviewed with the patient:    · Some temperatures over the next week will be below freezing. This may result in freezing of exposed water lines. The weather may also result in gusty winds which may lead to power outages. They have a generator. · Do you have all the supplies included but not limited to food, water, medications including O2, and medical equipment you need in the home to ensure safety if unable to leave home due to weather event? YES  o If no, list supplies needed and action plan for obtaining supplies: None- I placed a supply order. · If applicable: Do you have back up tanks for O2 in case your power is out in your home?  Pt. not on O2.    · DME company you use for O2: N/A

## 2022-12-22 ENCOUNTER — HOME CARE VISIT (OUTPATIENT)
Dept: SCHEDULING | Facility: HOME HEALTH | Age: 87
End: 2022-12-22
Payer: MEDICARE

## 2022-12-22 PROCEDURE — G0156 HHCP-SVS OF AIDE,EA 15 MIN: HCPCS

## 2022-12-29 ENCOUNTER — HOME CARE VISIT (OUTPATIENT)
Dept: SCHEDULING | Facility: HOME HEALTH | Age: 87
End: 2022-12-29
Payer: MEDICARE

## 2022-12-29 VITALS
RESPIRATION RATE: 21 BRPM | TEMPERATURE: 98.6 F | DIASTOLIC BLOOD PRESSURE: 62 MMHG | HEART RATE: 58 BPM | SYSTOLIC BLOOD PRESSURE: 123 MMHG

## 2022-12-29 PROCEDURE — G0299 HHS/HOSPICE OF RN EA 15 MIN: HCPCS

## 2022-12-29 PROCEDURE — G0156 HHCP-SVS OF AIDE,EA 15 MIN: HCPCS

## 2022-12-29 ASSESSMENT — ENCOUNTER SYMPTOMS
STOOL DESCRIPTION: SOFT FORMED
BOWEL INCONTINENCE: 1
TROUBLE SWALLOWING: 1

## 2023-01-02 ENCOUNTER — HOME CARE VISIT (OUTPATIENT)
Dept: SCHEDULING | Facility: HOME HEALTH | Age: 88
End: 2023-01-02
Payer: MEDICARE

## 2023-01-02 PROCEDURE — G0156 HHCP-SVS OF AIDE,EA 15 MIN: HCPCS

## 2023-01-05 ENCOUNTER — HOME CARE VISIT (OUTPATIENT)
Dept: HOSPICE | Facility: HOSPICE | Age: 88
End: 2023-01-05
Payer: MEDICARE

## 2023-01-05 ENCOUNTER — HOME CARE VISIT (OUTPATIENT)
Dept: SCHEDULING | Facility: HOME HEALTH | Age: 88
End: 2023-01-05
Payer: MEDICARE

## 2023-01-05 PROCEDURE — G0299 HHS/HOSPICE OF RN EA 15 MIN: HCPCS

## 2023-01-05 ASSESSMENT — ENCOUNTER SYMPTOMS
TROUBLE SWALLOWING: 1
BOWEL INCONTINENCE: 1
STOOL DESCRIPTION: SOFT FORMED

## 2023-01-05 NOTE — HOSPICE
Skilled Nursing Comprehensive visit completed. Shwetha present for [facility]. Alisa appeared tired today. Juliocesar Tai reports she has been up with pt as she was constipated. Jamie Randall is no longer having UTI symptoms. Jamie Randall lives with her daughter Juliocesar Tai who is her primary caregiver. Cg uses inserts in the pull-ups. Juliocesar Tai reports that this has helped but pt. still wants to get up several times at night. She is now a full assist for transfers as Jamie Randall is able to bear wt. on left leg briefly. She does lean to the right if she is tired or doesn't feel well. PPS 30%. 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 She can feed her self finger foods with her left hand only. Vts: T 98.7 Pulse 82 , B/P 148/87, LS clear. SEE ROS. Jamie Randall now sleeps in the hospital bed and receives bed baths. Juliocesar Tai is able to communicate with Jamie Randall very well. Jamie Randall is able to make her needs known with signals and some words. Patient has some short term memory loss, she listens intently to conversations and understands. Alisa was eating 90 % of an adult sized plate, now eats about 85% of a child's size plate. She drinks 1 Ensure daily as a supplement. I discussed with Jamie Randall that maybe if she gives Alisa an apple or tangerine and not a banana everyday that may help with  constipation Her was left MUAC 17.0.cm. She does show other signs of signs of weight loss like sunken cheeks,bitemporal wasting, loose fitting clothing, prominent clavicles, bony prominences. Medications must be crushed and put in food for ease of swallowing. Her diet consists of Regular foods cut up in tiny pieces, along with lots of fruits, yogurt. Pt. is on 2 tab BID of Senna. Pain is well managed with Norco 5/325 for severe pain. and Gabapentin. Pt. now has Tramadol 50 mg. 1 q 6 hrs. with Tylenol for mild to mod. pain. Instructed CG to try Ativan at HS. Reviewed with Juliocesar Tai to use the meds more if her mom is upset. .Medications rec completed: No RF needed. Supplies needed: ordered. DME needed: None. Rasheed Sherwood knows how to reach [facility] for any questions or concerns 24/7.

## 2023-01-09 ENCOUNTER — HOME CARE VISIT (OUTPATIENT)
Dept: SCHEDULING | Facility: HOME HEALTH | Age: 88
End: 2023-01-09
Payer: MEDICARE

## 2023-01-09 PROCEDURE — G0156 HHCP-SVS OF AIDE,EA 15 MIN: HCPCS

## 2023-01-12 ENCOUNTER — HOME CARE VISIT (OUTPATIENT)
Dept: SCHEDULING | Facility: HOME HEALTH | Age: 88
End: 2023-01-12
Payer: MEDICARE

## 2023-01-12 PROCEDURE — G0299 HHS/HOSPICE OF RN EA 15 MIN: HCPCS

## 2023-01-12 PROCEDURE — G0156 HHCP-SVS OF AIDE,EA 15 MIN: HCPCS

## 2023-01-12 ASSESSMENT — ENCOUNTER SYMPTOMS
STOOL DESCRIPTION: FIRM
TROUBLE SWALLOWING: 1
PAIN LOCATION - PAIN QUALITY: ACHING
DYSPNEA ACTIVITY LEVEL: AT REST
CONSTIPATION: 1

## 2023-01-12 NOTE — HOSPICE
Skilled Nursing Comprehensive visit completed. Shwetha present for [facility]. Kimmy Guan was not too happy today. #1 she was constipated, #2 Latanya Erickson has decided to put the house on the market to sell. She refused for me to take her vitals signs today. She pushed my hand away. Latanya Erickson reports is constipated. Kimmy Guan lives with her daughter Latanya Erickson who is her primary caregiver. Cg uses inserts in the pull-ups. Latanya Erickson reports that this has helped but pt. still wants to get up several times at night. She is now a full assist for transfers as Kimmy Guan is able to bear wt. on left leg briefly. She does lean to the right if she is tired or doesn't feel well. PPS 30%. 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 She can feed her self finger foods with her left hand only. SEE ROS. Kimmy Guan now sleeps in the hospital bed and receives bed baths. Latanya Erickson is able to communicate with Kimmy Guan very well. Kimmy Guan is able to make her needs known with signals and some words. Patient has some short term memory loss, she listens intently to conversations and understands. Alisa was eating 90 % of an adult sized plate, now eats about 85% of a child's size plate. She drinks 1 Ensure daily as a supplement. I discussed with Kimmy uGan that maybe if she gives Alisa an apple or tangerine and not a banana everyday that may help with constipation. I suggested Something warm to drink- coffee or hot tea may help. I offered to check pt and Latanya Erickson said no. She will call if her mother does not go. Her was left MUAC 17.0.cm. She does show other signs of signs of weight loss like sunken cheeks,bitemporal wasting, loose fitting clothing, prominent clavicles, bony prominences. Medications must be crushed and put in food for ease of swallowing. Her diet consists of Regular foods cut up in tiny pieces, along with lots of fruits, yogurt. Pt. is on 2 tab BID of Senna. Pain is well managed with Norco 5/325 for severe pain.  and Gabapentin. Pt. now has Tramadol 50 mg. 1 q 6 hrs. with Tylenol for mild to mod. pain. Instructed CG to try Ativan at HS. Reviewed with Ale Mohamud to use the meds more if her mom is upset. .Medications rec completed: No RF needed. Supplies needed: ordered. DME needed: None. Ale Mohamud knows how to reach [facility] for any questions or concerns 24/7.

## 2023-01-16 ENCOUNTER — HOME CARE VISIT (OUTPATIENT)
Dept: SCHEDULING | Facility: HOME HEALTH | Age: 88
End: 2023-01-16
Payer: MEDICARE

## 2023-01-16 PROCEDURE — G0156 HHCP-SVS OF AIDE,EA 15 MIN: HCPCS

## 2023-01-19 ENCOUNTER — HOME CARE VISIT (OUTPATIENT)
Dept: SCHEDULING | Facility: HOME HEALTH | Age: 88
End: 2023-01-19
Payer: MEDICARE

## 2023-01-19 VITALS
TEMPERATURE: 98.5 F | DIASTOLIC BLOOD PRESSURE: 80 MMHG | RESPIRATION RATE: 18 BRPM | HEART RATE: 84 BPM | SYSTOLIC BLOOD PRESSURE: 121 MMHG

## 2023-01-19 PROCEDURE — G0155 HHCP-SVS OF CSW,EA 15 MIN: HCPCS

## 2023-01-19 PROCEDURE — G0156 HHCP-SVS OF AIDE,EA 15 MIN: HCPCS

## 2023-01-19 PROCEDURE — G0299 HHS/HOSPICE OF RN EA 15 MIN: HCPCS

## 2023-01-19 ASSESSMENT — ENCOUNTER SYMPTOMS
CONSTIPATION: 1
TROUBLE SWALLOWING: 1
BOWEL INCONTINENCE: 1
STOOL DESCRIPTION: LARGE

## 2023-01-19 NOTE — HOSPICE
Skilled Nursing Comprehensive visit completed. Shwetha present for [facility]. Carolin Fair lives with her daughter Danna Peralta who is her primary caregiver. Cg uses inserts in the pull-ups. Danna Peralta reports that this has helped but pt. still wants to get up 1 to 2  times at night. She is now a full assist for transfers as Carolin Fair is able to bear wt. on left leg briefly. She does lean to the right if she is tired or doesn't feel well. PPS 30%. 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 She can feed her self finger foods with her left hand only. SEE ROS. Carolin Fair now sleeps in the hospital bed and receives bed baths. Danna Peralta is able to communicate with Carolin Fair very well. Carolin Fair is able to make her needs known with signals and some words. Patient has some short term memory loss, she listens intently to conversations and understands. Alisa was eating 90 % of an adult sized plate, now eats about 85% of a child's size plate. She drinks 1 Ensure daily as a supplement . Her was left MUAC 17.0.cm. She does show other signs of signs of weight loss like sunken cheeks,bitemporal wasting, loose fitting clothing, prominent clavicles, bony prominences. Medications must be crushed and put in food for ease of swallowing. Her diet consists of Regular foods cut up in tiny pieces, along with lots of fruits, yogurt. Pt. is on 2 tab BID of Senna. This has finely worked and pt. had a good BM. Pain is well managed with Tramadol  50mg. q 6 hrs. for mild to moderate pain. and Gabapentin. Instructed CG to try Ativan at HS. Reviewed with Danna Peralta to use the meds more if her mom is upset. .Medications rec completed: No RF needed. Supplies needed: ordered. DME needed: None. Danna Peralta knows how to reach [facility] for any questions or concerns 24/7.

## 2023-01-19 NOTE — HOSPICE
SW reviewed [facility] team member's recent notes and spoke with [facility] primary nurse, Jory Garcia prior to today's joint routine visit. Nurse and SW were greeted at the home by Pt.'s daughter, Karlo Swartz and invited into visit. Pt was sitting on the couch looking at the TV as we entered the living room area to visit. Pt was non-verbal throughout the visit but was \"muttering\" some to Hira Brock doing her assessment. Pt appeared to be content and well cared for in her surroundings. Pt.'s daughter reported no new concern or needs. Nurse and SW provided emotional support for Pt along with support for her daughter via active listening and validation of her feelings. Pt.'s daughter expressed appreciation for the visit and continued support. No changes in the plan of care. SW will continue to provide emotional support and assessment of psychosocial and bereavement concerns and needs.

## 2023-01-23 ENCOUNTER — HOME CARE VISIT (OUTPATIENT)
Dept: SCHEDULING | Facility: HOME HEALTH | Age: 88
End: 2023-01-23
Payer: MEDICARE

## 2023-01-23 PROCEDURE — G0156 HHCP-SVS OF AIDE,EA 15 MIN: HCPCS

## 2023-01-26 ENCOUNTER — HOME CARE VISIT (OUTPATIENT)
Dept: SCHEDULING | Facility: HOME HEALTH | Age: 88
End: 2023-01-26
Payer: MEDICARE

## 2023-01-26 VITALS
SYSTOLIC BLOOD PRESSURE: 164 MMHG | HEART RATE: 90 BPM | DIASTOLIC BLOOD PRESSURE: 70 MMHG | TEMPERATURE: 96.9 F | RESPIRATION RATE: 14 BRPM

## 2023-01-26 PROCEDURE — G0156 HHCP-SVS OF AIDE,EA 15 MIN: HCPCS

## 2023-01-26 PROCEDURE — G0299 HHS/HOSPICE OF RN EA 15 MIN: HCPCS

## 2023-01-26 ASSESSMENT — ENCOUNTER SYMPTOMS
BOWEL INCONTINENCE: 1
HEMOPTYSIS: 0
CONSTIPATION: 1

## 2023-01-26 NOTE — HOSPICE
Routine home visit conducted today. Present for visit were pt, pt's daughter/PCG, Altimoteo Weber, HHA, and RN. RN greeted at door by daughter. Pt sitting on sofa with legs elevated on stool upon arrival. Pt demonstrated expressive aphasia s/p stroke. Speech is incomprehensible. No nonverbal s/s of distress or discomfort observed. Pt had just finished getting bathed by HHA. BP slightly elevated. Asymptomatic. Daughter reported that dogs had been barking quite a bit today due to landscapers outside and this agitates pt. Full assessment completed, see ROS. Education provided, see POC. Multiple medications refilled via [facility]. Wipes and pads ordered via [facility]. Pt and family reminded of 24/7 RN availability and encouraged to call Theron Mendez at any time with questions or concerns.

## 2023-01-30 ENCOUNTER — HOME CARE VISIT (OUTPATIENT)
Dept: SCHEDULING | Facility: HOME HEALTH | Age: 88
End: 2023-01-30
Payer: MEDICARE

## 2023-01-30 PROCEDURE — G0156 HHCP-SVS OF AIDE,EA 15 MIN: HCPCS

## 2023-02-02 ENCOUNTER — HOME CARE VISIT (OUTPATIENT)
Dept: SCHEDULING | Facility: HOME HEALTH | Age: 88
End: 2023-02-02
Payer: MEDICARE

## 2023-02-02 VITALS
HEART RATE: 90 BPM | OXYGEN SATURATION: 98 % | RESPIRATION RATE: 18 BRPM | TEMPERATURE: 99.1 F | SYSTOLIC BLOOD PRESSURE: 120 MMHG | DIASTOLIC BLOOD PRESSURE: 70 MMHG

## 2023-02-02 PROCEDURE — G0299 HHS/HOSPICE OF RN EA 15 MIN: HCPCS

## 2023-02-02 PROCEDURE — G0156 HHCP-SVS OF AIDE,EA 15 MIN: HCPCS

## 2023-02-02 ASSESSMENT — ENCOUNTER SYMPTOMS
BOWEL INCONTINENCE: 1
STOOL DESCRIPTION: SOFT FORMED

## 2023-02-03 NOTE — HOSPICE
Skilled Nursing Comprehensive visit completed. Tori He present for [facility] and reports that she has been vomiting up blood. I told her to go call and make a dr. Herman Sky while I did my visit with her mom. Mamadou Molina lives with her daughter Tori He who is her primary caregiver. Cg uses inserts in the pull-ups. Tori He reports that this has helped but pt. still wants to get up 1 to 2 times at night. She is now a full assist for transfers as Mamadou Molina is able to bear wt. on left leg briefly. She does lean to the right if she is tired or doesn't feel well. PPS 30%. 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 She can feed her self finger foods with her left hand only. SEE ROS. Mamadou Molina now sleeps in the hospital bed and receives bed baths. Tori He is able to communicate with Mamadou Molina very well. Mamadou Molina is able to make her needs known with signals and some words. Patient has some short term memory loss, she listens intently to conversations and understands. Alisa was eating 90 % of an adult sized plate, now eats about 85% of a child's size plate. She drinks 1 Ensure daily as a supplement. Left MUAC 17.0.cm. She does show other signs of signs of weight loss like sunken cheeks,bitemporal wasting, loose fitting clothing, prominent clavicles, bony prominences. Medications must be crushed and put in food for ease of swallowing. Her diet consists of Regular foods cut up in tiny pieces, along with lots of fruits, yogurt. Pt. is on 2 tab BID of Senna. This has finely worked and pt. had a good BM. Pain is well managed with Tramadol 50mg. q 6 hrs. for mild to moderate pain. and Gabapentin. Instructed CG to try Ativan at HS. Reviewed with Tori He to use the meds more if her mom is upset. .Medications rec completed: No RF needed. Supplies needed: ordered. DME needed: None. Tori He knows how to reach [facility] for any questions or concerns 24/7.

## 2023-02-06 ENCOUNTER — HOME CARE VISIT (OUTPATIENT)
Dept: SCHEDULING | Facility: HOME HEALTH | Age: 88
End: 2023-02-06
Payer: MEDICARE

## 2023-02-06 PROCEDURE — G0156 HHCP-SVS OF AIDE,EA 15 MIN: HCPCS

## 2023-02-09 ENCOUNTER — HOME CARE VISIT (OUTPATIENT)
Dept: SCHEDULING | Facility: HOME HEALTH | Age: 88
End: 2023-02-09
Payer: MEDICARE

## 2023-02-09 VITALS
DIASTOLIC BLOOD PRESSURE: 75 MMHG | TEMPERATURE: 98.5 F | HEART RATE: 83 BPM | RESPIRATION RATE: 83 BRPM | OXYGEN SATURATION: 97 % | SYSTOLIC BLOOD PRESSURE: 129 MMHG

## 2023-02-09 PROCEDURE — G0156 HHCP-SVS OF AIDE,EA 15 MIN: HCPCS

## 2023-02-09 PROCEDURE — G0299 HHS/HOSPICE OF RN EA 15 MIN: HCPCS

## 2023-02-09 ASSESSMENT — ENCOUNTER SYMPTOMS
TROUBLE SWALLOWING: 1
STOOL DESCRIPTION: SOFT FORMED
BOWEL INCONTINENCE: 1

## 2023-02-10 NOTE — HOSPICE
Skilled Nursing Comprehensive visit completed. Gerard Serra lives with her daughter Willy Dubois who is her primary caregiver. Cg uses inserts in the pull-ups. Willy Dubois reports that this has helped but pt. still wants to get up 1 to 2 times at night. She is now a full assist for transfers as Gerard Serra is able to bear wt. on left leg briefly. She does lean to the right if she is tired or doesn't feel well. PPS 30%. 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 She can feed her self finger foods with her left hand only. SEE ROS. Gerard Serra now sleeps in the hospital bed and receives bed baths. Willy Dubois is able to communicate with Gerard Serra very well. Gerard Serra is able to make her needs known with signals and some words. Patient has some short term memory loss, she listens intently to conversations and understands. Alisa was eating 90 % of an adult sized plate, now eats about 85% of a child's size plate. She drinks 1 Ensure daily as a supplement. Left MUAC 17.0.cm. She does show other signs of signs of weight loss like sunken cheeks,bitemporal wasting, loose fitting clothing, prominent clavicles, bony prominences. Medications must be crushed and put in food for ease of swallowing. Her diet consists of Regular foods cut up in tiny pieces, along with lots of fruits, yogurt. Pt. is on 2 tab BID of Senna. This is working well. . Pain is well managed with Tramadol 50mg. q 6 hrs. for mild to moderate pain. and Gabapentin. Instructed CG to try Ativan at HS. Reviewed with Willy Dubois to use the meds more if her mom is upset. .Medications rec completed: No RF needed. Supplies needed: ordered. DME needed: None. [facility] or concerns [facility].

## 2023-02-13 ENCOUNTER — HOME CARE VISIT (OUTPATIENT)
Dept: SCHEDULING | Facility: HOME HEALTH | Age: 88
End: 2023-02-13
Payer: MEDICARE

## 2023-02-13 PROCEDURE — G0156 HHCP-SVS OF AIDE,EA 15 MIN: HCPCS

## 2023-02-16 ENCOUNTER — HOME CARE VISIT (OUTPATIENT)
Dept: SCHEDULING | Facility: HOME HEALTH | Age: 88
End: 2023-02-16
Payer: MEDICARE

## 2023-02-16 ENCOUNTER — HOME CARE VISIT (OUTPATIENT)
Dept: HOSPICE | Facility: HOSPICE | Age: 88
End: 2023-02-16
Payer: MEDICARE

## 2023-02-16 VITALS
TEMPERATURE: 98.1 F | RESPIRATION RATE: 16 BRPM | OXYGEN SATURATION: 98 % | SYSTOLIC BLOOD PRESSURE: 133 MMHG | HEART RATE: 84 BPM | DIASTOLIC BLOOD PRESSURE: 69 MMHG

## 2023-02-16 PROCEDURE — G0156 HHCP-SVS OF AIDE,EA 15 MIN: HCPCS

## 2023-02-16 PROCEDURE — G0299 HHS/HOSPICE OF RN EA 15 MIN: HCPCS

## 2023-02-16 ASSESSMENT — ENCOUNTER SYMPTOMS
STOOL DESCRIPTION: SOFT FORMED
TROUBLE SWALLOWING: 1
BOWEL INCONTINENCE: 1

## 2023-02-16 NOTE — HOSPICE
Skilled Nursing Comprehensive visit completed. Rosario Obrien lives with her daughter Gurpreet Flowers who is her primary caregiver. Cg uses inserts in the pull-ups. Gurpreet Flowers reports that this has helped but pt. still wants to get up 1 to 2 times at night. She is now a full assist for transfers as Rosario Obrien is able to bear wt. on left leg briefly. She does lean to the right if she is tired or doesn't feel well. PPS 30%. 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 She can feed her self finger foods with her left hand only. SEE ROS. Rosario Obrien now sleeps in the hospital bed and receives bed baths. Gurpreet Flowers is able to communicate with Rosario Obrien very well. Rosario Obrien is able to make her needs known with signals and some words. Patient has some short term memory loss, she listens intently to conversations and understands. Alisa was eating 90 % of an adult sized plate, now eats about 85% of a child's size plate. She drinks 1 Ensure daily as a supplement. Left MUAC 17.0.cm. She does show other signs of signs of weight loss like sunken cheeks,bitemporal wasting, loose fitting clothing, prominent clavicles, bony prominences. Medications must be crushed and put in food for ease of swallowing. Her diet consists of Regular foods cut up in tiny pieces, along with lots of fruits, yogurt. Pt. is on 2 tab BID of Senna. This is working well. . Pain is well managed with Tramadol 50mg. q 6 hrs. for mild to moderate pain. and Gabapentin. Instructed CG to try Ativan at HS. Reviewed with Gurpreet Flowers to use the meds more if her mom is upset. .Medications rec completed: Med RF ordered. Supplies  not needed:  DME needed: None. Gurpreet Flowers knows how to reach [facility] for any questions or concerns 24/7 for any needs or concerns. Diane Kothari

## 2023-02-20 ENCOUNTER — HOME CARE VISIT (OUTPATIENT)
Dept: SCHEDULING | Facility: HOME HEALTH | Age: 88
End: 2023-02-20
Payer: MEDICARE

## 2023-02-20 PROCEDURE — G0156 HHCP-SVS OF AIDE,EA 15 MIN: HCPCS

## 2023-02-23 ENCOUNTER — HOME CARE VISIT (OUTPATIENT)
Dept: SCHEDULING | Facility: HOME HEALTH | Age: 88
End: 2023-02-23
Payer: MEDICARE

## 2023-02-23 VITALS
SYSTOLIC BLOOD PRESSURE: 114 MMHG | DIASTOLIC BLOOD PRESSURE: 54 MMHG | HEART RATE: 90 BPM | RESPIRATION RATE: 18 BRPM | OXYGEN SATURATION: 98 % | TEMPERATURE: 98.6 F

## 2023-02-23 PROCEDURE — G0156 HHCP-SVS OF AIDE,EA 15 MIN: HCPCS

## 2023-02-23 PROCEDURE — G0155 HHCP-SVS OF CSW,EA 15 MIN: HCPCS

## 2023-02-23 PROCEDURE — G0299 HHS/HOSPICE OF RN EA 15 MIN: HCPCS

## 2023-02-23 ASSESSMENT — ENCOUNTER SYMPTOMS
BOWEL INCONTINENCE: 1
STOOL DESCRIPTION: SOFT FORMED

## 2023-02-23 NOTE — HOSPICE
Skilled Nursing Comprehensive visit completed. Kae Gabriel lives with her daughter Tez Armenta who is her primary caregiver. Cg uses inserts in the pull-ups. Tez Armenta reports that this has helped but pt. still wants to get up 1 to 2 times at night. She is now a full assist for transfers as Kae Gabriel is able to bear wt. on left leg briefly. She does lean to the right if she is tired or doesn't feel well. PPS 30%. 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 She can feed her self finger foods with her left hand only. SEE ROS. Kae Gabriel now sleeps in the hospital bed and receives bed baths. Tez Armenta is able to communicate with Kae Gabriel very well. Kae Gabriel is able to make her needs known with signals and some words. Patient has some short term memory loss, she listens intently to conversations and understands. Alisa was eating 90 % of an adult sized plate, now eats about 85% of a child's size plate. She drinks 1 Ensure daily as a supplement. Left MUAC 17.0.cm. She does show other signs of signs of weight loss like sunken cheeks,bitemporal wasting, loose fitting clothing, prominent clavicles, bony prominences. Medications must be crushed and put in food for ease of swallowing. Her diet consists of Regular foods cut up in tiny pieces, along with lots of fruits, yogurt. Pt. is on 2 tab BID of Senna. This is not helping as much and Tez Armenta wants something stronger. Pt. was obviously in pain during my visit. Tez Armenta had not given AM meds. She is still taking Keflex 500mg. for UTI and this has helped. Tez Armenta also mentioned the need for another [facility] stay in April nd pt. understood this and became very angry. Pain is well managed with Tramadol 50mg. q 6 hrs. for mild to moderate pain. and Gabapentin. Instructed CG to try Ativan at HS. Reviewed with Tez Armenta to use the meds more if her mom is upset. Medications rec completed: Med RF ordered. Supplies not needed: DME needed: None.  [facility] or concerns 24/7 for any needs or concerns. Penny Nieves

## 2023-02-23 NOTE — HOSPICE
SW reviewed [facility] team member's recent notes and spoke with [facility] primary nurse, Joi Dueñas prior to today's joint routine visit. Nurse and  SW were greeted at the home by Pt.'s daughter, Marissa Westbrook and invited into visit. Pt was sitting on the couch looking at the TV as we entered the living room area to visit. Pt was non-verbal throughout the visit but was \"muttering\" at times as Trinidad Abdi completed her assessment. Pt appeared to be in pain as evidenced by facial grimacing Buffy Buenrostro gave her pain medications during the visit) otherwise she appeared content and well cared for in her surroundings. Marissa Westbrook asked about a respite stay for her mother in April and will let us know the dates so we can plan accordingly. Nurse and SW provided emotional support for Pt along with support for her daughter via active listening and validation of her feelings. Pt.'s daughter expressed appreciation for the visit and continued support. No changes in the plan of care. SW will continue to provide emotional support and assessment of psychosocial and bereavement concerns and needs.

## 2023-02-27 ENCOUNTER — HOME CARE VISIT (OUTPATIENT)
Dept: SCHEDULING | Facility: HOME HEALTH | Age: 88
End: 2023-02-27
Payer: MEDICARE

## 2023-02-27 PROCEDURE — G0156 HHCP-SVS OF AIDE,EA 15 MIN: HCPCS

## 2023-03-02 ENCOUNTER — HOME CARE VISIT (OUTPATIENT)
Dept: SCHEDULING | Facility: HOME HEALTH | Age: 88
End: 2023-03-02
Payer: MEDICARE

## 2023-03-02 VITALS
OXYGEN SATURATION: 98 % | TEMPERATURE: 98.5 F | SYSTOLIC BLOOD PRESSURE: 126 MMHG | RESPIRATION RATE: 18 BRPM | HEART RATE: 80 BPM | DIASTOLIC BLOOD PRESSURE: 80 MMHG

## 2023-03-02 PROCEDURE — G0299 HHS/HOSPICE OF RN EA 15 MIN: HCPCS

## 2023-03-02 PROCEDURE — G0156 HHCP-SVS OF AIDE,EA 15 MIN: HCPCS

## 2023-03-02 ASSESSMENT — ENCOUNTER SYMPTOMS
STOOL DESCRIPTION: SOFT FORMED
BOWEL INCONTINENCE: 1

## 2023-03-02 NOTE — HOSPICE
Recertification visit completed today with Willy Finney NP. Eligio Klinefelter lives with her daughter Yaneli Wyman who is her primary caregiver. Cg uses inserts in the pull-ups. Groves Ped reports that this has helped but pt. still wants to get up 1 to 2 times at night. She is now a full assist for transfers as Eligio Klinefelter is able to bear wt. on left leg briefly. She does lean to the right if she is tired or doesn't feel well. PPS 30%. 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 She can feed her self finger foods with her left hand only. SEE ROS. Eligio Klinefelter now sleeps in the hospital bed and receives bed baths. Yaneli Wyman is able to communicate with Eligio Klinefelter very well. Eligio Klinefelter is able to make her needs known with signals and some words. Patient has some short term memory loss, she listens intently to conversations and understands. Alisa was eating 90 % of an adult sized plate, now eats about 85% of a child's size plate. She drinks 1 Ensure daily as a supplement. Left MUAC 17.0.cm. She does show other signs of signs of weight loss like sunken cheeks,bitemporal wasting, loose fitting clothing, prominent clavicles, bony prominences. Medications must be crushed and put in food for ease of swallowing. Her diet consists of Regular foods cut up in tiny pieces, along with lots of fruits, yogurt. Pt. has been started on Senna S 2 tab PO BID. This is helping so far. NP ordered Cranberry Complex 500mg. BID. Groves Ped also mentioned the need for another [facility] stay in April. Pain is well managed with Tramadol 50mg. q 6 hrs. for mild to moderate pain. and Gabapentin. Instructed CG to try Ativan at HS. Reviewed with Abbott Ped to use the meds more if her mom is upset. Medications rec completed: Med RF None. Supplies not needed: DME needed: None. Groves Ped knows how to reach [facility] for any questions or concerns 24/7 for any needs or concerns. Helga Monson

## 2023-03-06 ENCOUNTER — HOME CARE VISIT (OUTPATIENT)
Dept: SCHEDULING | Facility: HOME HEALTH | Age: 88
End: 2023-03-06
Payer: MEDICARE

## 2023-03-06 PROCEDURE — G0156 HHCP-SVS OF AIDE,EA 15 MIN: HCPCS

## 2023-03-09 ENCOUNTER — HOME CARE VISIT (OUTPATIENT)
Dept: HOSPICE | Facility: HOSPICE | Age: 88
End: 2023-03-09
Payer: MEDICARE

## 2023-03-09 ENCOUNTER — HOME CARE VISIT (OUTPATIENT)
Dept: SCHEDULING | Facility: HOME HEALTH | Age: 88
End: 2023-03-09
Payer: MEDICARE

## 2023-03-09 VITALS
HEART RATE: 68 BPM | DIASTOLIC BLOOD PRESSURE: 80 MMHG | TEMPERATURE: 98.4 F | OXYGEN SATURATION: 98 % | SYSTOLIC BLOOD PRESSURE: 130 MMHG | RESPIRATION RATE: 18 BRPM

## 2023-03-09 PROCEDURE — G0299 HHS/HOSPICE OF RN EA 15 MIN: HCPCS

## 2023-03-09 PROCEDURE — G0155 HHCP-SVS OF CSW,EA 15 MIN: HCPCS

## 2023-03-09 PROCEDURE — G0156 HHCP-SVS OF AIDE,EA 15 MIN: HCPCS

## 2023-03-09 ASSESSMENT — ENCOUNTER SYMPTOMS
TROUBLE SWALLOWING: 1
STOOL DESCRIPTION: SOFT FORMED
DYSPNEA ACTIVITY LEVEL: AT REST
BOWEL INCONTINENCE: 1

## 2023-03-09 NOTE — HOSPICE
Skilled Nursing Comprehensive visit completed. Harmeet Darby lives with her daughter David Ferguson who is her primary caregiver. Cg uses inserts in the pull-ups. David Ferguson reports that this has helped but pt. still wants to get up 1 to 2 times at night. She is now a full assist for transfers as Harmeet Darby is able to bear wt. on left leg briefly. She does lean to the right if she is tired or doesn't feel well. PPS 30%. 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 She can feed her self finger foods with her left hand only. SEE ROS. Harmeet Darby now sleeps in the hospital bed and receives bed baths. David Ferguson is able to communicate with Harmeet Darby very well. Harmeet Darby is able to make her needs known with signals and some words. Patient has some short term memory loss, she listens intently to conversations and understands. Alisa was eating 90 % of an adult sized plate, now eats about 85% of a child's size plate. She drinks 1 Ensure daily as a supplement. Left MUAC 17.0.cm. She does show other signs of signs of weight loss like sunken cheeks,bitemporal wasting, loose fitting clothing, prominent clavicles, bony prominences. Medications must be crushed and put in food for ease of swallowing. Her diet consists of Regular foods cut up in tiny pieces, along with lots of fruits, yogurt. Pt. is on 2 tab BID of Senna S which has help with her constipation. Pt. was obviously in pain during my visit - she was holding her head. David Ferguson had just given AM meds. Shwetha  discussed [facility] stay in April with MSW. Pain is well managed with Tramadol 50mg. q 6 hrs. for mild to moderate pain. and Gabapentin. Instructed CG to try Ativan at HS. Reviewed with David Ferguson to use the meds more if her mom is upset or agitated. . Medications rec completed: Med RF ordered. Supplies ordered.: DME needed: None. [facility] or concerns 24/7 for any needs or concerns. Sharla Fraser

## 2023-03-13 ENCOUNTER — HOME CARE VISIT (OUTPATIENT)
Dept: HOSPICE | Facility: HOSPICE | Age: 88
End: 2023-03-13
Payer: MEDICARE

## 2023-03-13 ASSESSMENT — ENCOUNTER SYMPTOMS: HEMOPTYSIS: 0

## 2023-03-13 NOTE — HOSPICE
Alisa is sitting up on her sofa. She is interactive today but not very cheerful. She is more easily frustrated per Ewa. Ewa requested two days of paid in-patient for her mom as she has a court deposition on the 14th and needs care for her mother. Their original caregiver is not available to stay with her. Neha Gracia has declined some in the past few months. Ewa is contemplating putting the house on the market soon.  provides emotional support and availability. Neha Gracia will go to [facility] by EMS and return Thursday via Ewa.

## 2023-03-14 ENCOUNTER — HOME CARE VISIT (OUTPATIENT)
Dept: SCHEDULING | Facility: HOME HEALTH | Age: 88
End: 2023-03-14
Payer: MEDICARE

## 2023-03-14 PROBLEM — Z86.73 HISTORY OF CVA (CEREBROVASCULAR ACCIDENT): Status: ACTIVE | Noted: 2017-07-20

## 2023-03-14 PROBLEM — Z51.5 HOSPICE CARE PATIENT: Status: ACTIVE | Noted: 2021-03-24

## 2023-03-17 ENCOUNTER — HOME CARE VISIT (OUTPATIENT)
Dept: SCHEDULING | Facility: HOME HEALTH | Age: 88
End: 2023-03-17
Payer: MEDICARE

## 2023-03-17 ENCOUNTER — HOME CARE VISIT (OUTPATIENT)
Dept: HOSPICE | Facility: HOSPICE | Age: 88
End: 2023-03-17
Payer: MEDICARE

## 2023-03-17 PROCEDURE — G0299 HHS/HOSPICE OF RN EA 15 MIN: HCPCS

## 2023-03-19 VITALS
DIASTOLIC BLOOD PRESSURE: 66 MMHG | HEART RATE: 96 BPM | TEMPERATURE: 98.1 F | RESPIRATION RATE: 16 BRPM | SYSTOLIC BLOOD PRESSURE: 118 MMHG

## 2023-03-19 ASSESSMENT — ENCOUNTER SYMPTOMS: STOOL DESCRIPTION: FORMED

## 2023-03-20 ENCOUNTER — HOME CARE VISIT (OUTPATIENT)
Dept: SCHEDULING | Facility: HOME HEALTH | Age: 88
End: 2023-03-20
Payer: MEDICARE

## 2023-03-20 PROCEDURE — G0156 HHCP-SVS OF AIDE,EA 15 MIN: HCPCS

## 2023-03-20 NOTE — HOSPICE
RN received report from [address] at [facility] yesterday othat patient only issue during brief stay was anxiety furing night of 3/15 that was contolled by 1 dose of lorazepam and pt sleepy prior to return home. TN also called Shwetha lat 1700, she reported patient awake eating. Visit planned for 3/17,reinfoced gospice avilUpon arrival, pt seated on sofa,alert and duaghter present. Clayton Rivers aware of change of CM,she pleasant receptive. Pt voiced need to urinate in Uzbek. RN observed cg very good technique in trasfer to w/c and to toliet. Pt followed cg instructions in Uzbek, in hand placment and  firmly attached towel rack during process of standing. Savanna Rojas provided RN with very good background from patient stroke to  present day. Assess as noted. Adequate incontinent supplies except reuested underpads. Next visit tentatively planned for 3/23. Reifnorced hospice avail as needed.

## 2023-03-23 ENCOUNTER — HOME CARE VISIT (OUTPATIENT)
Dept: SCHEDULING | Facility: HOME HEALTH | Age: 88
End: 2023-03-23
Payer: MEDICARE

## 2023-03-23 PROCEDURE — G0156 HHCP-SVS OF AIDE,EA 15 MIN: HCPCS

## 2023-03-23 PROCEDURE — G0299 HHS/HOSPICE OF RN EA 15 MIN: HCPCS

## 2023-03-27 ENCOUNTER — HOME CARE VISIT (OUTPATIENT)
Dept: SCHEDULING | Facility: HOME HEALTH | Age: 88
End: 2023-03-27
Payer: MEDICARE

## 2023-03-27 VITALS
DIASTOLIC BLOOD PRESSURE: 74 MMHG | HEART RATE: 68 BPM | SYSTOLIC BLOOD PRESSURE: 128 MMHG | RESPIRATION RATE: 20 BRPM | TEMPERATURE: 98.1 F

## 2023-03-27 PROCEDURE — G0156 HHCP-SVS OF AIDE,EA 15 MIN: HCPCS

## 2023-03-28 NOTE — HOSPICE
Ia recptive to visit, pt seated on sofa. Both Shwetha and pt were disiturbed and focused on granddaughter 's health issue. Pt kept pointing to granddaughter and babbling. RN instruct Shwetha to seek medical appointment for granddaughter. David Ferguson overwhelmed with focus on dogs, patient and  granddaughter, visit disjointed. Meds supply arrival and adequate. Pt continues to eat well. Assess as noted. RN planned next visit for 1 week. Reinforced hospice avail as needed.  David Ferguson voiced understnading and agreement

## 2023-03-30 ENCOUNTER — HOME CARE VISIT (OUTPATIENT)
Dept: SCHEDULING | Facility: HOME HEALTH | Age: 88
End: 2023-03-30
Payer: MEDICARE

## 2023-03-30 PROCEDURE — G0156 HHCP-SVS OF AIDE,EA 15 MIN: HCPCS

## 2023-04-03 ENCOUNTER — HOME CARE VISIT (OUTPATIENT)
Dept: HOSPICE | Facility: HOSPICE | Age: 88
End: 2023-04-03
Payer: MEDICARE

## 2023-04-06 ENCOUNTER — HOME CARE VISIT (OUTPATIENT)
Dept: SCHEDULING | Facility: HOME HEALTH | Age: 88
End: 2023-04-06
Payer: MEDICARE

## 2023-04-06 PROCEDURE — G0156 HHCP-SVS OF AIDE,EA 15 MIN: HCPCS

## 2023-04-17 ENCOUNTER — HOME CARE VISIT (OUTPATIENT)
Dept: SCHEDULING | Facility: HOME HEALTH | Age: 88
End: 2023-04-17
Payer: MEDICARE

## 2023-04-17 PROCEDURE — G0156 HHCP-SVS OF AIDE,EA 15 MIN: HCPCS

## 2023-04-18 ENCOUNTER — HOME CARE VISIT (OUTPATIENT)
Dept: SCHEDULING | Facility: HOME HEALTH | Age: 88
End: 2023-04-18
Payer: MEDICARE

## 2023-04-18 VITALS
RESPIRATION RATE: 20 BRPM | SYSTOLIC BLOOD PRESSURE: 140 MMHG | HEART RATE: 112 BPM | DIASTOLIC BLOOD PRESSURE: 80 MMHG | TEMPERATURE: 99 F

## 2023-04-18 PROCEDURE — G0299 HHS/HOSPICE OF RN EA 15 MIN: HCPCS

## 2023-04-18 ASSESSMENT — ENCOUNTER SYMPTOMS
CONTUSION: 1
STOOL DESCRIPTION: FORMED

## 2023-04-19 NOTE — HOSPICE
Joint visit with Parker Miles rushed and preoccupied with preparations for patient transfer to respite and leaving home for appoint in [place]. Pt seated on sofa, alert. Alert animated attempt to communicate with RN, inconprehensible babbling greene. Cooperative with assess and comprehends by lifting arm for bp and raising forward for breath sounds assess. Shwetha concenred re let breast deep tissue injury noted in review. becasue of Eliquis usage. Told would report to [facility] staff for provider assess. . RN reviewed and agreed with meds. At team departure, daughter remaining in home until transport arrives as Shwetha and granddaughters perparing to depart. Post visit 1020 this RN phoned transfer report to Tez Caballero RN incldued diagnosis, assessment findings today incl. bm and vital signs, chilango Shwetha phone number and all componenets required to fill out their form. Informed of bruisng and daughter concern over Eliquis and left breast bruise. Justyna voiced understanding. Informed this RN avail for questions,concerns as needed.

## 2023-04-26 ENCOUNTER — HOME CARE VISIT (OUTPATIENT)
Dept: SCHEDULING | Facility: HOME HEALTH | Age: 88
End: 2023-04-26
Payer: MEDICARE

## 2023-04-26 PROCEDURE — G0299 HHS/HOSPICE OF RN EA 15 MIN: HCPCS

## 2023-05-01 ENCOUNTER — HOME CARE VISIT (OUTPATIENT)
Dept: HOSPICE | Facility: HOSPICE | Age: 88
End: 2023-05-01
Payer: MEDICARE

## 2023-05-01 ENCOUNTER — HOME CARE VISIT (OUTPATIENT)
Dept: SCHEDULING | Facility: HOME HEALTH | Age: 88
End: 2023-05-01
Payer: MEDICARE

## 2023-05-01 ASSESSMENT — ENCOUNTER SYMPTOMS: HEMOPTYSIS: 0

## 2023-05-02 ENCOUNTER — HOME CARE VISIT (OUTPATIENT)
Dept: SCHEDULING | Facility: HOME HEALTH | Age: 88
End: 2023-05-02
Payer: MEDICARE

## 2023-05-02 ENCOUNTER — HOME CARE VISIT (OUTPATIENT)
Dept: HOSPICE | Facility: HOSPICE | Age: 88
End: 2023-05-02
Payer: MEDICARE

## 2023-05-02 PROCEDURE — G0156 HHCP-SVS OF AIDE,EA 15 MIN: HCPCS

## 2023-05-02 PROCEDURE — G0155 HHCP-SVS OF CSW,EA 15 MIN: HCPCS

## 2023-05-02 PROCEDURE — G0299 HHS/HOSPICE OF RN EA 15 MIN: HCPCS

## 2023-05-02 ASSESSMENT — ENCOUNTER SYMPTOMS: BOWEL INCONTINENCE: 1

## 2023-05-02 NOTE — HOSPICE
SN visit during [facility]. Discussed pt with Felisa Sevilla, she reprots uneventful stay. Staff took her ouside onto [address] and pt enjoyed this. Eating well. Marlon Montoya reported that today she drowsy and sleeping more but ate well at meals. Uon entrance to room,pt lying quietly alert. Incomprehensible speeech but animatd by RN presence. RB phoned Adrian's numer and granddaughter answered. Granddaughter reported ADRIAN home for her hospital surgical stay. Family member has come to provide Ewa with recovery assist. Surinder King her that I visited grandmother and she doing fine. RN provided face to face  update to Dr. Minerva Burrell on caregiver status.

## 2023-05-04 ENCOUNTER — HOME CARE VISIT (OUTPATIENT)
Dept: SCHEDULING | Facility: HOME HEALTH | Age: 88
End: 2023-05-04
Payer: MEDICARE

## 2023-05-04 PROCEDURE — G0156 HHCP-SVS OF AIDE,EA 15 MIN: HCPCS

## 2023-05-04 ASSESSMENT — ENCOUNTER SYMPTOMS: HEMOPTYSIS: 0

## 2023-05-04 NOTE — HOSPICE
Rea Speaks returned from her extended respite stay. She had become depressed toward the end of the stay. With her transferred home now, ABILIO and Ewa met today beganplanning a discharge from [facility]. ABILIO provided placement information to her about the area they are moving to, [facility]. ABILIO reviewed with her the application for medicaid process. She is aware of SNF vs [facility] medicaid. ABILIO also discussed  on aging's services. Ewa indicated that she may change hospice services to [facility] but has not fully decided. She states that when jennifer is to be dicharged from [facility], she will appear this. Ewa was given material to revew to [facility] and placement as determined. ABILIO offered availability for questions or concerns. They have not really decided on what they are going to do.

## 2023-05-05 ENCOUNTER — HOME CARE VISIT (OUTPATIENT)
Dept: SCHEDULING | Facility: HOME HEALTH | Age: 88
End: 2023-05-05
Payer: MEDICARE

## 2023-05-05 PROCEDURE — G0299 HHS/HOSPICE OF RN EA 15 MIN: HCPCS

## 2023-05-07 VITALS
TEMPERATURE: 97.6 F | HEART RATE: 84 BPM | RESPIRATION RATE: 22 BRPM | DIASTOLIC BLOOD PRESSURE: 80 MMHG | SYSTOLIC BLOOD PRESSURE: 118 MMHG

## 2023-05-07 ASSESSMENT — ENCOUNTER SYMPTOMS
STOOL DESCRIPTION: LARGE
CONTUSION: 1

## (undated) DEVICE — APPLICATOR BNDG 1MM ADH PREMIERPRO EXOFIN

## (undated) DEVICE — REM POLYHESIVE ADULT PATIENT RETURN ELECTRODE: Brand: VALLEYLAB

## (undated) DEVICE — SUTURE PERMAHAND SZ 2-0 L12X18IN NONABSORBABLE BLK SILK A185H

## (undated) DEVICE — SUTURE VCRL SZ 3-0 L27IN ABSRB UD L26MM SH 1/2 CIR J416H

## (undated) DEVICE — SUT PROL 2-0 30IN CT2 BLU --

## (undated) DEVICE — (D)PREP SKN CHLRAPRP APPL 26ML -- CONVERT TO ITEM 371833

## (undated) DEVICE — SOLUTION IV 1000ML 0.9% SOD CHL

## (undated) DEVICE — DRAPE,TOP,102X53,STERILE: Brand: MEDLINE

## (undated) DEVICE — SURGICAL PROCEDURE PACK BASIC ST FRANCIS

## (undated) DEVICE — KENDALL SCD EXPRESS SLEEVES, KNEE LENGTH, MEDIUM: Brand: KENDALL SCD

## (undated) DEVICE — SUTURE MCRYL SZ 4-0 L27IN ABSRB UD L19MM PS-2 1/2 CIR PRIM Y426H

## (undated) DEVICE — SUTURE PROL SZ 0 L30IN NONABSORBABLE BLU L26MM CT-2 1/2 CIR 8412H

## (undated) DEVICE — NEEDLE HYPO 25GA L1.5IN BLU POLYPR HUB S STL REG BVL STR

## (undated) DEVICE — SHEET, DRAPE, SPLIT, STERILE: Brand: MEDLINE